# Patient Record
Sex: MALE | Race: ASIAN | NOT HISPANIC OR LATINO
[De-identification: names, ages, dates, MRNs, and addresses within clinical notes are randomized per-mention and may not be internally consistent; named-entity substitution may affect disease eponyms.]

---

## 2021-03-18 PROBLEM — Z00.00 ENCOUNTER FOR PREVENTIVE HEALTH EXAMINATION: Status: ACTIVE | Noted: 2021-03-18

## 2021-03-21 ENCOUNTER — APPOINTMENT (OUTPATIENT)
Dept: DISASTER EMERGENCY | Facility: CLINIC | Age: 71
End: 2021-03-21

## 2021-03-22 LAB — SARS-COV-2 N GENE NPH QL NAA+PROBE: NOT DETECTED

## 2021-03-24 ENCOUNTER — INPATIENT (INPATIENT)
Facility: HOSPITAL | Age: 71
LOS: 1 days | Discharge: HOME | DRG: 247 | End: 2021-03-26
Attending: INTERNAL MEDICINE | Admitting: INTERNAL MEDICINE
Payer: MEDICARE

## 2021-03-24 ENCOUNTER — TRANSCRIPTION ENCOUNTER (OUTPATIENT)
Age: 71
End: 2021-03-24

## 2021-03-24 VITALS — WEIGHT: 175.05 LBS

## 2021-03-24 DIAGNOSIS — Z98.890 OTHER SPECIFIED POSTPROCEDURAL STATES: Chronic | ICD-10-CM

## 2021-03-24 DIAGNOSIS — R93.1 ABNORMAL FINDINGS ON DIAGNOSTIC IMAGING OF HEART AND CORONARY CIRCULATION: ICD-10-CM

## 2021-03-24 LAB
ANION GAP SERPL CALC-SCNC: 15 MMOL/L — SIGNIFICANT CHANGE UP (ref 5–17)
BUN SERPL-MCNC: 14 MG/DL — SIGNIFICANT CHANGE UP (ref 7–23)
CALCIUM SERPL-MCNC: 9.9 MG/DL — SIGNIFICANT CHANGE UP (ref 8.4–10.5)
CHLORIDE SERPL-SCNC: 98 MMOL/L — SIGNIFICANT CHANGE UP (ref 96–108)
CO2 SERPL-SCNC: 26 MMOL/L — SIGNIFICANT CHANGE UP (ref 22–31)
CREAT SERPL-MCNC: 0.69 MG/DL — SIGNIFICANT CHANGE UP (ref 0.5–1.3)
GLUCOSE BLDC GLUCOMTR-MCNC: 120 MG/DL — HIGH (ref 70–99)
GLUCOSE BLDC GLUCOMTR-MCNC: 160 MG/DL — HIGH (ref 70–99)
GLUCOSE BLDC GLUCOMTR-MCNC: 198 MG/DL — HIGH (ref 70–99)
GLUCOSE BLDC GLUCOMTR-MCNC: 93 MG/DL — SIGNIFICANT CHANGE UP (ref 70–99)
GLUCOSE SERPL-MCNC: 165 MG/DL — HIGH (ref 70–99)
HCT VFR BLD CALC: 47 % — SIGNIFICANT CHANGE UP (ref 39–50)
HGB BLD-MCNC: 14.6 G/DL — SIGNIFICANT CHANGE UP (ref 13–17)
MCHC RBC-ENTMCNC: 26.6 PG — LOW (ref 27–34)
MCHC RBC-ENTMCNC: 31.1 GM/DL — LOW (ref 32–36)
MCV RBC AUTO: 85.8 FL — SIGNIFICANT CHANGE UP (ref 80–100)
NRBC # BLD: 0 /100 WBCS — SIGNIFICANT CHANGE UP (ref 0–0)
PLATELET # BLD AUTO: 214 K/UL — SIGNIFICANT CHANGE UP (ref 150–400)
POTASSIUM SERPL-MCNC: 4.1 MMOL/L — SIGNIFICANT CHANGE UP (ref 3.5–5.3)
POTASSIUM SERPL-SCNC: 4.1 MMOL/L — SIGNIFICANT CHANGE UP (ref 3.5–5.3)
RBC # BLD: 5.48 M/UL — SIGNIFICANT CHANGE UP (ref 4.2–5.8)
RBC # FLD: 13.5 % — SIGNIFICANT CHANGE UP (ref 10.3–14.5)
SODIUM SERPL-SCNC: 139 MMOL/L — SIGNIFICANT CHANGE UP (ref 135–145)
WBC # BLD: 6.8 K/UL — SIGNIFICANT CHANGE UP (ref 3.8–10.5)
WBC # FLD AUTO: 6.8 K/UL — SIGNIFICANT CHANGE UP (ref 3.8–10.5)

## 2021-03-24 PROCEDURE — 93010 ELECTROCARDIOGRAM REPORT: CPT

## 2021-03-24 RX ORDER — GLUCAGON INJECTION, SOLUTION 0.5 MG/.1ML
1 INJECTION, SOLUTION SUBCUTANEOUS ONCE
Refills: 0 | Status: DISCONTINUED | OUTPATIENT
Start: 2021-03-24 | End: 2021-03-26

## 2021-03-24 RX ORDER — SODIUM CHLORIDE 9 MG/ML
1000 INJECTION, SOLUTION INTRAVENOUS
Refills: 0 | Status: DISCONTINUED | OUTPATIENT
Start: 2021-03-24 | End: 2021-03-26

## 2021-03-24 RX ORDER — INSULIN LISPRO 100/ML
VIAL (ML) SUBCUTANEOUS
Refills: 0 | Status: DISCONTINUED | OUTPATIENT
Start: 2021-03-24 | End: 2021-03-26

## 2021-03-24 RX ORDER — ASPIRIN/CALCIUM CARB/MAGNESIUM 324 MG
81 TABLET ORAL DAILY
Refills: 0 | Status: DISCONTINUED | OUTPATIENT
Start: 2021-03-24 | End: 2021-03-26

## 2021-03-24 RX ORDER — DEXTROSE 50 % IN WATER 50 %
12.5 SYRINGE (ML) INTRAVENOUS ONCE
Refills: 0 | Status: DISCONTINUED | OUTPATIENT
Start: 2021-03-24 | End: 2021-03-26

## 2021-03-24 RX ORDER — DEXTROSE 50 % IN WATER 50 %
25 SYRINGE (ML) INTRAVENOUS ONCE
Refills: 0 | Status: DISCONTINUED | OUTPATIENT
Start: 2021-03-24 | End: 2021-03-26

## 2021-03-24 RX ORDER — INSULIN LISPRO 100/ML
VIAL (ML) SUBCUTANEOUS AT BEDTIME
Refills: 0 | Status: DISCONTINUED | OUTPATIENT
Start: 2021-03-24 | End: 2021-03-26

## 2021-03-24 RX ORDER — ATORVASTATIN CALCIUM 80 MG/1
40 TABLET, FILM COATED ORAL AT BEDTIME
Refills: 0 | Status: DISCONTINUED | OUTPATIENT
Start: 2021-03-24 | End: 2021-03-26

## 2021-03-24 RX ORDER — DEXTROSE 50 % IN WATER 50 %
15 SYRINGE (ML) INTRAVENOUS ONCE
Refills: 0 | Status: DISCONTINUED | OUTPATIENT
Start: 2021-03-24 | End: 2021-03-26

## 2021-03-24 RX ORDER — METOPROLOL TARTRATE 50 MG
12.5 TABLET ORAL EVERY 12 HOURS
Refills: 0 | Status: DISCONTINUED | OUTPATIENT
Start: 2021-03-24 | End: 2021-03-26

## 2021-03-24 RX ORDER — TAMSULOSIN HYDROCHLORIDE 0.4 MG/1
0.4 CAPSULE ORAL AT BEDTIME
Refills: 0 | Status: DISCONTINUED | OUTPATIENT
Start: 2021-03-24 | End: 2021-03-26

## 2021-03-24 RX ORDER — CLOPIDOGREL BISULFATE 75 MG/1
75 TABLET, FILM COATED ORAL DAILY
Refills: 0 | Status: DISCONTINUED | OUTPATIENT
Start: 2021-03-24 | End: 2021-03-26

## 2021-03-24 RX ADMIN — ATORVASTATIN CALCIUM 40 MILLIGRAM(S): 80 TABLET, FILM COATED ORAL at 21:35

## 2021-03-24 RX ADMIN — Medication 12.5 MILLIGRAM(S): at 18:03

## 2021-03-24 NOTE — H&P CARDIOLOGY - HISTORY OF PRESENT ILLNESS
69 y/o M with PMhx of Carotid artery disease, HLD and Type 2 diabetes who presented to his cardiologist Dr. Shaik Madera with c/o chest pain, SOB, and dizziness.  He had a stress test performed on 3/14 which revealed moderate inferior defect with wall motion abnormality and EF of 64%.  Based on these findings he is referred for [facility] today.  He is asymptomatic at present, denies CP/SOB, dizziness/lightheadedness. 71 y/o M with PMhx of Carotid artery disease, HLD and Type 2 diabetes who presented to his cardiologist Dr. Shaik Madera with c/o exertional dyspnea over the past year.  He had a stress test performed on 3/14 which revealed moderate inferior defect with wall motion abnormality and EF of 64%.  Based on these findings he is referred for [facility] today.  He is asymptomatic at present, denies CP/SOB, dizziness/lightheadedness.

## 2021-03-24 NOTE — PHARMACOTHERAPY INTERVENTION NOTE - COMMENTS
Home Medications:  Flomax 0.4 mg oral capsule: 1 cap(s) orally once a day (24 Mar 2021 15:10)  glipiZIDE 10 mg oral tablet: 1 tab(s) orally 2 times a day (24 Mar 2021 15:10)  Janumet 50 mg-1000 mg oral tablet: 1 tab(s) orally 2 times a day (24 Mar 2021 15:10)  Lipitor 10 mg oral tablet: 1 tab(s) orally once a day (24 Mar 2021 15:10)    INPATIENT MEDICATIONS  (STANDING):  aspirin enteric coated 81 milliGRAM(s) Oral daily  atorvastatin 40 milliGRAM(s) Oral at bedtime  clopidogrel Tablet 75 milliGRAM(s) Oral daily  tamsulosin 0.4 milliGRAM(s) Oral at bedtime    Reviewed home medications with patient and updated patient on medication changes:  - Atorvastatin 40mg PO daily (patient endorsed never starting the atorvastatin 10mg)  - Oral antihyperglycemics held during procedure (recommend ISS during inpatient)  - Tamusulosin resumed (patient last dose on 3/24 AM)    Counseled patient on newly initiated medication doses, indications, and possible side effects:  - Clopidogrel 75mg PO daily  - Aspirin 81mg PO daily  - Atorvastatin 40mg PO daily    Devin ZieglerD  PGY2 Pharmacotherapy Resident   [id number]

## 2021-03-24 NOTE — ASU PATIENT PROFILE, ADULT - PMH
BPH (benign prostatic hyperplasia)    Carotid artery disease    Diabetes mellitus type 2 in nonobese    HLD (hyperlipidemia)

## 2021-03-25 DIAGNOSIS — E78.5 HYPERLIPIDEMIA, UNSPECIFIED: ICD-10-CM

## 2021-03-25 DIAGNOSIS — I10 ESSENTIAL (PRIMARY) HYPERTENSION: ICD-10-CM

## 2021-03-25 DIAGNOSIS — I25.118 ATHEROSCLEROTIC HEART DISEASE OF NATIVE CORONARY ARTERY WITH OTHER FORMS OF ANGINA PECTORIS: ICD-10-CM

## 2021-03-25 LAB
A1C WITH ESTIMATED AVERAGE GLUCOSE RESULT: 7.2 % — HIGH (ref 4–5.6)
COVID-19 SPIKE DOMAIN AB INTERP: NEGATIVE — SIGNIFICANT CHANGE UP
COVID-19 SPIKE DOMAIN ANTIBODY RESULT: 0.4 U/ML — SIGNIFICANT CHANGE UP
ESTIMATED AVERAGE GLUCOSE: 160 MG/DL — HIGH (ref 68–114)
GLUCOSE BLDC GLUCOMTR-MCNC: 124 MG/DL — HIGH (ref 70–99)
GLUCOSE BLDC GLUCOMTR-MCNC: 132 MG/DL — HIGH (ref 70–99)
GLUCOSE BLDC GLUCOMTR-MCNC: 97 MG/DL — SIGNIFICANT CHANGE UP (ref 70–99)
SARS-COV-2 IGG+IGM SERPL QL IA: 0.4 U/ML — SIGNIFICANT CHANGE UP
SARS-COV-2 IGG+IGM SERPL QL IA: NEGATIVE — SIGNIFICANT CHANGE UP

## 2021-03-25 PROCEDURE — 93010 ELECTROCARDIOGRAM REPORT: CPT

## 2021-03-25 PROCEDURE — 92920 PRQ TRLUML C ANGIOP 1ART&/BR: CPT | Mod: RC,52

## 2021-03-25 PROCEDURE — 99152 MOD SED SAME PHYS/QHP 5/>YRS: CPT

## 2021-03-25 PROCEDURE — 99232 SBSQ HOSP IP/OBS MODERATE 35: CPT

## 2021-03-25 RX ORDER — ASPIRIN/CALCIUM CARB/MAGNESIUM 324 MG
1 TABLET ORAL
Qty: 0 | Refills: 0 | DISCHARGE
Start: 2021-03-25

## 2021-03-25 RX ADMIN — ATORVASTATIN CALCIUM 40 MILLIGRAM(S): 80 TABLET, FILM COATED ORAL at 21:16

## 2021-03-25 RX ADMIN — Medication 12.5 MILLIGRAM(S): at 18:29

## 2021-03-25 RX ADMIN — TAMSULOSIN HYDROCHLORIDE 0.4 MILLIGRAM(S): 0.4 CAPSULE ORAL at 21:16

## 2021-03-25 RX ADMIN — Medication 12.5 MILLIGRAM(S): at 05:39

## 2021-03-25 RX ADMIN — Medication 81 MILLIGRAM(S): at 05:39

## 2021-03-25 RX ADMIN — CLOPIDOGREL BISULFATE 75 MILLIGRAM(S): 75 TABLET, FILM COATED ORAL at 05:39

## 2021-03-25 NOTE — DISCHARGE NOTE PROVIDER - HOSPITAL COURSE
HPI:  69 y/o M with PMhx of Carotid artery disease, HLD and Type 2 diabetes who presented to his cardiologist Dr. Shaik Madera with c/o exertional dyspnea over the past year.  He had a stress test performed on 3/14 which revealed moderate inferior defect with wall motion abnormality and EF of 64%.  Based on these findings he is referred for [facility] today.  He is asymptomatic at present, denies CP/SOB, dizziness/lightheadedness. (24 Mar 2021 12:18).    3/24 cardiac cath with shockwaves and one stent to the prox circ. Right radial site without swelling, bleeding.   HPI:  69 y/o M with PMhx of Carotid artery disease, HLD and Type 2 diabetes who presented to his cardiologist Dr. Shaik Madera with c/o exertional dyspnea over the past year.  He had a stress test performed on 3/14 which revealed moderate inferior defect with wall motion abnormality and EF of 64%.  Based on these findings he is referred for [facility] today.  He is asymptomatic at present, denies CP/SOB, dizziness/lightheadedness. (24 Mar 2021 12:18).    3/24 cardiac cath with shockwaves and one stent to the prox circ. Right radial site without swelling, bleeding.    3/25 RCA failed      3/26 stable for discharge to home

## 2021-03-25 NOTE — DISCHARGE NOTE PROVIDER - CARE PROVIDER_API CALL
Shaik JOSUE Madera)  Cardiology; Nuclear Medicine  [address] [place] [address]  Phone: [phone]  Fax: [phone]  Follow Up Time: 2 weeks

## 2021-03-25 NOTE — DISCHARGE NOTE PROVIDER - NSDCCPCAREPLAN_GEN_ALL_CORE_FT
PRINCIPAL DISCHARGE DIAGNOSIS  Diagnosis: CAD (coronary artery disease), native coronary artery  Assessment and Plan of Treatment: No heavy lifting or pushing/pulling with procedure arm for 2 weeks. No driving for 2 days. You may shower 24 hours following the procedure but avoid baths/swimming for 1 week. Check your wrist site for bleeding and/or swelling daily following procedure and call your doctor immediately if it occurs or if you experience increased pain at the site. Follow up with your cardiologist in 1-2 weeks. You may call [facility] if you have any questions/concerns regarding your procedure [phone].      SECONDARY DISCHARGE DIAGNOSES  Diagnosis: HTN (hypertension)  Assessment and Plan of Treatment: Continue with your blood pressure medications; eat a heart healthy diet with low salt diet; exercise regularly (consult with your physician or cardiologist first); maintain a heart healthy weight; if you smoke - quit (A resource to help you stop smoking is the [facility] [employer] [facility] – phone number [phone].); include healthy ways to manage stress. Continue to follow with your primary care physician or cardiologist.    Diagnosis: HLD (hyperlipidemia)  Assessment and Plan of Treatment: Continue with your cholesterol medications. Eat a heart healthy diet that is low in saturated fats and salt, and includes whole grains, fruits, vegetables and lean protein; exercise regularly (consult with your physician or cardiologist first); maintain a heart healthy weight; if you smoke - quit (A resource to help you stop smoking is the [facility] – phone number [phone].). Continue to follow with your primary physician or cardiologist.    Diagnosis: DM type 2, goal HbA1c < 7%  Assessment and Plan of Treatment: Your Hemoglobin A1c level is   Continue to follow with your primary care MD or your endocrinologist.  Follow a heart healthy diabetic diet. If you check your fingerstick glucose at home, call your MD if it is greater than 250mg/dL on 2 occasions or less than 100mg/dL on 2 occasions. Know signs of low blood sugar, such as: dizziness, shakiness, sweating, confusion, hunger, nervousness-drink 4 ounces apple juice if occurs and call your doctor. Know early signs of high blood sugar, such as: frequent urination, increased thirst, blurry vision, fatigue, headache - call your doctor if this occurs. Follow with other practitioners to care for your diabetes, such as ophthalmologist and podiatrist.

## 2021-03-25 NOTE — DISCHARGE NOTE PROVIDER - NSDCCPTREATMENT_GEN_ALL_CORE_FT
PRINCIPAL PROCEDURE  Procedure: Placement of coronary artery stent  Findings and Treatment: stent to the prox circ and

## 2021-03-25 NOTE — PROGRESS NOTE ADULT - ASSESSMENT
69 y/o male with above pmhx, s/p shockwave and one stent to the prox circ via right radial artery access.

## 2021-03-25 NOTE — DISCHARGE NOTE PROVIDER - NSDCMRMEDTOKEN_GEN_ALL_CORE_FT
aspirin 81 mg oral delayed release tablet: 1 tab(s) orally once a day  Flomax 0.4 mg oral capsule: 1 cap(s) orally once a day  glipiZIDE 10 mg oral tablet: 1 tab(s) orally 2 times a day  Janumet 50 mg-1000 mg oral tablet: 1 tab(s) orally 2 times a day  Lipitor 10 mg oral tablet: 1 tab(s) orally once a day   aspirin 81 mg oral delayed release tablet: 1 tab(s) orally once a day  clopidogrel 75 mg oral tablet: 1 tab(s) orally once a day  Flomax 0.4 mg oral capsule: 1 cap(s) orally once a day  glipiZIDE 10 mg oral tablet: 1 tab(s) orally 2 times a day  Janumet 50 mg-1000 mg oral tablet: 1 tab(s) orally 2 times a day (resume on 3/27)  Lipitor 10 mg oral tablet: 1 tab(s) orally once a day  metoprolol succinate 25 mg oral tablet, extended release: 0.5 tab(s) orally 2 times a day

## 2021-03-26 ENCOUNTER — TRANSCRIPTION ENCOUNTER (OUTPATIENT)
Age: 71
End: 2021-03-26

## 2021-03-26 VITALS
HEART RATE: 72 BPM | SYSTOLIC BLOOD PRESSURE: 97 MMHG | OXYGEN SATURATION: 97 % | RESPIRATION RATE: 17 BRPM | DIASTOLIC BLOOD PRESSURE: 52 MMHG | TEMPERATURE: 98 F

## 2021-03-26 LAB — GLUCOSE BLDC GLUCOMTR-MCNC: 118 MG/DL — HIGH (ref 70–99)

## 2021-03-26 PROCEDURE — C1887: CPT

## 2021-03-26 PROCEDURE — 82962 GLUCOSE BLOOD TEST: CPT

## 2021-03-26 PROCEDURE — 99153 MOD SED SAME PHYS/QHP EA: CPT

## 2021-03-26 PROCEDURE — 85027 COMPLETE CBC AUTOMATED: CPT

## 2021-03-26 PROCEDURE — 83036 HEMOGLOBIN GLYCOSYLATED A1C: CPT

## 2021-03-26 PROCEDURE — C1769: CPT

## 2021-03-26 PROCEDURE — 93005 ELECTROCARDIOGRAM TRACING: CPT

## 2021-03-26 PROCEDURE — 92920 PRQ TRLUML C ANGIOP 1ART&/BR: CPT | Mod: RC

## 2021-03-26 PROCEDURE — 86769 SARS-COV-2 COVID-19 ANTIBODY: CPT

## 2021-03-26 PROCEDURE — 99152 MOD SED SAME PHYS/QHP 5/>YRS: CPT

## 2021-03-26 PROCEDURE — 80048 BASIC METABOLIC PNL TOTAL CA: CPT

## 2021-03-26 RX ORDER — METOPROLOL TARTRATE 50 MG
0.5 TABLET ORAL
Qty: 30 | Refills: 0
Start: 2021-03-26 | End: 2021-04-24

## 2021-03-26 RX ORDER — SITAGLIPTIN AND METFORMIN HYDROCHLORIDE 500; 50 MG/1; MG/1
1 TABLET, FILM COATED ORAL
Qty: 0 | Refills: 0 | DISCHARGE

## 2021-03-26 RX ORDER — CLOPIDOGREL BISULFATE 75 MG/1
1 TABLET, FILM COATED ORAL
Qty: 30 | Refills: 0
Start: 2021-03-26 | End: 2021-04-24

## 2021-03-26 RX ADMIN — Medication 12.5 MILLIGRAM(S): at 06:06

## 2021-03-26 RX ADMIN — Medication 81 MILLIGRAM(S): at 06:05

## 2021-03-26 RX ADMIN — CLOPIDOGREL BISULFATE 75 MILLIGRAM(S): 75 TABLET, FILM COATED ORAL at 06:06

## 2021-03-26 NOTE — PROGRESS NOTE ADULT - SUBJECTIVE AND OBJECTIVE BOX
Patient resting comfortably overnight, no distress noted      Allergies    No Known Allergies    Intolerances        Medications:  aspirin enteric coated 81 milliGRAM(s) Oral daily  atorvastatin 40 milliGRAM(s) Oral at bedtime  clopidogrel Tablet 75 milliGRAM(s) Oral daily  dextrose 40% Gel 15 Gram(s) Oral once  dextrose 5%. 1000 milliLiter(s) IV Continuous <Continuous>  dextrose 5%. 1000 milliLiter(s) IV Continuous <Continuous>  dextrose 50% Injectable 25 Gram(s) IV Push once  dextrose 50% Injectable 12.5 Gram(s) IV Push once  dextrose 50% Injectable 25 Gram(s) IV Push once  glucagon  Injectable 1 milliGRAM(s) IntraMuscular once  insulin lispro (ADMELOG) corrective regimen sliding scale   SubCutaneous three times a day before meals  insulin lispro (ADMELOG) corrective regimen sliding scale   SubCutaneous at bedtime  metoprolol succinate ER 12.5 milliGRAM(s) Oral every 12 hours  tamsulosin 0.4 milliGRAM(s) Oral at bedtime      Vitals:  T(C): 36.8 (03-25-21 @ 10:40), Max: 36.8 (03-25-21 @ 05:52)  HR: 75 (03-25-21 @ 18:20) (61 - 77)  BP: 114/57 (03-25-21 @ 18:20) (93/56 - 131/74)  BP(mean): --  RR: 17 (03-25-21 @ 18:20) (15 - 18)  SpO2: 98% (03-25-21 @ 18:20) (97% - 99%)  Wt(kg): --  Daily     Daily   I&O's Summary    25 Mar 2021 07:01  -  26 Mar 2021 05:05  --------------------------------------------------------  IN: 240 mL / OUT: 0 mL / NET: 240 mL      Physical Exam:  Appearance: Normal  Eyes: PERRL, EOMI  HENT: Normal oral muscosa, NC/AT  Cardiovascular: S1S2, RRR, No M/R/G, no JVD, No Lower extremity edema  Procedural Access Site: Right radial no hematoma, Non-tender to palpation, 2+ pulse  Respiratory: Clear to auscultation bilaterally  Gastrointestinal: Soft, Non tender, Normal Bowel Sounds  Musculoskeletal: No clubbing, No joint deformity   Neurologic: Non-focal  Lymphatic: No lymphadenopathy  Psychiatry: AAOx3, Mood & affect appropriate  Skin: No rashes, No ecchymoses, No cyanosis      03-24                        14.6   6.80  )-----------( 214      ( 24 Mar 2021 12:39 )             47.0       139  |  98  |  14  ----------------------------<  165<H>  4.1   |  26  |  0.69    Ca    9.9      24 Mar 2021 12:39      ECG: 3/25/21 10:42:04  SR rate 64    Cath: 3/25/21  INDICATIONS: Unstable angina - CCS4.  HISTORY: The patient has a history of coronary artery disease. The patient  has hypertension and medication-treated dyslipidemia.  PROCEDURE:  --  Sonosite - Interventional.  --  Intervention on proximal RCA: .  TECHNIQUE: The risks and alternatives of the procedures and conscious  sedation were explained to the patient and informed consent was obtained.  Cardiac catheterization performed urgently. Coronary intervention  performed electively.  Local anesthetic given. Right femoral artery access. RADIATION EXPOSURE:  20.9 min. An unsuccessful attempt at was performed on the 100 % lesion in  the proximal RCA. Following intervention there was a 100 % residual  stenosis. According to the ACC/AHA classification system, this lesion was  a type C lesion. There was ALINE 0 flow before the procedure and ALINE 0  flow after the procedure. Vessel setup was performed. A 6FR JR4 LAUNCHER  guiding catheter was used to intubate the vessel. A MIRACLEBRO 6 WIRE wire  was used. Sonosite - Interventional.  CONTRAST GIVEN: Omnipaque 57 ml.  MEDICATIONS GIVEN: Midazolam, 1 mg, IV. Fentanyl, 25 mcg, IV. Heparin, 8000  units, IV. Normal Saline .9%, 250, IV, other.  CORONARY VESSELS:  RCA:   --  Proximal RCA: There was a 100 % stenosis.  COMPLICATIONS: There were no complications.  DIAGNOSTIC RECOMMENDATIONS: this is a .  ASA and Plavix for 1 year.  INTERVENTIONAL RECOMMENDATIONS: this is a .  ASA and Plavix for 1 year.    3/24/21  INDICATIONS: Stable angina - CCS2. Abnormal stress test.  HISTORY: The patient has a history of coronary artery disease. The patient  has hypertension and medication-treated dyslipidemia.  PROCEDURE:  --  Left heart catheterization.  --  Left coronary angiography.  --  Right coronary angiography.  --  Rotational Atherectomy.  --  Intervention on proximal circumflex: drug-eluting stent.  TECHNIQUE: The risks and alternatives of the procedures and conscious  sedation were explained to the patient and informed consent was obtained.  Cardiac catheterization performed electively.  Local anesthetic given. Right radial artery access. Left heart  catheterization. Left coronary artery angiography. The vessel was injected  utilizing a catheter. Right coronary artery angiography. The vessel was  injected utilizing a catheter. RADIATION EXPOSURE: 14.23 min. A successful  drug-eluting stent was performed on the 80 % lesion in the proximal  circumflex. Following intervention there was a 1 % residual stenosis.  According to the ACC/AHA classification system, this lesion was a type C  lesion. There was ALINE 3 flow before the procedure and ALINE 3 flow after  the procedure. Vessel setup was performed. A GAIL [id number] wire was used  to cross the lesion. Vessel setup was performed. A 6FR JL3.5 LAUNCHER  guiding catheter was used to intubate the vessel. Vessel setup was  performed. A 6FR EBU 3.5 LAUNCHER guiding catheter was used to intubate  the vessel. Balloon angioplasty was performed, using a 2.0 X 12 EUPHORA  balloon, with 1 inflations. Rotational atherectomy was performed, with a 0  passes. A 2.50 X 15 BRIEN drug-eluting stent was placed across the lesion  and deployed at a maximum inflation pressure of 16 yobany. Balloon  angioplasty was performed, with 2 inflations and a maximum inflation  pressure of 10 yobany. Rotational Atherectomy.  CONTRAST GIVEN: Omnipaque 58 ml. Omnipaque 62 ml.  MEDICATIONS GIVEN: Midazolam, 1 mg, IV. Fentanyl, 25 mcg, IV. Verapamil  (Isoptin, Calan, Covera), 2.5 mg, IA. Heparin, 3000 units, IA. Heparin,  5000 units, IV. Aspirin, 325 mg, PO. Clopidogrel (Plavix), 600 mg, PO.  CORONARY VESSELS: The coronary circulation is right dominant.  LM:   --  LM: Angiography showed minor luminal irregularities with no flow  limiting lesions.  LAD:   --  Proximal LAD: There was a diffuse 40 % stenosis.  CX:   --  Proximal circumflex: There was a 80 % stenosis.  RCA:   --  Proximal RCA: There was a 100 % stenosis.  COMPLICATIONS: There were no complications.  DIAGNOSTIC RECOMMENDATIONS: ASA and Plavix for 6 months  Return for PCI of RCA in am Shockwave done on proximal LCX  INTERVENTIONAL RECOMMENDATIONS: ASA and Plavix for 6 months  Return for PCI of RCA in am Shockwave done on proximal LCX    Interpretation of Telemetry: SR 60s-70s      71 y/o M with PMhx of Carotid artery disease, HLD and Type 2 diabetes who presented to his cardiologist Dr. Shaik Madera with c/o exertional dyspnea over the past year.  He had a stress test performed on 3/14 which revealed moderate inferior defect with wall motion abnormality and EF of 64%.  Based on these findings he is referred for [facility] today.  He is asymptomatic at present, denies CP/SOB, dizziness/lightheadedness. 3/24 s/p LHC shockwave & LUKE x1 to pCirc via RRA. 3/25 s/p LHC failed  via RRA      #   CAD       3/24 s/p LHC shockwave & LUKE x1 to pCirc via RRA       3/25 s/p failed  via RRA       no hematoma, positive pulses       continue aspirin and plavix       continue atorvastatin       continue DASH diet    #   HLD       continue atorvastatin    #   T2DM       continue SSI while inpatient       FS TID/HS       continue consistent carb diet       will resume oral hypoglycemics upon discharge    #   Dispo       anticipate discharge if site and condition are stable       follow up appt with Dr. Bustos in two weeks      Drew Bettencourt [facility]  Ext [phone]    
HPI:  69 y/o M with PMhx of Carotid artery disease, HLD and Type 2 diabetes who presented to his cardiologist Dr. Shaik Madera with c/o exertional dyspnea over the past year.  He had a stress test performed on 3/14 which revealed moderate inferior defect with wall motion abnormality and EF of 64%.  Based on these findings he is referred for [facility] today.  He is asymptomatic at present, denies CP/SOB, dizziness/lightheadedness. (24 Mar 2021 12:18).    Allergies    No Known Allergies    Intolerances        Medications:  aspirin enteric coated 81 milliGRAM(s) Oral daily  atorvastatin 40 milliGRAM(s) Oral at bedtime  clopidogrel Tablet 75 milliGRAM(s) Oral daily  dextrose 40% Gel 15 Gram(s) Oral once  dextrose 5%. 1000 milliLiter(s) IV Continuous <Continuous>  dextrose 5%. 1000 milliLiter(s) IV Continuous <Continuous>  dextrose 50% Injectable 25 Gram(s) IV Push once  dextrose 50% Injectable 12.5 Gram(s) IV Push once  dextrose 50% Injectable 25 Gram(s) IV Push once  glucagon  Injectable 1 milliGRAM(s) IntraMuscular once  insulin lispro (ADMELOG) corrective regimen sliding scale   SubCutaneous three times a day before meals  insulin lispro (ADMELOG) corrective regimen sliding scale   SubCutaneous at bedtime  metoprolol succinate ER 12.5 milliGRAM(s) Oral every 12 hours  tamsulosin 0.4 milliGRAM(s) Oral at bedtime      Vitals:  T(C): 36.8 (21 @ 12:30), Max: 36.8 (21 @ 12:30)  HR: 73 (21 @ 21:40) (70 - 80)  BP: 118/60 (21 @ 21:40) (104/50 - 139/67)  BP(mean): --  RR: 16 (21 @ 21:40) (16 - 18)  SpO2: 98% (21 @ 21:40) (97% - 100%)  Wt(kg): --  Daily Height in cm: 182.88 (24 Mar 2021 12:30)    Daily Weight in k.4 (24 Mar 2021 12:18)  I&O's Summary        Physical Exam:  Appearance: Normal  Eyes: PERRL, EOMI  HENT: Normal oral muscosa, NC/AT  Cardiovascular: S1S2, RRR, No M/R/G, no JVD, No Lower extremity edema  Procedural Access Site: No hematoma, Non-tender to palpation, 2+ pulse, No bruit, No Ecchymosis  Respiratory: Clear to auscultation bilaterally  Gastrointestinal: Soft, Non tender, Normal Bowel Sounds  Musculoskeletal: No clubbing, No joint deformity   Neurologic: Non-focal  Lymphatic: No lymphadenopathy  Psychiatry: AAOx3, Mood & affect appropriate  Skin: No rashes, No ecchymoses, No cyanosis        139  |  98  |  14  ----------------------------<  165<H>  4.1   |  26  |  0.69    Ca    9.9      24 Mar 2021 12:39              Lipid panel   Hgb A1c                         14.6   6.80  )-----------( 214      ( 24 Mar 2021 12:39 )             47.0

## 2021-03-26 NOTE — DISCHARGE NOTE NURSING/CASE MANAGEMENT/SOCIAL WORK - PATIENT PORTAL LINK FT
You can access the FollowMyHealth Patient Portal offered by [facility] by registering at the following website: [email or web address]. By joining [facility]’s FollowMyHealth portal, you will also be able to view your health information using other applications (apps) compatible with our system.

## 2021-04-09 ENCOUNTER — APPOINTMENT (OUTPATIENT)
Dept: CV DIAGNOSITCS | Facility: HOSPITAL | Age: 71
End: 2021-04-09

## 2022-07-11 PROBLEM — E78.5 HYPERLIPIDEMIA, UNSPECIFIED: Chronic | Status: ACTIVE | Noted: 2021-03-24

## 2022-07-11 PROBLEM — I77.9 DISORDER OF ARTERIES AND ARTERIOLES, UNSPECIFIED: Chronic | Status: ACTIVE | Noted: 2021-03-24

## 2022-07-11 PROBLEM — N40.0 BENIGN PROSTATIC HYPERPLASIA WITHOUT LOWER URINARY TRACT SYMPTOMS: Chronic | Status: ACTIVE | Noted: 2021-03-24

## 2022-07-11 PROBLEM — E11.9 TYPE 2 DIABETES MELLITUS WITHOUT COMPLICATIONS: Chronic | Status: ACTIVE | Noted: 2021-03-24

## 2022-07-21 ENCOUNTER — APPOINTMENT (OUTPATIENT)
Dept: NEUROLOGY | Facility: CLINIC | Age: 72
End: 2022-07-21

## 2022-07-21 VITALS
BODY MASS INDEX: 22.35 KG/M2 | DIASTOLIC BLOOD PRESSURE: 60 MMHG | SYSTOLIC BLOOD PRESSURE: 108 MMHG | HEIGHT: 72 IN | WEIGHT: 165 LBS

## 2022-07-21 DIAGNOSIS — Z87.438 PERSONAL HISTORY OF OTHER DISEASES OF MALE GENITAL ORGANS: ICD-10-CM

## 2022-07-21 DIAGNOSIS — Z87.891 PERSONAL HISTORY OF NICOTINE DEPENDENCE: ICD-10-CM

## 2022-07-21 PROCEDURE — 99204 OFFICE O/P NEW MOD 45 MIN: CPT

## 2022-07-21 NOTE — DATA REVIEWED
[de-identified] : MRA of the brain was performed on 6/9/2022 at [facility].\par The study was normal. [de-identified] : Skull x-ray was performed on 6/9/2022 at [facility].\par The study demonstrated posterior fossa craniotomy with metallic mesh at the site of previous surgery.

## 2022-07-21 NOTE — ASSESSMENT
[FreeTextEntry1] : This is a 72-year-old man with symptoms of difficulty with his feet.\par He has neuropathic findings on examination.\par Likely this is a result of diabetic peripheral neuropathy.\par I will obtain EMG and nerve conduction studies to assess things further.\par \par I will see him back after the testing.\par

## 2022-07-21 NOTE — CONSULT LETTER
[Dear  ___] : Dear  [unfilled], [Courtesy Letter:] : I had the pleasure of seeing your patient, [unfilled], in my office today. [Please see my note below.] : Please see my note below. [Consult Closing:] : Thank you very much for allowing me to participate in the care of this patient.  If you have any questions, please do not hesitate to contact me. [Sincerely,] : Sincerely, [FreeTextEntry3] : Sami Mcmillan MD.

## 2022-07-21 NOTE — PHYSICAL EXAM
[General Appearance - Alert] : alert [General Appearance - In No Acute Distress] : in no acute distress [Oriented To Time, Place, And Person] : oriented to person, place, and time [Affect] : the affect was normal [Memory Recent] : recent memory was not impaired [Memory Remote] : remote memory was not impaired [Cranial Nerves Optic (II)] : visual acuity intact bilaterally,  visual fields full to confrontation, pupils equal round and reactive to light [Cranial Nerves Oculomotor (III)] : extraocular motion intact [Cranial Nerves Trigeminal (V)] : facial sensation intact symmetrically [Cranial Nerves Facial (VII)] : face symmetrical [Cranial Nerves Glossopharyngeal (IX)] : tongue and palate midline [Cranial Nerves Accessory (XI - Cranial And Spinal)] : head turning and shoulder shrug symmetric [Cranial Nerves Hypoglossal (XII)] : there was no tongue deviation with protrusion [Motor Tone] : muscle tone was normal in all four extremities [Motor Strength] : muscle strength was normal in all four extremities [Sensation Tactile Decrease] : light touch was intact [Sensation Pain / Temperature Decrease] : pain and temperature was intact [Abnormal Walk] : normal gait [Optic Disc Abnormality] : the optic disc were normal in size and color [1+] : Patella left 1+ [0] : Ankle jerk left 0 [Dysarthria] : no dysarthria [Aphasia] : no dysphasia/aphasia [Romberg's Sign] : Romberg's sign was negtive [Coordination - Dysmetria Impaired Finger-to-Nose Bilateral] : not present [Plantar Reflex Right Only] : normal on the right [Plantar Reflex Left Only] : normal on the left

## 2022-07-21 NOTE — HISTORY OF PRESENT ILLNESS
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

## 2022-08-08 ENCOUNTER — OFFICE (OUTPATIENT)
Dept: URBAN - METROPOLITAN AREA CLINIC 109 | Facility: CLINIC | Age: 72
Setting detail: OPHTHALMOLOGY
End: 2022-08-08
Payer: COMMERCIAL

## 2022-08-08 DIAGNOSIS — H40.1131: ICD-10-CM

## 2022-08-08 DIAGNOSIS — H25.13: ICD-10-CM

## 2022-08-08 PROCEDURE — 92002 INTRM OPH EXAM NEW PATIENT: CPT | Performed by: OPHTHALMOLOGY

## 2022-08-08 PROCEDURE — 92083 EXTENDED VISUAL FIELD XM: CPT | Performed by: OPHTHALMOLOGY

## 2022-08-08 PROCEDURE — 92133 CPTRZD OPH DX IMG PST SGM ON: CPT | Performed by: OPHTHALMOLOGY

## 2022-08-08 PROCEDURE — 76514 ECHO EXAM OF EYE THICKNESS: CPT | Performed by: OPHTHALMOLOGY

## 2022-08-08 ASSESSMENT — REFRACTION_CURRENTRX
OD_OVR_VA: 20/
OS_ADD: +3.00
OS_CYLINDER: -0.50
OS_SPHERE: +2.50
OD_SPHERE: +1.00
OS_AXIS: 86
OD_AXIS: 90
OD_ADD: +3.00
OS_OVR_VA: 20/
OD_CYLINDER: -1.00

## 2022-08-08 ASSESSMENT — PACHYMETRY
OS_CT_CORRECTION: 0
OS_CT_UM: 548
OD_CT_CORRECTION: 3
OD_CT_UM: 502

## 2022-08-08 ASSESSMENT — CONFRONTATIONAL VISUAL FIELD TEST (CVF)
OS_FINDINGS: FULL
OD_FINDINGS: FULL

## 2022-08-08 ASSESSMENT — TONOMETRY: OD_IOP_MMHG: 21

## 2022-08-08 ASSESSMENT — REFRACTION_AUTOREFRACTION
OS_SPHERE: +2.75
OS_AXIS: 100
OD_SPHERE: UNABLE
OS_CYLINDER: -1.00

## 2022-08-08 ASSESSMENT — SPHEQUIV_DERIVED: OS_SPHEQUIV: 2.25

## 2022-08-08 ASSESSMENT — VISUAL ACUITY
OD_BCVA: 20/25-2
OS_BCVA: 20/70-2

## 2022-08-12 ENCOUNTER — OFFICE (OUTPATIENT)
Dept: URBAN - METROPOLITAN AREA CLINIC 109 | Facility: CLINIC | Age: 72
Setting detail: OPHTHALMOLOGY
End: 2022-08-12
Payer: COMMERCIAL

## 2022-08-12 DIAGNOSIS — H25.13: ICD-10-CM

## 2022-08-12 DIAGNOSIS — E11.3293: ICD-10-CM

## 2022-08-12 PROCEDURE — 92201 OPSCPY EXTND RTA DRAW UNI/BI: CPT | Performed by: OPHTHALMOLOGY

## 2022-08-12 PROCEDURE — 92014 COMPRE OPH EXAM EST PT 1/>: CPT | Performed by: OPHTHALMOLOGY

## 2022-08-12 ASSESSMENT — REFRACTION_CURRENTRX
OS_AXIS: 86
OS_CYLINDER: -0.50
OD_ADD: +3.00
OS_SPHERE: +2.50
OD_OVR_VA: 20/
OD_CYLINDER: -1.00
OS_OVR_VA: 20/
OS_ADD: +3.00
OD_SPHERE: +1.00
OD_AXIS: 90

## 2022-08-12 ASSESSMENT — REFRACTION_AUTOREFRACTION
OS_AXIS: 85
OS_CYLINDER: -1.25
OS_SPHERE: +3.00
OD_SPHERE: UNABLE

## 2022-08-12 ASSESSMENT — KERATOMETRY
OS_K1POWER_DIOPTERS: 41.87
OD_K1POWER_DIOPTERS: 42.75
OD_AXISANGLE_DEGREES: 180
OS_K2POWER_DIOPTERS: 43.25
OD_K2POWER_DIOPTERS: 42.87
OS_AXISANGLE_DEGREES: 171

## 2022-08-12 ASSESSMENT — VISUAL ACUITY
OD_BCVA: 20/25-1
OS_BCVA: 20/50-2

## 2022-08-12 ASSESSMENT — CONFRONTATIONAL VISUAL FIELD TEST (CVF)
OS_FINDINGS: FULL
OD_FINDINGS: FULL

## 2022-08-12 ASSESSMENT — AXIALLENGTH_DERIVED: OS_AL: 23.0258

## 2022-08-12 ASSESSMENT — SPHEQUIV_DERIVED: OS_SPHEQUIV: 2.375

## 2022-09-20 ENCOUNTER — APPOINTMENT (OUTPATIENT)
Dept: NEUROLOGY | Facility: CLINIC | Age: 72
End: 2022-09-20

## 2022-09-28 ENCOUNTER — APPOINTMENT (OUTPATIENT)
Dept: NEUROLOGY | Facility: CLINIC | Age: 72
End: 2022-09-28

## 2023-06-20 ENCOUNTER — RX ONLY (RX ONLY)
Age: 73
End: 2023-06-20

## 2023-06-20 ENCOUNTER — OFFICE (OUTPATIENT)
Dept: URBAN - METROPOLITAN AREA CLINIC 6 | Facility: CLINIC | Age: 73
Setting detail: OPHTHALMOLOGY
End: 2023-06-20
Payer: MEDICARE

## 2023-06-20 DIAGNOSIS — H01.001: ICD-10-CM

## 2023-06-20 DIAGNOSIS — Z96.1: ICD-10-CM

## 2023-06-20 DIAGNOSIS — H52.4: ICD-10-CM

## 2023-06-20 DIAGNOSIS — E11.9: ICD-10-CM

## 2023-06-20 DIAGNOSIS — H44.19: ICD-10-CM

## 2023-06-20 DIAGNOSIS — H01.002: ICD-10-CM

## 2023-06-20 DIAGNOSIS — H01.005: ICD-10-CM

## 2023-06-20 DIAGNOSIS — H01.004: ICD-10-CM

## 2023-06-20 PROCEDURE — 76512 OPH US DX B-SCAN: CPT | Performed by: OPHTHALMOLOGY

## 2023-06-20 PROCEDURE — 92015 DETERMINE REFRACTIVE STATE: CPT | Performed by: OPHTHALMOLOGY

## 2023-06-20 PROCEDURE — 92014 COMPRE OPH EXAM EST PT 1/>: CPT | Performed by: OPHTHALMOLOGY

## 2023-06-20 ASSESSMENT — PACHYMETRY
OD_CT_CORRECTION: 3
OD_CT_UM: 502

## 2023-06-20 ASSESSMENT — REFRACTION_CURRENTRX
OD_SPHERE: PLANO
OS_OVR_VA: 20/
OD_OVR_VA: 20/
OS_ADD: +2.75
OS_CYLINDER: SPHERE
OD_ADD: +2.75
OD_CYLINDER: -2.00
OD_AXIS: 081
OS_SPHERE: PLANO

## 2023-06-20 ASSESSMENT — REFRACTION_MANIFEST
OS_ADD: +2.50
OD_ADD: +2.50
OD_AXIS: 090
OD_CYLINDER: -1.50
OS_SPHERE: BALANCE
OD_SPHERE: PLANO
OD_VA1: 20/20

## 2023-06-20 ASSESSMENT — REFRACTION_AUTOREFRACTION
OD_SPHERE: PLANO
OS_SPHERE: UNABLE
OD_AXIS: 092
OD_CYLINDER: -1.50

## 2023-06-20 ASSESSMENT — KERATOMETRY
OS_K2POWER_DIOPTERS: 48.75
OD_K1POWER_DIOPTERS: 41.75
OD_AXISANGLE_DEGREES: 007
OS_AXISANGLE_DEGREES: 080
OS_K1POWER_DIOPTERS: 39.00
OD_K2POWER_DIOPTERS: 43.00

## 2023-06-20 ASSESSMENT — CONFRONTATIONAL VISUAL FIELD TEST (CVF)
OS_FINDINGS: FULL
OD_FINDINGS: FULL

## 2023-06-20 ASSESSMENT — LID EXAM ASSESSMENTS
OD_BLEPHARITIS: RLL RUL
OS_BLEPHARITIS: LLL LUL

## 2023-06-20 ASSESSMENT — VISUAL ACUITY
OD_BCVA: 20/NLP
OS_BCVA: 20/20

## 2023-06-20 ASSESSMENT — TONOMETRY: OD_IOP_MMHG: 12

## 2023-07-07 ENCOUNTER — OFFICE (OUTPATIENT)
Dept: URBAN - METROPOLITAN AREA CLINIC 104 | Facility: CLINIC | Age: 73
Setting detail: OPHTHALMOLOGY
End: 2023-07-07
Payer: MEDICARE

## 2023-07-07 DIAGNOSIS — H18.20: ICD-10-CM

## 2023-07-07 DIAGNOSIS — H21.02: ICD-10-CM

## 2023-07-07 PROBLEM — H01.002 BLEPHARITIS; RIGHT UPPER LID, RIGHT LOWER LID, LEFT UPPER LID, LEFT LOWER LID: Status: ACTIVE | Noted: 2023-06-20

## 2023-07-07 PROBLEM — H01.001 BLEPHARITIS; RIGHT UPPER LID, RIGHT LOWER LID, LEFT UPPER LID, LEFT LOWER LID: Status: ACTIVE | Noted: 2023-06-20

## 2023-07-07 PROBLEM — H16.223 DRY EYE SYNDROME K SICCA; BOTH EYES: Status: ACTIVE | Noted: 2023-06-20

## 2023-07-07 PROBLEM — H01.004 BLEPHARITIS; RIGHT UPPER LID, RIGHT LOWER LID, LEFT UPPER LID, LEFT LOWER LID: Status: ACTIVE | Noted: 2023-06-20

## 2023-07-07 PROBLEM — H44.19 ENDOPHTHALMITIS, OTHER ; LEFT EYE: Status: ACTIVE | Noted: 2023-06-20

## 2023-07-07 PROBLEM — Z96.1 PSEUDOPHAKIA ; RIGHT EYE: Status: ACTIVE | Noted: 2023-06-20

## 2023-07-07 PROBLEM — H01.005 BLEPHARITIS; RIGHT UPPER LID, RIGHT LOWER LID, LEFT UPPER LID, LEFT LOWER LID: Status: ACTIVE | Noted: 2023-06-20

## 2023-07-07 PROCEDURE — 99213 OFFICE O/P EST LOW 20 MIN: CPT | Performed by: OPTOMETRIST

## 2023-07-07 ASSESSMENT — CORNEAL EDEMA CLINICAL DESCRIPTION: OS_CORNEALEDEMA: 1+ 2+

## 2023-07-07 ASSESSMENT — LID EXAM ASSESSMENTS
OS_BLEPHARITIS: LLL LUL
OD_BLEPHARITIS: RLL RUL

## 2023-07-07 ASSESSMENT — PACHYMETRY
OD_CT_CORRECTION: 3
OD_CT_UM: 502

## 2023-07-07 ASSESSMENT — VISUAL ACUITY
OS_BCVA: 20/20
OD_BCVA: 20/NLP

## 2023-07-07 ASSESSMENT — REFRACTION_CURRENTRX
OS_OVR_VA: 20/
OD_OVR_VA: 20/

## 2023-07-07 ASSESSMENT — CONFRONTATIONAL VISUAL FIELD TEST (CVF)
OS_FINDINGS: FULL
OD_FINDINGS: FULL

## 2023-07-16 ENCOUNTER — RX ONLY (RX ONLY)
Age: 73
End: 2023-07-16

## 2023-07-16 ENCOUNTER — OFFICE (OUTPATIENT)
Dept: URBAN - METROPOLITAN AREA CLINIC 6 | Facility: CLINIC | Age: 73
Setting detail: OPHTHALMOLOGY
End: 2023-07-16
Payer: MEDICARE

## 2023-07-16 DIAGNOSIS — H44.19: ICD-10-CM

## 2023-07-16 DIAGNOSIS — H21.02: ICD-10-CM

## 2023-07-16 DIAGNOSIS — H16.223: ICD-10-CM

## 2023-07-16 DIAGNOSIS — H01.005: ICD-10-CM

## 2023-07-16 DIAGNOSIS — H18.20: ICD-10-CM

## 2023-07-16 DIAGNOSIS — Z96.1: ICD-10-CM

## 2023-07-16 DIAGNOSIS — H01.001: ICD-10-CM

## 2023-07-16 DIAGNOSIS — H01.002: ICD-10-CM

## 2023-07-16 DIAGNOSIS — H01.004: ICD-10-CM

## 2023-07-16 PROCEDURE — 99213 OFFICE O/P EST LOW 20 MIN: CPT | Performed by: OPHTHALMOLOGY

## 2023-07-16 ASSESSMENT — CONFRONTATIONAL VISUAL FIELD TEST (CVF)
OD_FINDINGS: FULL
OS_FINDINGS: FULL

## 2023-07-16 ASSESSMENT — PACHYMETRY
OD_CT_CORRECTION: 3
OD_CT_UM: 502

## 2023-07-16 ASSESSMENT — LID EXAM ASSESSMENTS
OD_BLEPHARITIS: RLL RUL
OS_BLEPHARITIS: LLL LUL

## 2023-07-16 ASSESSMENT — TONOMETRY: OD_IOP_MMHG: 15

## 2023-07-18 ASSESSMENT — VISUAL ACUITY
OS_BCVA: 20/20
OD_BCVA: 20/NLP

## 2023-09-08 ENCOUNTER — OFFICE (OUTPATIENT)
Dept: URBAN - METROPOLITAN AREA CLINIC 6 | Facility: CLINIC | Age: 73
Setting detail: OPHTHALMOLOGY
End: 2023-09-08
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
## 2023-09-08 PROCEDURE — NO SHOW FE NO SHOW FEE: Performed by: OPHTHALMOLOGY

## 2023-10-02 ENCOUNTER — RESULT REVIEW (OUTPATIENT)
Age: 73
End: 2023-10-02

## 2023-10-02 ENCOUNTER — APPOINTMENT (OUTPATIENT)
Age: 73
End: 2023-10-02
Payer: MEDICARE

## 2023-10-02 PROCEDURE — 42100 BIOPSY ROOF OF MOUTH: CPT

## 2023-10-09 ENCOUNTER — APPOINTMENT (OUTPATIENT)
Age: 73
End: 2023-10-09
Payer: MEDICARE

## 2023-10-09 PROCEDURE — 99024 POSTOP FOLLOW-UP VISIT: CPT

## 2023-10-10 PROBLEM — Z00.00 ENCOUNTER FOR PREVENTIVE HEALTH EXAMINATION: Noted: 2023-10-10

## 2023-10-12 ENCOUNTER — APPOINTMENT (OUTPATIENT)
Dept: NUCLEAR MEDICINE | Facility: CLINIC | Age: 73
End: 2023-10-12
Payer: MEDICARE

## 2023-10-12 ENCOUNTER — RESULT REVIEW (OUTPATIENT)
Age: 73
End: 2023-10-12

## 2023-10-12 ENCOUNTER — OUTPATIENT (OUTPATIENT)
Dept: OUTPATIENT SERVICES | Facility: HOSPITAL | Age: 73
LOS: 1 days | End: 2023-10-12

## 2023-10-12 DIAGNOSIS — C41.0 MALIGNANT NEOPLASM OF BONES OF SKULL AND FACE: ICD-10-CM

## 2023-10-12 PROCEDURE — 78815 PET IMAGE W/CT SKULL-THIGH: CPT | Mod: 26,PI

## 2023-10-17 ENCOUNTER — APPOINTMENT (OUTPATIENT)
Age: 73
End: 2023-10-17
Payer: MEDICARE

## 2023-10-17 PROCEDURE — 99213 OFFICE O/P EST LOW 20 MIN: CPT

## 2023-10-25 ENCOUNTER — APPOINTMENT (OUTPATIENT)
Age: 73
End: 2023-10-25
Payer: MEDICARE

## 2023-10-25 PROCEDURE — 99213 OFFICE O/P EST LOW 20 MIN: CPT

## 2023-10-26 ENCOUNTER — NON-APPOINTMENT (OUTPATIENT)
Age: 73
End: 2023-10-26

## 2023-10-31 ENCOUNTER — APPOINTMENT (OUTPATIENT)
Dept: PLASTIC SURGERY | Facility: CLINIC | Age: 73
End: 2023-10-31
Payer: MEDICARE

## 2023-10-31 VITALS
HEART RATE: 74 BPM | WEIGHT: 155 LBS | OXYGEN SATURATION: 98 % | HEIGHT: 72 IN | SYSTOLIC BLOOD PRESSURE: 111 MMHG | DIASTOLIC BLOOD PRESSURE: 64 MMHG | TEMPERATURE: 98 F | BODY MASS INDEX: 20.99 KG/M2

## 2023-10-31 DIAGNOSIS — Z86.011 PERSONAL HISTORY OF BENIGN NEOPLASM OF THE BRAIN: ICD-10-CM

## 2023-10-31 PROCEDURE — 99204 OFFICE O/P NEW MOD 45 MIN: CPT

## 2023-10-31 RX ORDER — GLIPIZIDE 10 MG/1
10 TABLET ORAL
Qty: 180 | Refills: 0 | Status: DISCONTINUED | COMMUNITY
Start: 2022-06-03 | End: 2023-10-31

## 2023-11-02 ENCOUNTER — OUTPATIENT (OUTPATIENT)
Dept: OUTPATIENT SERVICES | Facility: HOSPITAL | Age: 73
LOS: 1 days | End: 2023-11-02
Payer: MEDICARE

## 2023-11-02 ENCOUNTER — APPOINTMENT (OUTPATIENT)
Dept: CT IMAGING | Facility: CLINIC | Age: 73
End: 2023-11-02
Payer: MEDICARE

## 2023-11-02 DIAGNOSIS — C03.0 MALIGNANT NEOPLASM OF UPPER GUM: ICD-10-CM

## 2023-11-02 DIAGNOSIS — Z98.890 OTHER SPECIFIED POSTPROCEDURAL STATES: Chronic | ICD-10-CM

## 2023-11-02 PROCEDURE — 70496 CT ANGIOGRAPHY HEAD: CPT

## 2023-11-02 PROCEDURE — 70496 CT ANGIOGRAPHY HEAD: CPT | Mod: 26

## 2023-11-02 PROCEDURE — 70498 CT ANGIOGRAPHY NECK: CPT | Mod: 26

## 2023-11-02 PROCEDURE — 70498 CT ANGIOGRAPHY NECK: CPT

## 2023-11-03 ENCOUNTER — OUTPATIENT (OUTPATIENT)
Dept: OUTPATIENT SERVICES | Facility: HOSPITAL | Age: 73
LOS: 1 days | End: 2023-11-03
Payer: MEDICARE

## 2023-11-03 VITALS
SYSTOLIC BLOOD PRESSURE: 101 MMHG | RESPIRATION RATE: 16 BRPM | HEART RATE: 66 BPM | OXYGEN SATURATION: 100 % | HEIGHT: 72 IN | TEMPERATURE: 98 F | WEIGHT: 149.91 LBS | DIASTOLIC BLOOD PRESSURE: 56 MMHG

## 2023-11-03 DIAGNOSIS — I25.10 ATHEROSCLEROTIC HEART DISEASE OF NATIVE CORONARY ARTERY WITHOUT ANGINA PECTORIS: ICD-10-CM

## 2023-11-03 DIAGNOSIS — Z98.890 OTHER SPECIFIED POSTPROCEDURAL STATES: Chronic | ICD-10-CM

## 2023-11-03 DIAGNOSIS — I10 ESSENTIAL (PRIMARY) HYPERTENSION: ICD-10-CM

## 2023-11-03 DIAGNOSIS — Z98.61 CORONARY ANGIOPLASTY STATUS: Chronic | ICD-10-CM

## 2023-11-03 DIAGNOSIS — E11.9 TYPE 2 DIABETES MELLITUS WITHOUT COMPLICATIONS: ICD-10-CM

## 2023-11-03 DIAGNOSIS — C41.0 MALIGNANT NEOPLASM OF BONES OF SKULL AND FACE: ICD-10-CM

## 2023-11-03 DIAGNOSIS — Z98.49 CATARACT EXTRACTION STATUS, UNSPECIFIED EYE: Chronic | ICD-10-CM

## 2023-11-03 DIAGNOSIS — R29.6 REPEATED FALLS: ICD-10-CM

## 2023-11-03 LAB
A1C WITH ESTIMATED AVERAGE GLUCOSE RESULT: 7 % — HIGH (ref 4–5.6)
A1C WITH ESTIMATED AVERAGE GLUCOSE RESULT: 7 % — HIGH (ref 4–5.6)
ALBUMIN SERPL ELPH-MCNC: 4.6 G/DL — SIGNIFICANT CHANGE UP (ref 3.3–5)
ALBUMIN SERPL ELPH-MCNC: 4.6 G/DL — SIGNIFICANT CHANGE UP (ref 3.3–5)
ALP SERPL-CCNC: 78 U/L — SIGNIFICANT CHANGE UP (ref 40–120)
ALP SERPL-CCNC: 78 U/L — SIGNIFICANT CHANGE UP (ref 40–120)
ALT FLD-CCNC: 22 U/L — SIGNIFICANT CHANGE UP (ref 4–41)
ALT FLD-CCNC: 22 U/L — SIGNIFICANT CHANGE UP (ref 4–41)
ANION GAP SERPL CALC-SCNC: 13 MMOL/L — SIGNIFICANT CHANGE UP (ref 7–14)
ANION GAP SERPL CALC-SCNC: 13 MMOL/L — SIGNIFICANT CHANGE UP (ref 7–14)
AST SERPL-CCNC: 27 U/L — SIGNIFICANT CHANGE UP (ref 4–40)
AST SERPL-CCNC: 27 U/L — SIGNIFICANT CHANGE UP (ref 4–40)
BILIRUB SERPL-MCNC: 0.4 MG/DL — SIGNIFICANT CHANGE UP (ref 0.2–1.2)
BILIRUB SERPL-MCNC: 0.4 MG/DL — SIGNIFICANT CHANGE UP (ref 0.2–1.2)
BLD GP AB SCN SERPL QL: NEGATIVE — SIGNIFICANT CHANGE UP
BLD GP AB SCN SERPL QL: NEGATIVE — SIGNIFICANT CHANGE UP
BUN SERPL-MCNC: 11 MG/DL — SIGNIFICANT CHANGE UP (ref 7–23)
BUN SERPL-MCNC: 11 MG/DL — SIGNIFICANT CHANGE UP (ref 7–23)
CALCIUM SERPL-MCNC: 10 MG/DL — SIGNIFICANT CHANGE UP (ref 8.4–10.5)
CALCIUM SERPL-MCNC: 10 MG/DL — SIGNIFICANT CHANGE UP (ref 8.4–10.5)
CHLORIDE SERPL-SCNC: 100 MMOL/L — SIGNIFICANT CHANGE UP (ref 98–107)
CHLORIDE SERPL-SCNC: 100 MMOL/L — SIGNIFICANT CHANGE UP (ref 98–107)
CO2 SERPL-SCNC: 26 MMOL/L — SIGNIFICANT CHANGE UP (ref 22–31)
CO2 SERPL-SCNC: 26 MMOL/L — SIGNIFICANT CHANGE UP (ref 22–31)
CREAT SERPL-MCNC: 0.76 MG/DL — SIGNIFICANT CHANGE UP (ref 0.5–1.3)
CREAT SERPL-MCNC: 0.76 MG/DL — SIGNIFICANT CHANGE UP (ref 0.5–1.3)
EGFR: 96 ML/MIN/1.73M2 — SIGNIFICANT CHANGE UP
EGFR: 96 ML/MIN/1.73M2 — SIGNIFICANT CHANGE UP
ESTIMATED AVERAGE GLUCOSE: 154 — SIGNIFICANT CHANGE UP
ESTIMATED AVERAGE GLUCOSE: 154 — SIGNIFICANT CHANGE UP
GLUCOSE SERPL-MCNC: 147 MG/DL — HIGH (ref 70–99)
GLUCOSE SERPL-MCNC: 147 MG/DL — HIGH (ref 70–99)
HCT VFR BLD CALC: 41.7 % — SIGNIFICANT CHANGE UP (ref 39–50)
HCT VFR BLD CALC: 41.7 % — SIGNIFICANT CHANGE UP (ref 39–50)
HGB BLD-MCNC: 12.6 G/DL — LOW (ref 13–17)
HGB BLD-MCNC: 12.6 G/DL — LOW (ref 13–17)
MCHC RBC-ENTMCNC: 26 PG — LOW (ref 27–34)
MCHC RBC-ENTMCNC: 26 PG — LOW (ref 27–34)
MCHC RBC-ENTMCNC: 30.2 GM/DL — LOW (ref 32–36)
MCHC RBC-ENTMCNC: 30.2 GM/DL — LOW (ref 32–36)
MCV RBC AUTO: 86.2 FL — SIGNIFICANT CHANGE UP (ref 80–100)
MCV RBC AUTO: 86.2 FL — SIGNIFICANT CHANGE UP (ref 80–100)
NRBC # BLD: 0 /100 WBCS — SIGNIFICANT CHANGE UP (ref 0–0)
NRBC # BLD: 0 /100 WBCS — SIGNIFICANT CHANGE UP (ref 0–0)
NRBC # FLD: 0 K/UL — SIGNIFICANT CHANGE UP (ref 0–0)
NRBC # FLD: 0 K/UL — SIGNIFICANT CHANGE UP (ref 0–0)
PLATELET # BLD AUTO: 121 K/UL — LOW (ref 150–400)
PLATELET # BLD AUTO: 121 K/UL — LOW (ref 150–400)
POTASSIUM SERPL-MCNC: 3.7 MMOL/L — SIGNIFICANT CHANGE UP (ref 3.5–5.3)
POTASSIUM SERPL-MCNC: 3.7 MMOL/L — SIGNIFICANT CHANGE UP (ref 3.5–5.3)
POTASSIUM SERPL-SCNC: 3.7 MMOL/L — SIGNIFICANT CHANGE UP (ref 3.5–5.3)
POTASSIUM SERPL-SCNC: 3.7 MMOL/L — SIGNIFICANT CHANGE UP (ref 3.5–5.3)
PROT SERPL-MCNC: 7.6 G/DL — SIGNIFICANT CHANGE UP (ref 6–8.3)
PROT SERPL-MCNC: 7.6 G/DL — SIGNIFICANT CHANGE UP (ref 6–8.3)
RBC # BLD: 4.84 M/UL — SIGNIFICANT CHANGE UP (ref 4.2–5.8)
RBC # BLD: 4.84 M/UL — SIGNIFICANT CHANGE UP (ref 4.2–5.8)
RBC # FLD: 16 % — HIGH (ref 10.3–14.5)
RBC # FLD: 16 % — HIGH (ref 10.3–14.5)
RH IG SCN BLD-IMP: NEGATIVE — SIGNIFICANT CHANGE UP
RH IG SCN BLD-IMP: NEGATIVE — SIGNIFICANT CHANGE UP
SODIUM SERPL-SCNC: 139 MMOL/L — SIGNIFICANT CHANGE UP (ref 135–145)
SODIUM SERPL-SCNC: 139 MMOL/L — SIGNIFICANT CHANGE UP (ref 135–145)
WBC # BLD: 6.87 K/UL — SIGNIFICANT CHANGE UP (ref 3.8–10.5)
WBC # BLD: 6.87 K/UL — SIGNIFICANT CHANGE UP (ref 3.8–10.5)
WBC # FLD AUTO: 6.87 K/UL — SIGNIFICANT CHANGE UP (ref 3.8–10.5)
WBC # FLD AUTO: 6.87 K/UL — SIGNIFICANT CHANGE UP (ref 3.8–10.5)

## 2023-11-03 PROCEDURE — 93010 ELECTROCARDIOGRAM REPORT: CPT

## 2023-11-03 RX ORDER — ATORVASTATIN CALCIUM 80 MG/1
1 TABLET, FILM COATED ORAL
Qty: 0 | Refills: 0 | DISCHARGE

## 2023-11-03 RX ORDER — SODIUM CHLORIDE 9 MG/ML
1000 INJECTION, SOLUTION INTRAVENOUS
Refills: 0 | Status: DISCONTINUED | OUTPATIENT
Start: 2023-11-10 | End: 2023-11-10

## 2023-11-03 RX ORDER — SODIUM CHLORIDE 9 MG/ML
3 INJECTION INTRAMUSCULAR; INTRAVENOUS; SUBCUTANEOUS EVERY 8 HOURS
Refills: 0 | Status: DISCONTINUED | OUTPATIENT
Start: 2023-11-10 | End: 2023-11-10

## 2023-11-03 NOTE — H&P PST ADULT - NSANTHBMIRD_ENT_A_CORE
Show Aperture Variable?: Yes Consent: The patient's consent was obtained including but not limited to risks of crusting, scabbing, blistering, scarring, darker or lighter pigmentary change, recurrence, incomplete removal and infection. Detail Level: Detailed Render Post-Care Instructions In Note?: no Number Of Freeze-Thaw Cycles: 1 freeze-thaw cycle Post-Care Instructions: I reviewed with the patient in detail post-care instructions. Patient is to wear sunprotection, and avoid picking at any of the treated lesions. Pt may apply Vaseline to crusted or scabbing areas if desired. Duration Of Freeze Thaw-Cycle (Seconds): 3 No

## 2023-11-03 NOTE — H&P PST ADULT - NSICDXPASTMEDICALHX_GEN_ALL_CORE_FT
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

## 2023-11-03 NOTE — H&P PST ADULT - MUSCULOSKELETAL
negative ROM intact/strength 5/5 bilateral upper extremities/strength 5/5 bilateral lower extremities/abnormal gait

## 2023-11-03 NOTE — H&P PST ADULT - CARDIOVASCULAR COMMENTS
Hx CAD- s/p 2 stents in RCA 2021- patient reports he never took plavix and only took aspirin for a few days Shaik Grey was informed and stated he will addend cardiac clearance to reflect this information- patient to start taking aspirin 81 mg orally once a day Hx CAD- s/p 2 stents in RCA 2021- patient reports he never took plavix and only took aspirin for a few days Shaik Grey was informed and stated he will provide cardiac clearance - patient to start taking aspirin 81 mg orally once a day

## 2023-11-03 NOTE — H&P PST ADULT - ENMT COMMENTS
thick tongue scar- "tongue nerve removed" s/p acoustic neuroma excision developed facial nerve disorder, right facial paralysis, right hearing loss, and decreased taste sensation s/p acoustic neuroma excision in 1987 patient reports damage to facial nerve and nerve innervating tongue, presents with right facial paralysis, right hearing loss, and decreased taste sensation

## 2023-11-03 NOTE — H&P PST ADULT - MOUTH
thick tongue scar- "tongue nerve removed"/dry/swollen gums right thick tongue scar- "tongue nerve removed"/dry/swollen gums

## 2023-11-03 NOTE — H&P PST ADULT - HISTORY OF PRESENT ILLNESS
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 scheduled for maxillectomy, total neck dissection, modified radical impression custom preparation surgical obturator prosthesis, advance flap and Dr Lang to perform radial forearm free flap , spilt thickness skin graft, vestibuloplasty anterior, complex repair of neck, VAC placement, Patient reports he had cataract surgery in [place] one year ago, developed left eye infection and was started on many medications, developed left eye blindness and mouth sore, pain and some bleeding, had multiple teeth pulled without improvement of symptoms, referred for biopsy revealing SCC  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

## 2023-11-03 NOTE — H&P PST ADULT - ANTERIOR CERVICAL R
enlarged lymph node, solid, non tender, normal temperature right lateral enlarged solid nodule, non tender, normal temperature

## 2023-11-03 NOTE — H&P PST ADULT - PROBLEM SELECTOR PLAN 3
Medical evaluation requested by PST for further evaluation of painful urination, neck mass, frequent falls

## 2023-11-03 NOTE — H&P PST ADULT - GENERAL GENITOURINARY SYMPTOMS
reports some burning sensation when urinating for a few weeks has not seen medical doctor- will see medical doctor for medical clearance/dysuria reports some burning sensation when urinating for a few weeks has not seen medical doctor- will see medical doctor for medical clearance 11/4/23/dysuria

## 2023-11-03 NOTE — H&P PST ADULT - PROBLEM SELECTOR PLAN 1
Patient scheduled for surgery on: 11/10/23  Provided with verbal and written presurgical instructions  Verbalized understanding  with teach back on the following: Famotidine for GI prophylaxis     Lab specimen drawn at PST today:     Medical  Cardiac   Pulmonology  Dental** evaluation requested by PST for further evaluation of   ***Comparison EKG, Echo and Stress test requested Patient scheduled for surgery on: 11/10/23  Provided with verbal and written presurgical instructions  Verbalized understanding  with teach back on the following: Famotidine for GI prophylaxis     Lab specimen drawn at PST today: cbc, cmp, hga1c, type

## 2023-11-03 NOTE — H&P PST ADULT - FUNCTIONAL STATUS
walks stairs, bike one hour daily; Denies chest pain, shortness of breath, palpitations, weakness/4-10 METS

## 2023-11-03 NOTE — H&P PST ADULT - ENT GEN HX ROS MEA POS PC
hearing difficulty/vertigo/sinus symptoms/nasal discharge/abnormal taste sensation/gum bleeding/dry mouth

## 2023-11-03 NOTE — H&P PST ADULT - NSICDXPASTSURGICALHX_GEN_ALL_CORE_FT
PAST SURGICAL HISTORY:  S/P cataract surgery     S/P coronary angioplasty     S/P excision of acoustic neuroma

## 2023-11-03 NOTE — H&P PST ADULT - NEUROLOGICAL COMMENTS
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

## 2023-11-03 NOTE — H&P PST ADULT - PROBLEM SELECTOR PLAN 2
Cardiac   evaluation requested by PST for further evaluation of CAD not on antiplatelet therapy (states took aspirin for a few days after stent placement) Cardiologist called and informed   Echo and Stress test requested    Patient instructed to start low dose aspirin today Cardiac evaluation requested by PST for further evaluation of CAD not on antiplatelet therapy  Cardiologist called and informed  Cath report, Echo and Stress test requested    Patient instructed to start low dose aspirin today

## 2023-11-03 NOTE — H&P PST ADULT - NSANTHOSAYNRD_GEN_A_CORE
No. BHARAT screening performed.  STOP BANG Legend: 0-2 = LOW Risk; 3-4 = INTERMEDIATE Risk; 5-8 = HIGH Risk

## 2023-11-03 NOTE — H&P PST ADULT - OPHTHALMOLOGIC COMMENTS
left eye blindness after cataract surgery infection Left eye blindness after cataract surgery infection

## 2023-11-03 NOTE — H&P PST ADULT - NSICDXFAMILYHX_GEN_ALL_CORE_FT
FAMILY HISTORY:  Mother  Still living? No  Family history of diabetes mellitus (DM), Age at diagnosis: Age Unknown    Sibling  Still living? Yes, Estimated age: Age Unknown  Family history of CABG, Age at diagnosis: 61-70  Family history of diabetes mellitus (DM), Age at diagnosis: Age Unknown  Family history of diabetes mellitus (DM), Age at diagnosis: Age Unknown

## 2023-11-09 ENCOUNTER — TRANSCRIPTION ENCOUNTER (OUTPATIENT)
Age: 73
End: 2023-11-09

## 2023-11-09 NOTE — ASU PATIENT PROFILE, ADULT - FALL HARM RISK - HARM RISK INTERVENTIONS

## 2023-11-09 NOTE — ASU PATIENT PROFILE, ADULT - REASON FOR ADMISSION, PROFILE
maxillectomy, neck dissection, radial forearm free flap, split thickness skin graft, anterior vestibuloplasty, vac placement

## 2023-11-09 NOTE — ASU PATIENT PROFILE, ADULT - MENTAL HEALTH CONDITIONS/SYMPTOMS, PROFILE
Patient is a 52-year-old male with hypertension, diabetes, heart disease with prior cardiac stent in 2017. Comes to the emergency department tonight complaining of chest pain. He states the last several days he has had some palpitations and fluttering in the chest and feels like his heart pauses. Around 5 PM today states he had the acute onset of a sharp left-sided chest pain. No shortness of breath or diaphoresis. The pain lasted seconds. The history is provided by the patient. Chest Pain    This is a new problem. The current episode started 3 to 5 hours ago. The problem has been resolved. The pain is associated with normal activity. The pain is present in the left side. The pain is moderate. The quality of the pain is described as sharp. The pain does not radiate. Associated symptoms include palpitations. Pertinent negatives include no abdominal pain, no back pain, no cough, no diaphoresis, no fever, no nausea, no shortness of breath and no weakness. He has tried aspirin for the symptoms. Risk factors include diabetes mellitus, hypertension, male gender and cardiac disease. His past medical history is significant for DM and HTN. Procedural history includes cardiac catheterization and cardiac stents.        Past Medical History:   Diagnosis Date    Arthritis     Benign essential hypertension     CAD (coronary artery disease)     MI in 12/07, 6/2017    Chronic pain     Dilated cardiomyopathy (HCC)     Gastrointestinal hemorrhage with hematemesis 6/20/2017    GERD (gastroesophageal reflux disease)     Heart failure (HCC)     Hyperlipidemia LDL goal <70     NIDDM (non-insulin dependent diabetes mellitus) 8/3/2009    Psychiatric disorder     depression and anxiety    Renal insufficiency        Past Surgical History:   Procedure Laterality Date    HX CORONARY STENT PLACEMENT  06/2017    RCA    HX ORTHOPAEDIC      right femur ORIF surg    HX WISDOM TEETH EXTRACTION      NV LEFT HEART CATH,PERCUTANEOUS  06/19/2017    STEMI & PCI         Family History:   Problem Relation Age of Onset    Heart Disease Father     Hypertension Father     Diabetes Mother     Hypertension Mother     Stroke Mother        Social History     Socioeconomic History    Marital status: SINGLE     Spouse name: Not on file    Number of children: Not on file    Years of education: Not on file    Highest education level: Not on file   Occupational History    Occupation: INDOM     Employer: RETIRED     Comment: Disability   Social Needs    Financial resource strain: Not on file    Food insecurity     Worry: Not on file     Inability: Not on file   [employer] needs     Medical: Not on file     Non-medical: Not on file   Tobacco Use    Smoking status: Former Smoker     Packs/day: 0.25     Years: 29.00     Pack years: 7.25     Types: Cigarettes     Last attempt to quit: 6/19/2017     Years since quitting: 3.3    Smokeless tobacco: Never Used   Substance and Sexual Activity    Alcohol use: No    Drug use: No     Comment: occasional; quit cocainein 2007 - used for 10 years    Sexual activity: Not on file   Lifestyle    Physical activity     Days per week: Not on file     Minutes per session: Not on file    Stress: Not on file   Relationships    Social connections     Talks on phone: Not on file     Gets together: Not on file     Attends Muslim service: Not on file     Active member of club or organization: Not on file     Attends meetings of clubs or organizations: Not on file     Relationship status: Not on file    Intimate partner violence     Fear of current or ex partner: Not on file     Emotionally abused: Not on file     Physically abused: Not on file     Forced sexual activity: Not on file   Other Topics Concern    Not on file   Social History Narrative    Not on file         ALLERGIES: Lisinopril; Ragweed; and Reopro [abciximab]    Review of Systems   Constitutional: Negative for chills, diaphoresis, fatigue and fever. HENT: Negative for congestion, rhinorrhea and sore throat. Eyes: Negative for pain, discharge and visual disturbance. Respiratory: Negative for cough and shortness of breath. Cardiovascular: Positive for chest pain and palpitations. Gastrointestinal: Negative for abdominal pain, diarrhea and nausea. Endocrine: Negative for polydipsia and polyuria. Genitourinary: Negative for dysuria, frequency and urgency. Musculoskeletal: Negative for back pain and neck pain. Skin: Negative for rash. Neurological: Negative for seizures, syncope and weakness. Hematological: Negative. Vitals:    10/31/20 1825   BP: (!) 150/93   Pulse: 79   Resp: 16   Temp: 98.6 °F (37 °C)   SpO2: 94%   Weight: 106.6 kg (235 lb)   Height: 6' 3\" (1.905 m)            Physical Exam  Vitals signs and nursing note reviewed. Constitutional:       Appearance: Normal appearance. He is well-developed. HENT:      Head: Normocephalic and atraumatic. Nose: Nose normal.   Eyes:      Extraocular Movements: Extraocular movements intact. Conjunctiva/sclera: Conjunctivae normal.      Pupils: Pupils are equal, round, and reactive to light. Neck:      Musculoskeletal: Normal range of motion and neck supple. Cardiovascular:      Rate and Rhythm: Normal rate and regular rhythm. Heart sounds: Normal heart sounds. Pulmonary:      Effort: Pulmonary effort is normal.      Breath sounds: Normal breath sounds. Abdominal:      Palpations: Abdomen is soft. Tenderness: There is no abdominal tenderness. There is no guarding or rebound. Musculoskeletal: Normal range of motion. General: No tenderness. Right lower leg: No edema. Left lower leg: No edema. Lymphadenopathy:      Cervical: No cervical adenopathy. Skin:     General: Skin is warm and dry. Findings: No rash. Neurological:      General: No focal deficit present.       Mental Status: He is alert and oriented to person, place, and time. GCS: GCS eye subscore is 4. GCS verbal subscore is 5. GCS motor subscore is 6. Cranial Nerves: No cranial nerve deficit. Sensory: No sensory deficit. Motor: Motor function is intact. MDM  Number of Diagnoses or Management Options  Diagnosis management comments: I wore appropriate PPE throughout this patient's ED visit. Mart Rasmussen MD, 7:44 PM    EKG reviewed by me shows normal sinus rhythm rate of 83 with PVC. Unchanged morphology compared to previous. No acute ST segment elevation. 7:54 PM  Chemistries show mild renal insufficiency but creatinine is at his baseline  Initial troponin elevated 162    Patient is no longer having any pain and his EKG is unchanged. I discussed the case with Wilma Hoffman who is on-call for cardiology. He would like us to check a repeat 2-hour value given the patient's known disease. 9:28 PM  Repeat troponin went down to 143. He has had no further chest pain. Again nonischemic EKG. Pain was very atypical tonight. He is safe for discharge to home. He will follow-up with his cardiologist in the office. Voice dictation software was used during the making of this note. This software is not perfect and grammatical and other typographical errors may be present. This note has been proofread, but may still contain errors.   Mart Rasmussen MD; 10/31/2020 @9:29 PM   ===================================================================         Amount and/or Complexity of Data Reviewed  Clinical lab tests: ordered and reviewed  Tests in the radiology section of CPT®: ordered and reviewed  Tests in the medicine section of CPT®: ordered and reviewed  Review and summarize past medical records: yes  Discuss the patient with other providers: yes  Independent visualization of images, tracings, or specimens: yes    Risk of Complications, Morbidity, and/or Mortality  Presenting problems: moderate  Diagnostic procedures: moderate  Management options: low    Patient Progress  Patient progress: stable         Procedures anxiety disorder/depression
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
## 2023-11-10 ENCOUNTER — APPOINTMENT (OUTPATIENT)
Age: 73
End: 2023-11-10
Payer: MEDICARE

## 2023-11-10 ENCOUNTER — APPOINTMENT (OUTPATIENT)
Dept: PLASTIC SURGERY | Facility: HOSPITAL | Age: 73
End: 2023-11-10
Payer: MEDICARE

## 2023-11-10 ENCOUNTER — INPATIENT (INPATIENT)
Facility: HOSPITAL | Age: 73
LOS: 10 days | Discharge: HOME HEALTH | End: 2023-11-21
Attending: ORAL & MAXILLOFACIAL SURGERY | Admitting: SURGERY
Payer: MEDICARE

## 2023-11-10 ENCOUNTER — RESULT REVIEW (OUTPATIENT)
Age: 73
End: 2023-11-10

## 2023-11-10 VITALS
SYSTOLIC BLOOD PRESSURE: 114 MMHG | HEIGHT: 72 IN | WEIGHT: 149.91 LBS | DIASTOLIC BLOOD PRESSURE: 94 MMHG | RESPIRATION RATE: 18 BRPM | OXYGEN SATURATION: 99 % | TEMPERATURE: 98 F | HEART RATE: 72 BPM

## 2023-11-10 DIAGNOSIS — C41.0 MALIGNANT NEOPLASM OF BONES OF SKULL AND FACE: ICD-10-CM

## 2023-11-10 DIAGNOSIS — Z98.49 CATARACT EXTRACTION STATUS, UNSPECIFIED EYE: Chronic | ICD-10-CM

## 2023-11-10 DIAGNOSIS — Z98.890 OTHER SPECIFIED POSTPROCEDURAL STATES: Chronic | ICD-10-CM

## 2023-11-10 DIAGNOSIS — Z98.61 CORONARY ANGIOPLASTY STATUS: Chronic | ICD-10-CM

## 2023-11-10 PROBLEM — R26.81 UNSTEADINESS ON FEET: Chronic | Status: ACTIVE | Noted: 2023-11-03

## 2023-11-10 PROBLEM — D33.3 BENIGN NEOPLASM OF CRANIAL NERVES: Chronic | Status: ACTIVE | Noted: 2023-11-03

## 2023-11-10 PROBLEM — H54.40 BLINDNESS, ONE EYE, UNSPECIFIED EYE: Chronic | Status: ACTIVE | Noted: 2023-11-03

## 2023-11-10 PROBLEM — I25.10 ATHEROSCLEROTIC HEART DISEASE OF NATIVE CORONARY ARTERY WITHOUT ANGINA PECTORIS: Chronic | Status: ACTIVE | Noted: 2023-11-03

## 2023-11-10 LAB
A1C WITH ESTIMATED AVERAGE GLUCOSE RESULT: 7.4 % — HIGH (ref 4–5.6)
A1C WITH ESTIMATED AVERAGE GLUCOSE RESULT: 7.4 % — HIGH (ref 4–5.6)
ANION GAP SERPL CALC-SCNC: 10 MMOL/L — SIGNIFICANT CHANGE UP (ref 7–14)
APTT BLD: 31.1 SEC — SIGNIFICANT CHANGE UP (ref 24.5–35.6)
APTT BLD: 31.1 SEC — SIGNIFICANT CHANGE UP (ref 24.5–35.6)
BLOOD GAS ARTERIAL - LYTES,HGB,ICA,LACT RESULT: SIGNIFICANT CHANGE UP
BLOOD GAS ARTERIAL - LYTES,HGB,ICA,LACT RESULT: SIGNIFICANT CHANGE UP
BLOOD GAS ARTERIAL COMPREHENSIVE RESULT: SIGNIFICANT CHANGE UP
BLOOD GAS ARTERIAL COMPREHENSIVE RESULT: SIGNIFICANT CHANGE UP
BUN SERPL-MCNC: 12 MG/DL — SIGNIFICANT CHANGE UP (ref 7–23)
CA-I BLD-SCNC: 1.06 MMOL/L — LOW (ref 1.15–1.29)
CA-I BLD-SCNC: 1.06 MMOL/L — LOW (ref 1.15–1.29)
CALCIUM SERPL-MCNC: 8.2 MG/DL — LOW (ref 8.4–10.5)
CALCIUM SERPL-MCNC: 8.2 MG/DL — LOW (ref 8.4–10.5)
CALCIUM SERPL-MCNC: 8.3 MG/DL — LOW (ref 8.4–10.5)
CALCIUM SERPL-MCNC: 8.3 MG/DL — LOW (ref 8.4–10.5)
CHLORIDE SERPL-SCNC: 103 MMOL/L — SIGNIFICANT CHANGE UP (ref 98–107)
CO2 SERPL-SCNC: 25 MMOL/L — SIGNIFICANT CHANGE UP (ref 22–31)
CREAT SERPL-MCNC: 0.55 MG/DL — SIGNIFICANT CHANGE UP (ref 0.5–1.3)
EGFR: 105 ML/MIN/1.73M2 — SIGNIFICANT CHANGE UP
ESTIMATED AVERAGE GLUCOSE: 166 — SIGNIFICANT CHANGE UP
ESTIMATED AVERAGE GLUCOSE: 166 — SIGNIFICANT CHANGE UP
GAS PNL BLDA: SIGNIFICANT CHANGE UP
GAS PNL BLDA: SIGNIFICANT CHANGE UP
GLUCOSE BLDC GLUCOMTR-MCNC: 144 MG/DL — HIGH (ref 70–99)
GLUCOSE BLDC GLUCOMTR-MCNC: 144 MG/DL — HIGH (ref 70–99)
GLUCOSE BLDC GLUCOMTR-MCNC: 184 MG/DL — HIGH (ref 70–99)
GLUCOSE BLDC GLUCOMTR-MCNC: 184 MG/DL — HIGH (ref 70–99)
GLUCOSE SERPL-MCNC: 183 MG/DL — HIGH (ref 70–99)
HCT VFR BLD CALC: 29.3 % — LOW (ref 39–50)
HCT VFR BLD CALC: 29.3 % — LOW (ref 39–50)
HGB BLD-MCNC: 9.2 G/DL — LOW (ref 13–17)
HGB BLD-MCNC: 9.2 G/DL — LOW (ref 13–17)
INR BLD: 1.09 RATIO — SIGNIFICANT CHANGE UP (ref 0.85–1.18)
INR BLD: 1.09 RATIO — SIGNIFICANT CHANGE UP (ref 0.85–1.18)
MAGNESIUM SERPL-MCNC: 1.4 MG/DL — LOW (ref 1.6–2.6)
MAGNESIUM SERPL-MCNC: 1.4 MG/DL — LOW (ref 1.6–2.6)
MCHC RBC-ENTMCNC: 26.5 PG — LOW (ref 27–34)
MCHC RBC-ENTMCNC: 26.5 PG — LOW (ref 27–34)
MCHC RBC-ENTMCNC: 31.4 GM/DL — LOW (ref 32–36)
MCHC RBC-ENTMCNC: 31.4 GM/DL — LOW (ref 32–36)
MCV RBC AUTO: 84.4 FL — SIGNIFICANT CHANGE UP (ref 80–100)
MCV RBC AUTO: 84.4 FL — SIGNIFICANT CHANGE UP (ref 80–100)
NRBC # BLD: 0 /100 WBCS — SIGNIFICANT CHANGE UP (ref 0–0)
NRBC # BLD: 0 /100 WBCS — SIGNIFICANT CHANGE UP (ref 0–0)
NRBC # FLD: 0 K/UL — SIGNIFICANT CHANGE UP (ref 0–0)
NRBC # FLD: 0 K/UL — SIGNIFICANT CHANGE UP (ref 0–0)
PHOSPHATE SERPL-MCNC: 3.5 MG/DL — SIGNIFICANT CHANGE UP (ref 2.5–4.5)
PHOSPHATE SERPL-MCNC: 3.5 MG/DL — SIGNIFICANT CHANGE UP (ref 2.5–4.5)
PLATELET # BLD AUTO: 133 K/UL — LOW (ref 150–400)
PLATELET # BLD AUTO: 133 K/UL — LOW (ref 150–400)
POTASSIUM SERPL-MCNC: 3.8 MMOL/L — SIGNIFICANT CHANGE UP (ref 3.5–5.3)
POTASSIUM SERPL-SCNC: 3.8 MMOL/L — SIGNIFICANT CHANGE UP (ref 3.5–5.3)
PROTHROM AB SERPL-ACNC: 12.2 SEC — SIGNIFICANT CHANGE UP (ref 9.5–13)
PROTHROM AB SERPL-ACNC: 12.2 SEC — SIGNIFICANT CHANGE UP (ref 9.5–13)
RBC # BLD: 3.47 M/UL — LOW (ref 4.2–5.8)
RBC # BLD: 3.47 M/UL — LOW (ref 4.2–5.8)
RBC # FLD: 15.4 % — HIGH (ref 10.3–14.5)
RBC # FLD: 15.4 % — HIGH (ref 10.3–14.5)
SODIUM SERPL-SCNC: 138 MMOL/L — SIGNIFICANT CHANGE UP (ref 135–145)
TSH SERPL-MCNC: 3.84 UIU/ML — SIGNIFICANT CHANGE UP (ref 0.27–4.2)
TSH SERPL-MCNC: 3.84 UIU/ML — SIGNIFICANT CHANGE UP (ref 0.27–4.2)
WBC # BLD: 11.88 K/UL — HIGH (ref 3.8–10.5)
WBC # BLD: 11.88 K/UL — HIGH (ref 3.8–10.5)
WBC # FLD AUTO: 11.88 K/UL — HIGH (ref 3.8–10.5)
WBC # FLD AUTO: 11.88 K/UL — HIGH (ref 3.8–10.5)

## 2023-11-10 PROCEDURE — D7140: CPT

## 2023-11-10 PROCEDURE — 15100 SPLT AGRFT T/A/L 1ST 100SQCM: CPT

## 2023-11-10 PROCEDURE — 71045 X-RAY EXAM CHEST 1 VIEW: CPT | Mod: 26

## 2023-11-10 PROCEDURE — 88332 PATH CONSLTJ SURG EA ADD BLK: CPT | Mod: 26

## 2023-11-10 PROCEDURE — 88309 TISSUE EXAM BY PATHOLOGIST: CPT | Mod: 26

## 2023-11-10 PROCEDURE — 88360 TUMOR IMMUNOHISTOCHEM/MANUAL: CPT | Mod: 26

## 2023-11-10 PROCEDURE — ZZZZZ: CPT

## 2023-11-10 PROCEDURE — 21070 REMOVE CORONOID PROCESS: CPT

## 2023-11-10 PROCEDURE — 14302 TIS TRNFR ADDL 30 SQ CM: CPT

## 2023-11-10 PROCEDURE — 15757 FREE SKIN FLAP MICROVASC: CPT

## 2023-11-10 PROCEDURE — 99291 CRITICAL CARE FIRST HOUR: CPT | Mod: 25

## 2023-11-10 PROCEDURE — 88307 TISSUE EXAM BY PATHOLOGIST: CPT | Mod: 26

## 2023-11-10 PROCEDURE — 14301 TIS TRNFR ANY 30.1-60 SQ CM: CPT

## 2023-11-10 PROCEDURE — 99292 CRITICAL CARE ADDL 30 MIN: CPT | Mod: 25

## 2023-11-10 PROCEDURE — 88305 TISSUE EXAM BY PATHOLOGIST: CPT | Mod: 26

## 2023-11-10 PROCEDURE — 15002 WOUND PREP TRK/ARM/LEG: CPT

## 2023-11-10 PROCEDURE — 15860 IV NJX TST VASC FLO FLAP/GRF: CPT

## 2023-11-10 PROCEDURE — 40840 RECONSTRUCTION OF MOUTH: CPT

## 2023-11-10 PROCEDURE — 15004 WOUND PREP F/N/HF/G: CPT

## 2023-11-10 PROCEDURE — 88331 PATH CONSLTJ SURG 1 BLK 1SPC: CPT | Mod: 26

## 2023-11-10 PROCEDURE — 88311 DECALCIFY TISSUE: CPT | Mod: 26

## 2023-11-10 PROCEDURE — 31225 REMOVAL OF UPPER JAW: CPT

## 2023-11-10 PROCEDURE — 38724 REMOVAL OF LYMPH NODES NECK: CPT

## 2023-11-10 PROCEDURE — 97606 NEG PRS WND THER DME>50 SQCM: CPT | Mod: 59

## 2023-11-10 DEVICE — LIGATING CLIPS WECK HORIZON SMALL-WIDE (RED) 24: Type: IMPLANTABLE DEVICE | Site: LEFT | Status: FUNCTIONAL

## 2023-11-10 DEVICE — CLIP APPLIER COVIDIEN SURGICLIP III 9" SM: Type: IMPLANTABLE DEVICE | Site: LEFT | Status: FUNCTIONAL

## 2023-11-10 DEVICE — SURGICEL 2 X 14": Type: IMPLANTABLE DEVICE | Site: LEFT | Status: FUNCTIONAL

## 2023-11-10 DEVICE — DOPPLER PROBE DISPOSABLE: Type: IMPLANTABLE DEVICE | Site: LEFT | Status: FUNCTIONAL

## 2023-11-10 DEVICE — LIGATING CLIPS WECK HORIZON MEDIUM (BLUE) 24: Type: IMPLANTABLE DEVICE | Site: LEFT | Status: FUNCTIONAL

## 2023-11-10 DEVICE — COUPLER VESSEL ANASTOMOTIC 2.5MM: Type: IMPLANTABLE DEVICE | Site: LEFT | Status: FUNCTIONAL

## 2023-11-10 DEVICE — CLIP APPLIER COVIDIEN SURGICLIP 11.5" MEDIUM: Type: IMPLANTABLE DEVICE | Site: LEFT | Status: FUNCTIONAL

## 2023-11-10 DEVICE — SURGIFOAM PAD 8CM X 12.5CM X 2MM (100C): Type: IMPLANTABLE DEVICE | Site: LEFT | Status: FUNCTIONAL

## 2023-11-10 RX ORDER — PROPOFOL 10 MG/ML
50 INJECTION, EMULSION INTRAVENOUS
Qty: 1000 | Refills: 0 | Status: DISCONTINUED | OUTPATIENT
Start: 2023-11-10 | End: 2023-11-11

## 2023-11-10 RX ORDER — AMPICILLIN SODIUM AND SULBACTAM SODIUM 250; 125 MG/ML; MG/ML
3 INJECTION, POWDER, FOR SUSPENSION INTRAMUSCULAR; INTRAVENOUS EVERY 6 HOURS
Refills: 0 | Status: DISCONTINUED | OUTPATIENT
Start: 2023-11-10 | End: 2023-11-10

## 2023-11-10 RX ORDER — ONDANSETRON 8 MG/1
4 TABLET, FILM COATED ORAL EVERY 6 HOURS
Refills: 0 | Status: DISCONTINUED | OUTPATIENT
Start: 2023-11-10 | End: 2023-11-10

## 2023-11-10 RX ORDER — ONDANSETRON 8 MG/1
4 TABLET, FILM COATED ORAL EVERY 8 HOURS
Refills: 0 | Status: DISCONTINUED | OUTPATIENT
Start: 2023-11-10 | End: 2023-11-11

## 2023-11-10 RX ORDER — SODIUM CHLORIDE 9 MG/ML
1000 INJECTION, SOLUTION INTRAVENOUS
Refills: 0 | Status: DISCONTINUED | OUTPATIENT
Start: 2023-11-10 | End: 2023-11-11

## 2023-11-10 RX ORDER — SENNA PLUS 8.6 MG/1
2 TABLET ORAL AT BEDTIME
Refills: 0 | Status: DISCONTINUED | OUTPATIENT
Start: 2023-11-10 | End: 2023-11-16

## 2023-11-10 RX ORDER — POLYETHYLENE GLYCOL 3350 17 G/17G
17 POWDER, FOR SOLUTION ORAL DAILY
Refills: 0 | Status: DISCONTINUED | OUTPATIENT
Start: 2023-11-10 | End: 2023-11-21

## 2023-11-10 RX ORDER — AMPICILLIN SODIUM AND SULBACTAM SODIUM 250; 125 MG/ML; MG/ML
INJECTION, POWDER, FOR SUSPENSION INTRAMUSCULAR; INTRAVENOUS
Refills: 0 | Status: DISCONTINUED | OUTPATIENT
Start: 2023-11-10 | End: 2023-11-13

## 2023-11-10 RX ORDER — PHENYLEPHRINE HYDROCHLORIDE 10 MG/ML
0.2 INJECTION INTRAVENOUS
Qty: 160 | Refills: 0 | Status: DISCONTINUED | OUTPATIENT
Start: 2023-11-10 | End: 2023-11-11

## 2023-11-10 RX ORDER — SENNA PLUS 8.6 MG/1
1 TABLET ORAL DAILY
Refills: 0 | Status: DISCONTINUED | OUTPATIENT
Start: 2023-11-10 | End: 2023-11-10

## 2023-11-10 RX ORDER — AMPICILLIN SODIUM AND SULBACTAM SODIUM 250; 125 MG/ML; MG/ML
3 INJECTION, POWDER, FOR SUSPENSION INTRAMUSCULAR; INTRAVENOUS ONCE
Refills: 0 | Status: COMPLETED | OUTPATIENT
Start: 2023-11-10 | End: 2023-11-10

## 2023-11-10 RX ORDER — AMPICILLIN SODIUM AND SULBACTAM SODIUM 250; 125 MG/ML; MG/ML
3 INJECTION, POWDER, FOR SUSPENSION INTRAMUSCULAR; INTRAVENOUS EVERY 6 HOURS
Refills: 0 | Status: DISCONTINUED | OUTPATIENT
Start: 2023-11-11 | End: 2023-11-13

## 2023-11-10 RX ORDER — FENTANYL CITRATE 50 UG/ML
0.5 INJECTION INTRAVENOUS
Qty: 2500 | Refills: 0 | Status: DISCONTINUED | OUTPATIENT
Start: 2023-11-10 | End: 2023-11-11

## 2023-11-10 RX ORDER — HEPARIN SODIUM 5000 [USP'U]/ML
5000 INJECTION INTRAVENOUS; SUBCUTANEOUS EVERY 8 HOURS
Refills: 0 | Status: DISCONTINUED | OUTPATIENT
Start: 2023-11-10 | End: 2023-11-16

## 2023-11-10 RX ORDER — DEXMEDETOMIDINE HYDROCHLORIDE IN 0.9% SODIUM CHLORIDE 4 UG/ML
0.5 INJECTION INTRAVENOUS
Qty: 400 | Refills: 0 | Status: DISCONTINUED | OUTPATIENT
Start: 2023-11-10 | End: 2023-11-11

## 2023-11-10 RX ORDER — CHLORHEXIDINE GLUCONATE 213 G/1000ML
15 SOLUTION TOPICAL
Refills: 0 | Status: DISCONTINUED | OUTPATIENT
Start: 2023-11-10 | End: 2023-11-21

## 2023-11-10 RX ORDER — CHLORHEXIDINE GLUCONATE 213 G/1000ML
1 SOLUTION TOPICAL DAILY
Refills: 0 | Status: DISCONTINUED | OUTPATIENT
Start: 2023-11-10 | End: 2023-11-21

## 2023-11-10 RX ORDER — POLYETHYLENE GLYCOL 3350 17 G/17G
17 POWDER, FOR SOLUTION ORAL DAILY
Refills: 0 | Status: DISCONTINUED | OUTPATIENT
Start: 2023-11-10 | End: 2023-11-10

## 2023-11-10 RX ADMIN — HEPARIN SODIUM 5000 UNIT(S): 5000 INJECTION INTRAVENOUS; SUBCUTANEOUS at 22:00

## 2023-11-10 RX ADMIN — AMPICILLIN SODIUM AND SULBACTAM SODIUM 200 GRAM(S): 250; 125 INJECTION, POWDER, FOR SUSPENSION INTRAMUSCULAR; INTRAVENOUS at 20:59

## 2023-11-10 RX ADMIN — FENTANYL CITRATE 3.4 MICROGRAM(S)/KG/HR: 50 INJECTION INTRAVENOUS at 21:23

## 2023-11-10 RX ADMIN — DEXMEDETOMIDINE HYDROCHLORIDE IN 0.9% SODIUM CHLORIDE 8.5 MICROGRAM(S)/KG/HR: 4 INJECTION INTRAVENOUS at 21:23

## 2023-11-10 RX ADMIN — PROPOFOL 20.4 MICROGRAM(S)/KG/MIN: 10 INJECTION, EMULSION INTRAVENOUS at 21:23

## 2023-11-10 RX ADMIN — PHENYLEPHRINE HYDROCHLORIDE 2.55 MICROGRAM(S)/KG/MIN: 10 INJECTION INTRAVENOUS at 21:29

## 2023-11-10 NOTE — BRIEF OPERATIVE NOTE - NSICDXBRIEFPOSTOP_GEN_ALL_CORE_FT
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

## 2023-11-10 NOTE — PATIENT PROFILE ADULT - CAREGIVER PHONE NUMBER
COPD EDUCATION by COPD CLINICAL EDUCATOR  11/30/2018 at 6:31 AM by Felicia Arriaga     Patient reviewed by COPD education team. Patient does not qualify for COPD program.   [phone]

## 2023-11-10 NOTE — BRIEF OPERATIVE NOTE - COMMENTS
Nasoendotracheal tube sutured to L. naris, KO feeding tube sutured to R. naris, CXR to verify tube position, plan for likely extubation on POD1, ERAS
see omfs op note for additional details

## 2023-11-10 NOTE — CONSULT NOTE ADULT - SUBJECTIVE AND OBJECTIVE BOX
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 Pt now s/p R maxillectomy and R neck dissection, L RFFF recon, L STSG. 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      Acoustic neuroma      Facial nerve disorder      Blindness of left eye      Unsteady gait      CAD (coronary artery disease)      Hearing loss, right      H/O neuropathy      S/P excision of acoustic neuroma      S/P cataract surgery      S/P coronary angioplasty        Home Meds:   Allergies: No Known Allergies    Soc:   Advanced Directives: Presumed Full Code       CURRENT MEDICATIONS:   --------------------------------------------------------------------------------------  Neurologic Medications    Respiratory Medications    Cardiovascular Medications    Gastrointestinal Medications  lactated ringers. 1000 milliLiter(s) IV Continuous <Continuous>  sodium chloride 0.9% lock flush 3 milliLiter(s) IV Push every 8 hours    Genitourinary Medications    Hematologic/Oncologic Medications    Antimicrobial/Immunologic Medications    Endocrine/Metabolic Medications    Topical/Other Medications    --------------------------------------------------------------------------------------    VITAL SIGNS, INS/OUTS (last 24 hours):  --------------------------------------------------------------------------------------    --------------------------------------------------------------------------------------    EXAM:  General/Neuro  Exam: Normal, NAD, alert, oriented x 3, no focal deficits. PERRLA      HEENT: flap with good color/cap refill, sutures c/d/i, RAE with sang?serous output, triphasic doppler+    Respiratory  Exam: Lungs clear to auscultation, Normal expansion/effort.   [] Tracheostomy   [] Intubated  Mechanical Ventilation:     Cardiovascular  Exam: S1, S2.  Regular rate and rhythm.    Cardiac Rhythm: Normal Sinus Rhythm      GI  Exam: Abdomen soft, Non-tender, Non-distended.    Current Diet:  NPO      Tubes/Lines/Drains   [x] Peripheral IV  [] Central Venous Line     	[] R	[] L	[] IJ	[] Fem	[] SC        Type:	    Date Placed:   [x] Arterial Line		[] R	[] L	[] Fem	[] Rad	[] Ax	Date Placed:   [] PICC:         	[] Midline		[] Mediport           [x] Urinary Catheter		Date Placed:     Extremities  Exam: Extremities warm, pink, well-perfused.  L RFFF donor site with vac to suction. LLE SG donor site appropriately oozing, stable      Derm:  Exam: Good skin turgor, no skin breakdown.      :   Exam: Manuel catheter in place.     LABS  --------------------------------------------------------------------------------------    LABS:                --------------------------------------------------------------------------------------

## 2023-11-10 NOTE — CONSULT NOTE ADULT - ATTENDING COMMENTS
Patient s/p head and neck resection , left nasal tracheally intubated, and plastics reconstruction requiring sicu q1 hr flap checks  N prop and fent, rass of -5  resp on mechanical ventilation, nasal tracheally intubated, discuss with ent regarding extubation plan, extubation bedside vs in or  cv hemodynamically stable, q1 hr flap checks  gi npo, ivf, gi ppx  gu/renal monitor uop  heme vte ppx  id unasyn  endo no changes    The patient is a critical care patient with life threatening hemodynamic and metabolic instability in SICU.  I have personally interviewed when possible and examined the patient, reviewed data and laboratory tests/x-rays and all pertinent electronic images.  I was physically present for the key portions of the evaluation and management (E/M) service provided.   The SICU team has a constant risk benefit analyzes discussion with the primary team, all consultants, House Staff and PA's on all decisions.  These diagnoses are unrelated to the surgical procedure noted above.  I meet with family if needed to get further history, discuss the case and make care decisions for this patient who might not be able to participate.  Time involved in performance of separately billable procedures was not counted toward my critical care time. There is no overlap.  I spent 55-75 minutes ( 0800Hrs-0915Hrs in AM/ 1600Hrs-1715Hrs in PM, or other time indicated) of critical care time for the diagnoses, assessment, plan and interventions.  This time excludes time spent on separate procedures and teaching.

## 2023-11-10 NOTE — BRIEF OPERATIVE NOTE - OPERATION/FINDINGS
RfFF to maxillary defect from left arm, left leg STSG
Resection margins around tumor using maxillary vestibular incision, midline palatal incision, crestal incision over anterior maxillary ridge and incision posterior to maxillary tuberosity, removed R. maxillary central incisor, R. hemimaxillectomy performed using reciprocating saw, nasal floor left intact, R. PSA and R. DPA clipped for hemostasis, buccal fat pad removed, lateral mucosal margins sent for frozens and returned negative, performed supraomohyoid neck dissection levels I-III, dissected lateral to mandible, subperiosteal tunnel made through paraphyarngeal space for flap placement, coronoid tendon attachments dissected and osteotomized coronoid process, microvascular anastomosis using radial forearm free flap, cook doppler and drain placed prior to primary neck closure w/ vicryl deep dermal and nylon skin sutures.
73M with SCC R maxillary alveolar ridge s/p R hemimaxillectomy, R SND, L RFFF, L ALT STSG.

## 2023-11-10 NOTE — CONSULT NOTE ADULT - ASSESSMENT
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 Pt now s/p R maxillectomy and R neck dissection, L RFFF recon, L STSG. SICU consulted for q1 flap checks    PLAN:   Neurologic:   - AxOx3  - sedated: wean prop  - R eye blindness, L ear deafness  - PCA    Respiratory:   - nasal intubation- sutured in place    Cardiovascular:   - BP systolic < 140    Gastrointestinal/Nutrition:   - NGT- sutured in place  - NPO  - zofran  - senna/miralax    Renal/Genitourinary:   - IVF    Hematologic:   - dvt ppx    Infectious Disease:   - Xosygbd14 hr    Lines/Tubes:  - a line  - PIV  - NGT  - vac    Endocrine:   - monitor BG      Disposition: SICU    --------------------------------------------------------------------------------------    Critical Care Diagnoses:

## 2023-11-10 NOTE — PATIENT PROFILE ADULT - VISION (WITH CORRECTIVE LENSES IF THE PATIENT USUALLY WEARS THEM):
pt is blind in left eye/Normal vision: sees adequately in most situations; can see medication labels, newsprint

## 2023-11-10 NOTE — PATIENT PROFILE ADULT - FALL HARM RISK - HARM RISK INTERVENTIONS
Assistance with ambulation/Assistance OOB with selected safe patient handling equipment/Communicate Risk of Fall with Harm to all staff/Discuss with provider need for PT consult/Monitor gait and stability/Reinforce activity limits and safety measures with patient and family/Sit up slowly, dangle for a short time, stand at bedside before walking/Tailored Fall Risk Interventions/Use of alarms - bed, chair and/or voice tab/Visual Cue: Yellow wristband and red socks/Bed in lowest position, wheels locked, appropriate side rails in place/Call bell, personal items and telephone in reach/Instruct patient to call for assistance before getting out of bed or chair/Non-slip footwear when patient is out of bed/[place] to call system/Physically safe environment - no spills, clutter or unnecessary equipment/Purposeful Proactive Rounding/Room/bathroom lighting operational, light cord in reach

## 2023-11-10 NOTE — BRIEF OPERATIVE NOTE - NSICDXBRIEFPROCEDURE_GEN_ALL_CORE_FT
PROCEDURES:  Creation, free flap, fasciocutaneous, radial forearm 10-Nov-2023 18:39:04  Imer Pearl  Maxillectomy with neck dissection 10-Nov-2023 20:01:34  Isaak Bhatt  
PROCEDURES:  Maxillectomy with neck dissection 10-Nov-2023 20:01:34  Isaak Bhatt  
PROCEDURES:  Creation, free flap, fasciocutaneous, radial forearm 10-Nov-2023 18:39:04  Imer Pearl

## 2023-11-10 NOTE — BRIEF OPERATIVE NOTE - NSICDXBRIEFPREOP_GEN_ALL_CORE_FT
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

## 2023-11-11 LAB
ANION GAP SERPL CALC-SCNC: 12 MMOL/L — SIGNIFICANT CHANGE UP (ref 7–14)
ANION GAP SERPL CALC-SCNC: 12 MMOL/L — SIGNIFICANT CHANGE UP (ref 7–14)
BLOOD GAS ARTERIAL COMPREHENSIVE RESULT: SIGNIFICANT CHANGE UP
BLOOD GAS ARTERIAL COMPREHENSIVE RESULT: SIGNIFICANT CHANGE UP
BUN SERPL-MCNC: 11 MG/DL — SIGNIFICANT CHANGE UP (ref 7–23)
BUN SERPL-MCNC: 11 MG/DL — SIGNIFICANT CHANGE UP (ref 7–23)
CALCIUM SERPL-MCNC: 8.4 MG/DL — SIGNIFICANT CHANGE UP (ref 8.4–10.5)
CALCIUM SERPL-MCNC: 8.4 MG/DL — SIGNIFICANT CHANGE UP (ref 8.4–10.5)
CHLORIDE SERPL-SCNC: 101 MMOL/L — SIGNIFICANT CHANGE UP (ref 98–107)
CHLORIDE SERPL-SCNC: 101 MMOL/L — SIGNIFICANT CHANGE UP (ref 98–107)
CO2 SERPL-SCNC: 24 MMOL/L — SIGNIFICANT CHANGE UP (ref 22–31)
CO2 SERPL-SCNC: 24 MMOL/L — SIGNIFICANT CHANGE UP (ref 22–31)
CREAT SERPL-MCNC: 0.52 MG/DL — SIGNIFICANT CHANGE UP (ref 0.5–1.3)
CREAT SERPL-MCNC: 0.52 MG/DL — SIGNIFICANT CHANGE UP (ref 0.5–1.3)
EGFR: 107 ML/MIN/1.73M2 — SIGNIFICANT CHANGE UP
EGFR: 107 ML/MIN/1.73M2 — SIGNIFICANT CHANGE UP
GLUCOSE BLDC GLUCOMTR-MCNC: 108 MG/DL — HIGH (ref 70–99)
GLUCOSE BLDC GLUCOMTR-MCNC: 108 MG/DL — HIGH (ref 70–99)
GLUCOSE BLDC GLUCOMTR-MCNC: 140 MG/DL — HIGH (ref 70–99)
GLUCOSE BLDC GLUCOMTR-MCNC: 140 MG/DL — HIGH (ref 70–99)
GLUCOSE BLDC GLUCOMTR-MCNC: 169 MG/DL — HIGH (ref 70–99)
GLUCOSE BLDC GLUCOMTR-MCNC: 169 MG/DL — HIGH (ref 70–99)
GLUCOSE BLDC GLUCOMTR-MCNC: 239 MG/DL — HIGH (ref 70–99)
GLUCOSE BLDC GLUCOMTR-MCNC: 239 MG/DL — HIGH (ref 70–99)
GLUCOSE SERPL-MCNC: 188 MG/DL — HIGH (ref 70–99)
GLUCOSE SERPL-MCNC: 188 MG/DL — HIGH (ref 70–99)
HCT VFR BLD CALC: 28.8 % — LOW (ref 39–50)
HCT VFR BLD CALC: 28.8 % — LOW (ref 39–50)
HGB BLD-MCNC: 8.9 G/DL — LOW (ref 13–17)
HGB BLD-MCNC: 8.9 G/DL — LOW (ref 13–17)
MAGNESIUM SERPL-MCNC: 1.4 MG/DL — LOW (ref 1.6–2.6)
MAGNESIUM SERPL-MCNC: 1.4 MG/DL — LOW (ref 1.6–2.6)
MCHC RBC-ENTMCNC: 26.2 PG — LOW (ref 27–34)
MCHC RBC-ENTMCNC: 26.2 PG — LOW (ref 27–34)
MCHC RBC-ENTMCNC: 30.9 GM/DL — LOW (ref 32–36)
MCHC RBC-ENTMCNC: 30.9 GM/DL — LOW (ref 32–36)
MCV RBC AUTO: 84.7 FL — SIGNIFICANT CHANGE UP (ref 80–100)
MCV RBC AUTO: 84.7 FL — SIGNIFICANT CHANGE UP (ref 80–100)
NRBC # BLD: 0 /100 WBCS — SIGNIFICANT CHANGE UP (ref 0–0)
NRBC # BLD: 0 /100 WBCS — SIGNIFICANT CHANGE UP (ref 0–0)
NRBC # FLD: 0 K/UL — SIGNIFICANT CHANGE UP (ref 0–0)
NRBC # FLD: 0 K/UL — SIGNIFICANT CHANGE UP (ref 0–0)
PHOSPHATE SERPL-MCNC: 3.1 MG/DL — SIGNIFICANT CHANGE UP (ref 2.5–4.5)
PHOSPHATE SERPL-MCNC: 3.1 MG/DL — SIGNIFICANT CHANGE UP (ref 2.5–4.5)
PLATELET # BLD AUTO: 124 K/UL — LOW (ref 150–400)
PLATELET # BLD AUTO: 124 K/UL — LOW (ref 150–400)
POTASSIUM SERPL-MCNC: 3.8 MMOL/L — SIGNIFICANT CHANGE UP (ref 3.5–5.3)
POTASSIUM SERPL-MCNC: 3.8 MMOL/L — SIGNIFICANT CHANGE UP (ref 3.5–5.3)
POTASSIUM SERPL-SCNC: 3.8 MMOL/L — SIGNIFICANT CHANGE UP (ref 3.5–5.3)
POTASSIUM SERPL-SCNC: 3.8 MMOL/L — SIGNIFICANT CHANGE UP (ref 3.5–5.3)
RBC # BLD: 3.4 M/UL — LOW (ref 4.2–5.8)
RBC # BLD: 3.4 M/UL — LOW (ref 4.2–5.8)
RBC # FLD: 15.4 % — HIGH (ref 10.3–14.5)
RBC # FLD: 15.4 % — HIGH (ref 10.3–14.5)
SODIUM SERPL-SCNC: 137 MMOL/L — SIGNIFICANT CHANGE UP (ref 135–145)
SODIUM SERPL-SCNC: 137 MMOL/L — SIGNIFICANT CHANGE UP (ref 135–145)
WBC # BLD: 9.71 K/UL — SIGNIFICANT CHANGE UP (ref 3.8–10.5)
WBC # BLD: 9.71 K/UL — SIGNIFICANT CHANGE UP (ref 3.8–10.5)
WBC # FLD AUTO: 9.71 K/UL — SIGNIFICANT CHANGE UP (ref 3.8–10.5)
WBC # FLD AUTO: 9.71 K/UL — SIGNIFICANT CHANGE UP (ref 3.8–10.5)

## 2023-11-11 PROCEDURE — 99291 CRITICAL CARE FIRST HOUR: CPT | Mod: 24

## 2023-11-11 RX ORDER — GLUCAGON INJECTION, SOLUTION 0.5 MG/.1ML
1 INJECTION, SOLUTION SUBCUTANEOUS ONCE
Refills: 0 | Status: DISCONTINUED | OUTPATIENT
Start: 2023-11-11 | End: 2023-11-12

## 2023-11-11 RX ORDER — DEXTROSE 50 % IN WATER 50 %
12.5 SYRINGE (ML) INTRAVENOUS ONCE
Refills: 0 | Status: DISCONTINUED | OUTPATIENT
Start: 2023-11-11 | End: 2023-11-21

## 2023-11-11 RX ORDER — SODIUM CHLORIDE 9 MG/ML
1000 INJECTION, SOLUTION INTRAVENOUS
Refills: 0 | Status: DISCONTINUED | OUTPATIENT
Start: 2023-11-11 | End: 2023-11-12

## 2023-11-11 RX ORDER — DEXTROSE 50 % IN WATER 50 %
25 SYRINGE (ML) INTRAVENOUS ONCE
Refills: 0 | Status: DISCONTINUED | OUTPATIENT
Start: 2023-11-11 | End: 2023-11-21

## 2023-11-11 RX ORDER — OXYCODONE HYDROCHLORIDE 5 MG/1
5 TABLET ORAL EVERY 4 HOURS
Refills: 0 | Status: DISCONTINUED | OUTPATIENT
Start: 2023-11-11 | End: 2023-11-16

## 2023-11-11 RX ORDER — INSULIN LISPRO 100/ML
VIAL (ML) SUBCUTANEOUS EVERY 6 HOURS
Refills: 0 | Status: DISCONTINUED | OUTPATIENT
Start: 2023-11-11 | End: 2023-11-14

## 2023-11-11 RX ORDER — ACETAMINOPHEN 500 MG
975 TABLET ORAL EVERY 6 HOURS
Refills: 0 | Status: DISCONTINUED | OUTPATIENT
Start: 2023-11-11 | End: 2023-11-21

## 2023-11-11 RX ORDER — OXYCODONE HYDROCHLORIDE 5 MG/1
10 TABLET ORAL EVERY 4 HOURS
Refills: 0 | Status: DISCONTINUED | OUTPATIENT
Start: 2023-11-11 | End: 2023-11-13

## 2023-11-11 RX ORDER — DEXTROSE 50 % IN WATER 50 %
15 SYRINGE (ML) INTRAVENOUS ONCE
Refills: 0 | Status: DISCONTINUED | OUTPATIENT
Start: 2023-11-11 | End: 2023-11-12

## 2023-11-11 RX ADMIN — AMPICILLIN SODIUM AND SULBACTAM SODIUM 200 GRAM(S): 250; 125 INJECTION, POWDER, FOR SUSPENSION INTRAMUSCULAR; INTRAVENOUS at 09:22

## 2023-11-11 RX ADMIN — AMPICILLIN SODIUM AND SULBACTAM SODIUM 200 GRAM(S): 250; 125 INJECTION, POWDER, FOR SUSPENSION INTRAMUSCULAR; INTRAVENOUS at 15:37

## 2023-11-11 RX ADMIN — CHLORHEXIDINE GLUCONATE 1 APPLICATION(S): 213 SOLUTION TOPICAL at 11:57

## 2023-11-11 RX ADMIN — AMPICILLIN SODIUM AND SULBACTAM SODIUM 200 GRAM(S): 250; 125 INJECTION, POWDER, FOR SUSPENSION INTRAMUSCULAR; INTRAVENOUS at 22:16

## 2023-11-11 RX ADMIN — FENTANYL CITRATE 3.4 MICROGRAM(S)/KG/HR: 50 INJECTION INTRAVENOUS at 07:37

## 2023-11-11 RX ADMIN — PHENYLEPHRINE HYDROCHLORIDE 2.55 MICROGRAM(S)/KG/MIN: 10 INJECTION INTRAVENOUS at 07:37

## 2023-11-11 RX ADMIN — PROPOFOL 20.4 MICROGRAM(S)/KG/MIN: 10 INJECTION, EMULSION INTRAVENOUS at 07:37

## 2023-11-11 RX ADMIN — Medication 2: at 17:35

## 2023-11-11 RX ADMIN — DEXMEDETOMIDINE HYDROCHLORIDE IN 0.9% SODIUM CHLORIDE 8.5 MICROGRAM(S)/KG/HR: 4 INJECTION INTRAVENOUS at 07:38

## 2023-11-11 RX ADMIN — ONDANSETRON 4 MILLIGRAM(S): 8 TABLET, FILM COATED ORAL at 05:35

## 2023-11-11 RX ADMIN — AMPICILLIN SODIUM AND SULBACTAM SODIUM 200 GRAM(S): 250; 125 INJECTION, POWDER, FOR SUSPENSION INTRAMUSCULAR; INTRAVENOUS at 03:27

## 2023-11-11 RX ADMIN — CHLORHEXIDINE GLUCONATE 15 MILLILITER(S): 213 SOLUTION TOPICAL at 22:17

## 2023-11-11 RX ADMIN — FENTANYL CITRATE 0.5 MICROGRAM(S)/KG/HR: 50 INJECTION INTRAVENOUS at 08:11

## 2023-11-11 RX ADMIN — HEPARIN SODIUM 5000 UNIT(S): 5000 INJECTION INTRAVENOUS; SUBCUTANEOUS at 13:15

## 2023-11-11 RX ADMIN — SODIUM CHLORIDE 75 MILLILITER(S): 9 INJECTION, SOLUTION INTRAVENOUS at 07:37

## 2023-11-11 RX ADMIN — CHLORHEXIDINE GLUCONATE 15 MILLILITER(S): 213 SOLUTION TOPICAL at 13:15

## 2023-11-11 RX ADMIN — SENNA PLUS 2 TABLET(S): 8.6 TABLET ORAL at 22:17

## 2023-11-11 RX ADMIN — Medication 4: at 01:13

## 2023-11-11 RX ADMIN — HEPARIN SODIUM 5000 UNIT(S): 5000 INJECTION INTRAVENOUS; SUBCUTANEOUS at 05:34

## 2023-11-11 RX ADMIN — HEPARIN SODIUM 5000 UNIT(S): 5000 INJECTION INTRAVENOUS; SUBCUTANEOUS at 22:17

## 2023-11-11 NOTE — PROGRESS NOTE ADULT - ATTENDING COMMENTS
I have examined this patient, reviewed pertinent labs and imaging on SICU rounds.    75   minutes in total were spent in providing direct critical care for the diagnoses, assessment and plan outlined below.  This patient suffers from a critical illness that acutely impairs one or more vital organ systems such that there is a high probability of life threatening or imminent deterioration of the patient's condition.  These diagnoses are unrelated to the surgical procedure.    Additionally, time spent in teaching or the performance of separately billable procedures was not counted toward my critical care time.  There is no overlap.  Time included review of vitals, labs, imaging, discussion with consultants (OMFS).    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  He underwent maxillectomy, total neck dissection, modified radical impression custom preparation surgical obturator prosthesis, advance flap and radial forearm free flap , spilt thickness skin graft, vestibuloplasty anterior, complex repair of neck, VAC placement.  SICU consulted for q1 flap checks and vent management.    crit care issues:  critical airway  vent management    ICU Vital Signs Last 24 Hrs  T(C): 36.4 (11 Nov 2023 08:00), Max: 37.2 (10 Nov 2023 18:45)  T(F): 97.6 (11 Nov 2023 08:00), Max: 99 (10 Nov 2023 18:45)  HR: 73 (11 Nov 2023 12:08) (59 - 93)  BP: 116/60 (10 Nov 2023 18:35) (116/60 - 116/60)  BP(mean): 78 (10 Nov 2023 18:35) (78 - 78)  ABP: 130/59 (11 Nov 2023 11:00) (90/44 - 156/63)  ABP(mean): 84 (11 Nov 2023 11:00) (58 - 103)  RR: 12 (11 Nov 2023 11:00) (12 - 14)  SpO2: 100% (11 Nov 2023 12:08) (100% - 100%)    O2 Parameters below as of 11 Nov 2023 10:00  Patient On (Oxygen Delivery Method): ventilator, AC    O2 Concentration (%): 40    sedated  intubated  some plethora face, as expected  minimal trach secretions    ABG - ( 11 Nov 2023 00:50 )  pH, Arterial: 7.45  pH, Blood: x     /  pCO2: 37    /  pO2: 173   / HCO3: 26    / Base Excess: 1.7   /  SaO2: 97.2                            8.9    9.71  )-----------( 124      ( 11 Nov 2023 00:50 )             28.8   11-11    137  |  101  |  11  ----------------------------<  188<H>  3.8   |  24  |  0.52    Ca    8.4      11 Nov 2023 00:50  Phos  3.1     11-11  Mg     1.40     11-11    MEDICATIONS  (STANDING):  ampicillin/sulbactam  IVPB      ampicillin/sulbactam  IVPB 3 Gram(s) IV Intermittent every 6 hours  chlorhexidine 0.12% Liquid 15 milliLiter(s) Swish and Spit <User Schedule>  chlorhexidine 2% Cloths 1 Application(s) Topical daily  dexMEDEtomidine Infusion 0.5 MICROgram(s)/kG/Hr (8.5 mL/Hr) IV Continuous <Continuous>  dextrose 5%. 1000 milliLiter(s) (50 mL/Hr) IV Continuous <Continuous>  dextrose 5%. 1000 milliLiter(s) (100 mL/Hr) IV Continuous <Continuous>  dextrose 50% Injectable 25 Gram(s) IV Push once  dextrose 50% Injectable 25 Gram(s) IV Push once  dextrose 50% Injectable 12.5 Gram(s) IV Push once  fentaNYL   Infusion 0.5 MICROgram(s)/kG/Hr (3.4 mL/Hr) IV Continuous <Continuous>  glucagon  Injectable 1 milliGRAM(s) IntraMuscular once  heparin   Injectable 5000 Unit(s) SubCutaneous every 8 hours  insulin lispro (ADMELOG) corrective regimen sliding scale   SubCutaneous every 6 hours  ondansetron Injectable 4 milliGRAM(s) IV Push every 8 hours  phenylephrine    Infusion 0.2 MICROgram(s)/kG/Min (2.55 mL/Hr) IV Continuous <Continuous>  polyethylene glycol 3350 17 Gram(s) Oral daily  propofol Infusion 50 MICROgram(s)/kG/Min (20.4 mL/Hr) IV Continuous <Continuous>  senna 2 Tablet(s) Oral at bedtime

## 2023-11-11 NOTE — PROGRESS NOTE ADULT - ASSESSMENT
s/p maxillectomy with radial fore arm reconstruction 11/10/2023:    Plan:  - continue ERAS protocol,  - wean off pressors  - extubation based off of OMFS  - continue q2 flap checks at 24 hours post op  - VAC to arm, remove on POD7  - leg thigh dressing down POD7    Juan Palm PGY3  Plastic Surgery  [id number]# [facility] pager  [phone] [facility] pager  Available on Teams

## 2023-11-11 NOTE — PROGRESS NOTE ADULT - ASSESSMENT
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 Admitted to SICU for q1h flap checks, +2 cook doppler signal, KO tube via R naris, yang, A-line. WBC 9.71, H/H 8.9/28.8.     Plan:  - ERAS Day 1 Protocol  - Cuff leak test, if pass, extubation in SICU w/ OMFS at bedside  - Consult nutrition for TF regimen  - Confirm TF placement in CXR, once correct TF placement is verified, start TF   - C/W IV Unasyn  - Multimodal pain control  - After extubation, remove yang, TOV,   - AOOBTC  - Follow RAE and VAC output  - Q1h flap checks    Juanita Sultana  Oral and Maxillofacial Surgery  #[id number]

## 2023-11-11 NOTE — PROGRESS NOTE ADULT - SUBJECTIVE AND OBJECTIVE BOX
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 Admitted to SICU for q1h flap checks, +2 cook doppler signal, KO tube via R naris, yang, A-line.    Interval events:  CXR taken at 9pm overnight, re-positioned, re-taken CXR w/ image at 11pm. NAEON.    ICU Vital Signs Last 24 Hrs  T(C): 36.4 (11 Nov 2023 08:00), Max: 37.2 (10 Nov 2023 18:45)  T(F): 97.6 (11 Nov 2023 08:00), Max: 99 (10 Nov 2023 18:45)  HR: 69 (11 Nov 2023 09:00) (59 - 93)  BP: 116/60 (10 Nov 2023 18:35) (116/60 - 116/60)  BP(mean): 78 (10 Nov 2023 18:35) (78 - 78)  ABP: 94/54 (11 Nov 2023 09:00) (90/44 - 156/63)  ABP(mean): 69 (11 Nov 2023 09:00) (58 - 103)  RR: 12 (11 Nov 2023 09:00) (12 - 14)  SpO2: 100% (11 Nov 2023 09:00) (100% - 100%)    O2 Parameters below as of 11 Nov 2023 08:00  Patient On (Oxygen Delivery Method): ventilator, AC  O2 Flow (L/min): 40    I&O's Detail    10 Nov 2023 07:01  -  11 Nov 2023 07:00  --------------------------------------------------------  IN:    Dexmedetomidine: 34 mL    FentaNYL: 91.8 mL    IV PiggyBack: 100 mL    Lactated Ringers: 900 mL    Phenylephrine: 42.3 mL    Propofol: 244.8 mL  Total IN: 1412.9 mL    OUT:    Bulb (mL): 20 mL    Bulb (mL): 60 mL    Indwelling Catheter - Urethral (mL): 1700 mL    VAC (Vacuum Assisted Closure) System (mL): 25 mL  Total OUT: 1805 mL    Total NET: -392.1 mL      11 Nov 2023 07:01  -  11 Nov 2023 10:31  --------------------------------------------------------  IN:    FentaNYL: 17 mL    Lactated Ringers: 150 mL    Phenylephrine: 14 mL    Propofol: 40.8 mL  Total IN: 221.8 mL    OUT:    Bulb (mL): 20 mL    Bulb (mL): 5 mL    Indwelling Catheter - Urethral (mL): 275 mL  Total OUT: 300 mL    Total NET: -78.2 mL    MEDICATIONS  (STANDING):  ampicillin/sulbactam  IVPB      ampicillin/sulbactam  IVPB 3 Gram(s) IV Intermittent every 6 hours  chlorhexidine 0.12% Liquid 15 milliLiter(s) Swish and Spit <User Schedule>  chlorhexidine 2% Cloths 1 Application(s) Topical daily  dexMEDEtomidine Infusion 0.5 MICROgram(s)/kG/Hr (8.5 mL/Hr) IV Continuous <Continuous>  dextrose 5%. 1000 milliLiter(s) (50 mL/Hr) IV Continuous <Continuous>  dextrose 5%. 1000 milliLiter(s) (100 mL/Hr) IV Continuous <Continuous>  dextrose 50% Injectable 25 Gram(s) IV Push once  dextrose 50% Injectable 25 Gram(s) IV Push once  dextrose 50% Injectable 12.5 Gram(s) IV Push once  fentaNYL   Infusion 0.5 MICROgram(s)/kG/Hr (3.4 mL/Hr) IV Continuous <Continuous>  glucagon  Injectable 1 milliGRAM(s) IntraMuscular once  heparin   Injectable 5000 Unit(s) SubCutaneous every 8 hours  insulin lispro (ADMELOG) corrective regimen sliding scale   SubCutaneous every 6 hours  ondansetron Injectable 4 milliGRAM(s) IV Push every 8 hours  phenylephrine    Infusion 0.2 MICROgram(s)/kG/Min (2.55 mL/Hr) IV Continuous <Continuous>  polyethylene glycol 3350 17 Gram(s) Oral daily  propofol Infusion 50 MICROgram(s)/kG/Min (20.4 mL/Hr) IV Continuous <Continuous>  senna 2 Tablet(s) Oral at bedtime    MEDICATIONS  (PRN):  dextrose Oral Gel 15 Gram(s) Oral once PRN Blood Glucose LESS THAN 70 milliGRAM(s)/deciliter    Physical Exam:  Gen: intubated, sedated  ABIDA: NC/AT, Cook doppler on R side, strong, R neck RAE drain w/ sso 30cc, R anterior neck staple line c/d/i,  N: KO feeding tube in place, sutured  Maxillofacial: intraoral flap pink, good cap refill, suture site c/i/hemostatic  : yang, clear yellow urine  Ext: L forearm wrapped w/ vac to suction, LLE STSG donor site hemostatic, intact,

## 2023-11-11 NOTE — PROGRESS NOTE ADULT - SUBJECTIVE AND OBJECTIVE BOX
SICU Daily Progress Note  =====================================================  Interval/Overnight Events:     ***    Allergies: No Known Allergies      MEDICATIONS:   --------------------------------------------------------------------------------------  Neurologic Medications  dexMEDEtomidine Infusion 0.5 MICROgram(s)/kG/Hr IV Continuous <Continuous>  fentaNYL   Infusion 0.5 MICROgram(s)/kG/Hr IV Continuous <Continuous>  ondansetron Injectable 4 milliGRAM(s) IV Push every 8 hours  propofol Infusion 50 MICROgram(s)/kG/Min IV Continuous <Continuous>    Respiratory Medications    Cardiovascular Medications  phenylephrine    Infusion 0.2 MICROgram(s)/kG/Min IV Continuous <Continuous>    Gastrointestinal Medications  dextrose 5%. 1000 milliLiter(s) IV Continuous <Continuous>  dextrose 5%. 1000 milliLiter(s) IV Continuous <Continuous>  lactated ringers. 1000 milliLiter(s) IV Continuous <Continuous>  polyethylene glycol 3350 17 Gram(s) Oral daily  senna 2 Tablet(s) Oral at bedtime    Genitourinary Medications    Hematologic/Oncologic Medications  heparin   Injectable 5000 Unit(s) SubCutaneous every 8 hours    Antimicrobial/Immunologic Medications  ampicillin/sulbactam  IVPB      ampicillin/sulbactam  IVPB 3 Gram(s) IV Intermittent every 6 hours    Endocrine/Metabolic Medications  dextrose 50% Injectable 25 Gram(s) IV Push once  dextrose 50% Injectable 25 Gram(s) IV Push once  dextrose 50% Injectable 12.5 Gram(s) IV Push once  dextrose Oral Gel 15 Gram(s) Oral once PRN Blood Glucose LESS THAN 70 milliGRAM(s)/deciliter  glucagon  Injectable 1 milliGRAM(s) IntraMuscular once  insulin lispro (ADMELOG) corrective regimen sliding scale   SubCutaneous every 6 hours    Topical/Other Medications  chlorhexidine 0.12% Liquid 15 milliLiter(s) Swish and Spit <User Schedule>  chlorhexidine 2% Cloths 1 Application(s) Topical daily    --------------------------------------------------------------------------------------    VITAL SIGNS, INS/OUTS (last 24 hours):  --------------------------------------------------------------------------------------  T(C): 36.1 (11-11-23 @ 00:00), Max: 37.2 (11-10-23 @ 18:45)  HR: 81 (11-11-23 @ 02:00) (72 - 93)  BP: 116/60 (11-10-23 @ 18:35) (114/94 - 116/60)  BP(mean): 78 (11-10-23 @ 18:35) (78 - 78)  ABP: 111/50 (11-11-23 @ 02:00) (107/47 - 156/63)  ABP(mean): 69 (11-11-23 @ 02:00) (64 - 93)  RR: 12 (11-11-23 @ 02:00) (12 - 18)  SpO2: 100% (11-11-23 @ 02:00) (99% - 100%)  Wt(kg): --  CVP(mm Hg): --  CI: --  CAPILLARY BLOOD GLUCOSE      POCT Blood Glucose.: 239 mg/dL (11 Nov 2023 01:04)  POCT Blood Glucose.: 184 mg/dL (10 Nov 2023 19:27)  POCT Blood Glucose.: 144 mg/dL (10 Nov 2023 06:58)   N/A      11-10 @ 07:01  -  11-11 @ 02:59  --------------------------------------------------------  IN:    Dexmedetomidine: 42.5 mL    IV PiggyBack: 100 mL    Lactated Ringers: 525 mL    Propofol: 122.4 mL  Total IN: 789.9 mL    OUT:    Bulb (mL): 0 mL    Bulb (mL): 0 mL    Indwelling Catheter - Urethral (mL): 1100 mL    VAC (Vacuum Assisted Closure) System (mL): 0 mL  Total OUT: 1100 mL    Total NET: -310.1 mL        --------------------------------------------------------------------------------------    EXAM  NEUROLOGY  Exam: Normal, NAD, alert, oriented x3, no focal deficits.    HEENT  Exam: nasotracheal intubation sutured in place, KO feeding tube in place and sutured, dried blood  at nares, R anterior neck staple line C/D/I, flap with [employer] doppler in place audible signal present, good color + cap refill, RAE w/ SS drain    RESPIRATORY  Exam: Lungs clear to auscultation, Normal expansion/effort.   Mechanical Ventilation: Mode: AC/ CMV (Assist Control/ Continuous Mandatory Ventilation), RR (machine): 12, TV (machine): 500, FiO2: 40, PEEP: 5, MAP: 8, PIP: 23    CARDIOVASCULAR  Exam: S1, S2.  Regular rate and rhythm.     GI/NUTRITION  Exam: Abdomen soft, Non-tender, Non-distended.     VASCULAR  Exam: Extremities warm, pink, well-perfused. L forearm wrapped with vac to suction, LLE STSG donor site, oozing      MUSCULOSKELETAL  Exam: All extremities moving spontaneously without limitations.    SKIN  Exam: Good skin turgor, no skin breakdown.       Tubes/Lines/Drains   [x] Peripheral IV  [] Central Venous Line     	[] R	[] L	[] IJ	[] Fem	[] SC	  [] Arterial Line		[] R	[] L	[] Fem	[] Rad	[] Ax	  [] PICC		[] Midline		[] Mediport  [] Urinary Catheter		  [x] Necessity of urinary, arterial, and venous catheters discussed    LABS  --------------------------------------------------------------------------------------  LABS:                        8.9    9.71  )-----------( 124      ( 11 Nov 2023 00:50 )             28.8     11-10    138  |  103  |  12  ----------------------------<  183<H>  3.8   |  25  |  0.55    Ca    8.2<L>      10 Nov 2023 19:00  Phos  3.5     11-10  Mg     1.40     11-10      Lactate:    PT/INR - ( 10 Nov 2023 19:00 )   PT: 12.2 sec;   INR: 1.09 ratio         PTT - ( 10 Nov 2023 19:00 )  PTT:31.1 sec  ABG - ( 11 Nov 2023 00:50 )  pH, Arterial: 7.45  pH, Blood: x     /  pCO2: 37    /  pO2: 173   / HCO3: 26    / Base Excess: 1.7   /  SaO2: 97.2                    Urinalysis Basic - ( 10 Nov 2023 19:00 )    Color: x / Appearance: x / SG: x / pH: x  Gluc: 183 mg/dL / Ketone: x  / Bili: x / Urobili: x   Blood: x / Protein: x / Nitrite: x   Leuk Esterase: x / RBC: x / WBC x   Sq Epi: x / Non Sq Epi: x / Bacteria: x        IMAGING:    --------------------------------------------------------------------------------------    IMAGING STUDIES:

## 2023-11-11 NOTE — PROGRESS NOTE ADULT - CRITICAL CARE ATTENDING COMMENT
Neurologic:   - wean propofol if cuff leak and per OMFS availability for extubation trial    Respiratory:   - nasal intubation- sutured in place  - Per OMFS, plan to extubate POD1, at bedside, discussed with OMFS that primary team would need to be bedside for extubation in SICU for airway salvage if unable to be reintubated. OMFS will f/u in AM    Cardiovascular: hypotension likely 2/2 mild hypovolemia and sedative effect  - BP systolic < 140  - requiring anna gtt for hypotension    Gastrointestinal/Nutrition:   - NGT- sutured in place, initial placement in R lung, replaced overnight with OMFS  - NPO  - zofran  - senna/miralax    Renal/Genitourinary:   - IVF  - replete lytes prn    Hematologic:   - dvt ppx    Infectious Disease:   - [id number] hr    Lines/Tubes:  - a line  - PIV  - NGT  - vac    Endocrine:   - monitor BG      Disposition: SICU

## 2023-11-11 NOTE — PROGRESS NOTE ADULT - ASSESSMENT
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 Pt now s/p R maxillectomy and R neck dissection, L RFFF recon, L STSG. SICU consulted for q1 flap checks     OM  PLAN:   Neurologic:   - AxOx3  - sedated: wean prop  - R eye blindness, L ear deafness  - PCA    Respiratory:   - nasal intubation- sutured in place  - Per OMFS, plan to extubate POD1, at bedside, discussed with OMFS that primary team would need to be bedside for extubation in SICU for airway salvage if unable to be reintubated. OMFS will f/u in AM    Cardiovascular:   - BP systolic < 140  - requiring anna gtt for hypotension    Gastrointestinal/Nutrition:   - NGT- sutured in place, initial placement in R lung, replaced overnight with OMFS  - NPO  - zofran  - senna/miralax    Renal/Genitourinary:   - IVF  - replete lytes prn    Hematologic:   - dvt ppx    Infectious Disease:   - [id number] hr    Lines/Tubes:  - a line  - PIV  - NGT  - vac    Endocrine:   - monitor BG      Disposition: SICU    --------------------------------------------------------------------------------------    Critical Care Diagnoses:

## 2023-11-11 NOTE — PROGRESS NOTE ADULT - SUBJECTIVE AND OBJECTIVE BOX
Plastic Surgery    SUBJECTIVE: Pt seen and examined on rounds with team. NAEON.      VITALS  T(C): 36.4 (11-11-23 @ 08:00), Max: 37.2 (11-10-23 @ 18:45)  HR: 59 (11-11-23 @ 08:00) (59 - 93)  BP: 116/60 (11-10-23 @ 18:35) (116/60 - 116/60)  RR: 12 (11-11-23 @ 08:00) (12 - 14)  SpO2: 100% (11-11-23 @ 08:00) (100% - 100%)  CAPILLARY BLOOD GLUCOSE      POCT Blood Glucose.: 140 mg/dL (11 Nov 2023 05:17)  POCT Blood Glucose.: 239 mg/dL (11 Nov 2023 01:04)  POCT Blood Glucose.: 184 mg/dL (10 Nov 2023 19:27)      Is/Os    11-10 @ 07:01  -  11-11 @ 07:00  --------------------------------------------------------  IN:    Dexmedetomidine: 34 mL    FentaNYL: 91.8 mL    IV PiggyBack: 100 mL    Lactated Ringers: 900 mL    Phenylephrine: 42.3 mL    Propofol: 244.8 mL  Total IN: 1412.9 mL    OUT:    Bulb (mL): 20 mL    Bulb (mL): 60 mL    Indwelling Catheter - Urethral (mL): 1700 mL    VAC (Vacuum Assisted Closure) System (mL): 25 mL  Total OUT: 1805 mL    Total NET: -392.1 mL      11-11 @ 07:01  -  11-11 @ 08:57  --------------------------------------------------------  IN:    FentaNYL: 10.2 mL    Lactated Ringers: 75 mL    Phenylephrine: 6.3 mL    Propofol: 20.4 mL  Total IN: 111.9 mL    OUT:    Bulb (mL): 20 mL    Bulb (mL): 5 mL    Indwelling Catheter - Urethral (mL): 150 mL  Total OUT: 175 mL    Total NET: -63.1 mL          PHYSICAL EXAM:   General: NAD, Lying in bed comfortably  Neuro: sedated  Pulm: Nasal intubation  HEENT: Maxillary flap, good color, soft, good cook signal, drains s/s,   L arm: VAC holding suction  L thigh: donor site appropriate.       LABS  CBC (11-11 @ 00:50)                              8.9<L>                         9.71    )----------------(  124<L>     --    % Neutrophils, --    % Lymphocytes, ANC: --                                  28.8<L>  CBC (11-10 @ 19:00)                              9.2<L>                         11.88<H>  )----------------(  133<L>     --    % Neutrophils, --    % Lymphocytes, ANC: --                                  29.3<L>    BMP (11-11 @ 00:50)             137     |  101     |  11    		Ca++ --      Ca 8.4                ---------------------------------( 188<H>		Mg 1.40<L>             3.8     |  24      |  0.52  			Ph 3.1     BMP (11-10 @ 19:00)             138     |  103     |  12    		Ca++ 1.06<L>  Ca 8.2<L>             ---------------------------------( 183<H>		Mg 1.40<L>             3.8     |  25      |  0.55  			Ph 3.5         Coags (11-10 @ 19:00)  aPTT 31.1 / INR 1.09 / PT 12.2      ABG (11-11 @ 00:50)     7.45 / 37 / 173<H> / 26 / 1.7 / 97.2%     Lactate:    ABG (11-10 @ 19:00)     7.40 / 41 / 174<H> / 25 / 0.5 / 96.3%     Lactate:

## 2023-11-12 LAB
ANION GAP SERPL CALC-SCNC: 10 MMOL/L — SIGNIFICANT CHANGE UP (ref 7–14)
ANION GAP SERPL CALC-SCNC: 10 MMOL/L — SIGNIFICANT CHANGE UP (ref 7–14)
BUN SERPL-MCNC: 12 MG/DL — SIGNIFICANT CHANGE UP (ref 7–23)
BUN SERPL-MCNC: 12 MG/DL — SIGNIFICANT CHANGE UP (ref 7–23)
CALCIUM SERPL-MCNC: 8.5 MG/DL — SIGNIFICANT CHANGE UP (ref 8.4–10.5)
CALCIUM SERPL-MCNC: 8.5 MG/DL — SIGNIFICANT CHANGE UP (ref 8.4–10.5)
CHLORIDE SERPL-SCNC: 100 MMOL/L — SIGNIFICANT CHANGE UP (ref 98–107)
CHLORIDE SERPL-SCNC: 100 MMOL/L — SIGNIFICANT CHANGE UP (ref 98–107)
CO2 SERPL-SCNC: 26 MMOL/L — SIGNIFICANT CHANGE UP (ref 22–31)
CO2 SERPL-SCNC: 26 MMOL/L — SIGNIFICANT CHANGE UP (ref 22–31)
CREAT SERPL-MCNC: 0.57 MG/DL — SIGNIFICANT CHANGE UP (ref 0.5–1.3)
CREAT SERPL-MCNC: 0.57 MG/DL — SIGNIFICANT CHANGE UP (ref 0.5–1.3)
EGFR: 104 ML/MIN/1.73M2 — SIGNIFICANT CHANGE UP
EGFR: 104 ML/MIN/1.73M2 — SIGNIFICANT CHANGE UP
GLUCOSE BLDC GLUCOMTR-MCNC: 124 MG/DL — HIGH (ref 70–99)
GLUCOSE BLDC GLUCOMTR-MCNC: 124 MG/DL — HIGH (ref 70–99)
GLUCOSE BLDC GLUCOMTR-MCNC: 151 MG/DL — HIGH (ref 70–99)
GLUCOSE BLDC GLUCOMTR-MCNC: 151 MG/DL — HIGH (ref 70–99)
GLUCOSE BLDC GLUCOMTR-MCNC: 156 MG/DL — HIGH (ref 70–99)
GLUCOSE BLDC GLUCOMTR-MCNC: 156 MG/DL — HIGH (ref 70–99)
GLUCOSE BLDC GLUCOMTR-MCNC: 174 MG/DL — HIGH (ref 70–99)
GLUCOSE BLDC GLUCOMTR-MCNC: 174 MG/DL — HIGH (ref 70–99)
GLUCOSE BLDC GLUCOMTR-MCNC: 181 MG/DL — HIGH (ref 70–99)
GLUCOSE BLDC GLUCOMTR-MCNC: 181 MG/DL — HIGH (ref 70–99)
GLUCOSE SERPL-MCNC: 170 MG/DL — HIGH (ref 70–99)
GLUCOSE SERPL-MCNC: 170 MG/DL — HIGH (ref 70–99)
HCT VFR BLD CALC: 24.1 % — LOW (ref 39–50)
HCT VFR BLD CALC: 24.1 % — LOW (ref 39–50)
HGB BLD-MCNC: 7.6 G/DL — LOW (ref 13–17)
HGB BLD-MCNC: 7.6 G/DL — LOW (ref 13–17)
MAGNESIUM SERPL-MCNC: 1.6 MG/DL — SIGNIFICANT CHANGE UP (ref 1.6–2.6)
MAGNESIUM SERPL-MCNC: 1.6 MG/DL — SIGNIFICANT CHANGE UP (ref 1.6–2.6)
MCHC RBC-ENTMCNC: 26 PG — LOW (ref 27–34)
MCHC RBC-ENTMCNC: 26 PG — LOW (ref 27–34)
MCHC RBC-ENTMCNC: 31.5 GM/DL — LOW (ref 32–36)
MCHC RBC-ENTMCNC: 31.5 GM/DL — LOW (ref 32–36)
MCV RBC AUTO: 82.5 FL — SIGNIFICANT CHANGE UP (ref 80–100)
MCV RBC AUTO: 82.5 FL — SIGNIFICANT CHANGE UP (ref 80–100)
NRBC # BLD: 0 /100 WBCS — SIGNIFICANT CHANGE UP (ref 0–0)
NRBC # BLD: 0 /100 WBCS — SIGNIFICANT CHANGE UP (ref 0–0)
NRBC # FLD: 0 K/UL — SIGNIFICANT CHANGE UP (ref 0–0)
NRBC # FLD: 0 K/UL — SIGNIFICANT CHANGE UP (ref 0–0)
PHOSPHATE SERPL-MCNC: 2.4 MG/DL — LOW (ref 2.5–4.5)
PHOSPHATE SERPL-MCNC: 2.4 MG/DL — LOW (ref 2.5–4.5)
PLATELET # BLD AUTO: 122 K/UL — LOW (ref 150–400)
PLATELET # BLD AUTO: 122 K/UL — LOW (ref 150–400)
POTASSIUM SERPL-MCNC: 3.3 MMOL/L — LOW (ref 3.5–5.3)
POTASSIUM SERPL-MCNC: 3.3 MMOL/L — LOW (ref 3.5–5.3)
POTASSIUM SERPL-SCNC: 3.3 MMOL/L — LOW (ref 3.5–5.3)
POTASSIUM SERPL-SCNC: 3.3 MMOL/L — LOW (ref 3.5–5.3)
RBC # BLD: 2.92 M/UL — LOW (ref 4.2–5.8)
RBC # BLD: 2.92 M/UL — LOW (ref 4.2–5.8)
RBC # FLD: 15.3 % — HIGH (ref 10.3–14.5)
RBC # FLD: 15.3 % — HIGH (ref 10.3–14.5)
SODIUM SERPL-SCNC: 136 MMOL/L — SIGNIFICANT CHANGE UP (ref 135–145)
SODIUM SERPL-SCNC: 136 MMOL/L — SIGNIFICANT CHANGE UP (ref 135–145)
WBC # BLD: 9.67 K/UL — SIGNIFICANT CHANGE UP (ref 3.8–10.5)
WBC # BLD: 9.67 K/UL — SIGNIFICANT CHANGE UP (ref 3.8–10.5)
WBC # FLD AUTO: 9.67 K/UL — SIGNIFICANT CHANGE UP (ref 3.8–10.5)
WBC # FLD AUTO: 9.67 K/UL — SIGNIFICANT CHANGE UP (ref 3.8–10.5)

## 2023-11-12 PROCEDURE — 99232 SBSQ HOSP IP/OBS MODERATE 35: CPT | Mod: 24,GC

## 2023-11-12 PROCEDURE — 71045 X-RAY EXAM CHEST 1 VIEW: CPT | Mod: 26

## 2023-11-12 RX ORDER — SODIUM CHLORIDE 9 MG/ML
500 INJECTION, SOLUTION INTRAVENOUS ONCE
Refills: 0 | Status: COMPLETED | OUTPATIENT
Start: 2023-11-12 | End: 2023-11-12

## 2023-11-12 RX ORDER — FINASTERIDE 5 MG/1
5 TABLET, FILM COATED ORAL ONCE
Refills: 0 | Status: COMPLETED | OUTPATIENT
Start: 2023-11-12 | End: 2023-11-12

## 2023-11-12 RX ORDER — POTASSIUM CHLORIDE 20 MEQ
40 PACKET (EA) ORAL ONCE
Refills: 0 | Status: DISCONTINUED | OUTPATIENT
Start: 2023-11-12 | End: 2023-11-12

## 2023-11-12 RX ORDER — POTASSIUM PHOSPHATE, MONOBASIC POTASSIUM PHOSPHATE, DIBASIC 236; 224 MG/ML; MG/ML
30 INJECTION, SOLUTION INTRAVENOUS ONCE
Refills: 0 | Status: COMPLETED | OUTPATIENT
Start: 2023-11-12 | End: 2023-11-12

## 2023-11-12 RX ORDER — MAGNESIUM SULFATE 500 MG/ML
1 VIAL (ML) INJECTION ONCE
Refills: 0 | Status: COMPLETED | OUTPATIENT
Start: 2023-11-12 | End: 2023-11-12

## 2023-11-12 RX ORDER — POTASSIUM CHLORIDE 20 MEQ
40 PACKET (EA) ORAL ONCE
Refills: 0 | Status: COMPLETED | OUTPATIENT
Start: 2023-11-12 | End: 2023-11-12

## 2023-11-12 RX ORDER — ACETAMINOPHEN 500 MG
1000 TABLET ORAL ONCE
Refills: 0 | Status: COMPLETED | OUTPATIENT
Start: 2023-11-12 | End: 2023-11-12

## 2023-11-12 RX ORDER — POTASSIUM CHLORIDE 20 MEQ
10 PACKET (EA) ORAL
Refills: 0 | Status: DISCONTINUED | OUTPATIENT
Start: 2023-11-12 | End: 2023-11-12

## 2023-11-12 RX ADMIN — Medication 1000 MILLIGRAM(S): at 09:48

## 2023-11-12 RX ADMIN — POTASSIUM PHOSPHATE, MONOBASIC POTASSIUM PHOSPHATE, DIBASIC 83.33 MILLIMOLE(S): 236; 224 INJECTION, SOLUTION INTRAVENOUS at 06:03

## 2023-11-12 RX ADMIN — Medication 2: at 00:10

## 2023-11-12 RX ADMIN — Medication 100 GRAM(S): at 01:48

## 2023-11-12 RX ADMIN — CHLORHEXIDINE GLUCONATE 1 APPLICATION(S): 213 SOLUTION TOPICAL at 12:56

## 2023-11-12 RX ADMIN — CHLORHEXIDINE GLUCONATE 15 MILLILITER(S): 213 SOLUTION TOPICAL at 14:57

## 2023-11-12 RX ADMIN — HEPARIN SODIUM 5000 UNIT(S): 5000 INJECTION INTRAVENOUS; SUBCUTANEOUS at 14:57

## 2023-11-12 RX ADMIN — Medication 975 MILLIGRAM(S): at 23:54

## 2023-11-12 RX ADMIN — Medication 40 MILLIEQUIVALENT(S): at 04:12

## 2023-11-12 RX ADMIN — HEPARIN SODIUM 5000 UNIT(S): 5000 INJECTION INTRAVENOUS; SUBCUTANEOUS at 23:54

## 2023-11-12 RX ADMIN — Medication 2: at 12:55

## 2023-11-12 RX ADMIN — AMPICILLIN SODIUM AND SULBACTAM SODIUM 200 GRAM(S): 250; 125 INJECTION, POWDER, FOR SUSPENSION INTRAMUSCULAR; INTRAVENOUS at 23:56

## 2023-11-12 RX ADMIN — Medication 975 MILLIGRAM(S): at 00:10

## 2023-11-12 RX ADMIN — AMPICILLIN SODIUM AND SULBACTAM SODIUM 200 GRAM(S): 250; 125 INJECTION, POWDER, FOR SUSPENSION INTRAMUSCULAR; INTRAVENOUS at 15:27

## 2023-11-12 RX ADMIN — SODIUM CHLORIDE 500 MILLILITER(S): 9 INJECTION, SOLUTION INTRAVENOUS at 03:37

## 2023-11-12 RX ADMIN — Medication 2: at 23:59

## 2023-11-12 RX ADMIN — OXYCODONE HYDROCHLORIDE 5 MILLIGRAM(S): 5 TABLET ORAL at 21:12

## 2023-11-12 RX ADMIN — CHLORHEXIDINE GLUCONATE 15 MILLILITER(S): 213 SOLUTION TOPICAL at 05:56

## 2023-11-12 RX ADMIN — AMPICILLIN SODIUM AND SULBACTAM SODIUM 200 GRAM(S): 250; 125 INJECTION, POWDER, FOR SUSPENSION INTRAMUSCULAR; INTRAVENOUS at 04:47

## 2023-11-12 RX ADMIN — AMPICILLIN SODIUM AND SULBACTAM SODIUM 200 GRAM(S): 250; 125 INJECTION, POWDER, FOR SUSPENSION INTRAMUSCULAR; INTRAVENOUS at 09:43

## 2023-11-12 RX ADMIN — Medication 400 MILLIGRAM(S): at 08:37

## 2023-11-12 RX ADMIN — Medication 975 MILLIGRAM(S): at 17:11

## 2023-11-12 RX ADMIN — HEPARIN SODIUM 5000 UNIT(S): 5000 INJECTION INTRAVENOUS; SUBCUTANEOUS at 05:55

## 2023-11-12 RX ADMIN — Medication 975 MILLIGRAM(S): at 18:00

## 2023-11-12 RX ADMIN — FINASTERIDE 5 MILLIGRAM(S): 5 TABLET, FILM COATED ORAL at 17:11

## 2023-11-12 RX ADMIN — Medication 975 MILLIGRAM(S): at 01:00

## 2023-11-12 RX ADMIN — Medication 2: at 05:54

## 2023-11-12 RX ADMIN — OXYCODONE HYDROCHLORIDE 5 MILLIGRAM(S): 5 TABLET ORAL at 20:42

## 2023-11-12 RX ADMIN — SENNA PLUS 2 TABLET(S): 8.6 TABLET ORAL at 23:54

## 2023-11-12 NOTE — PROGRESS NOTE ADULT - ATTENDING COMMENTS
tolerated extubation  flap viable  acute blood loss anemia with hgb 7.6 but without hemodynamic consequence  -monitor for trend, transfuse if hgb<7

## 2023-11-12 NOTE — PROGRESS NOTE ADULT - SUBJECTIVE AND OBJECTIVE BOX
SICU Daily Progress Note  =====================================================  Interval events:  - Restarted TF  - D/c LR, D/c fent  - Extubated at bedside with OMFS and anesthesia  - KO tube      ALLERGIES:  No Known Allergies      --------------------------------------------------------------------------------------    MEDICATIONS:    Neurologic Medications  acetaminophen   Oral Liquid .. 975 milliGRAM(s) Oral every 6 hours  oxyCODONE    Solution 5 milliGRAM(s) Oral every 4 hours PRN Moderate Pain (4 - 6)  oxyCODONE    Solution 10 milliGRAM(s) Oral every 4 hours PRN Severe Pain (7 - 10)    Respiratory Medications    Cardiovascular Medications    Gastrointestinal Medications  dextrose 5%. 1000 milliLiter(s) IV Continuous <Continuous>  dextrose 5%. 1000 milliLiter(s) IV Continuous <Continuous>  polyethylene glycol 3350 17 Gram(s) Oral daily  senna 2 Tablet(s) Oral at bedtime    Genitourinary Medications    Hematologic/Oncologic Medications  heparin   Injectable 5000 Unit(s) SubCutaneous every 8 hours    Antimicrobial/Immunologic Medications  ampicillin/sulbactam  IVPB 3 Gram(s) IV Intermittent every 6 hours  ampicillin/sulbactam  IVPB        Endocrine/Metabolic Medications  dextrose 50% Injectable 25 Gram(s) IV Push once  dextrose 50% Injectable 25 Gram(s) IV Push once  dextrose 50% Injectable 12.5 Gram(s) IV Push once  dextrose Oral Gel 15 Gram(s) Oral once PRN Blood Glucose LESS THAN 70 milliGRAM(s)/deciliter  glucagon  Injectable 1 milliGRAM(s) IntraMuscular once  insulin lispro (ADMELOG) corrective regimen sliding scale   SubCutaneous every 6 hours    Topical/Other Medications  chlorhexidine 0.12% Liquid 15 milliLiter(s) Swish and Spit <User Schedule>  chlorhexidine 2% Cloths 1 Application(s) Topical daily    --------------------------------------------------------------------------------------    VITAL SIGNS:  ICU Vital Signs Last 24 Hrs  T(C): 36.7 (11 Nov 2023 20:00), Max: 36.7 (11 Nov 2023 04:00)  T(F): 98.1 (11 Nov 2023 20:00), Max: 98.1 (11 Nov 2023 20:00)  HR: 88 (11 Nov 2023 22:00) (59 - 92)  BP: --  BP(mean): --  ABP: 130/50 (11 Nov 2023 22:00) (90/44 - 147/73)  ABP(mean): 75 (11 Nov 2023 22:00) (58 - 103)  RR: 21 (11 Nov 2023 22:00) (10 - 21)  SpO2: 100% (11 Nov 2023 22:00) (99% - 100%)    O2 Parameters below as of 11 Nov 2023 20:00  Patient On (Oxygen Delivery Method): face tent  O2 Flow (L/min): 8  O2 Concentration (%): 28  --------------------------------------------------------------------------------------    INS AND OUTS:  I&O's Detail    10 Nov 2023 07:01  -  11 Nov 2023 07:00  --------------------------------------------------------  IN:    Dexmedetomidine: 34 mL    FentaNYL: 91.8 mL    IV PiggyBack: 100 mL    Lactated Ringers: 900 mL    Phenylephrine: 42.3 mL    Propofol: 244.8 mL  Total IN: 1412.9 mL    OUT:    Bulb (mL): 20 mL    Bulb (mL): 60 mL    Indwelling Catheter - Urethral (mL): 1700 mL    VAC (Vacuum Assisted Closure) System (mL): 25 mL  Total OUT: 1805 mL    Total NET: -392.1 mL      11 Nov 2023 07:01  -  12 Nov 2023 00:33  --------------------------------------------------------  IN:    Dexmedetomidine: 10.2 mL    FentaNYL: 17 mL    Glucerna 1.5: 80 mL    IV PiggyBack: 200 mL    Lactated Ringers: 150 mL    Phenylephrine: 36.8 mL    Propofol: 82.8 mL  Total IN: 576.8 mL    OUT:    Bulb (mL): 8 mL    Bulb (mL): 90 mL    Indwelling Catheter - Urethral (mL): 1200 mL  Total OUT: 1298 mL    Total NET: -721.2 mL    --------------------------------------------------------------------------------------      EXAM  NEUROLOGY  Exam: Normal, NAD, alert, oriented x3, no focal deficits.    HEENT  Exam: KO feeding tube in place and sutured, dried blood  at nares, R anterior neck staple line C/D/I, flap with cook doppler in place audible signal present, good color + cap refill, RAE w/ SS drain    RESPIRATORY  Exam: Lungs clear to auscultation, Normal expansion/effort. Face tent    CARDIOVASCULAR  Exam: S1, S2.  Regular rate and rhythm.     GI/NUTRITION  Exam: Abdomen soft, Non-tender, Non-distended.     VASCULAR  Exam: Extremities warm, pink, well-perfused. L forearm wrapped with vac to suction, LLE STSG donor site, oozing      MUSCULOSKELETAL  Exam: All extremities moving spontaneously without limitations.    SKIN  Exam: Good skin turgor, no skin breakdown.     METABOLIC / FLUIDS / ELECTROLYTES  dextrose 5%. 1000 milliLiter(s) IV Continuous <Continuous>  dextrose 5%. 1000 milliLiter(s) IV Continuous <Continuous>      HEMATOLOGIC  [x] VTE Prophylaxis: heparin   Injectable 5000 Unit(s) SubCutaneous every 8 hours    Transfusions:	[] PRBC	[] Platelets		[] FFP	[] Cryoprecipitate    INFECTIOUS DISEASE  Antimicrobials/Immunologic Medications:  ampicillin/sulbactam  IVPB 3 Gram(s) IV Intermittent every 6 hours  ampicillin/sulbactam  IVPB        Day #      of     ***    TUBES / LINES / DRAINS  ***  [x] Peripheral IV  [] Central Venous Line     	[] R	[] L	[] IJ	[] Fem	[] SC	Date Placed:   [] Arterial Line		[] R	[] L	[] Fem	[] Rad	[] Ax	Date Placed:   [] PICC		[] Midline		[] Mediport  [] Urinary Catheter		Date Placed:   [x] Necessity of urinary, arterial, and venous catheters discussed    --------------------------------------------------------------------------------------    LABS                          8.9    9.71  )-----------( 124      ( 11 Nov 2023 00:50 )             28.8   11-11    137  |  101  |  11  ----------------------------<  188<H>  3.8   |  24  |  0.52    Ca    8.4      11 Nov 2023 00:50  Phos  3.1     11-11  Mg     1.40     11-11    --------------------------------------------------------------------------------------    OTHER LABORATORY:     IMAGING STUDIES:   CXR:

## 2023-11-12 NOTE — PROGRESS NOTE ADULT - ASSESSMENT
s/p maxillectomy with radial fore arm reconstruction 11/10/2023:    Plan:  - continue ERAS protocol,  - Extubated, doing well  - q4 flap checks with [employer] doppler  - VAC to arm, remove on POD7  - leg thigh dressing down POD7    Plastic Surgery  [id number]# [facility] pager  [phone] [facility] pager  Available on Teams

## 2023-11-12 NOTE — PROGRESS NOTE ADULT - SUBJECTIVE AND OBJECTIVE BOX
Plastic Surgery Progress Note (pg [facility]: [id number], [facility]: [phone])    SUBJECTIVE  The patient was seen and examined. No acute events overnight. Pain controlled, afebrile w/ stable vitals. Extubated.    OBJECTIVE  ___________________________________________________  VITAL SIGNS / I&O's   Vital Signs Last 24 Hrs  T(C): 36.9 (12 Nov 2023 04:00), Max: 36.9 (12 Nov 2023 04:00)  T(F): 98.5 (12 Nov 2023 04:00), Max: 98.5 (12 Nov 2023 04:00)  HR: 83 (12 Nov 2023 08:00) (71 - 100)  BP: --  BP(mean): --  RR: 16 (12 Nov 2023 08:00) (10 - 29)  SpO2: 100% (12 Nov 2023 08:00) (99% - 100%)    Parameters below as of 12 Nov 2023 08:00  Patient On (Oxygen Delivery Method): face tent  O2 Flow (L/min): 6  O2 Concentration (%): 28  Mode: CPAP with PS  FiO2: 30  PEEP: 5  PS: 8  MAP: 8      11 Nov 2023 07:01  -  12 Nov 2023 07:00  --------------------------------------------------------  IN:    Dexmedetomidine: 10.2 mL    FentaNYL: 17 mL    Glucerna 1.5: 320 mL    IV PiggyBack: 800 mL    Lactated Ringers: 150 mL    Phenylephrine: 36.8 mL    Propofol: 82.8 mL  Total IN: 1416.8 mL    OUT:    Bulb (mL): 13 mL    Bulb (mL): 130 mL    Indwelling Catheter - Urethral (mL): 2020 mL  Total OUT: 2163 mL    Total NET: -746.2 mL      12 Nov 2023 07:01  -  12 Nov 2023 09:06  --------------------------------------------------------  IN:    Glucerna 1.5: 40 mL  Total IN: 40 mL    OUT:    Indwelling Catheter - Urethral (mL): 150 mL  Total OUT: 150 mL    Total NET: -110 mL        ___________________________________________________  PHYSICAL EXAM    PHYSICAL EXAM:   General: NAD, Lying in bed comfortably  Neuro: Somnolent but off sedation  Pulm: Extubated w/ face tent mask  HEENT: Maxillary flap, good color, soft, good cook signal, drains s/s,   L arm: VAC holding suction  L thigh: donor site appropriate.     ___________________________________________________  LABS                        7.6    9.67  )-----------( 122      ( 12 Nov 2023 00:44 )             24.1     12 Nov 2023 00:44    136    |  100    |  12     ----------------------------<  170    3.3     |  26     |  0.57     Ca    8.5        12 Nov 2023 00:44  Phos  2.4       12 Nov 2023 00:44  Mg     1.60      12 Nov 2023 00:44      PT/INR - ( 10 Nov 2023 19:00 )   PT: 12.2 sec;   INR: 1.09 ratio         PTT - ( 10 Nov 2023 19:00 )  PTT:31.1 sec  CAPILLARY BLOOD GLUCOSE      POCT Blood Glucose.: 181 mg/dL (12 Nov 2023 05:42)  POCT Blood Glucose.: 151 mg/dL (12 Nov 2023 00:08)  POCT Blood Glucose.: 169 mg/dL (11 Nov 2023 17:15)  POCT Blood Glucose.: 108 mg/dL (11 Nov 2023 11:29)        Urinalysis Basic - ( 12 Nov 2023 00:44 )    Color: x / Appearance: x / SG: x / pH: x  Gluc: 170 mg/dL / Ketone: x  / Bili: x / Urobili: x   Blood: x / Protein: x / Nitrite: x   Leuk Esterase: x / RBC: x / WBC x   Sq Epi: x / Non Sq Epi: x / Bacteria: x      ___________________________________________________  MICRO  Recent Cultures:    ___________________________________________________  MEDICATIONS  (STANDING):  acetaminophen   Oral Liquid .. 975 milliGRAM(s) Oral every 6 hours  ampicillin/sulbactam  IVPB 3 Gram(s) IV Intermittent every 6 hours  ampicillin/sulbactam  IVPB      chlorhexidine 0.12% Liquid 15 milliLiter(s) Swish and Spit <User Schedule>  chlorhexidine 2% Cloths 1 Application(s) Topical daily  dextrose 50% Injectable 25 Gram(s) IV Push once  dextrose 50% Injectable 25 Gram(s) IV Push once  dextrose 50% Injectable 12.5 Gram(s) IV Push once  finasteride 5 milliGRAM(s) Oral once  heparin   Injectable 5000 Unit(s) SubCutaneous every 8 hours  insulin lispro (ADMELOG) corrective regimen sliding scale   SubCutaneous every 6 hours  polyethylene glycol 3350 17 Gram(s) Oral daily  senna 2 Tablet(s) Oral at bedtime    MEDICATIONS  (PRN):  oxyCODONE    Solution 5 milliGRAM(s) Oral every 4 hours PRN Moderate Pain (4 - 6)  oxyCODONE    Solution 10 milliGRAM(s) Oral every 4 hours PRN Severe Pain (7 - 10)

## 2023-11-12 NOTE — PROGRESS NOTE ADULT - ASSESSMENT
Assessment	  73M w/ hx of SCC R maxillary alveolar ridge POD1 s/p R hemimaxillectomy, R SOHND, L RFFF, L ALT STSG 10/10/23. Admitted to SICU for q1h flap checks, +2 cook doppler signal, KO tube via R naris, yang, A-line. Pt is progressing well.     Plan:  - ERAS Day 2 Protocol  - Consult nutrition for TF regimen  - PT Consult   - C/W IV Unasyn  - Multimodal pain control  - OOBTC  - Trend RAE/Vac outputs      Oral and Maxillofacial Surgery  #[id number]

## 2023-11-12 NOTE — PROGRESS NOTE ADULT - SUBJECTIVE AND OBJECTIVE BOX
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 Admitted to SICU for q1h flap checks, +2 cook doppler signal, KO tube via R naris, yang, A-line.    Interval events: Extubated, d/c'd propofol, precedex, fentanyl. Started TFs.     Vital Signs Last 24 Hrs  T(C): 36.9 (12 Nov 2023 04:00), Max: 36.9 (12 Nov 2023 04:00)  T(F): 98.5 (12 Nov 2023 04:00), Max: 98.5 (12 Nov 2023 04:00)  HR: 83 (12 Nov 2023 08:00) (69 - 100)  BP: --  BP(mean): --  RR: 16 (12 Nov 2023 08:00) (10 - 29)  SpO2: 100% (12 Nov 2023 08:00) (99% - 100%)    Parameters below as of 12 Nov 2023 08:00  Patient On (Oxygen Delivery Method): face tent  O2 Flow (L/min): 6  O2 Concentration (%): 28    I&O's Detail    11 Nov 2023 07:01  -  12 Nov 2023 07:00  --------------------------------------------------------  IN:    Dexmedetomidine: 10.2 mL    FentaNYL: 17 mL    Glucerna 1.5: 320 mL    IV PiggyBack: 800 mL    Lactated Ringers: 150 mL    Phenylephrine: 36.8 mL    Propofol: 82.8 mL  Total IN: 1416.8 mL    OUT:    Bulb (mL): 13 mL    Bulb (mL): 130 mL    Indwelling Catheter - Urethral (mL): 2020 mL  Total OUT: 2163 mL    Total NET: -746.2 mL      12 Nov 2023 07:01  -  12 Nov 2023 08:58  --------------------------------------------------------  IN:    Glucerna 1.5: 40 mL  Total IN: 40 mL    OUT:    Indwelling Catheter - Urethral (mL): 150 mL  Total OUT: 150 mL    Total NET: -110 mL    MEDICATIONS  (STANDING):  acetaminophen   Oral Liquid .. 975 milliGRAM(s) Oral every 6 hours  ampicillin/sulbactam  IVPB 3 Gram(s) IV Intermittent every 6 hours  ampicillin/sulbactam  IVPB      chlorhexidine 0.12% Liquid 15 milliLiter(s) Swish and Spit <User Schedule>  chlorhexidine 2% Cloths 1 Application(s) Topical daily  dextrose 50% Injectable 25 Gram(s) IV Push once  dextrose 50% Injectable 25 Gram(s) IV Push once  dextrose 50% Injectable 12.5 Gram(s) IV Push once  finasteride 5 milliGRAM(s) Oral once  heparin   Injectable 5000 Unit(s) SubCutaneous every 8 hours  insulin lispro (ADMELOG) corrective regimen sliding scale   SubCutaneous every 6 hours  polyethylene glycol 3350 17 Gram(s) Oral daily  senna 2 Tablet(s) Oral at bedtime    MEDICATIONS  (PRN):  oxyCODONE    Solution 5 milliGRAM(s) Oral every 4 hours PRN Moderate Pain (4 - 6)  oxyCODONE    Solution 10 milliGRAM(s) Oral every 4 hours PRN Severe Pain (7 - 10)      Physical Exam:  Gen: AAOx3, pt not in acute distress  ABIDA: NC/AT, Cook doppler on R side, strong, R neck RAE drain, R anterior neck staple line c/d/i,  N: KO feeding tube in place, sutured  Maxillofacial: intraoral flap pink, good cap refill, suture site c/i/hemostatic  : yang, clear yellow urine  Ext: L forearm wrapped w/ vac to suction, LLE STSG donor site hemostatic, intact,

## 2023-11-12 NOTE — PROGRESS NOTE ADULT - ASSESSMENT
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 Pt now s/p R maxillectomy and R neck dissection, L RFFF recon, L STSG. SICU consulted for q1 flap checks    PLAN:   Neurologic:   - R eye blindness, L ear deafness    Respiratory:   - Extubated 11/11  - O2 > 92%    Cardiovascular:   - BP systolic < 140  - Wean Nathen as tolerated    Gastrointestinal/Nutrition:   - TF: Glucerna 1.5  - NGT- sutured in place, initial placement in R lung, replaced overnight with OMFS  - NPO  - zofran  - senna/miralax    Renal/Genitourinary:   - IVF  - replete lytes prn    Hematologic:   - dvt ppx: heparin    Infectious Disease:   - [id number] hr    Endocrine:   - monitor BG  - ISS    Lines/Tubes:  - a line  - PIV  - NGT  - vac      Disposition: SICU

## 2023-11-13 LAB
ANION GAP SERPL CALC-SCNC: 4 MMOL/L — LOW (ref 7–14)
ANION GAP SERPL CALC-SCNC: 4 MMOL/L — LOW (ref 7–14)
BUN SERPL-MCNC: 11 MG/DL — SIGNIFICANT CHANGE UP (ref 7–23)
BUN SERPL-MCNC: 11 MG/DL — SIGNIFICANT CHANGE UP (ref 7–23)
CALCIUM SERPL-MCNC: 8.6 MG/DL — SIGNIFICANT CHANGE UP (ref 8.4–10.5)
CALCIUM SERPL-MCNC: 8.6 MG/DL — SIGNIFICANT CHANGE UP (ref 8.4–10.5)
CHLORIDE SERPL-SCNC: 103 MMOL/L — SIGNIFICANT CHANGE UP (ref 98–107)
CHLORIDE SERPL-SCNC: 103 MMOL/L — SIGNIFICANT CHANGE UP (ref 98–107)
CO2 SERPL-SCNC: 30 MMOL/L — SIGNIFICANT CHANGE UP (ref 22–31)
CO2 SERPL-SCNC: 30 MMOL/L — SIGNIFICANT CHANGE UP (ref 22–31)
CREAT SERPL-MCNC: 0.53 MG/DL — SIGNIFICANT CHANGE UP (ref 0.5–1.3)
CREAT SERPL-MCNC: 0.53 MG/DL — SIGNIFICANT CHANGE UP (ref 0.5–1.3)
EGFR: 106 ML/MIN/1.73M2 — SIGNIFICANT CHANGE UP
EGFR: 106 ML/MIN/1.73M2 — SIGNIFICANT CHANGE UP
GLUCOSE BLDC GLUCOMTR-MCNC: 158 MG/DL — HIGH (ref 70–99)
GLUCOSE BLDC GLUCOMTR-MCNC: 158 MG/DL — HIGH (ref 70–99)
GLUCOSE BLDC GLUCOMTR-MCNC: 175 MG/DL — HIGH (ref 70–99)
GLUCOSE BLDC GLUCOMTR-MCNC: 175 MG/DL — HIGH (ref 70–99)
GLUCOSE BLDC GLUCOMTR-MCNC: 197 MG/DL — HIGH (ref 70–99)
GLUCOSE BLDC GLUCOMTR-MCNC: 197 MG/DL — HIGH (ref 70–99)
GLUCOSE BLDC GLUCOMTR-MCNC: 202 MG/DL — HIGH (ref 70–99)
GLUCOSE BLDC GLUCOMTR-MCNC: 202 MG/DL — HIGH (ref 70–99)
GLUCOSE SERPL-MCNC: 209 MG/DL — HIGH (ref 70–99)
GLUCOSE SERPL-MCNC: 209 MG/DL — HIGH (ref 70–99)
HCT VFR BLD CALC: 22.9 % — LOW (ref 39–50)
HCT VFR BLD CALC: 22.9 % — LOW (ref 39–50)
HGB BLD-MCNC: 7.2 G/DL — LOW (ref 13–17)
HGB BLD-MCNC: 7.2 G/DL — LOW (ref 13–17)
MAGNESIUM SERPL-MCNC: 1.8 MG/DL — SIGNIFICANT CHANGE UP (ref 1.6–2.6)
MAGNESIUM SERPL-MCNC: 1.8 MG/DL — SIGNIFICANT CHANGE UP (ref 1.6–2.6)
MCHC RBC-ENTMCNC: 26.8 PG — LOW (ref 27–34)
MCHC RBC-ENTMCNC: 26.8 PG — LOW (ref 27–34)
MCHC RBC-ENTMCNC: 31.4 GM/DL — LOW (ref 32–36)
MCHC RBC-ENTMCNC: 31.4 GM/DL — LOW (ref 32–36)
MCV RBC AUTO: 85.1 FL — SIGNIFICANT CHANGE UP (ref 80–100)
MCV RBC AUTO: 85.1 FL — SIGNIFICANT CHANGE UP (ref 80–100)
NRBC # BLD: 0 /100 WBCS — SIGNIFICANT CHANGE UP (ref 0–0)
NRBC # BLD: 0 /100 WBCS — SIGNIFICANT CHANGE UP (ref 0–0)
NRBC # FLD: 0 K/UL — SIGNIFICANT CHANGE UP (ref 0–0)
NRBC # FLD: 0 K/UL — SIGNIFICANT CHANGE UP (ref 0–0)
PHOSPHATE SERPL-MCNC: 1.8 MG/DL — LOW (ref 2.5–4.5)
PHOSPHATE SERPL-MCNC: 1.8 MG/DL — LOW (ref 2.5–4.5)
PLATELET # BLD AUTO: 137 K/UL — LOW (ref 150–400)
PLATELET # BLD AUTO: 137 K/UL — LOW (ref 150–400)
POTASSIUM SERPL-MCNC: 3.2 MMOL/L — LOW (ref 3.5–5.3)
POTASSIUM SERPL-MCNC: 3.2 MMOL/L — LOW (ref 3.5–5.3)
POTASSIUM SERPL-SCNC: 3.2 MMOL/L — LOW (ref 3.5–5.3)
POTASSIUM SERPL-SCNC: 3.2 MMOL/L — LOW (ref 3.5–5.3)
RBC # BLD: 2.69 M/UL — LOW (ref 4.2–5.8)
RBC # BLD: 2.69 M/UL — LOW (ref 4.2–5.8)
RBC # FLD: 15.9 % — HIGH (ref 10.3–14.5)
RBC # FLD: 15.9 % — HIGH (ref 10.3–14.5)
SODIUM SERPL-SCNC: 137 MMOL/L — SIGNIFICANT CHANGE UP (ref 135–145)
SODIUM SERPL-SCNC: 137 MMOL/L — SIGNIFICANT CHANGE UP (ref 135–145)
WBC # BLD: 7.95 K/UL — SIGNIFICANT CHANGE UP (ref 3.8–10.5)
WBC # BLD: 7.95 K/UL — SIGNIFICANT CHANGE UP (ref 3.8–10.5)
WBC # FLD AUTO: 7.95 K/UL — SIGNIFICANT CHANGE UP (ref 3.8–10.5)
WBC # FLD AUTO: 7.95 K/UL — SIGNIFICANT CHANGE UP (ref 3.8–10.5)

## 2023-11-13 PROCEDURE — 99233 SBSQ HOSP IP/OBS HIGH 50: CPT

## 2023-11-13 RX ORDER — TAMSULOSIN HYDROCHLORIDE 0.4 MG/1
0.4 CAPSULE ORAL AT BEDTIME
Refills: 0 | Status: COMPLETED | OUTPATIENT
Start: 2023-11-13 | End: 2023-11-15

## 2023-11-13 RX ORDER — POTASSIUM PHOSPHATE, MONOBASIC POTASSIUM PHOSPHATE, DIBASIC 236; 224 MG/ML; MG/ML
30 INJECTION, SOLUTION INTRAVENOUS ONCE
Refills: 0 | Status: COMPLETED | OUTPATIENT
Start: 2023-11-13 | End: 2023-11-13

## 2023-11-13 RX ORDER — POTASSIUM CHLORIDE 20 MEQ
40 PACKET (EA) ORAL ONCE
Refills: 0 | Status: COMPLETED | OUTPATIENT
Start: 2023-11-13 | End: 2023-11-13

## 2023-11-13 RX ORDER — MAGNESIUM SULFATE 500 MG/ML
1 VIAL (ML) INJECTION ONCE
Refills: 0 | Status: COMPLETED | OUTPATIENT
Start: 2023-11-13 | End: 2023-11-13

## 2023-11-13 RX ADMIN — AMPICILLIN SODIUM AND SULBACTAM SODIUM 200 GRAM(S): 250; 125 INJECTION, POWDER, FOR SUSPENSION INTRAMUSCULAR; INTRAVENOUS at 11:00

## 2023-11-13 RX ADMIN — AMPICILLIN SODIUM AND SULBACTAM SODIUM 200 GRAM(S): 250; 125 INJECTION, POWDER, FOR SUSPENSION INTRAMUSCULAR; INTRAVENOUS at 15:56

## 2023-11-13 RX ADMIN — Medication 2: at 23:28

## 2023-11-13 RX ADMIN — TAMSULOSIN HYDROCHLORIDE 0.4 MILLIGRAM(S): 0.4 CAPSULE ORAL at 23:27

## 2023-11-13 RX ADMIN — Medication 4: at 11:34

## 2023-11-13 RX ADMIN — Medication 2: at 17:52

## 2023-11-13 RX ADMIN — CHLORHEXIDINE GLUCONATE 15 MILLILITER(S): 213 SOLUTION TOPICAL at 15:36

## 2023-11-13 RX ADMIN — Medication 975 MILLIGRAM(S): at 11:00

## 2023-11-13 RX ADMIN — OXYCODONE HYDROCHLORIDE 10 MILLIGRAM(S): 5 TABLET ORAL at 00:00

## 2023-11-13 RX ADMIN — HEPARIN SODIUM 5000 UNIT(S): 5000 INJECTION INTRAVENOUS; SUBCUTANEOUS at 05:13

## 2023-11-13 RX ADMIN — Medication 40 MILLIEQUIVALENT(S): at 05:13

## 2023-11-13 RX ADMIN — Medication 975 MILLIGRAM(S): at 23:27

## 2023-11-13 RX ADMIN — HEPARIN SODIUM 5000 UNIT(S): 5000 INJECTION INTRAVENOUS; SUBCUTANEOUS at 23:27

## 2023-11-13 RX ADMIN — OXYCODONE HYDROCHLORIDE 10 MILLIGRAM(S): 5 TABLET ORAL at 13:51

## 2023-11-13 RX ADMIN — OXYCODONE HYDROCHLORIDE 10 MILLIGRAM(S): 5 TABLET ORAL at 20:23

## 2023-11-13 RX ADMIN — Medication 975 MILLIGRAM(S): at 17:53

## 2023-11-13 RX ADMIN — Medication 975 MILLIGRAM(S): at 00:17

## 2023-11-13 RX ADMIN — Medication 975 MILLIGRAM(S): at 11:30

## 2023-11-13 RX ADMIN — CHLORHEXIDINE GLUCONATE 1 APPLICATION(S): 213 SOLUTION TOPICAL at 11:01

## 2023-11-13 RX ADMIN — Medication 2: at 05:18

## 2023-11-13 RX ADMIN — OXYCODONE HYDROCHLORIDE 10 MILLIGRAM(S): 5 TABLET ORAL at 13:21

## 2023-11-13 RX ADMIN — Medication 100 GRAM(S): at 04:22

## 2023-11-13 RX ADMIN — POTASSIUM PHOSPHATE, MONOBASIC POTASSIUM PHOSPHATE, DIBASIC 83.33 MILLIMOLE(S): 236; 224 INJECTION, SOLUTION INTRAVENOUS at 06:31

## 2023-11-13 RX ADMIN — CHLORHEXIDINE GLUCONATE 15 MILLILITER(S): 213 SOLUTION TOPICAL at 23:27

## 2023-11-13 RX ADMIN — CHLORHEXIDINE GLUCONATE 15 MILLILITER(S): 213 SOLUTION TOPICAL at 00:17

## 2023-11-13 RX ADMIN — Medication 975 MILLIGRAM(S): at 05:12

## 2023-11-13 RX ADMIN — CHLORHEXIDINE GLUCONATE 15 MILLILITER(S): 213 SOLUTION TOPICAL at 05:13

## 2023-11-13 RX ADMIN — HEPARIN SODIUM 5000 UNIT(S): 5000 INJECTION INTRAVENOUS; SUBCUTANEOUS at 15:36

## 2023-11-13 RX ADMIN — AMPICILLIN SODIUM AND SULBACTAM SODIUM 200 GRAM(S): 250; 125 INJECTION, POWDER, FOR SUSPENSION INTRAMUSCULAR; INTRAVENOUS at 04:21

## 2023-11-13 NOTE — DIETITIAN INITIAL EVALUATION ADULT - PERTINENT LABORATORY DATA
11-13    137  |  103  |  11  ----------------------------<  209<H>  3.2<L>   |  30  |  0.53    Ca    8.6      13 Nov 2023 01:02  Phos  1.8     11-13  Mg     1.80     11-13    POCT Blood Glucose.: 202 mg/dL (11-13-23 @ 11:28)  A1C with Estimated Average Glucose Result: 7.4 % (11-10-23 @ 19:00)  A1C with Estimated Average Glucose Result: 7.0 % (11-03-23 @ 13:05)

## 2023-11-13 NOTE — PROGRESS NOTE ADULT - ASSESSMENT
s/p maxillectomy with radial fore arm reconstruction 11/10/2023:    Plan:  - continue ERAS protocol,  - Extubated, doing well  - q4 flap checks with [employer] doppler  - VAC to arm, remove on POD7  - leg thigh dressing down POD7    Plastic Surgery  [id number]# [facility] pager  [phone] [facility] pager  Available on Teams

## 2023-11-13 NOTE — PROGRESS NOTE ADULT - ATTENDING COMMENTS
I agree with the detailed interval history, physical, and plan, which I have reviewed and edited where appropriate'; also agree with notes/assessment with my team on service.  I have personally examined the patient.  I was physically present for the key portions of the evaluation and management (E/M) service provided.  I reviewed all the pertinent data.  The patient is a critical care patient with life threatening hemodynamic and metabolic instability in SICU.  The SICU team has a constant risk benefit analyzes discussion and coordinating care with the primary team and all consultants.   The patient is in SICU with the chief complaint and diagnosis mentioned in the note.   The plan will be specified in the note.  73 year old male sp maxillectomy, total neck dissection, modified radical impression custom preparation surgical obturator prosthesis, advance flap and radial forearm free flap in SICU.  EXAM  NEUROLOGY  Exam: no focal deficits.  HEENT  Exam: cook doppler  RESPIRATORY  Exam: Lungs clear   CARDIOVASCULAR  Exam: Regular rate   GI/NUTRITION  Exam: Abdomen soft  PLAN:   Neurologic:   - R eye blindness, L ear deafness  Respiratory:   - O2 > 92%  Cardiovascular:   - MAP>65  Gastrointestinal/Nutrition:   - TF: Glucerna 1.5  - zofran  - senna/miralax  Renal/Genitourinary:   - IVF  Hematologic:   -heparin  Infectious Disease:   - Unasyn  Endocrine:   - monitor BGlucose    Disposition: SICU

## 2023-11-13 NOTE — PHYSICAL THERAPY INITIAL EVALUATION ADULT - DIAGNOSIS, PT EVAL
Pt s/p Maxillectomy with neck dissection on 11/10/2023; pt presents with decreased strength and decreased balance.

## 2023-11-13 NOTE — PROGRESS NOTE ADULT - ASSESSMENT
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 Pt now s/p R maxillectomy and R neck dissection, L RFFF recon, L STSG. SICU consulted for q1 flap checks    PLAN:   Neurologic:   - R eye blindness, L ear deafness    Respiratory:   - Extubated 11/11  - O2 > 92%    Cardiovascular:   - BP systolic < 140  - Wean Nathen as tolerated    Gastrointestinal/Nutrition:   - TF: Glucerna 1.5  - NGT- sutured in place, initial placement in R lung, replaced overnight with OMFS  - NPO  - zofran  - senna/miralax    Renal/Genitourinary:   - IVF  - replete lytes prn    Hematologic:   - dvt ppx: heparin    Infectious Disease:   - [id number] hr    Endocrine:   - monitor BG  - ISS    Lines/Tubes:  - a line  - PIV  - NGT  - vac      Disposition: SICU

## 2023-11-13 NOTE — PHYSICAL THERAPY INITIAL EVALUATION ADULT - PATIENT PROFILE REVIEW, REHAB EVAL
No Formal Activity Order in the Computer; Spoke with RN Sirena Clark prior to PT evaluation--> Pt OK for PT consult/OOB activity; vitals taken; /60mmHg, heart rate 78bpm, SpO2 99%./yes

## 2023-11-13 NOTE — PHYSICAL THERAPY INITIAL EVALUATION ADULT - ACTIVE RANGE OF MOTION EXAMINATION, REHAB EVAL
bilateral shoulders ~80 degrees, right elbow/hand/wrist WFL, Left elbow ~ degrees, left wrist WFL, left hand decreased DIP flexion/bilateral  lower extremity Active ROM was WFL (within functional limits)

## 2023-11-13 NOTE — PROGRESS NOTE ADULT - SUBJECTIVE AND OBJECTIVE BOX
Plastic Surgery Progress Note (pg [facility]: [id number], [facility]: [phone])    SUBJECTIVE  The patient was seen and examined. No acute events overnight. Pain controlled, afebrile w/ stable vitals. Doing well on room air    OBJECTIVE  ___________________________________________________  VITAL SIGNS / I&O's   Vital Signs Last 24 Hrs  T(C): 37 (13 Nov 2023 08:00), Max: 37.1 (13 Nov 2023 00:00)  T(F): 98.6 (13 Nov 2023 08:00), Max: 98.8 (13 Nov 2023 00:00)  HR: 71 (13 Nov 2023 08:00) (71 - 92)  BP: 123/49 (13 Nov 2023 08:00) (117/49 - 132/59)  BP(mean): 71 (13 Nov 2023 08:00) (67 - 83)  RR: 17 (13 Nov 2023 08:00) (11 - 22)  SpO2: 99% (13 Nov 2023 08:00) (96% - 100%)    Parameters below as of 13 Nov 2023 08:00  Patient On (Oxygen Delivery Method): room air          12 Nov 2023 07:01  -  13 Nov 2023 07:00  --------------------------------------------------------  IN:    Glucerna 1.5: 715 mL    IV PiggyBack: 200 mL  Total IN: 915 mL    OUT:    Bulb (mL): 30 mL    Bulb (mL): 9 mL    Indwelling Catheter - Urethral (mL): 1975 mL    VAC (Vacuum Assisted Closure) System (mL): 0 mL  Total OUT: 2014 mL    Total NET: -1099 mL        ___________________________________________________  PHYSICAL EXAM    General: NAD, Lying in bed comfortably  Neuro: awake and alert  Pulm: No increased work of breathing on room air  HEENT: Maxillary flap, good color, soft, good cook signal, drain s/s,   L arm: VAC holding suction, drain SS  L thigh: donor site appropriate.     ___________________________________________________  LABS                        7.2    7.95  )-----------( 137      ( 13 Nov 2023 01:02 )             22.9     13 Nov 2023 01:02    137    |  103    |  11     ----------------------------<  209    3.2     |  30     |  0.53     Ca    8.6        13 Nov 2023 01:02  Phos  1.8       13 Nov 2023 01:02  Mg     1.80      13 Nov 2023 01:02        CAPILLARY BLOOD GLUCOSE      POCT Blood Glucose.: 197 mg/dL (13 Nov 2023 05:16)  POCT Blood Glucose.: 174 mg/dL (12 Nov 2023 23:53)  POCT Blood Glucose.: 124 mg/dL (12 Nov 2023 17:22)  POCT Blood Glucose.: 156 mg/dL (12 Nov 2023 12:45)        Urinalysis Basic - ( 13 Nov 2023 01:02 )    Color: x / Appearance: x / SG: x / pH: x  Gluc: 209 mg/dL / Ketone: x  / Bili: x / Urobili: x   Blood: x / Protein: x / Nitrite: x   Leuk Esterase: x / RBC: x / WBC x   Sq Epi: x / Non Sq Epi: x / Bacteria: x      ___________________________________________________  MICRO  Recent Cultures:    ___________________________________________________  MEDICATIONS  (STANDING):  acetaminophen   Oral Liquid .. 975 milliGRAM(s) Oral every 6 hours  ampicillin/sulbactam  IVPB 3 Gram(s) IV Intermittent every 6 hours  ampicillin/sulbactam  IVPB      chlorhexidine 0.12% Liquid 15 milliLiter(s) Swish and Spit <User Schedule>  chlorhexidine 2% Cloths 1 Application(s) Topical daily  dextrose 50% Injectable 25 Gram(s) IV Push once  dextrose 50% Injectable 25 Gram(s) IV Push once  dextrose 50% Injectable 12.5 Gram(s) IV Push once  heparin   Injectable 5000 Unit(s) SubCutaneous every 8 hours  insulin lispro (ADMELOG) corrective regimen sliding scale   SubCutaneous every 6 hours  polyethylene glycol 3350 17 Gram(s) Oral daily  senna 2 Tablet(s) Oral at bedtime    MEDICATIONS  (PRN):  oxyCODONE    Solution 5 milliGRAM(s) Oral every 4 hours PRN Moderate Pain (4 - 6)  oxyCODONE    Solution 10 milliGRAM(s) Oral every 4 hours PRN Severe Pain (7 - 10)

## 2023-11-13 NOTE — PHYSICAL THERAPY INITIAL EVALUATION ADULT - PERTINENT HX OF CURRENT PROBLEM, REHAB EVAL
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 Pt now s/p Right maxillectomy and Right neck dissection, Left RFFF recon, Left STSG. SICU consulted for q1 flap checks

## 2023-11-13 NOTE — DIETITIAN INITIAL EVALUATION ADULT - [id number]_GEN_A_CORE
11-12-23 @ 07:01  -  11-13-23 @ 07:00  --------------------------------------------------------  OUT:  Total OUT: 0 mL    Total NET: 715 mL      11-13-23 @ 07:01  -  11-13-23 @ 13:01  --------------------------------------------------------  OUT:  Total OUT: 0 mL    Total NET: 90 mL

## 2023-11-13 NOTE — PHYSICAL THERAPY INITIAL EVALUATION ADULT - ADDITIONAL COMMENTS
Information regarding pt's prior level of function was obtained from pt's wife @ bedside. Pt lives in a private house with his wife (she works 2 jobs so most of the time pt is alone) with 3 steps to enter; and a flight of stairs to negotiate to basement where his bedroom is; (+)1 handrail. Prior to hospital admission, pt was completely independent and used no assistive device with ambulation, however pt would hold onto furniture/jay. Pt has had several recent falls. Pt's wife is unsure when his last fall is as there are times when she returns from work where she'll find bruises or cuts on him where she believes that he did fall and denies it.

## 2023-11-13 NOTE — PHYSICAL THERAPY INITIAL EVALUATION ADULT - GENERAL OBSERVATIONS, REHAB EVAL
Pt encountered seated in chair, no distress, AxOx4, with +IV, +RAE drains, +doppler, +tele, +pulse oximeter, +yang, +wound vac, left forearm wrapped in ace bandage dry/intact, and wife @ bedside. Pt agreeable to participate in PT despite pain level.

## 2023-11-13 NOTE — PHYSICAL THERAPY INITIAL EVALUATION ADULT - MANUAL MUSCLE TESTING RESULTS, REHAB EVAL
surgical precautions; Bilateral shoulder 3-/5, right elbow/hand/wrist 3/5, Left elbow 3-/5, left wrist 3/5, left  hand 3-/5; Bilateral lower extremities 3/5/grossly assessed due to

## 2023-11-13 NOTE — PROGRESS NOTE ADULT - SUBJECTIVE AND OBJECTIVE BOX
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 Admitted to SICU for q1h flap checks, +2 cook doppler signal, KO tube via R naris, yang, A-line.    Interval events: Due to NGT crack, replaced NGT and confirmed with CXR, TF started uneventfully    Vital Signs Last 24 Hrs  T(C): 36.9 (13 Nov 2023 04:00), Max: 37.1 (13 Nov 2023 00:00)  T(F): 98.4 (13 Nov 2023 04:00), Max: 98.8 (13 Nov 2023 00:00)  HR: 74 (13 Nov 2023 04:00) (73 - 92)  BP: 132/59 (13 Nov 2023 04:00) (117/49 - 132/59)  BP(mean): 80 (13 Nov 2023 04:00) (67 - 83)  RR: 11 (13 Nov 2023 04:00) (11 - 22)  SpO2: 99% (13 Nov 2023 04:00) (96% - 100%)    Parameters below as of 13 Nov 2023 04:00  Patient On (Oxygen Delivery Method): room air    I&O's Detail    11 Nov 2023 07:01  -  12 Nov 2023 07:00  --------------------------------------------------------  IN:    Dexmedetomidine: 10.2 mL    FentaNYL: 17 mL    Glucerna 1.5: 320 mL    IV PiggyBack: 800 mL    Lactated Ringers: 150 mL    Phenylephrine: 36.8 mL    Propofol: 82.8 mL  Total IN: 1416.8 mL    OUT:    Bulb (mL): 13 mL    Bulb (mL): 130 mL    Indwelling Catheter - Urethral (mL): 2020 mL  Total OUT: 2163 mL    Total NET: -746.2 mL    12 Nov 2023 07:01  -  13 Nov 2023 06:56  --------------------------------------------------------  IN:    Glucerna 1.5: 715 mL    IV PiggyBack: 200 mL  Total IN: 915 mL    OUT:    Bulb (mL): 30 mL    Bulb (mL): 9 mL    Indwelling Catheter - Urethral (mL): 1975 mL    VAC (Vacuum Assisted Closure) System (mL): 0 mL  Total OUT: 2014 mL    Total NET: -1099 mL    MEDICATIONS  (STANDING):  acetaminophen   Oral Liquid .. 975 milliGRAM(s) Oral every 6 hours  ampicillin/sulbactam  IVPB 3 Gram(s) IV Intermittent every 6 hours  ampicillin/sulbactam  IVPB      chlorhexidine 0.12% Liquid 15 milliLiter(s) Swish and Spit <User Schedule>  chlorhexidine 2% Cloths 1 Application(s) Topical daily  dextrose 50% Injectable 25 Gram(s) IV Push once  dextrose 50% Injectable 25 Gram(s) IV Push once  dextrose 50% Injectable 12.5 Gram(s) IV Push once  heparin   Injectable 5000 Unit(s) SubCutaneous every 8 hours  insulin lispro (ADMELOG) corrective regimen sliding scale   SubCutaneous every 6 hours  polyethylene glycol 3350 17 Gram(s) Oral daily  senna 2 Tablet(s) Oral at bedtime    MEDICATIONS  (PRN):  oxyCODONE    Solution 5 milliGRAM(s) Oral every 4 hours PRN Moderate Pain (4 - 6)  oxyCODONE    Solution 10 milliGRAM(s) Oral every 4 hours PRN Severe Pain (7 - 10)    Labs:                        7.2    7.95  )-----------( 137      ( 13 Nov 2023 01:02 )             22.9     11-13    137  |  103  |  11  ----------------------------<  209<H>  3.2<L>   |  30  |  0.53    Ca    8.6      13 Nov 2023 01:02  Phos  1.8     11-13  Mg     1.80     11-13    Physical Exam:  Gen: AAOx3, pt not in acute distress  ABIDA: NC/AT, Cook doppler on R side, strong, R neck RAE drain, R anterior neck staple line c/d/i,  N: KO feeding tube in place, sutured  Maxillofacial: intraoral flap pink, good cap refill, suture site c/i/hemostatic  : yang, clear yellow urine  Ext: L forearm wrapped w/ vac to suction, LLE STSG donor site hemostatic, intact,

## 2023-11-13 NOTE — PROGRESS NOTE ADULT - ASSESSMENT
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 Admitted to SICU for q1h flap checks, +2 cook doppler signal, KO tube via R naris, yang, A-line. Pt is progressing well.     Plan:  - ERAS Day 3 Protocol  - Consult nutrition for TF regimen  - PT Consult   - Multimodal pain control  - OOBTC  - Trend RAE/Vac outputs    Tyler Phelps  Oral and Maxillofacial Surgery  [facility] Pager #[phone]  Available on Teams

## 2023-11-13 NOTE — DIETITIAN INITIAL EVALUATION ADULT - NS FNS DIET ORDER
Diet, NPO with Tube Feed:   Tube Feeding Modality: Nasogastric  Glucerna 1.5 Nando (GLUCERNA1.5RTH)  Total Volume for 24 Hours (mL): 1080  Continuous  Starting Tube Feed Rate {mL per Hour}: 20  Increase Tube Feed Rate by (mL): 10  Until Goal Tube Feed Rate (mL per Hour): 45  Tube Feed Duration (in Hours): 24  Tube Feed Start Time: 21:00 (11-11-23 @ 20:10)

## 2023-11-13 NOTE — DIETITIAN INITIAL EVALUATION ADULT - PERTINENT MEDS FT
MEDICATIONS  (STANDING):  acetaminophen   Oral Liquid .. 975 milliGRAM(s) Oral every 6 hours  ampicillin/sulbactam  IVPB 3 Gram(s) IV Intermittent every 6 hours  ampicillin/sulbactam  IVPB      chlorhexidine 0.12% Liquid 15 milliLiter(s) Swish and Spit <User Schedule>  chlorhexidine 2% Cloths 1 Application(s) Topical daily  dextrose 50% Injectable 25 Gram(s) IV Push once  dextrose 50% Injectable 25 Gram(s) IV Push once  dextrose 50% Injectable 12.5 Gram(s) IV Push once  heparin   Injectable 5000 Unit(s) SubCutaneous every 8 hours  insulin lispro (ADMELOG) corrective regimen sliding scale   SubCutaneous every 6 hours  polyethylene glycol 3350 17 Gram(s) Oral daily  senna 2 Tablet(s) Oral at bedtime    MEDICATIONS  (PRN):  oxyCODONE    Solution 5 milliGRAM(s) Oral every 4 hours PRN Moderate Pain (4 - 6)  oxyCODONE    Solution 10 milliGRAM(s) Oral every 4 hours PRN Severe Pain (7 - 10)

## 2023-11-13 NOTE — DIETITIAN INITIAL EVALUATION ADULT - WEIGHT FOR BMI (KG)
EGD scheduled on 8/3/20 with Dr Kay at [facility]  Sharyn Marcuses gave patient verbal instructions/paper work given  Patient aware to complete covid test on 7/24/20 
67.9

## 2023-11-13 NOTE — PROGRESS NOTE ADULT - SUBJECTIVE AND OBJECTIVE BOX
SICU Daily Progress Note  =====================================================  Interval/Overnight Events:    - q2 -> q4 flap checks    Allergies: No Known Allergies      MEDICATIONS:   --------------------------------------------------------------------------------------  Neurologic Medications  acetaminophen   Oral Liquid .. 975 milliGRAM(s) Oral every 6 hours  oxyCODONE    Solution 5 milliGRAM(s) Oral every 4 hours PRN Moderate Pain (4 - 6)  oxyCODONE    Solution 10 milliGRAM(s) Oral every 4 hours PRN Severe Pain (7 - 10)    Respiratory Medications    Cardiovascular Medications    Gastrointestinal Medications  polyethylene glycol 3350 17 Gram(s) Oral daily  senna 2 Tablet(s) Oral at bedtime    Genitourinary Medications    Hematologic/Oncologic Medications  heparin   Injectable 5000 Unit(s) SubCutaneous every 8 hours    Antimicrobial/Immunologic Medications  ampicillin/sulbactam  IVPB 3 Gram(s) IV Intermittent every 6 hours  ampicillin/sulbactam  IVPB        Endocrine/Metabolic Medications  dextrose 50% Injectable 25 Gram(s) IV Push once  dextrose 50% Injectable 25 Gram(s) IV Push once  dextrose 50% Injectable 12.5 Gram(s) IV Push once  insulin lispro (ADMELOG) corrective regimen sliding scale   SubCutaneous every 6 hours    Topical/Other Medications  chlorhexidine 0.12% Liquid 15 milliLiter(s) Swish and Spit <User Schedule>  chlorhexidine 2% Cloths 1 Application(s) Topical daily    --------------------------------------------------------------------------------------    VITAL SIGNS, INS/OUTS (last 24 hours):  --------------------------------------------------------------------------------------  Vital Signs Last 24 Hrs  T(C): 36.8 (12 Nov 2023 20:00), Max: 36.9 (12 Nov 2023 04:00)  T(F): 98.3 (12 Nov 2023 20:00), Max: 98.5 (12 Nov 2023 04:00)  HR: 92 (12 Nov 2023 20:00) (73 - 100)  BP: 123/56 (12 Nov 2023 20:00) (117/49 - 129/55)  BP(mean): 75 (12 Nov 2023 20:00) (67 - 75)  RR: 22 (12 Nov 2023 20:00) (14 - 29)  SpO2: 98% (12 Nov 2023 20:00) (96% - 100%)    Parameters below as of 12 Nov 2023 20:00  Patient On (Oxygen Delivery Method): room air      --------------------------------------------------------------------------------------      EXAM  NEUROLOGY  Exam: Normal, NAD, alert, oriented x3, no focal deficits.    HEENT  Exam: KO feeding tube in place and sutured, dried blood  at nares, R anterior neck staple line C/D/I, flap with [address] doppler in place audible signal present, good color + cap refill, RAE w/ SS drain    RESPIRATORY  Exam: Lungs clear to auscultation, Normal expansion/effort. Face tent    CARDIOVASCULAR  Exam: S1, S2.  Regular rate and rhythm.     GI/NUTRITION  Exam: Abdomen soft, Non-tender, Non-distended.     VASCULAR  Exam: Extremities warm, pink, well-perfused. L forearm wrapped with vac to suction, LLE STSG donor site, oozing      MUSCULOSKELETAL  Exam: All extremities moving spontaneously without limitations.    SKIN  Exam: Good skin turgor, no skin breakdown.     METABOLIC / FLUIDS / ELECTROLYTES  dextrose 5%. 1000 milliLiter(s) IV Continuous <Continuous>  dextrose 5%. 1000 milliLiter(s) IV Continuous <Continuous>      HEMATOLOGIC  [x] VTE Prophylaxis: heparin   Injectable 5000 Unit(s) SubCutaneous every 8 hours    Transfusions:	[] PRBC	[] Platelets		[] FFP	[] Cryoprecipitate    INFECTIOUS DISEASE  Antimicrobials/Immunologic Medications:  ampicillin/sulbactam  IVPB 3 Gram(s) IV Intermittent every 6 hours  ampicillin/sulbactam  IVPB        Day #      of     ***    TUBES / LINES / DRAINS  ***  [x] Peripheral IV  [] Central Venous Line     	[] R	[] L	[] IJ	[] Fem	[] SC	Date Placed:   [] Arterial Line		[] R	[] L	[] Fem	[] Rad	[] Ax	Date Placed:   [] PICC		[] Midline		[] Mediport  [] Urinary Catheter		Date Placed:   [x] Necessity of urinary, arterial, and venous catheters discussed      LABS  --------------------------------------------------------------------------------------    LABS:                        7.6    9.67  )-----------( 122      ( 12 Nov 2023 00:44 )             24.1     11-12    136  |  100  |  12  ----------------------------<  170<H>  3.3<L>   |  26  |  0.57    Ca    8.5      12 Nov 2023 00:44  Phos  2.4     11-12  Mg     1.60     11-12            --------------------------------------------------------------------------------------    OTHER LABORATORY:     IMAGING STUDIES:   CXR:     ASSESSMENT:  73y Male    ((insert from previous))***    PLAN:   ***  Neurologic:   Respiratory:   Cardiovascular:   Gastrointestinal/Nutrition:   Renal/Genitourinary:   Hematologic:   Infectious Disease:   Tubes/Lines/Drains:   Endocrine:   Disposition:     --------------------------------------------------------------------------------------    Critical Care Diagnoses:

## 2023-11-14 LAB
ANION GAP SERPL CALC-SCNC: 10 MMOL/L — SIGNIFICANT CHANGE UP (ref 7–14)
ANION GAP SERPL CALC-SCNC: 10 MMOL/L — SIGNIFICANT CHANGE UP (ref 7–14)
BUN SERPL-MCNC: 13 MG/DL — SIGNIFICANT CHANGE UP (ref 7–23)
BUN SERPL-MCNC: 13 MG/DL — SIGNIFICANT CHANGE UP (ref 7–23)
CALCIUM SERPL-MCNC: 9.2 MG/DL — SIGNIFICANT CHANGE UP (ref 8.4–10.5)
CALCIUM SERPL-MCNC: 9.2 MG/DL — SIGNIFICANT CHANGE UP (ref 8.4–10.5)
CHLORIDE SERPL-SCNC: 103 MMOL/L — SIGNIFICANT CHANGE UP (ref 98–107)
CHLORIDE SERPL-SCNC: 103 MMOL/L — SIGNIFICANT CHANGE UP (ref 98–107)
CO2 SERPL-SCNC: 28 MMOL/L — SIGNIFICANT CHANGE UP (ref 22–31)
CO2 SERPL-SCNC: 28 MMOL/L — SIGNIFICANT CHANGE UP (ref 22–31)
CREAT SERPL-MCNC: 0.49 MG/DL — LOW (ref 0.5–1.3)
CREAT SERPL-MCNC: 0.49 MG/DL — LOW (ref 0.5–1.3)
EGFR: 109 ML/MIN/1.73M2 — SIGNIFICANT CHANGE UP
EGFR: 109 ML/MIN/1.73M2 — SIGNIFICANT CHANGE UP
GLUCOSE BLDC GLUCOMTR-MCNC: 174 MG/DL — HIGH (ref 70–99)
GLUCOSE BLDC GLUCOMTR-MCNC: 174 MG/DL — HIGH (ref 70–99)
GLUCOSE BLDC GLUCOMTR-MCNC: 200 MG/DL — HIGH (ref 70–99)
GLUCOSE BLDC GLUCOMTR-MCNC: 200 MG/DL — HIGH (ref 70–99)
GLUCOSE BLDC GLUCOMTR-MCNC: 216 MG/DL — HIGH (ref 70–99)
GLUCOSE BLDC GLUCOMTR-MCNC: 216 MG/DL — HIGH (ref 70–99)
GLUCOSE BLDC GLUCOMTR-MCNC: 223 MG/DL — HIGH (ref 70–99)
GLUCOSE BLDC GLUCOMTR-MCNC: 223 MG/DL — HIGH (ref 70–99)
GLUCOSE SERPL-MCNC: 186 MG/DL — HIGH (ref 70–99)
GLUCOSE SERPL-MCNC: 186 MG/DL — HIGH (ref 70–99)
HCT VFR BLD CALC: 25.7 % — LOW (ref 39–50)
HCT VFR BLD CALC: 25.7 % — LOW (ref 39–50)
HGB BLD-MCNC: 7.9 G/DL — LOW (ref 13–17)
HGB BLD-MCNC: 7.9 G/DL — LOW (ref 13–17)
MAGNESIUM SERPL-MCNC: 1.9 MG/DL — SIGNIFICANT CHANGE UP (ref 1.6–2.6)
MAGNESIUM SERPL-MCNC: 1.9 MG/DL — SIGNIFICANT CHANGE UP (ref 1.6–2.6)
MCHC RBC-ENTMCNC: 26.3 PG — LOW (ref 27–34)
MCHC RBC-ENTMCNC: 26.3 PG — LOW (ref 27–34)
MCHC RBC-ENTMCNC: 30.7 GM/DL — LOW (ref 32–36)
MCHC RBC-ENTMCNC: 30.7 GM/DL — LOW (ref 32–36)
MCV RBC AUTO: 85.7 FL — SIGNIFICANT CHANGE UP (ref 80–100)
MCV RBC AUTO: 85.7 FL — SIGNIFICANT CHANGE UP (ref 80–100)
NRBC # BLD: 0 /100 WBCS — SIGNIFICANT CHANGE UP (ref 0–0)
NRBC # BLD: 0 /100 WBCS — SIGNIFICANT CHANGE UP (ref 0–0)
NRBC # FLD: 0 K/UL — SIGNIFICANT CHANGE UP (ref 0–0)
NRBC # FLD: 0 K/UL — SIGNIFICANT CHANGE UP (ref 0–0)
PHOSPHATE SERPL-MCNC: 2.5 MG/DL — SIGNIFICANT CHANGE UP (ref 2.5–4.5)
PHOSPHATE SERPL-MCNC: 2.5 MG/DL — SIGNIFICANT CHANGE UP (ref 2.5–4.5)
PLATELET # BLD AUTO: 212 K/UL — SIGNIFICANT CHANGE UP (ref 150–400)
PLATELET # BLD AUTO: 212 K/UL — SIGNIFICANT CHANGE UP (ref 150–400)
POTASSIUM SERPL-MCNC: 3.1 MMOL/L — LOW (ref 3.5–5.3)
POTASSIUM SERPL-MCNC: 3.1 MMOL/L — LOW (ref 3.5–5.3)
POTASSIUM SERPL-SCNC: 3.1 MMOL/L — LOW (ref 3.5–5.3)
POTASSIUM SERPL-SCNC: 3.1 MMOL/L — LOW (ref 3.5–5.3)
RBC # BLD: 3 M/UL — LOW (ref 4.2–5.8)
RBC # BLD: 3 M/UL — LOW (ref 4.2–5.8)
RBC # FLD: 15.6 % — HIGH (ref 10.3–14.5)
RBC # FLD: 15.6 % — HIGH (ref 10.3–14.5)
SODIUM SERPL-SCNC: 141 MMOL/L — SIGNIFICANT CHANGE UP (ref 135–145)
SODIUM SERPL-SCNC: 141 MMOL/L — SIGNIFICANT CHANGE UP (ref 135–145)
WBC # BLD: 7.24 K/UL — SIGNIFICANT CHANGE UP (ref 3.8–10.5)
WBC # BLD: 7.24 K/UL — SIGNIFICANT CHANGE UP (ref 3.8–10.5)
WBC # FLD AUTO: 7.24 K/UL — SIGNIFICANT CHANGE UP (ref 3.8–10.5)
WBC # FLD AUTO: 7.24 K/UL — SIGNIFICANT CHANGE UP (ref 3.8–10.5)

## 2023-11-14 PROCEDURE — 71045 X-RAY EXAM CHEST 1 VIEW: CPT | Mod: 26

## 2023-11-14 RX ORDER — OXYMETAZOLINE HYDROCHLORIDE 0.5 MG/ML
2 SPRAY NASAL
Qty: 1 | Refills: 0
Start: 2023-11-14 | End: 2023-11-16

## 2023-11-14 RX ORDER — ACETAMINOPHEN 500 MG
20 TABLET ORAL
Qty: 800 | Refills: 0
Start: 2023-11-14 | End: 2023-11-23

## 2023-11-14 RX ORDER — DEXTROSE 50 % IN WATER 50 %
12.5 SYRINGE (ML) INTRAVENOUS ONCE
Refills: 0 | Status: DISCONTINUED | OUTPATIENT
Start: 2023-11-14 | End: 2023-11-21

## 2023-11-14 RX ORDER — SODIUM CHLORIDE 0.65 %
2 AEROSOL, SPRAY (ML) NASAL
Qty: 1 | Refills: 0
Start: 2023-11-14 | End: 2023-11-20

## 2023-11-14 RX ORDER — SODIUM CHLORIDE 9 MG/ML
1000 INJECTION, SOLUTION INTRAVENOUS
Refills: 0 | Status: DISCONTINUED | OUTPATIENT
Start: 2023-11-14 | End: 2023-11-21

## 2023-11-14 RX ORDER — DEXTROSE 50 % IN WATER 50 %
25 SYRINGE (ML) INTRAVENOUS ONCE
Refills: 0 | Status: DISCONTINUED | OUTPATIENT
Start: 2023-11-14 | End: 2023-11-21

## 2023-11-14 RX ORDER — IBUPROFEN 200 MG
30 TABLET ORAL
Qty: 1200 | Refills: 0
Start: 2023-11-14 | End: 2023-11-23

## 2023-11-14 RX ORDER — FLUTICASONE PROPIONATE 50 MCG
1 SPRAY, SUSPENSION NASAL
Qty: 1 | Refills: 0
Start: 2023-11-14 | End: 2023-11-20

## 2023-11-14 RX ORDER — POTASSIUM CHLORIDE 20 MEQ
10 PACKET (EA) ORAL
Refills: 0 | Status: COMPLETED | OUTPATIENT
Start: 2023-11-14 | End: 2023-11-14

## 2023-11-14 RX ORDER — OXYCODONE HYDROCHLORIDE 5 MG/1
5 TABLET ORAL
Qty: 100 | Refills: 0
Start: 2023-11-14 | End: 2023-11-18

## 2023-11-14 RX ORDER — CHLORHEXIDINE GLUCONATE 213 G/1000ML
15 SOLUTION TOPICAL
Qty: 1 | Refills: 0
Start: 2023-11-14 | End: 2023-11-27

## 2023-11-14 RX ORDER — INSULIN LISPRO 100/ML
VIAL (ML) SUBCUTANEOUS AT BEDTIME
Refills: 0 | Status: DISCONTINUED | OUTPATIENT
Start: 2023-11-14 | End: 2023-11-14

## 2023-11-14 RX ORDER — DEXTROSE 50 % IN WATER 50 %
15 SYRINGE (ML) INTRAVENOUS ONCE
Refills: 0 | Status: DISCONTINUED | OUTPATIENT
Start: 2023-11-14 | End: 2023-11-21

## 2023-11-14 RX ORDER — INSULIN LISPRO 100/ML
VIAL (ML) SUBCUTANEOUS EVERY 6 HOURS
Refills: 0 | Status: DISCONTINUED | OUTPATIENT
Start: 2023-11-14 | End: 2023-11-15

## 2023-11-14 RX ORDER — GLUCAGON INJECTION, SOLUTION 0.5 MG/.1ML
1 INJECTION, SOLUTION SUBCUTANEOUS ONCE
Refills: 0 | Status: DISCONTINUED | OUTPATIENT
Start: 2023-11-14 | End: 2023-11-21

## 2023-11-14 RX ORDER — AMOXICILLIN 250 MG/5ML
10 SUSPENSION, RECONSTITUTED, ORAL (ML) ORAL
Qty: 2 | Refills: 0
Start: 2023-11-14 | End: 2023-11-20

## 2023-11-14 RX ADMIN — SENNA PLUS 2 TABLET(S): 8.6 TABLET ORAL at 21:24

## 2023-11-14 RX ADMIN — Medication 975 MILLIGRAM(S): at 12:08

## 2023-11-14 RX ADMIN — CHLORHEXIDINE GLUCONATE 15 MILLILITER(S): 213 SOLUTION TOPICAL at 07:26

## 2023-11-14 RX ADMIN — CHLORHEXIDINE GLUCONATE 1 APPLICATION(S): 213 SOLUTION TOPICAL at 11:58

## 2023-11-14 RX ADMIN — Medication 975 MILLIGRAM(S): at 18:13

## 2023-11-14 RX ADMIN — CHLORHEXIDINE GLUCONATE 15 MILLILITER(S): 213 SOLUTION TOPICAL at 16:50

## 2023-11-14 RX ADMIN — Medication 4: at 12:22

## 2023-11-14 RX ADMIN — HEPARIN SODIUM 5000 UNIT(S): 5000 INJECTION INTRAVENOUS; SUBCUTANEOUS at 21:23

## 2023-11-14 RX ADMIN — HEPARIN SODIUM 5000 UNIT(S): 5000 INJECTION INTRAVENOUS; SUBCUTANEOUS at 14:02

## 2023-11-14 RX ADMIN — Medication 100 MILLIEQUIVALENT(S): at 10:55

## 2023-11-14 RX ADMIN — TAMSULOSIN HYDROCHLORIDE 0.4 MILLIGRAM(S): 0.4 CAPSULE ORAL at 21:23

## 2023-11-14 RX ADMIN — Medication 975 MILLIGRAM(S): at 17:43

## 2023-11-14 RX ADMIN — Medication 975 MILLIGRAM(S): at 11:38

## 2023-11-14 RX ADMIN — Medication 975 MILLIGRAM(S): at 07:25

## 2023-11-14 RX ADMIN — HEPARIN SODIUM 5000 UNIT(S): 5000 INJECTION INTRAVENOUS; SUBCUTANEOUS at 07:26

## 2023-11-14 RX ADMIN — Medication 975 MILLIGRAM(S): at 00:15

## 2023-11-14 RX ADMIN — Medication 2: at 07:36

## 2023-11-14 RX ADMIN — Medication 100 MILLIEQUIVALENT(S): at 11:37

## 2023-11-14 RX ADMIN — Medication 100 MILLIEQUIVALENT(S): at 09:28

## 2023-11-14 RX ADMIN — Medication 975 MILLIGRAM(S): at 23:14

## 2023-11-14 RX ADMIN — CHLORHEXIDINE GLUCONATE 15 MILLILITER(S): 213 SOLUTION TOPICAL at 21:23

## 2023-11-14 NOTE — PROGRESS NOTE ADULT - ASSESSMENT
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 +2 cook doppler signal, KO tube via R naris, yang, A-line. Pt is progressing well.     Plan:  - ERAS Day 4 Protocol  - Consult S/S, SW, and   - Multimodal pain control  - OOBTC  - Trend RAE/Vac outputs    Tyler Phelps  Oral and Maxillofacial Surgery  [facility] Pager #[phone]  Available on Teams

## 2023-11-14 NOTE — PROGRESS NOTE ADULT - SUBJECTIVE AND OBJECTIVE BOX
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 Admitted to SICU for q1h flap checks, +2 cook doppler signal, KO tube via R naris, yang, A-line.    Interval events:   -Pt was uneventfully downgraded from SICU to 8S  -Pt (+) TOV 550mL  -Pt successfully sipped clears    Vitals:     Vital Signs Last 24 Hrs  T(C): 36.4 (14 Nov 2023 05:30), Max: 37 (13 Nov 2023 08:00)  T(F): 97.5 (14 Nov 2023 05:30), Max: 98.6 (13 Nov 2023 08:00)  HR: 75 (14 Nov 2023 05:30) (64 - 75)  BP: 149/78 (14 Nov 2023 05:30) (117/53 - 149/78)  BP(mean): 94 (14 Nov 2023 04:00) (71 - 94)  RR: 16 (14 Nov 2023 05:30) (13 - 18)  SpO2: 94% (14 Nov 2023 05:30) (94% - 100%)    Parameters below as of 14 Nov 2023 05:30  Patient On (Oxygen Delivery Method): room air        I&O's Detail    13 Nov 2023 07:01  -  14 Nov 2023 07:00  --------------------------------------------------------  IN:    Glucerna 1.5: 1130 mL    IV PiggyBack: 100 mL  Total IN: 1230 mL    OUT:    Bulb (mL): 30 mL    Bulb (mL): 15 mL    Indwelling Catheter - Urethral (mL): 1340 mL    VAC (Vacuum Assisted Closure) System (mL): 0 mL    Voided (mL): 550 mL  Total OUT: 1935 mL    Total NET: -705 mL          Medications:    MEDICATIONS  (STANDING):  acetaminophen   Oral Liquid .. 975 milliGRAM(s) Oral every 6 hours  chlorhexidine 0.12% Liquid 15 milliLiter(s) Swish and Spit <User Schedule>  chlorhexidine 2% Cloths 1 Application(s) Topical daily  dextrose 50% Injectable 25 Gram(s) IV Push once  dextrose 50% Injectable 25 Gram(s) IV Push once  dextrose 50% Injectable 12.5 Gram(s) IV Push once  heparin   Injectable 5000 Unit(s) SubCutaneous every 8 hours  insulin lispro (ADMELOG) corrective regimen sliding scale   SubCutaneous every 6 hours  polyethylene glycol 3350 17 Gram(s) Oral daily  senna 2 Tablet(s) Oral at bedtime  tamsulosin 0.4 milliGRAM(s) Oral at bedtime    MEDICATIONS  (PRN):  oxyCODONE    Solution 5 milliGRAM(s) Oral every 4 hours PRN Moderate Pain (4 - 6)  oxyCODONE    Solution 10 milliGRAM(s) Oral every 4 hours PRN Severe Pain (7 - 10)    Labs:                        7.9    7.24  )-----------( 212      ( 14 Nov 2023 07:17 )             25.7     11-13    137  |  103  |  11  ----------------------------<  209<H>  3.2<L>   |  30  |  0.53    Ca    8.6      13 Nov 2023 01:02  Phos  1.8     11-13  Mg     1.80     11-13    Physical Exam:  Gen: AAOx3, pt not in acute distress  ABIDA: NC/AT, Cook doppler on R side, strong, R neck RAE drain, R anterior neck staple line c/d/i,  N: KO feeding tube in place, sutured  Maxillofacial: intraoral flap pink, good cap refill, suture site c/i/hemostatic  : yang, clear yellow urine  Ext: L forearm wrapped w/ vac to suction, LLE STSG donor site hemostatic, intact,

## 2023-11-14 NOTE — PROGRESS NOTE ADULT - SUBJECTIVE AND OBJECTIVE BOX
Plastic Surgery Progress Note (pg [facility]: [id number], NS: [phone])    SUBJECTIVE  The patient was seen and examined. Transferred to floor overnight. Pain controlled, afebrile w/ stable vitals.     OBJECTIVE  ___________________________________________________  VITAL SIGNS / I&O's   Vital Signs Last 24 Hrs  T(C): 36.4 (14 Nov 2023 05:30), Max: 37 (13 Nov 2023 08:00)  T(F): 97.5 (14 Nov 2023 05:30), Max: 98.6 (13 Nov 2023 08:00)  HR: 75 (14 Nov 2023 05:30) (64 - 75)  BP: 149/78 (14 Nov 2023 05:30) (117/53 - 149/78)  BP(mean): 94 (14 Nov 2023 04:00) (71 - 94)  RR: 16 (14 Nov 2023 05:30) (13 - 18)  SpO2: 94% (14 Nov 2023 05:30) (94% - 100%)    Parameters below as of 14 Nov 2023 05:30  Patient On (Oxygen Delivery Method): room air          13 Nov 2023 07:01  -  14 Nov 2023 07:00  --------------------------------------------------------  IN:    Glucerna 1.5: 1130 mL    IV PiggyBack: 100 mL  Total IN: 1230 mL    OUT:    Bulb (mL): 30 mL    Bulb (mL): 15 mL    Indwelling Catheter - Urethral (mL): 1340 mL    VAC (Vacuum Assisted Closure) System (mL): 0 mL    Voided (mL): 550 mL  Total OUT: 1935 mL    Total NET: -705 mL        ___________________________________________________  PHYSICAL EXAM    General: NAD, Lying in bed comfortably  Neuro: awake and alert  Pulm: No increased work of breathing on room air  HEENT: Maxillary flap, good color, slightly indurated, good cook signal, drain s/s  Neck: incision intact w/ nylon sutures  L arm: VAC holding suction, drain SS  L thigh: donor site appropriate.     ___________________________________________________  LABS                        7.2    7.95  )-----------( 137      ( 13 Nov 2023 01:02 )             22.9     13 Nov 2023 01:02    137    |  103    |  11     ----------------------------<  209    3.2     |  30     |  0.53     Ca    8.6        13 Nov 2023 01:02  Phos  1.8       13 Nov 2023 01:02  Mg     1.80      13 Nov 2023 01:02        CAPILLARY BLOOD GLUCOSE      POCT Blood Glucose.: 175 mg/dL (13 Nov 2023 23:23)  POCT Blood Glucose.: 158 mg/dL (13 Nov 2023 17:12)  POCT Blood Glucose.: 202 mg/dL (13 Nov 2023 11:28)        Urinalysis Basic - ( 13 Nov 2023 01:02 )    Color: x / Appearance: x / SG: x / pH: x  Gluc: 209 mg/dL / Ketone: x  / Bili: x / Urobili: x   Blood: x / Protein: x / Nitrite: x   Leuk Esterase: x / RBC: x / WBC x   Sq Epi: x / Non Sq Epi: x / Bacteria: x      ___________________________________________________  MICRO  Recent Cultures:    ___________________________________________________  MEDICATIONS  (STANDING):  acetaminophen   Oral Liquid .. 975 milliGRAM(s) Oral every 6 hours  chlorhexidine 0.12% Liquid 15 milliLiter(s) Swish and Spit <User Schedule>  chlorhexidine 2% Cloths 1 Application(s) Topical daily  dextrose 50% Injectable 25 Gram(s) IV Push once  dextrose 50% Injectable 25 Gram(s) IV Push once  dextrose 50% Injectable 12.5 Gram(s) IV Push once  heparin   Injectable 5000 Unit(s) SubCutaneous every 8 hours  insulin lispro (ADMELOG) corrective regimen sliding scale   SubCutaneous every 6 hours  polyethylene glycol 3350 17 Gram(s) Oral daily  senna 2 Tablet(s) Oral at bedtime  tamsulosin 0.4 milliGRAM(s) Oral at bedtime    MEDICATIONS  (PRN):  oxyCODONE    Solution 5 milliGRAM(s) Oral every 4 hours PRN Moderate Pain (4 - 6)  oxyCODONE    Solution 10 milliGRAM(s) Oral every 4 hours PRN Severe Pain (7 - 10)

## 2023-11-14 NOTE — PROGRESS NOTE ADULT - ASSESSMENT
s/p maxillectomy with radial fore arm reconstruction 11/10/2023:    Plan:  - continue ERAS protocol,  - q4 flap checks with Cook doppler, remove POD7  - VAC to arm, remove on POD7  - leg thigh dressing down POD7  - Remove nylons in neck POD10    Plastic Surgery  [id number]# [facility] pager  [phone] [facility] pager  Available on Teams

## 2023-11-15 LAB
ANION GAP SERPL CALC-SCNC: 9 MMOL/L — SIGNIFICANT CHANGE UP (ref 7–14)
ANION GAP SERPL CALC-SCNC: 9 MMOL/L — SIGNIFICANT CHANGE UP (ref 7–14)
BUN SERPL-MCNC: 16 MG/DL — SIGNIFICANT CHANGE UP (ref 7–23)
BUN SERPL-MCNC: 16 MG/DL — SIGNIFICANT CHANGE UP (ref 7–23)
CALCIUM SERPL-MCNC: 9.1 MG/DL — SIGNIFICANT CHANGE UP (ref 8.4–10.5)
CALCIUM SERPL-MCNC: 9.1 MG/DL — SIGNIFICANT CHANGE UP (ref 8.4–10.5)
CHLORIDE SERPL-SCNC: 102 MMOL/L — SIGNIFICANT CHANGE UP (ref 98–107)
CHLORIDE SERPL-SCNC: 102 MMOL/L — SIGNIFICANT CHANGE UP (ref 98–107)
CO2 SERPL-SCNC: 26 MMOL/L — SIGNIFICANT CHANGE UP (ref 22–31)
CO2 SERPL-SCNC: 26 MMOL/L — SIGNIFICANT CHANGE UP (ref 22–31)
CREAT SERPL-MCNC: 0.45 MG/DL — LOW (ref 0.5–1.3)
CREAT SERPL-MCNC: 0.45 MG/DL — LOW (ref 0.5–1.3)
EGFR: 112 ML/MIN/1.73M2 — SIGNIFICANT CHANGE UP
EGFR: 112 ML/MIN/1.73M2 — SIGNIFICANT CHANGE UP
GLUCOSE BLDC GLUCOMTR-MCNC: 216 MG/DL — HIGH (ref 70–99)
GLUCOSE BLDC GLUCOMTR-MCNC: 216 MG/DL — HIGH (ref 70–99)
GLUCOSE BLDC GLUCOMTR-MCNC: 222 MG/DL — HIGH (ref 70–99)
GLUCOSE BLDC GLUCOMTR-MCNC: 222 MG/DL — HIGH (ref 70–99)
GLUCOSE BLDC GLUCOMTR-MCNC: 242 MG/DL — HIGH (ref 70–99)
GLUCOSE BLDC GLUCOMTR-MCNC: 242 MG/DL — HIGH (ref 70–99)
GLUCOSE BLDC GLUCOMTR-MCNC: 256 MG/DL — HIGH (ref 70–99)
GLUCOSE BLDC GLUCOMTR-MCNC: 256 MG/DL — HIGH (ref 70–99)
GLUCOSE BLDC GLUCOMTR-MCNC: 295 MG/DL — HIGH (ref 70–99)
GLUCOSE BLDC GLUCOMTR-MCNC: 295 MG/DL — HIGH (ref 70–99)
GLUCOSE BLDC GLUCOMTR-MCNC: 306 MG/DL — HIGH (ref 70–99)
GLUCOSE BLDC GLUCOMTR-MCNC: 306 MG/DL — HIGH (ref 70–99)
GLUCOSE SERPL-MCNC: 281 MG/DL — HIGH (ref 70–99)
GLUCOSE SERPL-MCNC: 281 MG/DL — HIGH (ref 70–99)
HCT VFR BLD CALC: 23.7 % — LOW (ref 39–50)
HCT VFR BLD CALC: 23.7 % — LOW (ref 39–50)
HEMOLYSIS INDEX: 3 — SIGNIFICANT CHANGE UP
HEMOLYSIS INDEX: 3 — SIGNIFICANT CHANGE UP
HGB BLD-MCNC: 7.4 G/DL — LOW (ref 13–17)
HGB BLD-MCNC: 7.4 G/DL — LOW (ref 13–17)
MAGNESIUM SERPL-MCNC: 1.9 MG/DL — SIGNIFICANT CHANGE UP (ref 1.6–2.6)
MAGNESIUM SERPL-MCNC: 1.9 MG/DL — SIGNIFICANT CHANGE UP (ref 1.6–2.6)
MCHC RBC-ENTMCNC: 26.4 PG — LOW (ref 27–34)
MCHC RBC-ENTMCNC: 26.4 PG — LOW (ref 27–34)
MCHC RBC-ENTMCNC: 31.2 GM/DL — LOW (ref 32–36)
MCHC RBC-ENTMCNC: 31.2 GM/DL — LOW (ref 32–36)
MCV RBC AUTO: 84.6 FL — SIGNIFICANT CHANGE UP (ref 80–100)
MCV RBC AUTO: 84.6 FL — SIGNIFICANT CHANGE UP (ref 80–100)
NRBC # BLD: 0 /100 WBCS — SIGNIFICANT CHANGE UP (ref 0–0)
NRBC # BLD: 0 /100 WBCS — SIGNIFICANT CHANGE UP (ref 0–0)
NRBC # FLD: 0 K/UL — SIGNIFICANT CHANGE UP (ref 0–0)
NRBC # FLD: 0 K/UL — SIGNIFICANT CHANGE UP (ref 0–0)
PHOSPHATE SERPL-MCNC: 2.5 MG/DL — SIGNIFICANT CHANGE UP (ref 2.5–4.5)
PHOSPHATE SERPL-MCNC: 2.5 MG/DL — SIGNIFICANT CHANGE UP (ref 2.5–4.5)
PLATELET # BLD AUTO: 218 K/UL — SIGNIFICANT CHANGE UP (ref 150–400)
PLATELET # BLD AUTO: 218 K/UL — SIGNIFICANT CHANGE UP (ref 150–400)
POTASSIUM SERPL-MCNC: 3.2 MMOL/L — LOW (ref 3.5–5.3)
POTASSIUM SERPL-MCNC: 3.2 MMOL/L — LOW (ref 3.5–5.3)
POTASSIUM SERPL-MCNC: 3.5 MMOL/L — SIGNIFICANT CHANGE UP (ref 3.5–5.3)
POTASSIUM SERPL-MCNC: 3.5 MMOL/L — SIGNIFICANT CHANGE UP (ref 3.5–5.3)
POTASSIUM SERPL-SCNC: 3.2 MMOL/L — LOW (ref 3.5–5.3)
POTASSIUM SERPL-SCNC: 3.2 MMOL/L — LOW (ref 3.5–5.3)
POTASSIUM SERPL-SCNC: 3.5 MMOL/L — SIGNIFICANT CHANGE UP (ref 3.5–5.3)
POTASSIUM SERPL-SCNC: 3.5 MMOL/L — SIGNIFICANT CHANGE UP (ref 3.5–5.3)
RBC # BLD: 2.8 M/UL — LOW (ref 4.2–5.8)
RBC # BLD: 2.8 M/UL — LOW (ref 4.2–5.8)
RBC # FLD: 15 % — HIGH (ref 10.3–14.5)
RBC # FLD: 15 % — HIGH (ref 10.3–14.5)
SODIUM SERPL-SCNC: 137 MMOL/L — SIGNIFICANT CHANGE UP (ref 135–145)
SODIUM SERPL-SCNC: 137 MMOL/L — SIGNIFICANT CHANGE UP (ref 135–145)
WBC # BLD: 5.44 K/UL — SIGNIFICANT CHANGE UP (ref 3.8–10.5)
WBC # BLD: 5.44 K/UL — SIGNIFICANT CHANGE UP (ref 3.8–10.5)
WBC # FLD AUTO: 5.44 K/UL — SIGNIFICANT CHANGE UP (ref 3.8–10.5)
WBC # FLD AUTO: 5.44 K/UL — SIGNIFICANT CHANGE UP (ref 3.8–10.5)

## 2023-11-15 RX ORDER — SODIUM CHLORIDE 9 MG/ML
1000 INJECTION, SOLUTION INTRAVENOUS
Refills: 0 | Status: DISCONTINUED | OUTPATIENT
Start: 2023-11-15 | End: 2023-11-16

## 2023-11-15 RX ORDER — POTASSIUM CHLORIDE 20 MEQ
10 PACKET (EA) ORAL
Refills: 0 | Status: COMPLETED | OUTPATIENT
Start: 2023-11-15 | End: 2023-11-15

## 2023-11-15 RX ORDER — POTASSIUM CHLORIDE 20 MEQ
10 PACKET (EA) ORAL
Refills: 0 | Status: DISCONTINUED | OUTPATIENT
Start: 2023-11-15 | End: 2023-11-15

## 2023-11-15 RX ORDER — INSULIN GLARGINE 100 [IU]/ML
14 INJECTION, SOLUTION SUBCUTANEOUS AT BEDTIME
Refills: 0 | Status: DISCONTINUED | OUTPATIENT
Start: 2023-11-15 | End: 2023-11-16

## 2023-11-15 RX ORDER — INSULIN LISPRO 100/ML
VIAL (ML) SUBCUTANEOUS
Refills: 0 | Status: DISCONTINUED | OUTPATIENT
Start: 2023-11-15 | End: 2023-11-16

## 2023-11-15 RX ORDER — LANOLIN ALCOHOL/MO/W.PET/CERES
3 CREAM (GRAM) TOPICAL AT BEDTIME
Refills: 0 | Status: DISCONTINUED | OUTPATIENT
Start: 2023-11-15 | End: 2023-11-16

## 2023-11-15 RX ORDER — DEXTROSE 50 % IN WATER 50 %
25 SYRINGE (ML) INTRAVENOUS ONCE
Refills: 0 | Status: DISCONTINUED | OUTPATIENT
Start: 2023-11-15 | End: 2023-11-16

## 2023-11-15 RX ADMIN — Medication 100 MILLIEQUIVALENT(S): at 11:23

## 2023-11-15 RX ADMIN — HEPARIN SODIUM 5000 UNIT(S): 5000 INJECTION INTRAVENOUS; SUBCUTANEOUS at 13:17

## 2023-11-15 RX ADMIN — Medication 2: at 06:35

## 2023-11-15 RX ADMIN — TAMSULOSIN HYDROCHLORIDE 0.4 MILLIGRAM(S): 0.4 CAPSULE ORAL at 21:50

## 2023-11-15 RX ADMIN — POLYETHYLENE GLYCOL 3350 17 GRAM(S): 17 POWDER, FOR SOLUTION ORAL at 12:23

## 2023-11-15 RX ADMIN — HEPARIN SODIUM 5000 UNIT(S): 5000 INJECTION INTRAVENOUS; SUBCUTANEOUS at 05:51

## 2023-11-15 RX ADMIN — Medication 100 MILLIEQUIVALENT(S): at 21:50

## 2023-11-15 RX ADMIN — Medication 2: at 18:02

## 2023-11-15 RX ADMIN — INSULIN GLARGINE 14 UNIT(S): 100 INJECTION, SOLUTION SUBCUTANEOUS at 22:26

## 2023-11-15 RX ADMIN — Medication 100 MILLIEQUIVALENT(S): at 13:15

## 2023-11-15 RX ADMIN — Medication 100 MILLIEQUIVALENT(S): at 22:55

## 2023-11-15 RX ADMIN — Medication 975 MILLIGRAM(S): at 12:24

## 2023-11-15 RX ADMIN — CHLORHEXIDINE GLUCONATE 15 MILLILITER(S): 213 SOLUTION TOPICAL at 13:16

## 2023-11-15 RX ADMIN — CHLORHEXIDINE GLUCONATE 15 MILLILITER(S): 213 SOLUTION TOPICAL at 05:52

## 2023-11-15 RX ADMIN — Medication 975 MILLIGRAM(S): at 12:54

## 2023-11-15 RX ADMIN — Medication 975 MILLIGRAM(S): at 23:51

## 2023-11-15 RX ADMIN — Medication 975 MILLIGRAM(S): at 18:03

## 2023-11-15 RX ADMIN — CHLORHEXIDINE GLUCONATE 1 APPLICATION(S): 213 SOLUTION TOPICAL at 12:23

## 2023-11-15 RX ADMIN — Medication 975 MILLIGRAM(S): at 05:52

## 2023-11-15 RX ADMIN — Medication 100 MILLIEQUIVALENT(S): at 19:37

## 2023-11-15 RX ADMIN — HEPARIN SODIUM 5000 UNIT(S): 5000 INJECTION INTRAVENOUS; SUBCUTANEOUS at 21:49

## 2023-11-15 RX ADMIN — CHLORHEXIDINE GLUCONATE 15 MILLILITER(S): 213 SOLUTION TOPICAL at 22:25

## 2023-11-15 RX ADMIN — Medication 100 MILLIEQUIVALENT(S): at 09:34

## 2023-11-15 RX ADMIN — Medication 3: at 13:16

## 2023-11-15 RX ADMIN — Medication 975 MILLIGRAM(S): at 18:33

## 2023-11-15 RX ADMIN — Medication 975 MILLIGRAM(S): at 23:21

## 2023-11-15 NOTE — PROGRESS NOTE ADULT - SUBJECTIVE AND OBJECTIVE BOX
PRATIMA VAZQUEZ  [id number]    Subjective:    Patient seen and examined, doing well. No complaints.      (13 Nov 2023 13:01)       Objective:  T(C): 36.5 (11-15-23 @ 02:00), Max: 36.7 (11-14-23 @ 22:00)  HR: 68 (11-15-23 @ 02:00) (67 - 72)  BP: 126/63 (11-15-23 @ 02:00) (117/64 - 139/72)  RR: 16 (11-15-23 @ 02:00) (16 - 18)  SpO2: 99% (11-15-23 @ 02:00) (98% - 100%)  Wt(kg): --   11-14    141  |  103  |  13  ----------------------------<  186<H>  3.1<L>   |  28  |  0.49<L>    Ca    9.2      14 Nov 2023 07:17  Phos  2.5     11-14  Mg     1.90     11-14                          7.9    7.24  )-----------( 212      ( 14 Nov 2023 07:17 )             25.7       11-14 @ 07:01  -  11-15 @ 07:00  --------------------------------------------------------  IN: 660 mL / OUT: 1126 mL / NET: -466 mL      PHYSICAL EXAM:    General: NAD, Lying in bed comfortably  Neuro: awake and alert  Pulm: No increased work of breathing on room air  HEENT: Maxillary flap, good color, good cook signal, drain s/s  Neck: incision intact w/ nylon sutures  L arm: VAC holding suction, drain SS  L thigh: donor site appropriate.               MEDICATIONS  (STANDING):  acetaminophen   Oral Liquid .. 975 milliGRAM(s) Oral every 6 hours  chlorhexidine 0.12% Liquid 15 milliLiter(s) Swish and Spit <User Schedule>  chlorhexidine 2% Cloths 1 Application(s) Topical daily  dextrose 5%. 1000 milliLiter(s) (100 mL/Hr) IV Continuous <Continuous>  dextrose 5%. 1000 milliLiter(s) (50 mL/Hr) IV Continuous <Continuous>  dextrose 5%. 1000 milliLiter(s) (50 mL/Hr) IV Continuous <Continuous>  dextrose 50% Injectable 12.5 Gram(s) IV Push once  dextrose 50% Injectable 25 Gram(s) IV Push once  dextrose 50% Injectable 25 Gram(s) IV Push once  dextrose 50% Injectable 25 Gram(s) IV Push once  dextrose 50% Injectable 25 Gram(s) IV Push once  dextrose 50% Injectable 25 Gram(s) IV Push once  dextrose 50% Injectable 12.5 Gram(s) IV Push once  glucagon  Injectable 1 milliGRAM(s) IntraMuscular once  heparin   Injectable 5000 Unit(s) SubCutaneous every 8 hours  insulin glargine Injectable (LANTUS) 14 Unit(s) SubCutaneous at bedtime  insulin lispro (ADMELOG) corrective regimen sliding scale   SubCutaneous three times a day before meals  polyethylene glycol 3350 17 Gram(s) Oral daily  senna 2 Tablet(s) Oral at bedtime  tamsulosin 0.4 milliGRAM(s) Oral at bedtime    MEDICATIONS  (PRN):  dextrose Oral Gel 15 Gram(s) Oral once PRN Blood Glucose LESS THAN 70 milliGRAM(s)/deciliter  oxyCODONE    Solution 5 milliGRAM(s) Oral every 4 hours PRN Moderate Pain (4 - 6)  oxyCODONE    Solution 10 milliGRAM(s) Oral every 4 hours PRN Severe Pain (7 - 10)

## 2023-11-15 NOTE — PROGRESS NOTE ADULT - ASSESSMENT
s/p maxillectomy with radial fore arm reconstruction 11/10/2023:    Plan:  - continue ERAS protocol,  - q4 flap checks with Cook doppler, remove POD7  - VAC to arm, remove on POD7  - leg thigh dressing down POD7  - Remove nylons in neck POD10    Plastic Surgery  [id number]# [facility] pager  [phone] [facility] pager  Available on Teams

## 2023-11-15 NOTE — SWALLOW BEDSIDE ASSESSMENT ADULT - COMMENTS
OMFS 11/15 "73M w/ hx of SCC R maxillary alveolar ridge POD5 s/p R hemimaxillectomy, R SOHND, L RFFF, L ALT STSG 10/10/23. Admitted to SICU for q1h flap checks, +2 cook doppler signal, KO tube via R naris, yang, A-line. Pt was extubated on 11/11/23, tolerating well on room air. A- line and yang removed prior to transferred the floor, d/c Unasyn on 11/13/23. Passed TOV and voiding well on 11/14/23. PT dispo plan for subacute rehab 2-3x/week, SW dispo plan for skilled nursing facility short-term. Pt is progressing appropriately w/o acute events."    Patient seen at bedside this AM for an initial assessment of the swallow function. Patient with NG tube in place upon arrival. Patient is able to verbalize his wants/needs and follows directives. Patient states his mouth is dry because he has not had anything to eat or drink. Patient denies odynophagia.

## 2023-11-15 NOTE — PROGRESS NOTE ADULT - SUBJECTIVE AND OBJECTIVE BOX
73M w/ hx of SCC R maxillary alveolar ridge POD5 s/p R hemimaxillectomy, R SOHND, L RFFF, L ALT STSG 10/10/23. Admitted to SICU for q1h flap checks, +2 cook doppler signal, KO tube via R naris, yang, A-line. Pt was extubated on 11/11/23, tolerating well on room air. A- line and yang removed prior to transferred the floor, d/c Unasyn on 11/13/23. Passed TOV and voiding well on 11/14/23.     Interval events:   -Pt successfully sipped clears  -Insulin changed to low dose sliding scale + bedtime Lantus 14 units this AM due to high POCT glucose range to 300  -1x KCl- repletion, AM labs pending    ICU Vital Signs Last 24 Hrs  T(C): 36.3 (15 Nov 2023 05:51), Max: 36.7 (14 Nov 2023 22:00)  T(F): 97.4 (15 Nov 2023 05:51), Max: 98 (14 Nov 2023 22:00)  HR: 76 (15 Nov 2023 05:51) (67 - 76)  BP: 151/59 (15 Nov 2023 05:51) (117/64 - 151/59)  BP(mean): --  ABP: --  ABP(mean): --  RR: 17 (15 Nov 2023 05:51) (16 - 18)  SpO2: 100% (15 Nov 2023 05:51) (98% - 100%)    O2 Parameters below as of 15 Nov 2023 05:51  Patient On (Oxygen Delivery Method): room air    I&O's Detail    14 Nov 2023 07:01  -  15 Nov 2023 07:00  --------------------------------------------------------  IN:    Glucerna 1.5: 880 mL  Total IN: 880 mL    OUT:    Bulb (mL): 9.5 mL    Bulb (mL): 24 mL    Oral Fluid: 0 mL    VAC (Vacuum Assisted Closure) System (mL): 50 mL    Voided (mL): 1275 mL  Total OUT: 1358.5 mL    Total NET: -478.5 mL    MEDICATIONS  (STANDING):  acetaminophen   Oral Liquid .. 975 milliGRAM(s) Oral every 6 hours  chlorhexidine 0.12% Liquid 15 milliLiter(s) Swish and Spit <User Schedule>  chlorhexidine 2% Cloths 1 Application(s) Topical daily  dextrose 5%. 1000 milliLiter(s) (50 mL/Hr) IV Continuous <Continuous>  dextrose 5%. 1000 milliLiter(s) (50 mL/Hr) IV Continuous <Continuous>  dextrose 5%. 1000 milliLiter(s) (100 mL/Hr) IV Continuous <Continuous>  dextrose 50% Injectable 25 Gram(s) IV Push once  dextrose 50% Injectable 25 Gram(s) IV Push once  dextrose 50% Injectable 25 Gram(s) IV Push once  dextrose 50% Injectable 25 Gram(s) IV Push once  dextrose 50% Injectable 25 Gram(s) IV Push once  dextrose 50% Injectable 12.5 Gram(s) IV Push once  dextrose 50% Injectable 12.5 Gram(s) IV Push once  glucagon  Injectable 1 milliGRAM(s) IntraMuscular once  heparin   Injectable 5000 Unit(s) SubCutaneous every 8 hours  insulin glargine Injectable (LANTUS) 14 Unit(s) SubCutaneous at bedtime  insulin lispro (ADMELOG) corrective regimen sliding scale   SubCutaneous three times a day before meals  polyethylene glycol 3350 17 Gram(s) Oral daily  senna 2 Tablet(s) Oral at bedtime  tamsulosin 0.4 milliGRAM(s) Oral at bedtime    MEDICATIONS  (PRN):  dextrose Oral Gel 15 Gram(s) Oral once PRN Blood Glucose LESS THAN 70 milliGRAM(s)/deciliter  oxyCODONE    Solution 5 milliGRAM(s) Oral every 4 hours PRN Moderate Pain (4 - 6)  oxyCODONE    Solution 10 milliGRAM(s) Oral every 4 hours PRN Severe Pain (7 - 10)    Physical Exam:  Gen: AAOx3, pt not in acute distress  ABIDA: NC/AT, Cook doppler on R side, strong, R neck RAE drain w/ 19cc ss,, R anterior neck staple line c/d/i,  N: KO feeding tube in place, sutured,  Maxillofacial: intraoral flap pink, good cap refill, suture site c/i/hemostatic  : voiding freely  Ext: L forearm RAE w/ sso 7cc, L forearm wrapped w/ vac to suction w/ sso 50cc, LLE STSG donor site hemostatic, intact,

## 2023-11-15 NOTE — SWALLOW BEDSIDE ASSESSMENT ADULT - ADDITIONAL RECOMMENDATIONS
1) Continue with short-term non-oral means via NG tube at MD's discretion. 2) This service to follow up as schedule permits for diet tolerance. 3) Reconsult this service if change in medical status and/or tolerance to recommended diet

## 2023-11-15 NOTE — SWALLOW BEDSIDE ASSESSMENT ADULT - SWALLOW EVAL: DIAGNOSIS
Received clearance for puree and thin liquids from OMFS. 1. Mild oral dysphagia for puree and thin liquids marked by reduced labial seal closure around teaspoon/cup resulting in mild anterior spillage from right side of mouth (thin liquids>puree), adequate collection/manipulation and transfer. Adequate oral clearance post primary swallow. 2. Functional pharyngeal phase for puree and thin liquids marked by a present pharyngeal swallow trigger with hyolaryngeal elevation noted upon digital palpation without evidence of impaired airway protection. Of Note: Patient exhibited coughing x2 on large cup sips of thin liquids that did not reduplicate when patient is cued to take small single sips.

## 2023-11-15 NOTE — PROGRESS NOTE ADULT - ASSESSMENT
73M w/ hx of SCC R maxillary alveolar ridge POD5 s/p R hemimaxillectomy, R SOHND, L RFFF, L ALT STSG 10/10/23. Admitted to SICU for q1h flap checks, +2 cook doppler signal, KO tube via R naris, yang, A-line. Pt was extubated on 11/11/23, tolerating well on room air. A- line and yang removed prior to transferred the floor, d/c Unasyn on 11/13/23. Passed TOV and voiding well on 11/14/23. PT dispo plan for subacute rehab 2-3x/week, SW dispo plan for skilled nursing facility short-term. Pt is progressing appropriately w/o acute events.    Plan:  - ERAS Day 5 Protocol  - Pending S/S eval  - Once s/s eval completed, potentially advance to FLD   - Multimodal pain control  - OOBTC  - Trend RAE/Vac outputs    Juanita Sultana  Oral and Maxillofacial Surgery  [facility] Pager #[phone]  Available on Teams

## 2023-11-16 LAB
ANION GAP SERPL CALC-SCNC: 10 MMOL/L — SIGNIFICANT CHANGE UP (ref 7–14)
ANION GAP SERPL CALC-SCNC: 10 MMOL/L — SIGNIFICANT CHANGE UP (ref 7–14)
BUN SERPL-MCNC: 11 MG/DL — SIGNIFICANT CHANGE UP (ref 7–23)
BUN SERPL-MCNC: 11 MG/DL — SIGNIFICANT CHANGE UP (ref 7–23)
CALCIUM SERPL-MCNC: 8.8 MG/DL — SIGNIFICANT CHANGE UP (ref 8.4–10.5)
CALCIUM SERPL-MCNC: 8.8 MG/DL — SIGNIFICANT CHANGE UP (ref 8.4–10.5)
CHLORIDE SERPL-SCNC: 102 MMOL/L — SIGNIFICANT CHANGE UP (ref 98–107)
CHLORIDE SERPL-SCNC: 102 MMOL/L — SIGNIFICANT CHANGE UP (ref 98–107)
CO2 SERPL-SCNC: 27 MMOL/L — SIGNIFICANT CHANGE UP (ref 22–31)
CO2 SERPL-SCNC: 27 MMOL/L — SIGNIFICANT CHANGE UP (ref 22–31)
CREAT SERPL-MCNC: 0.46 MG/DL — LOW (ref 0.5–1.3)
CREAT SERPL-MCNC: 0.46 MG/DL — LOW (ref 0.5–1.3)
EGFR: 111 ML/MIN/1.73M2 — SIGNIFICANT CHANGE UP
EGFR: 111 ML/MIN/1.73M2 — SIGNIFICANT CHANGE UP
GLUCOSE BLDC GLUCOMTR-MCNC: 131 MG/DL — HIGH (ref 70–99)
GLUCOSE BLDC GLUCOMTR-MCNC: 131 MG/DL — HIGH (ref 70–99)
GLUCOSE BLDC GLUCOMTR-MCNC: 152 MG/DL — HIGH (ref 70–99)
GLUCOSE BLDC GLUCOMTR-MCNC: 152 MG/DL — HIGH (ref 70–99)
GLUCOSE BLDC GLUCOMTR-MCNC: 208 MG/DL — HIGH (ref 70–99)
GLUCOSE BLDC GLUCOMTR-MCNC: 208 MG/DL — HIGH (ref 70–99)
GLUCOSE BLDC GLUCOMTR-MCNC: 228 MG/DL — HIGH (ref 70–99)
GLUCOSE BLDC GLUCOMTR-MCNC: 228 MG/DL — HIGH (ref 70–99)
GLUCOSE BLDC GLUCOMTR-MCNC: 253 MG/DL — HIGH (ref 70–99)
GLUCOSE BLDC GLUCOMTR-MCNC: 253 MG/DL — HIGH (ref 70–99)
GLUCOSE SERPL-MCNC: 189 MG/DL — HIGH (ref 70–99)
GLUCOSE SERPL-MCNC: 189 MG/DL — HIGH (ref 70–99)
HCT VFR BLD CALC: 23.5 % — LOW (ref 39–50)
HCT VFR BLD CALC: 23.5 % — LOW (ref 39–50)
HGB BLD-MCNC: 7.4 G/DL — LOW (ref 13–17)
HGB BLD-MCNC: 7.4 G/DL — LOW (ref 13–17)
MAGNESIUM SERPL-MCNC: 2 MG/DL — SIGNIFICANT CHANGE UP (ref 1.6–2.6)
MAGNESIUM SERPL-MCNC: 2 MG/DL — SIGNIFICANT CHANGE UP (ref 1.6–2.6)
MCHC RBC-ENTMCNC: 26 PG — LOW (ref 27–34)
MCHC RBC-ENTMCNC: 26 PG — LOW (ref 27–34)
MCHC RBC-ENTMCNC: 31.5 GM/DL — LOW (ref 32–36)
MCHC RBC-ENTMCNC: 31.5 GM/DL — LOW (ref 32–36)
MCV RBC AUTO: 82.5 FL — SIGNIFICANT CHANGE UP (ref 80–100)
MCV RBC AUTO: 82.5 FL — SIGNIFICANT CHANGE UP (ref 80–100)
NRBC # BLD: 0 /100 WBCS — SIGNIFICANT CHANGE UP (ref 0–0)
NRBC # BLD: 0 /100 WBCS — SIGNIFICANT CHANGE UP (ref 0–0)
NRBC # FLD: 0.02 K/UL — HIGH (ref 0–0)
NRBC # FLD: 0.02 K/UL — HIGH (ref 0–0)
PHOSPHATE SERPL-MCNC: 2.7 MG/DL — SIGNIFICANT CHANGE UP (ref 2.5–4.5)
PHOSPHATE SERPL-MCNC: 2.7 MG/DL — SIGNIFICANT CHANGE UP (ref 2.5–4.5)
PLATELET # BLD AUTO: 255 K/UL — SIGNIFICANT CHANGE UP (ref 150–400)
PLATELET # BLD AUTO: 255 K/UL — SIGNIFICANT CHANGE UP (ref 150–400)
POTASSIUM SERPL-MCNC: 3.7 MMOL/L — SIGNIFICANT CHANGE UP (ref 3.5–5.3)
POTASSIUM SERPL-MCNC: 3.7 MMOL/L — SIGNIFICANT CHANGE UP (ref 3.5–5.3)
POTASSIUM SERPL-SCNC: 3.7 MMOL/L — SIGNIFICANT CHANGE UP (ref 3.5–5.3)
POTASSIUM SERPL-SCNC: 3.7 MMOL/L — SIGNIFICANT CHANGE UP (ref 3.5–5.3)
RBC # BLD: 2.85 M/UL — LOW (ref 4.2–5.8)
RBC # BLD: 2.85 M/UL — LOW (ref 4.2–5.8)
RBC # FLD: 14.7 % — HIGH (ref 10.3–14.5)
RBC # FLD: 14.7 % — HIGH (ref 10.3–14.5)
SODIUM SERPL-SCNC: 139 MMOL/L — SIGNIFICANT CHANGE UP (ref 135–145)
SODIUM SERPL-SCNC: 139 MMOL/L — SIGNIFICANT CHANGE UP (ref 135–145)
WBC # BLD: 6.53 K/UL — SIGNIFICANT CHANGE UP (ref 3.8–10.5)
WBC # BLD: 6.53 K/UL — SIGNIFICANT CHANGE UP (ref 3.8–10.5)
WBC # FLD AUTO: 6.53 K/UL — SIGNIFICANT CHANGE UP (ref 3.8–10.5)
WBC # FLD AUTO: 6.53 K/UL — SIGNIFICANT CHANGE UP (ref 3.8–10.5)

## 2023-11-16 RX ORDER — INSULIN GLARGINE 100 [IU]/ML
20 INJECTION, SOLUTION SUBCUTANEOUS AT BEDTIME
Refills: 0 | Status: DISCONTINUED | OUTPATIENT
Start: 2023-11-16 | End: 2023-11-17

## 2023-11-16 RX ORDER — INSULIN LISPRO 100/ML
VIAL (ML) SUBCUTANEOUS
Refills: 0 | Status: DISCONTINUED | OUTPATIENT
Start: 2023-11-16 | End: 2023-11-17

## 2023-11-16 RX ORDER — LANOLIN ALCOHOL/MO/W.PET/CERES
3 CREAM (GRAM) TOPICAL ONCE
Refills: 0 | Status: COMPLETED | OUTPATIENT
Start: 2023-11-16 | End: 2023-11-16

## 2023-11-16 RX ORDER — OXYCODONE HYDROCHLORIDE 5 MG/1
5 TABLET ORAL EVERY 4 HOURS
Refills: 0 | Status: DISCONTINUED | OUTPATIENT
Start: 2023-11-16 | End: 2023-11-21

## 2023-11-16 RX ORDER — TAMSULOSIN HYDROCHLORIDE 0.4 MG/1
0.4 CAPSULE ORAL AT BEDTIME
Refills: 0 | Status: COMPLETED | OUTPATIENT
Start: 2023-11-16 | End: 2023-11-18

## 2023-11-16 RX ORDER — SODIUM CHLORIDE 0.65 %
1 AEROSOL, SPRAY (ML) NASAL
Refills: 0 | Status: DISCONTINUED | OUTPATIENT
Start: 2023-11-16 | End: 2023-11-21

## 2023-11-16 RX ORDER — ENOXAPARIN SODIUM 100 MG/ML
40 INJECTION SUBCUTANEOUS EVERY 24 HOURS
Refills: 0 | Status: DISCONTINUED | OUTPATIENT
Start: 2023-11-17 | End: 2023-11-21

## 2023-11-16 RX ORDER — SENNA PLUS 8.6 MG/1
2 TABLET ORAL AT BEDTIME
Refills: 0 | Status: DISCONTINUED | OUTPATIENT
Start: 2023-11-16 | End: 2023-11-21

## 2023-11-16 RX ADMIN — Medication 975 MILLIGRAM(S): at 05:39

## 2023-11-16 RX ADMIN — Medication 975 MILLIGRAM(S): at 17:42

## 2023-11-16 RX ADMIN — CHLORHEXIDINE GLUCONATE 1 APPLICATION(S): 213 SOLUTION TOPICAL at 12:02

## 2023-11-16 RX ADMIN — Medication 1 SPRAY(S): at 17:43

## 2023-11-16 RX ADMIN — HEPARIN SODIUM 5000 UNIT(S): 5000 INJECTION INTRAVENOUS; SUBCUTANEOUS at 05:39

## 2023-11-16 RX ADMIN — CHLORHEXIDINE GLUCONATE 15 MILLILITER(S): 213 SOLUTION TOPICAL at 05:39

## 2023-11-16 RX ADMIN — INSULIN GLARGINE 20 UNIT(S): 100 INJECTION, SOLUTION SUBCUTANEOUS at 23:12

## 2023-11-16 RX ADMIN — TAMSULOSIN HYDROCHLORIDE 0.4 MILLIGRAM(S): 0.4 CAPSULE ORAL at 22:53

## 2023-11-16 RX ADMIN — Medication 2: at 12:25

## 2023-11-16 RX ADMIN — Medication 3: at 17:43

## 2023-11-16 RX ADMIN — Medication 975 MILLIGRAM(S): at 12:35

## 2023-11-16 RX ADMIN — OXYCODONE HYDROCHLORIDE 5 MILLIGRAM(S): 5 TABLET ORAL at 10:45

## 2023-11-16 RX ADMIN — Medication 975 MILLIGRAM(S): at 23:17

## 2023-11-16 RX ADMIN — Medication 2: at 08:45

## 2023-11-16 RX ADMIN — CHLORHEXIDINE GLUCONATE 15 MILLILITER(S): 213 SOLUTION TOPICAL at 22:52

## 2023-11-16 RX ADMIN — Medication 975 MILLIGRAM(S): at 12:02

## 2023-11-16 RX ADMIN — OXYCODONE HYDROCHLORIDE 5 MILLIGRAM(S): 5 TABLET ORAL at 10:14

## 2023-11-16 RX ADMIN — Medication 3 MILLIGRAM(S): at 00:29

## 2023-11-16 RX ADMIN — Medication 1 SPRAY(S): at 23:18

## 2023-11-16 RX ADMIN — Medication 975 MILLIGRAM(S): at 06:09

## 2023-11-16 RX ADMIN — Medication 975 MILLIGRAM(S): at 18:15

## 2023-11-16 NOTE — PROGRESS NOTE ADULT - SUBJECTIVE AND OBJECTIVE BOX
ORAL & MAXILLOFACIAL SURGERY PROGRESS NOTE:    73M w/hx of SCC R maxillary alveolar ridge s/p R hemimaxillectomy, R SOHND, L RFFF, L ALT STSG (10/10/23). Pain well-controlled on current regimen. Lost KO tube overnight, decision to not replaced at this time 2/2 adequate PO intake. Ambulating independently.    Vital Signs Last 24 Hrs  T(C): 36.9 (16 Nov 2023 06:00), Max: 36.9 (16 Nov 2023 06:00)  T(F): 98.5 (16 Nov 2023 06:00), Max: 98.5 (16 Nov 2023 06:00)  HR: 72 (16 Nov 2023 06:00) (66 - 77)  BP: 116/75 (16 Nov 2023 06:00) (116/75 - 136/77)  BP(mean): --  RR: 17 (16 Nov 2023 06:00) (16 - 18)  SpO2: 98% (16 Nov 2023 06:00) (97% - 100%)  Parameters below as of 16 Nov 2023 06:00  Patient On (Oxygen Delivery Method): room air    I&O's Summary  15 Nov 2023 07:01  -  16 Nov 2023 07:00  --------------------------------------------------------  IN: 650 mL / OUT: 36.5 mL / NET: 613.5 mL    Physical Exam:  Gen: AAOx3, NAD  ABIDA: NC/AT, Cook doppler on R side, strong, R neck RAE drain w/22.5cc +SS, R anterior neck staple line c/d/i.  N: KO tube lost, nares hemostatic.  Maxillofacial: intraoral flap pink, good cap refill, suture site c/i/hemostatic, no evidence of dehiscence or infection.  Ext: L forearm RAE 14cc SS+, L forearm wrapped w/ vac to suction, LLE STSG donor site hemostatic, dressing intact.    LABS:                          7.4    6.53  )-----------( 255      ( 16 Nov 2023 06:05 )             23.5     11-15    x   |  x   |  x   ----------------------------<  x   3.5   |  x   |  x     Ca    9.1      15 Nov 2023 06:16  Phos  2.5     11-15  Mg     1.90     11-15    CAPILLARY BLOOD GLUCOSE  POCT Blood Glucose.: 216 mg/dL (15 Nov 2023 22:13)  POCT Blood Glucose.: 222 mg/dL (15 Nov 2023 17:54)  POCT Blood Glucose.: 242 mg/dL (15 Nov 2023 17:11)  POCT Blood Glucose.: 256 mg/dL (15 Nov 2023 12:44)

## 2023-11-16 NOTE — PROGRESS NOTE ADULT - ASSESSMENT
73M w/ hx of SCC R maxillary alveolar ridge s/p R hemimaxillectomy, R SOHND, L RFFF, L ALT STSG (10/10/23). Admitted to SICU for hemodynamic monitoring, since transferred to floor uneventfully. Now tolerating FLD s/p S&S evaluation. PT dispo plan for [facility]. Progressing as per ERAS.    Plan:  - d/c RAE neck today  - Strict I&Os  - Multimodal pain control  - Monitor FS, adjust insulin for goal -150mg/dl  - L forearm and thigh management as per PRS, neck nylons to be removed 11/20  - Diet: FLD  - Activity: OOBTC, PT  - DVT ppx: Lovenox 40mg QD  - Dispo: TYLER Medina DDS, MD  Resident, Oral & Maxillofacial Surgery  [facility] Pager: [phone]  [facility] Pager: [phone]  [facility] Pager: [phone]  Available on [employer] TEAMS

## 2023-11-16 NOTE — PROGRESS NOTE ADULT - SUBJECTIVE AND OBJECTIVE BOX
Plastic Surgery Progress Note (pg [facility]: [id number], [facility]: [phone])    SUBJECTIVE  The patient was seen and examined. No acute events overnight. Pain controlled, afebrile w/ stable vitals.     OBJECTIVE  ___________________________________________________  VITAL SIGNS / I&O's   Vital Signs Last 24 Hrs  T(C): 36.4 (16 Nov 2023 02:26), Max: 36.6 (15 Nov 2023 13:54)  T(F): 97.6 (16 Nov 2023 02:26), Max: 97.9 (15 Nov 2023 13:54)  HR: 72 (16 Nov 2023 02:26) (66 - 77)  BP: 118/64 (16 Nov 2023 02:26) (118/64 - 136/77)  BP(mean): --  RR: 16 (16 Nov 2023 02:26) (16 - 18)  SpO2: 97% (16 Nov 2023 02:26) (97% - 100%)    Parameters below as of 16 Nov 2023 02:26  Patient On (Oxygen Delivery Method): room air          15 Nov 2023 07:01  -  16 Nov 2023 07:00  --------------------------------------------------------  IN:    IV PiggyBack: 500 mL    Oral Fluid: 100 mL  Total IN: 600 mL    OUT:    Bulb (mL): 20 mL    Bulb (mL): 13 mL    VAC (Vacuum Assisted Closure) System (mL): 0 mL  Total OUT: 33 mL    Total NET: 567 mL        ___________________________________________________  PHYSICAL EXAM    General: NAD, Lying in bed comfortably  Neuro: awake and alert  Pulm: No increased work of breathing on room air  HEENT: Maxillary flap, good color, good cook signal, drain s/s  Neck: incision intact w/ nylon sutures  L arm: VAC holding suction, drain SS  L thigh: donor site appropriate.     ___________________________________________________  LABS                        7.4    5.44  )-----------( 218      ( 15 Nov 2023 06:16 )             23.7     15 Nov 2023 17:55    x      |  x      |  x      ----------------------------<  x      3.5     |  x      |  x        Ca    9.1        15 Nov 2023 06:16  Phos  2.5       15 Nov 2023 06:16  Mg     1.90      15 Nov 2023 06:16        CAPILLARY BLOOD GLUCOSE      POCT Blood Glucose.: 216 mg/dL (15 Nov 2023 22:13)  POCT Blood Glucose.: 222 mg/dL (15 Nov 2023 17:54)  POCT Blood Glucose.: 242 mg/dL (15 Nov 2023 17:11)  POCT Blood Glucose.: 256 mg/dL (15 Nov 2023 12:44)        Urinalysis Basic - ( 15 Nov 2023 06:16 )    Color: x / Appearance: x / SG: x / pH: x  Gluc: 281 mg/dL / Ketone: x  / Bili: x / Urobili: x   Blood: x / Protein: x / Nitrite: x   Leuk Esterase: x / RBC: x / WBC x   Sq Epi: x / Non Sq Epi: x / Bacteria: x      ___________________________________________________  MICRO  Recent Cultures:    ___________________________________________________  MEDICATIONS  (STANDING):  acetaminophen   Oral Liquid .. 975 milliGRAM(s) Oral every 6 hours  chlorhexidine 0.12% Liquid 15 milliLiter(s) Swish and Spit <User Schedule>  chlorhexidine 2% Cloths 1 Application(s) Topical daily  dextrose 5%. 1000 milliLiter(s) (50 mL/Hr) IV Continuous <Continuous>  dextrose 5%. 1000 milliLiter(s) (100 mL/Hr) IV Continuous <Continuous>  dextrose 5%. 1000 milliLiter(s) (50 mL/Hr) IV Continuous <Continuous>  dextrose 50% Injectable 25 Gram(s) IV Push once  dextrose 50% Injectable 25 Gram(s) IV Push once  dextrose 50% Injectable 25 Gram(s) IV Push once  dextrose 50% Injectable 12.5 Gram(s) IV Push once  dextrose 50% Injectable 25 Gram(s) IV Push once  dextrose 50% Injectable 12.5 Gram(s) IV Push once  dextrose 50% Injectable 25 Gram(s) IV Push once  glucagon  Injectable 1 milliGRAM(s) IntraMuscular once  heparin   Injectable 5000 Unit(s) SubCutaneous every 8 hours  insulin glargine Injectable (LANTUS) 14 Unit(s) SubCutaneous at bedtime  insulin lispro (ADMELOG) corrective regimen sliding scale   SubCutaneous three times a day before meals  polyethylene glycol 3350 17 Gram(s) Oral daily  senna 2 Tablet(s) Oral at bedtime    MEDICATIONS  (PRN):  dextrose Oral Gel 15 Gram(s) Oral once PRN Blood Glucose LESS THAN 70 milliGRAM(s)/deciliter  oxyCODONE    Solution 5 milliGRAM(s) Oral every 4 hours PRN Moderate Pain (4 - 6)  oxyCODONE    Solution 10 milliGRAM(s) Oral every 4 hours PRN Severe Pain (7 - 10)

## 2023-11-17 LAB
A1C WITH ESTIMATED AVERAGE GLUCOSE RESULT: 6.5 % — HIGH (ref 4–5.6)
A1C WITH ESTIMATED AVERAGE GLUCOSE RESULT: 6.5 % — HIGH (ref 4–5.6)
ANION GAP SERPL CALC-SCNC: 12 MMOL/L — SIGNIFICANT CHANGE UP (ref 7–14)
ANION GAP SERPL CALC-SCNC: 12 MMOL/L — SIGNIFICANT CHANGE UP (ref 7–14)
BUN SERPL-MCNC: 12 MG/DL — SIGNIFICANT CHANGE UP (ref 7–23)
BUN SERPL-MCNC: 12 MG/DL — SIGNIFICANT CHANGE UP (ref 7–23)
CALCIUM SERPL-MCNC: 9.2 MG/DL — SIGNIFICANT CHANGE UP (ref 8.4–10.5)
CALCIUM SERPL-MCNC: 9.2 MG/DL — SIGNIFICANT CHANGE UP (ref 8.4–10.5)
CHLORIDE SERPL-SCNC: 101 MMOL/L — SIGNIFICANT CHANGE UP (ref 98–107)
CHLORIDE SERPL-SCNC: 101 MMOL/L — SIGNIFICANT CHANGE UP (ref 98–107)
CO2 SERPL-SCNC: 27 MMOL/L — SIGNIFICANT CHANGE UP (ref 22–31)
CO2 SERPL-SCNC: 27 MMOL/L — SIGNIFICANT CHANGE UP (ref 22–31)
CREAT SERPL-MCNC: 0.52 MG/DL — SIGNIFICANT CHANGE UP (ref 0.5–1.3)
CREAT SERPL-MCNC: 0.52 MG/DL — SIGNIFICANT CHANGE UP (ref 0.5–1.3)
EGFR: 107 ML/MIN/1.73M2 — SIGNIFICANT CHANGE UP
EGFR: 107 ML/MIN/1.73M2 — SIGNIFICANT CHANGE UP
ESTIMATED AVERAGE GLUCOSE: 140 — SIGNIFICANT CHANGE UP
ESTIMATED AVERAGE GLUCOSE: 140 — SIGNIFICANT CHANGE UP
GLUCOSE BLDC GLUCOMTR-MCNC: 160 MG/DL — HIGH (ref 70–99)
GLUCOSE BLDC GLUCOMTR-MCNC: 160 MG/DL — HIGH (ref 70–99)
GLUCOSE BLDC GLUCOMTR-MCNC: 161 MG/DL — HIGH (ref 70–99)
GLUCOSE BLDC GLUCOMTR-MCNC: 161 MG/DL — HIGH (ref 70–99)
GLUCOSE BLDC GLUCOMTR-MCNC: 188 MG/DL — HIGH (ref 70–99)
GLUCOSE BLDC GLUCOMTR-MCNC: 188 MG/DL — HIGH (ref 70–99)
GLUCOSE BLDC GLUCOMTR-MCNC: 193 MG/DL — HIGH (ref 70–99)
GLUCOSE BLDC GLUCOMTR-MCNC: 193 MG/DL — HIGH (ref 70–99)
GLUCOSE BLDC GLUCOMTR-MCNC: 202 MG/DL — HIGH (ref 70–99)
GLUCOSE BLDC GLUCOMTR-MCNC: 202 MG/DL — HIGH (ref 70–99)
GLUCOSE SERPL-MCNC: 194 MG/DL — HIGH (ref 70–99)
GLUCOSE SERPL-MCNC: 194 MG/DL — HIGH (ref 70–99)
HCT VFR BLD CALC: 26.6 % — LOW (ref 39–50)
HCT VFR BLD CALC: 26.6 % — LOW (ref 39–50)
HEMOLYSIS INDEX: 1 — SIGNIFICANT CHANGE UP
HEMOLYSIS INDEX: 1 — SIGNIFICANT CHANGE UP
HGB BLD-MCNC: 8.3 G/DL — LOW (ref 13–17)
HGB BLD-MCNC: 8.3 G/DL — LOW (ref 13–17)
MAGNESIUM SERPL-MCNC: 2.1 MG/DL — SIGNIFICANT CHANGE UP (ref 1.6–2.6)
MAGNESIUM SERPL-MCNC: 2.1 MG/DL — SIGNIFICANT CHANGE UP (ref 1.6–2.6)
MCHC RBC-ENTMCNC: 26.3 PG — LOW (ref 27–34)
MCHC RBC-ENTMCNC: 26.3 PG — LOW (ref 27–34)
MCHC RBC-ENTMCNC: 31.2 GM/DL — LOW (ref 32–36)
MCHC RBC-ENTMCNC: 31.2 GM/DL — LOW (ref 32–36)
MCV RBC AUTO: 84.4 FL — SIGNIFICANT CHANGE UP (ref 80–100)
MCV RBC AUTO: 84.4 FL — SIGNIFICANT CHANGE UP (ref 80–100)
NRBC # BLD: 0 /100 WBCS — SIGNIFICANT CHANGE UP (ref 0–0)
NRBC # BLD: 0 /100 WBCS — SIGNIFICANT CHANGE UP (ref 0–0)
NRBC # FLD: 0 K/UL — SIGNIFICANT CHANGE UP (ref 0–0)
NRBC # FLD: 0 K/UL — SIGNIFICANT CHANGE UP (ref 0–0)
PHOSPHATE SERPL-MCNC: 2.8 MG/DL — SIGNIFICANT CHANGE UP (ref 2.5–4.5)
PHOSPHATE SERPL-MCNC: 2.8 MG/DL — SIGNIFICANT CHANGE UP (ref 2.5–4.5)
PLATELET # BLD AUTO: 308 K/UL — SIGNIFICANT CHANGE UP (ref 150–400)
PLATELET # BLD AUTO: 308 K/UL — SIGNIFICANT CHANGE UP (ref 150–400)
POTASSIUM SERPL-MCNC: 3.3 MMOL/L — LOW (ref 3.5–5.3)
POTASSIUM SERPL-MCNC: 3.3 MMOL/L — LOW (ref 3.5–5.3)
POTASSIUM SERPL-MCNC: 4 MMOL/L — SIGNIFICANT CHANGE UP (ref 3.5–5.3)
POTASSIUM SERPL-MCNC: 4 MMOL/L — SIGNIFICANT CHANGE UP (ref 3.5–5.3)
POTASSIUM SERPL-SCNC: 3.3 MMOL/L — LOW (ref 3.5–5.3)
POTASSIUM SERPL-SCNC: 3.3 MMOL/L — LOW (ref 3.5–5.3)
POTASSIUM SERPL-SCNC: 4 MMOL/L — SIGNIFICANT CHANGE UP (ref 3.5–5.3)
POTASSIUM SERPL-SCNC: 4 MMOL/L — SIGNIFICANT CHANGE UP (ref 3.5–5.3)
RBC # BLD: 3.15 M/UL — LOW (ref 4.2–5.8)
RBC # BLD: 3.15 M/UL — LOW (ref 4.2–5.8)
RBC # FLD: 15 % — HIGH (ref 10.3–14.5)
RBC # FLD: 15 % — HIGH (ref 10.3–14.5)
SODIUM SERPL-SCNC: 140 MMOL/L — SIGNIFICANT CHANGE UP (ref 135–145)
SODIUM SERPL-SCNC: 140 MMOL/L — SIGNIFICANT CHANGE UP (ref 135–145)
WBC # BLD: 7.63 K/UL — SIGNIFICANT CHANGE UP (ref 3.8–10.5)
WBC # BLD: 7.63 K/UL — SIGNIFICANT CHANGE UP (ref 3.8–10.5)
WBC # FLD AUTO: 7.63 K/UL — SIGNIFICANT CHANGE UP (ref 3.8–10.5)
WBC # FLD AUTO: 7.63 K/UL — SIGNIFICANT CHANGE UP (ref 3.8–10.5)

## 2023-11-17 RX ORDER — PETROLATUM,WHITE
1 JELLY (GRAM) TOPICAL DAILY
Refills: 0 | Status: DISCONTINUED | OUTPATIENT
Start: 2023-11-18 | End: 2023-11-18

## 2023-11-17 RX ORDER — GLUCAGON INJECTION, SOLUTION 0.5 MG/.1ML
1 INJECTION, SOLUTION SUBCUTANEOUS ONCE
Refills: 0 | Status: DISCONTINUED | OUTPATIENT
Start: 2023-11-17 | End: 2023-11-21

## 2023-11-17 RX ORDER — LANOLIN ALCOHOL/MO/W.PET/CERES
6 CREAM (GRAM) TOPICAL AT BEDTIME
Refills: 0 | Status: DISCONTINUED | OUTPATIENT
Start: 2023-11-17 | End: 2023-11-21

## 2023-11-17 RX ORDER — INSULIN LISPRO 100/ML
2 VIAL (ML) SUBCUTANEOUS
Refills: 0 | Status: DISCONTINUED | OUTPATIENT
Start: 2023-11-17 | End: 2023-11-18

## 2023-11-17 RX ORDER — POTASSIUM CHLORIDE 20 MEQ
20 PACKET (EA) ORAL ONCE
Refills: 0 | Status: COMPLETED | OUTPATIENT
Start: 2023-11-17 | End: 2023-11-17

## 2023-11-17 RX ORDER — INSULIN LISPRO 100/ML
VIAL (ML) SUBCUTANEOUS
Refills: 0 | Status: DISCONTINUED | OUTPATIENT
Start: 2023-11-17 | End: 2023-11-18

## 2023-11-17 RX ORDER — SODIUM CHLORIDE 9 MG/ML
500 INJECTION, SOLUTION INTRAVENOUS ONCE
Refills: 0 | Status: COMPLETED | OUTPATIENT
Start: 2023-11-17 | End: 2023-11-17

## 2023-11-17 RX ORDER — INSULIN GLARGINE 100 [IU]/ML
24 INJECTION, SOLUTION SUBCUTANEOUS AT BEDTIME
Refills: 0 | Status: DISCONTINUED | OUTPATIENT
Start: 2023-11-17 | End: 2023-11-18

## 2023-11-17 RX ORDER — SODIUM CHLORIDE 9 MG/ML
1000 INJECTION, SOLUTION INTRAVENOUS
Refills: 0 | Status: DISCONTINUED | OUTPATIENT
Start: 2023-11-17 | End: 2023-11-21

## 2023-11-17 RX ORDER — LANOLIN ALCOHOL/MO/W.PET/CERES
5 CREAM (GRAM) TOPICAL AT BEDTIME
Refills: 0 | Status: DISCONTINUED | OUTPATIENT
Start: 2023-11-17 | End: 2023-11-17

## 2023-11-17 RX ADMIN — CHLORHEXIDINE GLUCONATE 15 MILLILITER(S): 213 SOLUTION TOPICAL at 22:43

## 2023-11-17 RX ADMIN — CHLORHEXIDINE GLUCONATE 1 APPLICATION(S): 213 SOLUTION TOPICAL at 11:18

## 2023-11-17 RX ADMIN — Medication 2 UNIT(S): at 18:35

## 2023-11-17 RX ADMIN — Medication 2: at 12:24

## 2023-11-17 RX ADMIN — ENOXAPARIN SODIUM 40 MILLIGRAM(S): 100 INJECTION SUBCUTANEOUS at 05:40

## 2023-11-17 RX ADMIN — Medication 975 MILLIGRAM(S): at 18:38

## 2023-11-17 RX ADMIN — SODIUM CHLORIDE 500 MILLILITER(S): 9 INJECTION, SOLUTION INTRAVENOUS at 11:11

## 2023-11-17 RX ADMIN — Medication 6 MILLIGRAM(S): at 22:44

## 2023-11-17 RX ADMIN — TAMSULOSIN HYDROCHLORIDE 0.4 MILLIGRAM(S): 0.4 CAPSULE ORAL at 22:44

## 2023-11-17 RX ADMIN — Medication 50 MILLIEQUIVALENT(S): at 11:11

## 2023-11-17 RX ADMIN — Medication 1 SPRAY(S): at 23:00

## 2023-11-17 RX ADMIN — Medication 1 SPRAY(S): at 18:40

## 2023-11-17 RX ADMIN — Medication 1: at 08:41

## 2023-11-17 RX ADMIN — Medication 1: at 18:35

## 2023-11-17 RX ADMIN — Medication 975 MILLIGRAM(S): at 05:41

## 2023-11-17 RX ADMIN — CHLORHEXIDINE GLUCONATE 15 MILLILITER(S): 213 SOLUTION TOPICAL at 05:40

## 2023-11-17 RX ADMIN — CHLORHEXIDINE GLUCONATE 15 MILLILITER(S): 213 SOLUTION TOPICAL at 13:08

## 2023-11-17 RX ADMIN — Medication 975 MILLIGRAM(S): at 06:11

## 2023-11-17 RX ADMIN — Medication 1 SPRAY(S): at 05:41

## 2023-11-17 RX ADMIN — INSULIN GLARGINE 24 UNIT(S): 100 INJECTION, SOLUTION SUBCUTANEOUS at 22:41

## 2023-11-17 RX ADMIN — Medication 1 SPRAY(S): at 11:14

## 2023-11-17 NOTE — PROVIDER CONTACT NOTE (FALL NOTIFICATION) - SITUATION
Pt found on floor in bathroom states he slips getting up from toilet. He states he did not hit his head or hit / injure any other part of the body.Able to safely get pt back to bed.

## 2023-11-17 NOTE — PROGRESS NOTE ADULT - ASSESSMENT
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 Pt was extubated on 11/11/23, tolerating well on room air. A- line and yang removed prior to transferred the floor, d/c Unasyn on 11/13/23. Passed TOV and voiding well on 11/14/23. PT dispo plan for subacute rehab 2-3x/week, SW dispo plan for skilled nursing facility short-term. Pt is progressing appropriately w/o acute events.    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 Pt was extubated on 11/11/23, tolerating well on room air. A- line and yang removed prior to transferred the floor, d/c Unasyn on 11/13/23. Passed TOV and voiding well on 11/14/23. PT dispo plan for subacute rehab 2-3x/week, SW dispo plan for skilled nursing facility short-term. Pt is progressing appropriately w/o acute events.    Plan:  - Remove nylons in neck POD1-  - Multimodal pain control  - Plan to remove R Neck RAE drain today  - Strict I&Os  - Monitor FS, adjust insulin for goal -150mg/dl  - Diet: FLD  - Activity: OOBTC, PT  - DVT ppx: Lovenox 40mg QD  - Dispo: [facility], during [facility] treatment pt will not require Radiation Therapy/Chemo    Tyler Phelps  Oral and Maxillofacial Surgery  [facility] Pager #[phone]  Available on Teams

## 2023-11-17 NOTE — PROGRESS NOTE ADULT - SUBJECTIVE AND OBJECTIVE BOX
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     Interval Events: NAD, pt had unwitnessed fall in bathroom this AM, denies head strike. Afebrile, AVSS. Vac and LA RAE drain removed this AM eventfully and cook doppler cut by PRS. Pt progressing well.     Vitals:     Vital Signs Last 24 Hrs  T(C): 36.7 (17 Nov 2023 02:00), Max: 36.8 (16 Nov 2023 14:00)  T(F): 98.1 (17 Nov 2023 02:00), Max: 98.2 (16 Nov 2023 14:00)  HR: 78 (17 Nov 2023 02:00) (70 - 81)  BP: 106/65 (17 Nov 2023 02:00) (106/65 - 120/72)  BP(mean): --  RR: 18 (17 Nov 2023 02:00) (17 - 18)  SpO2: 98% (17 Nov 2023 02:00) (98% - 100%)    Parameters below as of 17 Nov 2023 02:00  Patient On (Oxygen Delivery Method): room air        I&O's Detail    16 Nov 2023 07:01  -  17 Nov 2023 07:00  --------------------------------------------------------  IN:    Oral Fluid: 920 mL  Total IN: 920 mL    OUT:    Bulb (mL): 19.5 mL    Bulb (mL): 6 mL    VAC (Vacuum Assisted Closure) System (mL): 1 mL    Voided (mL): 0 mL  Total OUT: 26.5 mL    Total NET: 893.5 mL          Medications:    MEDICATIONS  (STANDING):  acetaminophen   Oral Liquid .. 975 milliGRAM(s) Oral every 6 hours  chlorhexidine 0.12% Liquid 15 milliLiter(s) Swish and Spit <User Schedule>  chlorhexidine 2% Cloths 1 Application(s) Topical daily  dextrose 5%. 1000 milliLiter(s) (50 mL/Hr) IV Continuous <Continuous>  dextrose 5%. 1000 milliLiter(s) (100 mL/Hr) IV Continuous <Continuous>  dextrose 50% Injectable 12.5 Gram(s) IV Push once  dextrose 50% Injectable 25 Gram(s) IV Push once  dextrose 50% Injectable 25 Gram(s) IV Push once  dextrose 50% Injectable 12.5 Gram(s) IV Push once  dextrose 50% Injectable 25 Gram(s) IV Push once  enoxaparin Injectable 40 milliGRAM(s) SubCutaneous every 24 hours  glucagon  Injectable 1 milliGRAM(s) IntraMuscular once  insulin glargine Injectable (LANTUS) 20 Unit(s) SubCutaneous at bedtime  insulin lispro (ADMELOG) corrective regimen sliding scale   SubCutaneous three times a day before meals  polyethylene glycol 3350 17 Gram(s) Oral daily  senna 2 Tablet(s) Oral at bedtime  sodium chloride 0.65% Nasal 1 Spray(s) Both Nostrils four times a day  tamsulosin 0.4 milliGRAM(s) Oral at bedtime    MEDICATIONS  (PRN):  dextrose Oral Gel 15 Gram(s) Oral once PRN Blood Glucose LESS THAN 70 milliGRAM(s)/deciliter  oxyCODONE    Solution 10 milliGRAM(s) Oral every 4 hours PRN Severe Pain (7 - 10)  oxyCODONE    Solution 5 milliGRAM(s) Oral every 4 hours PRN Moderate Pain (4 - 6)      Labs:                          8.3    7.63  )-----------( 308      ( 17 Nov 2023 06:20 )             26.6       11-17    140  |  101  |  12  ----------------------------<  194<H>  3.3<L>   |  27  |  0.52    Ca    9.2      17 Nov 2023 06:20  Phos  2.8     11-17  Mg     2.10     11-17    Physical Exam:  Gen: AAOx3, pt not in acute distress  ABIDA: NC/AT, R neck RAE drain, R anterior neck staple line c/d/i,  Maxillofacial: intraoral flap pink, good cap refill, suture site c/i/hemostatic  : voiding freely  Ext: L forearm wrapped w/ ACE Bandage,

## 2023-11-17 NOTE — PROGRESS NOTE ADULT - SUBJECTIVE AND OBJECTIVE BOX
Plastic Surgery Progress Note (pg [facility]: [id number], [facility]: [phone])    SUBJECTIVE  The patient was seen and examined. No acute events overnight. Pain controlled, afebrile w/ stable vitals.     OBJECTIVE  ___________________________________________________  VITAL SIGNS / I&O's   Vital Signs Last 24 Hrs  T(C): 36.7 (17 Nov 2023 02:00), Max: 36.8 (16 Nov 2023 14:00)  T(F): 98.1 (17 Nov 2023 02:00), Max: 98.2 (16 Nov 2023 14:00)  HR: 78 (17 Nov 2023 02:00) (70 - 81)  BP: 106/65 (17 Nov 2023 02:00) (106/65 - 120/72)  BP(mean): --  RR: 18 (17 Nov 2023 02:00) (17 - 18)  SpO2: 98% (17 Nov 2023 02:00) (98% - 100%)    Parameters below as of 17 Nov 2023 02:00  Patient On (Oxygen Delivery Method): room air          16 Nov 2023 07:01  -  17 Nov 2023 07:00  --------------------------------------------------------  IN:    Oral Fluid: 920 mL  Total IN: 920 mL    OUT:    Bulb (mL): 19.5 mL    Bulb (mL): 6 mL    VAC (Vacuum Assisted Closure) System (mL): 1 mL    Voided (mL): 0 mL  Total OUT: 26.5 mL    Total NET: 893.5 mL        ___________________________________________________  PHYSICAL EXAM    General: NAD, Lying in bed comfortably  Neuro: awake and alert  Pulm: No increased work of breathing on room air  HEENT: Maxillary flap, good color, drain s/s; interval removal of cook doppler  Neck: incision intact w/ nylon sutures  L arm: Skin graft w/ good take. Xeroform/telfa/ace dressing applied  L thigh: donor site appropriate; open to air    ___________________________________________________  LABS                        8.3    7.63  )-----------( 308      ( 17 Nov 2023 06:20 )             26.6     17 Nov 2023 06:20    140    |  101    |  12     ----------------------------<  194    3.3     |  27     |  0.52     Ca    9.2        17 Nov 2023 06:20  Phos  2.8       17 Nov 2023 06:20  Mg     2.10      17 Nov 2023 06:20        CAPILLARY BLOOD GLUCOSE      POCT Blood Glucose.: 160 mg/dL (17 Nov 2023 06:00)  POCT Blood Glucose.: 131 mg/dL (16 Nov 2023 23:10)  POCT Blood Glucose.: 152 mg/dL (16 Nov 2023 21:47)  POCT Blood Glucose.: 253 mg/dL (16 Nov 2023 17:28)  POCT Blood Glucose.: 228 mg/dL (16 Nov 2023 12:12)  POCT Blood Glucose.: 208 mg/dL (16 Nov 2023 08:38)        Urinalysis Basic - ( 17 Nov 2023 06:20 )    Color: x / Appearance: x / SG: x / pH: x  Gluc: 194 mg/dL / Ketone: x  / Bili: x / Urobili: x   Blood: x / Protein: x / Nitrite: x   Leuk Esterase: x / RBC: x / WBC x   Sq Epi: x / Non Sq Epi: x / Bacteria: x      ___________________________________________________  MICRO  Recent Cultures:    ___________________________________________________  MEDICATIONS  (STANDING):  acetaminophen   Oral Liquid .. 975 milliGRAM(s) Oral every 6 hours  chlorhexidine 0.12% Liquid 15 milliLiter(s) Swish and Spit <User Schedule>  chlorhexidine 2% Cloths 1 Application(s) Topical daily  dextrose 5%. 1000 milliLiter(s) (50 mL/Hr) IV Continuous <Continuous>  dextrose 5%. 1000 milliLiter(s) (100 mL/Hr) IV Continuous <Continuous>  dextrose 50% Injectable 12.5 Gram(s) IV Push once  dextrose 50% Injectable 25 Gram(s) IV Push once  dextrose 50% Injectable 25 Gram(s) IV Push once  dextrose 50% Injectable 12.5 Gram(s) IV Push once  dextrose 50% Injectable 25 Gram(s) IV Push once  enoxaparin Injectable 40 milliGRAM(s) SubCutaneous every 24 hours  glucagon  Injectable 1 milliGRAM(s) IntraMuscular once  insulin glargine Injectable (LANTUS) 20 Unit(s) SubCutaneous at bedtime  insulin lispro (ADMELOG) corrective regimen sliding scale   SubCutaneous three times a day before meals  polyethylene glycol 3350 17 Gram(s) Oral daily  senna 2 Tablet(s) Oral at bedtime  sodium chloride 0.65% Nasal 1 Spray(s) Both Nostrils four times a day  tamsulosin 0.4 milliGRAM(s) Oral at bedtime    MEDICATIONS  (PRN):  dextrose Oral Gel 15 Gram(s) Oral once PRN Blood Glucose LESS THAN 70 milliGRAM(s)/deciliter  oxyCODONE    Solution 10 milliGRAM(s) Oral every 4 hours PRN Severe Pain (7 - 10)  oxyCODONE    Solution 5 milliGRAM(s) Oral every 4 hours PRN Moderate Pain (4 - 6)   Plastic Surgery Progress Note (pg [facility]: [id number], [facility]: [phone])    SUBJECTIVE  The patient was seen and examined. Fell while going to bathroom overnight; denies head strike. Pain controlled, afebrile w/ stable vitals.     OBJECTIVE  ___________________________________________________  VITAL SIGNS / I&O's   Vital Signs Last 24 Hrs  T(C): 36.7 (17 Nov 2023 02:00), Max: 36.8 (16 Nov 2023 14:00)  T(F): 98.1 (17 Nov 2023 02:00), Max: 98.2 (16 Nov 2023 14:00)  HR: 78 (17 Nov 2023 02:00) (70 - 81)  BP: 106/65 (17 Nov 2023 02:00) (106/65 - 120/72)  BP(mean): --  RR: 18 (17 Nov 2023 02:00) (17 - 18)  SpO2: 98% (17 Nov 2023 02:00) (98% - 100%)    Parameters below as of 17 Nov 2023 02:00  Patient On (Oxygen Delivery Method): room air          16 Nov 2023 07:01  -  17 Nov 2023 07:00  --------------------------------------------------------  IN:    Oral Fluid: 920 mL  Total IN: 920 mL    OUT:    Bulb (mL): 19.5 mL    Bulb (mL): 6 mL    VAC (Vacuum Assisted Closure) System (mL): 1 mL    Voided (mL): 0 mL  Total OUT: 26.5 mL    Total NET: 893.5 mL        ___________________________________________________  PHYSICAL EXAM    General: NAD, Lying in bed comfortably  Neuro: awake and alert  Pulm: No increased work of breathing on room air  HEENT: Maxillary flap, good color, drain s/s; interval removal of cook doppler  Neck: incision intact w/ nylon sutures  L arm: Skin graft w/ good take. Xeroform/telfa/ace dressing applied  L thigh: donor site appropriate; open to air    ___________________________________________________  LABS                        8.3    7.63  )-----------( 308      ( 17 Nov 2023 06:20 )             26.6     17 Nov 2023 06:20    140    |  101    |  12     ----------------------------<  194    3.3     |  27     |  0.52     Ca    9.2        17 Nov 2023 06:20  Phos  2.8       17 Nov 2023 06:20  Mg     2.10      17 Nov 2023 06:20        CAPILLARY BLOOD GLUCOSE      POCT Blood Glucose.: 160 mg/dL (17 Nov 2023 06:00)  POCT Blood Glucose.: 131 mg/dL (16 Nov 2023 23:10)  POCT Blood Glucose.: 152 mg/dL (16 Nov 2023 21:47)  POCT Blood Glucose.: 253 mg/dL (16 Nov 2023 17:28)  POCT Blood Glucose.: 228 mg/dL (16 Nov 2023 12:12)  POCT Blood Glucose.: 208 mg/dL (16 Nov 2023 08:38)        Urinalysis Basic - ( 17 Nov 2023 06:20 )    Color: x / Appearance: x / SG: x / pH: x  Gluc: 194 mg/dL / Ketone: x  / Bili: x / Urobili: x   Blood: x / Protein: x / Nitrite: x   Leuk Esterase: x / RBC: x / WBC x   Sq Epi: x / Non Sq Epi: x / Bacteria: x      ___________________________________________________  MICRO  Recent Cultures:    ___________________________________________________  MEDICATIONS  (STANDING):  acetaminophen   Oral Liquid .. 975 milliGRAM(s) Oral every 6 hours  chlorhexidine 0.12% Liquid 15 milliLiter(s) Swish and Spit <User Schedule>  chlorhexidine 2% Cloths 1 Application(s) Topical daily  dextrose 5%. 1000 milliLiter(s) (50 mL/Hr) IV Continuous <Continuous>  dextrose 5%. 1000 milliLiter(s) (100 mL/Hr) IV Continuous <Continuous>  dextrose 50% Injectable 12.5 Gram(s) IV Push once  dextrose 50% Injectable 25 Gram(s) IV Push once  dextrose 50% Injectable 25 Gram(s) IV Push once  dextrose 50% Injectable 12.5 Gram(s) IV Push once  dextrose 50% Injectable 25 Gram(s) IV Push once  enoxaparin Injectable 40 milliGRAM(s) SubCutaneous every 24 hours  glucagon  Injectable 1 milliGRAM(s) IntraMuscular once  insulin glargine Injectable (LANTUS) 20 Unit(s) SubCutaneous at bedtime  insulin lispro (ADMELOG) corrective regimen sliding scale   SubCutaneous three times a day before meals  polyethylene glycol 3350 17 Gram(s) Oral daily  senna 2 Tablet(s) Oral at bedtime  sodium chloride 0.65% Nasal 1 Spray(s) Both Nostrils four times a day  tamsulosin 0.4 milliGRAM(s) Oral at bedtime    MEDICATIONS  (PRN):  dextrose Oral Gel 15 Gram(s) Oral once PRN Blood Glucose LESS THAN 70 milliGRAM(s)/deciliter  oxyCODONE    Solution 10 milliGRAM(s) Oral every 4 hours PRN Severe Pain (7 - 10)  oxyCODONE    Solution 5 milliGRAM(s) Oral every 4 hours PRN Moderate Pain (4 - 6)

## 2023-11-17 NOTE — PROGRESS NOTE ADULT - ASSESSMENT
s/p maxillectomy with radial forearm reconstruction 11/10/2023:    Plan:  - continue ERAS protocol,  - Cook doppler taken down 11/17 AM  - Xeroform/telfa/ace wrap dressing changes to L arm q1d  - leg thigh donor site dressing down; aquaphor daily to site starting 11/18  - Remove nylons in neck POD10    Plastic Surgery  [id number]# [facility] pager  [phone] [facility] pager  Available on Teams

## 2023-11-17 NOTE — PROVIDER CONTACT NOTE (FALL NOTIFICATION) - ASSESSMENT
aox4,no signs for injury , bruises and bleeding, he is little weak to walk , vital signs and FS are stable. Pt RAE and incisions are intact.

## 2023-11-18 DIAGNOSIS — E11.9 TYPE 2 DIABETES MELLITUS WITHOUT COMPLICATIONS: ICD-10-CM

## 2023-11-18 DIAGNOSIS — D62 ACUTE POSTHEMORRHAGIC ANEMIA: ICD-10-CM

## 2023-11-18 DIAGNOSIS — C41.0 MALIGNANT NEOPLASM OF BONES OF SKULL AND FACE: ICD-10-CM

## 2023-11-18 DIAGNOSIS — I77.9 DISORDER OF ARTERIES AND ARTERIOLES, UNSPECIFIED: ICD-10-CM

## 2023-11-18 LAB
ANION GAP SERPL CALC-SCNC: 11 MMOL/L — SIGNIFICANT CHANGE UP (ref 7–14)
ANION GAP SERPL CALC-SCNC: 11 MMOL/L — SIGNIFICANT CHANGE UP (ref 7–14)
BUN SERPL-MCNC: 13 MG/DL — SIGNIFICANT CHANGE UP (ref 7–23)
BUN SERPL-MCNC: 13 MG/DL — SIGNIFICANT CHANGE UP (ref 7–23)
CALCIUM SERPL-MCNC: 9 MG/DL — SIGNIFICANT CHANGE UP (ref 8.4–10.5)
CALCIUM SERPL-MCNC: 9 MG/DL — SIGNIFICANT CHANGE UP (ref 8.4–10.5)
CHLORIDE SERPL-SCNC: 99 MMOL/L — SIGNIFICANT CHANGE UP (ref 98–107)
CHLORIDE SERPL-SCNC: 99 MMOL/L — SIGNIFICANT CHANGE UP (ref 98–107)
CO2 SERPL-SCNC: 26 MMOL/L — SIGNIFICANT CHANGE UP (ref 22–31)
CO2 SERPL-SCNC: 26 MMOL/L — SIGNIFICANT CHANGE UP (ref 22–31)
CREAT SERPL-MCNC: 0.5 MG/DL — SIGNIFICANT CHANGE UP (ref 0.5–1.3)
CREAT SERPL-MCNC: 0.5 MG/DL — SIGNIFICANT CHANGE UP (ref 0.5–1.3)
EGFR: 108 ML/MIN/1.73M2 — SIGNIFICANT CHANGE UP
EGFR: 108 ML/MIN/1.73M2 — SIGNIFICANT CHANGE UP
GLUCOSE BLDC GLUCOMTR-MCNC: 143 MG/DL — HIGH (ref 70–99)
GLUCOSE BLDC GLUCOMTR-MCNC: 143 MG/DL — HIGH (ref 70–99)
GLUCOSE BLDC GLUCOMTR-MCNC: 159 MG/DL — HIGH (ref 70–99)
GLUCOSE BLDC GLUCOMTR-MCNC: 159 MG/DL — HIGH (ref 70–99)
GLUCOSE BLDC GLUCOMTR-MCNC: 193 MG/DL — HIGH (ref 70–99)
GLUCOSE BLDC GLUCOMTR-MCNC: 193 MG/DL — HIGH (ref 70–99)
GLUCOSE BLDC GLUCOMTR-MCNC: 223 MG/DL — HIGH (ref 70–99)
GLUCOSE BLDC GLUCOMTR-MCNC: 223 MG/DL — HIGH (ref 70–99)
GLUCOSE SERPL-MCNC: 225 MG/DL — HIGH (ref 70–99)
GLUCOSE SERPL-MCNC: 225 MG/DL — HIGH (ref 70–99)
HCT VFR BLD CALC: 24.9 % — LOW (ref 39–50)
HCT VFR BLD CALC: 24.9 % — LOW (ref 39–50)
HGB BLD-MCNC: 7.5 G/DL — LOW (ref 13–17)
HGB BLD-MCNC: 7.5 G/DL — LOW (ref 13–17)
MAGNESIUM SERPL-MCNC: 2.1 MG/DL — SIGNIFICANT CHANGE UP (ref 1.6–2.6)
MAGNESIUM SERPL-MCNC: 2.1 MG/DL — SIGNIFICANT CHANGE UP (ref 1.6–2.6)
MCHC RBC-ENTMCNC: 26.3 PG — LOW (ref 27–34)
MCHC RBC-ENTMCNC: 26.3 PG — LOW (ref 27–34)
MCHC RBC-ENTMCNC: 30.1 GM/DL — LOW (ref 32–36)
MCHC RBC-ENTMCNC: 30.1 GM/DL — LOW (ref 32–36)
MCV RBC AUTO: 87.4 FL — SIGNIFICANT CHANGE UP (ref 80–100)
MCV RBC AUTO: 87.4 FL — SIGNIFICANT CHANGE UP (ref 80–100)
NRBC # BLD: 0 /100 WBCS — SIGNIFICANT CHANGE UP (ref 0–0)
NRBC # BLD: 0 /100 WBCS — SIGNIFICANT CHANGE UP (ref 0–0)
NRBC # FLD: 0 K/UL — SIGNIFICANT CHANGE UP (ref 0–0)
NRBC # FLD: 0 K/UL — SIGNIFICANT CHANGE UP (ref 0–0)
PHOSPHATE SERPL-MCNC: 3.2 MG/DL — SIGNIFICANT CHANGE UP (ref 2.5–4.5)
PHOSPHATE SERPL-MCNC: 3.2 MG/DL — SIGNIFICANT CHANGE UP (ref 2.5–4.5)
PLATELET # BLD AUTO: 330 K/UL — SIGNIFICANT CHANGE UP (ref 150–400)
PLATELET # BLD AUTO: 330 K/UL — SIGNIFICANT CHANGE UP (ref 150–400)
POTASSIUM SERPL-MCNC: 3.6 MMOL/L — SIGNIFICANT CHANGE UP (ref 3.5–5.3)
POTASSIUM SERPL-MCNC: 3.6 MMOL/L — SIGNIFICANT CHANGE UP (ref 3.5–5.3)
POTASSIUM SERPL-SCNC: 3.6 MMOL/L — SIGNIFICANT CHANGE UP (ref 3.5–5.3)
POTASSIUM SERPL-SCNC: 3.6 MMOL/L — SIGNIFICANT CHANGE UP (ref 3.5–5.3)
RBC # BLD: 2.85 M/UL — LOW (ref 4.2–5.8)
RBC # BLD: 2.85 M/UL — LOW (ref 4.2–5.8)
RBC # FLD: 15.3 % — HIGH (ref 10.3–14.5)
RBC # FLD: 15.3 % — HIGH (ref 10.3–14.5)
SODIUM SERPL-SCNC: 136 MMOL/L — SIGNIFICANT CHANGE UP (ref 135–145)
SODIUM SERPL-SCNC: 136 MMOL/L — SIGNIFICANT CHANGE UP (ref 135–145)
WBC # BLD: 6.74 K/UL — SIGNIFICANT CHANGE UP (ref 3.8–10.5)
WBC # BLD: 6.74 K/UL — SIGNIFICANT CHANGE UP (ref 3.8–10.5)
WBC # FLD AUTO: 6.74 K/UL — SIGNIFICANT CHANGE UP (ref 3.8–10.5)
WBC # FLD AUTO: 6.74 K/UL — SIGNIFICANT CHANGE UP (ref 3.8–10.5)

## 2023-11-18 PROCEDURE — 99223 1ST HOSP IP/OBS HIGH 75: CPT | Mod: GC

## 2023-11-18 RX ORDER — DEXTROSE 50 % IN WATER 50 %
15 SYRINGE (ML) INTRAVENOUS ONCE
Refills: 0 | Status: DISCONTINUED | OUTPATIENT
Start: 2023-11-18 | End: 2023-11-21

## 2023-11-18 RX ORDER — SODIUM CHLORIDE 9 MG/ML
1000 INJECTION, SOLUTION INTRAVENOUS
Refills: 0 | Status: DISCONTINUED | OUTPATIENT
Start: 2023-11-18 | End: 2023-11-20

## 2023-11-18 RX ORDER — INSULIN LISPRO 100/ML
3 VIAL (ML) SUBCUTANEOUS
Refills: 0 | Status: DISCONTINUED | OUTPATIENT
Start: 2023-11-18 | End: 2023-11-21

## 2023-11-18 RX ORDER — DEXTROSE 50 % IN WATER 50 %
25 SYRINGE (ML) INTRAVENOUS ONCE
Refills: 0 | Status: DISCONTINUED | OUTPATIENT
Start: 2023-11-18 | End: 2023-11-21

## 2023-11-18 RX ORDER — SODIUM CHLORIDE 9 MG/ML
1000 INJECTION, SOLUTION INTRAVENOUS
Refills: 0 | Status: DISCONTINUED | OUTPATIENT
Start: 2023-11-18 | End: 2023-11-21

## 2023-11-18 RX ORDER — GLUCAGON INJECTION, SOLUTION 0.5 MG/.1ML
1 INJECTION, SOLUTION SUBCUTANEOUS ONCE
Refills: 0 | Status: DISCONTINUED | OUTPATIENT
Start: 2023-11-18 | End: 2023-11-21

## 2023-11-18 RX ORDER — DEXTROSE 50 % IN WATER 50 %
12.5 SYRINGE (ML) INTRAVENOUS ONCE
Refills: 0 | Status: DISCONTINUED | OUTPATIENT
Start: 2023-11-18 | End: 2023-11-21

## 2023-11-18 RX ORDER — INSULIN LISPRO 100/ML
VIAL (ML) SUBCUTANEOUS AT BEDTIME
Refills: 0 | Status: DISCONTINUED | OUTPATIENT
Start: 2023-11-18 | End: 2023-11-21

## 2023-11-18 RX ORDER — INSULIN LISPRO 100/ML
VIAL (ML) SUBCUTANEOUS
Refills: 0 | Status: DISCONTINUED | OUTPATIENT
Start: 2023-11-18 | End: 2023-11-21

## 2023-11-18 RX ORDER — INSULIN GLARGINE 100 [IU]/ML
25 INJECTION, SOLUTION SUBCUTANEOUS AT BEDTIME
Refills: 0 | Status: DISCONTINUED | OUTPATIENT
Start: 2023-11-18 | End: 2023-11-20

## 2023-11-18 RX ORDER — PETROLATUM,WHITE
1 JELLY (GRAM) TOPICAL DAILY
Refills: 0 | Status: DISCONTINUED | OUTPATIENT
Start: 2023-11-18 | End: 2023-11-21

## 2023-11-18 RX ORDER — SODIUM CHLORIDE 9 MG/ML
500 INJECTION, SOLUTION INTRAVENOUS ONCE
Refills: 0 | Status: COMPLETED | OUTPATIENT
Start: 2023-11-18 | End: 2023-11-18

## 2023-11-18 RX ADMIN — Medication 1 SPRAY(S): at 12:27

## 2023-11-18 RX ADMIN — Medication 6 MILLIGRAM(S): at 22:39

## 2023-11-18 RX ADMIN — CHLORHEXIDINE GLUCONATE 1 APPLICATION(S): 213 SOLUTION TOPICAL at 12:26

## 2023-11-18 RX ADMIN — Medication 975 MILLIGRAM(S): at 06:10

## 2023-11-18 RX ADMIN — Medication 975 MILLIGRAM(S): at 12:27

## 2023-11-18 RX ADMIN — SENNA PLUS 2 TABLET(S): 8.6 TABLET ORAL at 22:38

## 2023-11-18 RX ADMIN — Medication 975 MILLIGRAM(S): at 23:39

## 2023-11-18 RX ADMIN — Medication 2 UNIT(S): at 12:33

## 2023-11-18 RX ADMIN — Medication 1 SPRAY(S): at 23:10

## 2023-11-18 RX ADMIN — Medication 2: at 12:32

## 2023-11-18 RX ADMIN — POLYETHYLENE GLYCOL 3350 17 GRAM(S): 17 POWDER, FOR SOLUTION ORAL at 12:27

## 2023-11-18 RX ADMIN — Medication 975 MILLIGRAM(S): at 05:40

## 2023-11-18 RX ADMIN — TAMSULOSIN HYDROCHLORIDE 0.4 MILLIGRAM(S): 0.4 CAPSULE ORAL at 22:38

## 2023-11-18 RX ADMIN — Medication 975 MILLIGRAM(S): at 13:00

## 2023-11-18 RX ADMIN — Medication 1 SPRAY(S): at 05:40

## 2023-11-18 RX ADMIN — Medication 1: at 08:48

## 2023-11-18 RX ADMIN — CHLORHEXIDINE GLUCONATE 15 MILLILITER(S): 213 SOLUTION TOPICAL at 22:37

## 2023-11-18 RX ADMIN — Medication 2 UNIT(S): at 08:49

## 2023-11-18 RX ADMIN — Medication 975 MILLIGRAM(S): at 23:09

## 2023-11-18 RX ADMIN — SODIUM CHLORIDE 500 MILLILITER(S): 9 INJECTION, SOLUTION INTRAVENOUS at 06:12

## 2023-11-18 RX ADMIN — CHLORHEXIDINE GLUCONATE 15 MILLILITER(S): 213 SOLUTION TOPICAL at 14:36

## 2023-11-18 RX ADMIN — SODIUM CHLORIDE 70 MILLILITER(S): 9 INJECTION, SOLUTION INTRAVENOUS at 22:47

## 2023-11-18 RX ADMIN — Medication 3 UNIT(S): at 17:31

## 2023-11-18 RX ADMIN — Medication 1 SPRAY(S): at 17:31

## 2023-11-18 RX ADMIN — CHLORHEXIDINE GLUCONATE 15 MILLILITER(S): 213 SOLUTION TOPICAL at 05:40

## 2023-11-18 RX ADMIN — INSULIN GLARGINE 25 UNIT(S): 100 INJECTION, SOLUTION SUBCUTANEOUS at 22:39

## 2023-11-18 RX ADMIN — Medication 1 APPLICATION(S): at 12:26

## 2023-11-18 NOTE — PROGRESS NOTE ADULT - ASSESSMENT
s/p maxillectomy with radial forearm reconstruction 11/10/2023:    Plan:  - continue ERAS protocol  - Xeroform+aquaphor/telfa/ace wrap dressing changes to L arm q1d  - leg thigh donor site dressing down; aquaphor daily to site starting 11/18  - Remove nylons in neck POD10  - Appreciate rest of care per primary team    Plastic Surgery  [id number]# [facility] pager  [phone] [facility] pager  Available on Teams

## 2023-11-18 NOTE — PROGRESS NOTE ADULT - ASSESSMENT
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 Pt was extubated on 11/11/23, tolerating well on room air. A- line and yang removed prior to transferred the floor, d/c Unasyn on 11/13/23. Passed TOV and voiding well on 11/14/23. PT dispo plan for subacute rehab 2-3x/week, SW dispo plan for skilled nursing facility short-term. Pt is progressing appropriately w/o acute events.    Plan:  - Multimodal pain control  - Strict I&Os  - Monitor FS, adjust insulin for goal -150mg/dl  - FLD  - Activity: OOBTC, PT  - DVT ppx: Lovenox 40mg QD  - Dispo: [facility], during [facility] treatment pt will not require Radiation Therapy/Chemo      Lemuel Song  Oral and Maxillofacial Surgery  [facility] Pager #[phone]  Available on Teams

## 2023-11-18 NOTE — CONSULT NOTE ADULT - SUBJECTIVE AND OBJECTIVE BOX
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 Pt presents to the hospital for scheduled operation with OMFS and plastic surgery. Pt is now s/p maxillectomy and R neck dissection, L RFFF recon, L STSG. Pt briefly in SICU q1 flap checks, but now transitioned to general floors. Medicine consulted for management of DM2.    Spoke with patient's family. DM2 is not a new diagnosis. Pt is on Janumet at home. A1c during admission 6.5% indicating good outpt glycemic control. FS during admissions >180. Pt currently on Lantus 24u and Lispro 2u TID initiated by surgery team. Pt on atorvastatin 20mg at home for HLD. Pt is not on anti-hypertensive at this time. Chart review indicated pt was prescribed midodrine outpt.    Evaluated pt at bedside. Pt reports experiencing lightheadness earlier today, received 500cc bolus. Pt reports feeling better at this time. Denies any complaints.     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      Acoustic neuroma      Facial nerve disorder      Blindness of left eye      Unsteady gait      CAD (coronary artery disease)      Hearing loss, right      H/O neuropathy      S/P excision of acoustic neuroma      S/P cataract surgery      S/P coronary angioplasty        T(C): 36.2 (11-18-23 @ 10:05), Max: 36.6 (11-17-23 @ 14:00)  HR: 78 (11-18-23 @ 10:05) (69 - 81)  BP: 100/52 (11-18-23 @ 10:05) (95/42 - 140/62)  RR: 17 (11-18-23 @ 10:05) (17 - 18)  SpO2: 98% (11-18-23 @ 10:05) (98% - 100%)  Wt(kg): --Vital Signs Last 24 Hrs  T(C): 36.2 (18 Nov 2023 10:05), Max: 36.6 (17 Nov 2023 14:00)  T(F): 97.1 (18 Nov 2023 10:05), Max: 97.9 (17 Nov 2023 14:00)  HR: 78 (18 Nov 2023 10:05) (69 - 81)  BP: 100/52 (18 Nov 2023 10:05) (95/42 - 140/62)  BP(mean): --  RR: 17 (18 Nov 2023 10:05) (17 - 18)  SpO2: 98% (18 Nov 2023 10:05) (98% - 100%)    Parameters below as of 18 Nov 2023 10:05  Patient On (Oxygen Delivery Method): room air        PHYSICAL EXAM:  GENERAL: NAD, well-groomed, well-developed  HEAD:  Atraumatic, Normocephalic  EYES: EOMI, PERRLA, conjunctiva and sclera clear  ABIDA: NC/AT, R anterior neck staple line c/d/i,  Maxillofacial: intraoral flap pink, good cap refill, suture site c/i/hemostatic  : voiding freely  Ext: L forearm wrapped w/ ACE Bandage,  CHEST/LUNG: Clear to percussion bilaterally; No rales, rhonchi, wheezing, or rubs  HEART: Regular rate and rhythm; No murmurs, rubs, or gallops  ABDOMEN: Soft, Nontender, Nondistended; Bowel sounds present  EXTREMITIES:  2+ Peripheral Pulses, No clubbing, cyanosis, or edema  LYMPH: No lymphadenopathy noted  SKIN: No rashes or lesions  NERVOUS SYSTEM:  Alert & Oriented X3, Good concentration; Motor Strength 5/5 B/L upper and lower extremities;    Consultant(s) Notes Reviewed:  [x ] YES  [ ] NO  Care Discussed with Consultants/Other Providers [ x] YES  [ ] NO    LABS:  CBC   11-18-23 @ 07:30  Hematcorit 24.9  Hemoglobin 7.5  Mean Cell Hemoglobin 26.3  Platelet Count-Automated 330  RBC Count 2.85  Red Cell Distrib Width 15.3  Wbc Count 6.74      BMP  11-18-23 @ 07:30  Anion Gap. Serum 11  Blood Urea Nitrogen,Serm 13  Calcium, Total Serum 9.0  Carbon Dioxide, Serum 26  Chloride, Serum 99  Creatinine, Serum 0.50  eGFR in  --  eGFR in Non Afican American --  Gloucose, serum 225  Potassium, Serum 3.6  Sodium, Serum 136              11-17-23 @ 19:15  Anion Gap. Serum --  Blood Urea Nitrogen,Serm --  Calcium, Total Serum --  Carbon Dioxide, Serum --  Chloride, Serum --  Creatinine, Serum --  eGFR in  --  eGFR in Non Afican American --  Gloucose, serum --  Potassium, Serum 4.0  Sodium, Serum --              11-17-23 @ 06:20  Anion Gap. Serum 12  Blood Urea Nitrogen,Serm 12  Calcium, Total Serum 9.2  Carbon Dioxide, Serum 27  Chloride, Serum 101  Creatinine, Serum 0.52  eGFR in  --  eGFR in Non Afican American --  Gloucose, serum 194  Potassium, Serum 3.3  Sodium, Serum 140              11-16-23 @ 06:05  Anion Gap. Serum 10  Blood Urea Nitrogen,Serm 11  Calcium, Total Serum 8.8  Carbon Dioxide, Serum 27  Chloride, Serum 102  Creatinine, Serum 0.46  eGFR in  --  eGFR in Non Afican American --  Gloucose, serum 189  Potassium, Serum 3.7  Sodium, Serum 139              11-15-23 @ 17:55  Anion Gap. Serum --  Blood Urea Nitrogen,Serm --  Calcium, Total Serum --  Carbon Dioxide, Serum --  Chloride, Serum --  Creatinine, Serum --  eGFR in  --  eGFR in Non Afican American --  Gloucose, serum --  Potassium, Serum 3.5  Sodium, Serum --                  CMP  11-18-23 @ 07:30  Irene Aminotransferase(ALT/SGPT)--  Albumin, Serum --  Alkaline Phosphatase, Serum --  Anion Gap, Serum 11  Aspartate Aminotransferase (AST/SGOT)--  Bilirubin Total, Serum --  Blood Urea Nitrogen, Serum 13  Calcium,Total Serum 9.0  Carbon Dioxide, Serum 26  Chloride, Serum 99  Creatinine, Serum 0.50  eGFR if  --  eGFR if Non African American --  Glucose, Serum 225  Potassium, Serum 3.6  Protein Total, Serum --  Sodium, Serum 136                      11-17-23 @ 19:15  Irene Aminotransferase(ALT/SGPT)--  Albumin, Serum --  Alkaline Phosphatase, Serum --  Anion Gap, Serum --  Aspartate Aminotransferase (AST/SGOT)--  Bilirubin Total, Serum --  Blood Urea Nitrogen, Serum --  Calcium,Total Serum --  Carbon Dioxide, Serum --  Chloride, Serum --  Creatinine, Serum --  eGFR if  --  eGFR if Non African American --  Glucose, Serum --  Potassium, Serum 4.0  Protein Total, Serum --  Sodium, Serum --                      11-17-23 @ 06:20  Irene Aminotransferase(ALT/SGPT)--  Albumin, Serum --  Alkaline Phosphatase, Serum --  Anion Gap, Serum 12  Aspartate Aminotransferase (AST/SGOT)--  Bilirubin Total, Serum --  Blood Urea Nitrogen, Serum 12  Calcium,Total Serum 9.2  Carbon Dioxide, Serum 27  Chloride, Serum 101  Creatinine, Serum 0.52  eGFR if  --  eGFR if Non African American --  Glucose, Serum 194  Potassium, Serum 3.3  Protein Total, Serum --  Sodium, Serum 140                      11-16-23 @ 06:05  Irene Aminotransferase(ALT/SGPT)--  Albumin, Serum --  Alkaline Phosphatase, Serum --  Anion Gap, Serum 10  Aspartate Aminotransferase (AST/SGOT)--  Bilirubin Total, Serum --  Blood Urea Nitrogen, Serum 11  Calcium,Total Serum 8.8  Carbon Dioxide, Serum 27  Chloride, Serum 102  Creatinine, Serum 0.46  eGFR if  --  eGFR if Non African American --  Glucose, Serum 189  Potassium, Serum 3.7  Protein Total, Serum --  Sodium, Serum 139                      11-15-23 @ 17:55  Irene Aminotransferase(ALT/SGPT)--  Albumin, Serum --  Alkaline Phosphatase, Serum --  Anion Gap, Serum --  Aspartate Aminotransferase (AST/SGOT)--  Bilirubin Total, Serum --  Blood Urea Nitrogen, Serum --  Calcium,Total Serum --  Carbon Dioxide, Serum --  Chloride, Serum --  Creatinine, Serum --  eGFR if  --  eGFR if Non African American --  Glucose, Serum --  Potassium, Serum 3.5  Protein Total, Serum --  Sodium, Serum --                          PT/INR      Amylase/Lipase              Imaging Personally Reviewed:  [ x] YES  [ ] NO Patient is a 73y old  Male who presents with a chief complaint of 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 Pt presents to the hospital for scheduled operation with OMFS and plastic surgery. Pt is now s/p maxillectomy and R neck dissection, L RFFF recon, L STSG. Pt briefly in SICU q1 flap checks, but now transitioned to general floors. Medicine consulted for management of DM2.    Spoke with patient's family. DM2 is not a new diagnosis. Pt is on Janumet and farxiga outpatient. A1c during admission 6.5% indicating good outpt glycemic control. FS during admissions >180. Pt currently on Lantus 24u and Lispro 2u TID initiated by surgery team. Pt on atorvastatin 20mg at home for HLD. Pt is not on anti-hypertensive at this time. Chart review indicated pt was prescribed midodrine outpt.    Evaluated pt at bedside. Pt reports experiencing lightheadedness earlier today, received 500cc bolus. Pt reports feeling better at this time. Denies any complaints. He reports he was seen by his PCP recently and was recommended to start on midodrine qd for hypotension, which he had been taking prior to pre-surgical testing.    Of note, pt has history of CAD. He received stent 1.5 years ago and was prescribed DAPT. Unclear how long he took DAPT for. Stopped on his own without consulting with his cardiologist. Was supposedly restarted on ASA prior to pre-surgical testing but according to wife, he has not been taking ASA either. 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      Acoustic neuroma      Facial nerve disorder      Blindness of left eye      Unsteady gait      CAD (coronary artery disease)      Hearing loss, right      H/O neuropathy      S/P excision of acoustic neuroma      S/P cataract surgery      S/P coronary angioplasty        T(C): 36.2 (11-18-23 @ 10:05), Max: 36.6 (11-17-23 @ 14:00)  HR: 78 (11-18-23 @ 10:05) (69 - 81)  BP: 100/52 (11-18-23 @ 10:05) (95/42 - 140/62)  RR: 17 (11-18-23 @ 10:05) (17 - 18)  SpO2: 98% (11-18-23 @ 10:05) (98% - 100%)  Wt(kg): --Vital Signs Last 24 Hrs  T(C): 36.2 (18 Nov 2023 10:05), Max: 36.6 (17 Nov 2023 14:00)  T(F): 97.1 (18 Nov 2023 10:05), Max: 97.9 (17 Nov 2023 14:00)  HR: 78 (18 Nov 2023 10:05) (69 - 81)  BP: 100/52 (18 Nov 2023 10:05) (95/42 - 140/62)  BP(mean): --  RR: 17 (18 Nov 2023 10:05) (17 - 18)  SpO2: 98% (18 Nov 2023 10:05) (98% - 100%)    Parameters below as of 18 Nov 2023 10:05  Patient On (Oxygen Delivery Method): room air        PHYSICAL EXAM:  GENERAL: NAD, well-groomed, well-developed  HEAD:  Atraumatic, Normocephalic  EYES: EOMI, PERRLA, conjunctiva and sclera clear  ABIDA: NC/AT, R anterior neck staple line c/d/i,  Maxillofacial: intraoral flap pink, good cap refill, suture site c/i/hemostatic  : voiding freely  Ext: L forearm wrapped w/ ACE Bandage,  CHEST/LUNG: Clear to percussion bilaterally; No rales, rhonchi, wheezing, or rubs  HEART: Regular rate and rhythm; No murmurs, rubs, or gallops  ABDOMEN: Soft, Nontender, Nondistended; Bowel sounds present  EXTREMITIES:  2+ Peripheral Pulses, No clubbing, cyanosis, or edema  LYMPH: No lymphadenopathy noted  SKIN: No rashes or lesions  NERVOUS SYSTEM:  Alert & Oriented X3, Good concentration; Motor Strength 5/5 B/L upper and lower extremities;    Consultant(s) Notes Reviewed:  [x ] YES  [ ] NO  Care Discussed with Consultants/Other Providers [ x] YES  [ ] NO    LABS:  CBC   11-18-23 @ 07:30  Hematcorit 24.9  Hemoglobin 7.5  Mean Cell Hemoglobin 26.3  Platelet Count-Automated 330  RBC Count 2.85  Red Cell Distrib Width 15.3  Wbc Count 6.74      BMP  11-18-23 @ 07:30  Anion Gap. Serum 11  Blood Urea Nitrogen,Serm 13  Calcium, Total Serum 9.0  Carbon Dioxide, Serum 26  Chloride, Serum 99  Creatinine, Serum 0.50  eGFR in  --  eGFR in Non Afican American --  Gloucose, serum 225  Potassium, Serum 3.6  Sodium, Serum 136              11-17-23 @ 19:15  Anion Gap. Serum --  Blood Urea Nitrogen,Serm --  Calcium, Total Serum --  Carbon Dioxide, Serum --  Chloride, Serum --  Creatinine, Serum --  eGFR in  --  eGFR in Non Afican American --  Gloucose, serum --  Potassium, Serum 4.0  Sodium, Serum --              11-17-23 @ 06:20  Anion Gap. Serum 12  Blood Urea Nitrogen,Serm 12  Calcium, Total Serum 9.2  Carbon Dioxide, Serum 27  Chloride, Serum 101  Creatinine, Serum 0.52  eGFR in  --  eGFR in Non Afican American --  Gloucose, serum 194  Potassium, Serum 3.3  Sodium, Serum 140              11-16-23 @ 06:05  Anion Gap. Serum 10  Blood Urea Nitrogen,Serm 11  Calcium, Total Serum 8.8  Carbon Dioxide, Serum 27  Chloride, Serum 102  Creatinine, Serum 0.46  eGFR in  --  eGFR in Non Afican American --  Gloucose, serum 189  Potassium, Serum 3.7  Sodium, Serum 139              11-15-23 @ 17:55  Anion Gap. Serum --  Blood Urea Nitrogen,Serm --  Calcium, Total Serum --  Carbon Dioxide, Serum --  Chloride, Serum --  Creatinine, Serum --  eGFR in  --  eGFR in Non Afican American --  Gloucose, serum --  Potassium, Serum 3.5  Sodium, Serum --                  CMP  11-18-23 @ 07:30  Irene Aminotransferase(ALT/SGPT)--  Albumin, Serum --  Alkaline Phosphatase, Serum --  Anion Gap, Serum 11  Aspartate Aminotransferase (AST/SGOT)--  Bilirubin Total, Serum --  Blood Urea Nitrogen, Serum 13  Calcium,Total Serum 9.0  Carbon Dioxide, Serum 26  Chloride, Serum 99  Creatinine, Serum 0.50  eGFR if  --  eGFR if Non African American --  Glucose, Serum 225  Potassium, Serum 3.6  Protein Total, Serum --  Sodium, Serum 136                      11-17-23 @ 19:15  Irene Aminotransferase(ALT/SGPT)--  Albumin, Serum --  Alkaline Phosphatase, Serum --  Anion Gap, Serum --  Aspartate Aminotransferase (AST/SGOT)--  Bilirubin Total, Serum --  Blood Urea Nitrogen, Serum --  Calcium,Total Serum --  Carbon Dioxide, Serum --  Chloride, Serum --  Creatinine, Serum --  eGFR if  --  eGFR if Non African American --  Glucose, Serum --  Potassium, Serum 4.0  Protein Total, Serum --  Sodium, Serum --                      11-17-23 @ 06:20  Irene Aminotransferase(ALT/SGPT)--  Albumin, Serum --  Alkaline Phosphatase, Serum --  Anion Gap, Serum 12  Aspartate Aminotransferase (AST/SGOT)--  Bilirubin Total, Serum --  Blood Urea Nitrogen, Serum 12  Calcium,Total Serum 9.2  Carbon Dioxide, Serum 27  Chloride, Serum 101  Creatinine, Serum 0.52  eGFR if  --  eGFR if Non African American --  Glucose, Serum 194  Potassium, Serum 3.3  Protein Total, Serum --  Sodium, Serum 140                      11-16-23 @ 06:05  Irene Aminotransferase(ALT/SGPT)--  Albumin, Serum --  Alkaline Phosphatase, Serum --  Anion Gap, Serum 10  Aspartate Aminotransferase (AST/SGOT)--  Bilirubin Total, Serum --  Blood Urea Nitrogen, Serum 11  Calcium,Total Serum 8.8  Carbon Dioxide, Serum 27  Chloride, Serum 102  Creatinine, Serum 0.46  eGFR if  --  eGFR if Non African American --  Glucose, Serum 189  Potassium, Serum 3.7  Protein Total, Serum --  Sodium, Serum 139                      11-15-23 @ 17:55  Irene Aminotransferase(ALT/SGPT)--  Albumin, Serum --  Alkaline Phosphatase, Serum --  Anion Gap, Serum --  Aspartate Aminotransferase (AST/SGOT)--  Bilirubin Total, Serum --  Blood Urea Nitrogen, Serum --  Calcium,Total Serum --  Carbon Dioxide, Serum --  Chloride, Serum --  Creatinine, Serum --  eGFR if  --  eGFR if Non African American --  Glucose, Serum --  Potassium, Serum 3.5  Protein Total, Serum --  Sodium, Serum --                          PT/INR      Amylase/Lipase              Imaging Personally Reviewed:  [ x] YES  [ ] NO

## 2023-11-18 NOTE — CONSULT NOTE ADULT - ASSESSMENT
*Incomplete*    73M w/ PMHx of DM2, HTN, HLD, and SCC R maxillary alveolar ridge. Pt presents to the hospital for scheduled operation with OMFS and plastic surgery. Pt is now s/p maxillectomy and R neck dissection, L RFFF recon, L STSG. Pt briefly in SICU q1 flap checks, but now transitioned to general floors. Medicine consulted for management of DM2.    #DM2  Pt with hx of DM2, A1c 6.5% indicating good outpt glycemic control. FS in hospital >180. Currently on lantus 24u and Lispro 2u TID with low ISS. Pt required 4u pre-meal ISS yesterday. Goal FS inpt 140-180  - Would increase lispro 3u TID  - Increase Lantus 25u qhs  - Increase to moderate ISS and add low bedtime ISS  - Pt on janumet at home with A1c 6.5%. Can continue with Janumet outpt once discharged    #HLD  Pt with hx of HLD, on home atorvastatin 20mg  - Can continue with atorvastatin upon discharge    #Anemia  Pt with hgb ~7 since procedure likely i/s/o recent procedure. Hgb currently stable  - trend CBC, transfuse >7 as appropriate  - Pt should undergo outpt age appropriate cancer screening    #Hypotension  Pt with soft BPs during admission. Has reported hx of HTN, but not on anti-hypertensive medications. Chart review revealed pt on midodrine outpt.  - Monitor BP, can add midodrine as needed  - Would obtain orthostatic BPs    #SCC R maxillary alveolar ridge  Pt is s/p maxillectomy and R neck dissection, L RFFF recon, L STSG.  - Care per OMFS and plastic surgery    Recommendations are not finalized until signed by the attending    Joshua Alexis MD  IM Resident, PGY-3  Available via [employer] Teams 73M w/ PMHx of DM2, HTN, HLD, and SCC R maxillary alveolar ridge. Pt presents to the hospital for scheduled operation with OMFS and plastic surgery. Pt is now s/p maxillectomy and R neck dissection, L RFFF recon, L STSG. Pt briefly in SICU q1 flap checks, but now transitioned to general floors. Medicine consulted for management of DM2.    #DM2  Pt with hx of DM2, A1c 6.5% indicating good outpt glycemic control. FS in hospital >180. Currently on lantus 24u and Lispro 2u TID with low ISS. Pt required 4u pre-meal ISS yesterday. Goal FS inpt 140-180  - Would increase lispro 3u TID  - Increase Lantus 25u qhs  - Increase to moderate ISS and add low bedtime ISS  - Pt on janumet and farxiga at home with A1c 6.5%. Can continue with Janumet and farxiga outpt once discharged    #CAD  Pt with hx of CAD s/p stent x2. Unclear if pt still supposed to be on DAPT as discontinued prematurely without consulting his cardiologist. Reported started on ASA recently.  -  can restart ASA if no further concern for bleeding from surgery perspective.     #HLD  Pt with hx of HLD, on home atorvastatin 20mg  - Can continue with atorvastatin upon discharge    #Anemia  Pt with hgb ~7 since procedure likely i/s/o recent procedure and post-procedure bleeding. Hgb currently stable  - trend CBC, given hx of CAD transfuse >8  - Pt should undergo outpt age appropriate cancer screening    #Hypotension/dizziness  Pt with soft BPs during admission. Has reported hx of HTN, but not on anti-hypertensive medications. Chart review and per darion, pt on midodrine outpt.  - Monitor BP, can add midodrine as needed.   - Encourage PO hydration  - Would obtain orthostatic BPs  - If orthostatic or if pt continues to be hypotensive, can consider switching tamsulosin to doxazosin if covered by insurance    #SCC R maxillary alveolar ridge  Pt is s/p maxillectomy and R neck dissection, L RFFF recon, L STSG.  - Care per OMFS and plastic surgery    Recommendations are not finalized until signed by the attending    Joshua Alexis MD  IM Resident, PGY-3  Available via [employer] Teams

## 2023-11-18 NOTE — CONSULT NOTE ADULT - ATTENDING COMMENTS
73M with PMH DM2, HTN, SCC s/p maxillectomy and R neck dissection, L RFFF recon, L STSG. Medicine consulted for Diabetes management. Patient is taking Janumet and Farxiga at home. Currently on basal/bolus regimen with FS ranging from 161 to 223 in the past 24hrs. Patient is concern about his FS over 200s. He does not monitor FSG at home, but his A1c has usually been around 7 in outpatient setting. He endorsed difficulty sleeping due the movement of the pneumonic bed. He also feels tired.    Overall FSG is acceptable. agree with increasing basal/bolus for better glycemic control. can resume home oral hyperglycemic regimen on discharge. 73M with PMH DM2, HTN, SCC s/p maxillectomy and R neck dissection, L RFFF recon, L STSG. Medicine consulted for Diabetes management. Patient is taking Janumet and Farxiga at home. Currently on basal/bolus regimen with FS ranging from 161 to 223 in the past 24hrs. Patient is concern about his FS over 200s. He does not monitor FSG at home, but his A1c has usually been around 7 in outpatient setting. He endorsed difficulty sleeping due the movement of the pneumonic bed. He also feels tired. EKG reviewed, NSR, no NE/QRS prolongation. patient tolerating PO diet.     Overall FSG is acceptable. agree with increasing basal/bolus for better glycemic control. can resume home oral hyperglycemic regimen on discharge, given the recent outpatient A1c is 7.  Patient was hypotensive this morning. patient has been on Tamsulosin at home prior to surgery. BP is likely due to dehydration and surgery. continue to encourage PO hydration. IVF prn.    Rest of care per primary team. 73M with PMH DM2, HTN, SCC s/p maxillectomy and R neck dissection, L RFFF recon, L STSG. Medicine consulted for Diabetes management. Patient is taking Janumet and Farxiga at home. Currently on basal/bolus regimen with FS ranging from 161 to 223 in the past 24hrs. Patient is concern about his FS over 200s. He does not monitor FSG at home, but his A1c has usually been around 7 in outpatient setting. He endorsed difficulty sleeping due the movement of the pneumonic bed. He also feels tired. EKG reviewed, NSR, no IN/QRS prolongation. patient tolerating PO diet.     #DM2 - Overall FSG is acceptable. agree with increasing basal/bolus for better glycemic control. can resume home oral hyperglycemic regimen on discharge, given the recent outpatient A1c is 7.  #Hypotension - Patient was hypotensive this morning. patient has been on Tamsulosin at home prior to surgery. BP is likely due to dehydration and surgery. continue to encourage PO hydration. IVF prn.  #h/o CAD - had PCI over 1.5yr ago. patient was not on DAPT. was recently started on ASA 81mg prior to surgery per his cardiologist. can restart ASA if no further concern for bleeding from surgery perspective.   #Anemia - Hb was 12 in PST and had post op Hb drop to 9.2 now 7-8 range in the past few days. acute blood loss anemia. would transfuse and maintain patient's Hb >8 given history of CAD.    Rest of care per primary team. 73M with PMH DM2, HTN, SCC s/p maxillectomy and R neck dissection, L RFFF recon, L STSG. Medicine consulted for Diabetes management. Patient is taking Janumet and Farxiga at home. Currently on basal/bolus regimen with FS ranging from 161 to 223 in the past 24hrs. Patient is concern about his FS over 200s. He does not monitor FSG at home, but his A1c has usually been around 7 in outpatient setting. He endorsed difficulty sleeping due the movement of the pneumonic bed. He also feels tired. EKG reviewed, NSR, no AK/QRS prolongation. patient tolerating PO diet.     #DM2 - Overall FSG is acceptable. agree with increasing basal/bolus for better glycemic control. can resume home oral hyperglycemic regimen on discharge, given the recent outpatient A1c is 7.  #Hypotension - Patient was hypotensive this morning. patient has been on Tamsulosin at home prior to surgery. BP is likely due to dehydration and surgery. continue to encourage PO hydration. IVF prn.  #h/o CAD - had PCI over 1.5yr ago. patient was not on DAPT. was recently started on ASA 81mg prior to surgery per his cardiologist. can restart ASA if no further concern for bleeding from surgery perspective.   #Anemia - Hb was 12 in PST and had post op Hb drop to 9.2 now 7-8 range in the past few days. acute blood loss anemia. would transfuse and maintain patient's Hb >8 given history of CAD.    Rest of care per primary team

## 2023-11-18 NOTE — PROGRESS NOTE ADULT - SUBJECTIVE AND OBJECTIVE BOX
Plastic Surgery Progress Note (pg [facility]: [id number], [place]: [phone])    SUBJECTIVE  The patient was seen and examined. No acute events overnight. Pain controlled, afebrile w/ stable vitals. Tolerating diet.     OBJECTIVE  ___________________________________________________  VITAL SIGNS / I&O's   Vital Signs Last 24 Hrs  T(C): 36.5 (18 Nov 2023 07:15), Max: 36.7 (17 Nov 2023 10:45)  T(F): 97.7 (18 Nov 2023 07:15), Max: 98 (17 Nov 2023 10:45)  HR: 69 (18 Nov 2023 07:15) (69 - 81)  BP: 95/42 (18 Nov 2023 07:15) (81/34 - 140/62)  BP(mean): --  RR: 18 (18 Nov 2023 07:15) (17 - 18)  SpO2: 100% (18 Nov 2023 07:15) (100% - 100%)    Parameters below as of 18 Nov 2023 07:15  Patient On (Oxygen Delivery Method): room air          17 Nov 2023 07:01  -  18 Nov 2023 07:00  --------------------------------------------------------  IN:    IV PiggyBack: 100 mL    Lactated Ringers Bolus: 500 mL    Oral Fluid: 960 mL  Total IN: 1560 mL    OUT:    Voided (mL): 1100 mL  Total OUT: 1100 mL    Total NET: 460 mL        ___________________________________________________  PHYSICAL EXAM    General: NAD, Lying in bed comfortably  Neuro: awake and alert  Pulm: No increased work of breathing on room air  HEENT: Maxillary flap, good color, interval removal of neck drain  Neck: incision intact w/ nylon sutures  L arm: Skin graft w/ good take. Xeroform/telfa/ace dressing applied  L thigh: donor site appropriate; open to air    ___________________________________________________  LABS                        7.5    6.74  )-----------( 330      ( 18 Nov 2023 07:30 )             24.9     18 Nov 2023 07:30    136    |  99     |  13     ----------------------------<  225    3.6     |  26     |  0.50     Ca    9.0        18 Nov 2023 07:30  Phos  3.2       18 Nov 2023 07:30  Mg     2.10      18 Nov 2023 07:30        CAPILLARY BLOOD GLUCOSE      POCT Blood Glucose.: 161 mg/dL (17 Nov 2023 21:48)  POCT Blood Glucose.: 193 mg/dL (17 Nov 2023 18:06)  POCT Blood Glucose.: 202 mg/dL (17 Nov 2023 12:23)  POCT Blood Glucose.: 188 mg/dL (17 Nov 2023 08:35)        Urinalysis Basic - ( 18 Nov 2023 07:30 )    Color: x / Appearance: x / SG: x / pH: x  Gluc: 225 mg/dL / Ketone: x  / Bili: x / Urobili: x   Blood: x / Protein: x / Nitrite: x   Leuk Esterase: x / RBC: x / WBC x   Sq Epi: x / Non Sq Epi: x / Bacteria: x      ___________________________________________________  MICRO  Recent Cultures:    ___________________________________________________  MEDICATIONS  (STANDING):  acetaminophen   Oral Liquid .. 975 milliGRAM(s) Oral every 6 hours  AQUAPHOR (petrolatum Ointment) 1 Application(s) Topical daily  chlorhexidine 0.12% Liquid 15 milliLiter(s) Swish and Spit <User Schedule>  chlorhexidine 2% Cloths 1 Application(s) Topical daily  dextrose 5%. 1000 milliLiter(s) (50 mL/Hr) IV Continuous <Continuous>  dextrose 5%. 1000 milliLiter(s) (100 mL/Hr) IV Continuous <Continuous>  dextrose 5%. 1000 milliLiter(s) (50 mL/Hr) IV Continuous <Continuous>  dextrose 5%. 1000 milliLiter(s) (100 mL/Hr) IV Continuous <Continuous>  dextrose 50% Injectable 12.5 Gram(s) IV Push once  dextrose 50% Injectable 25 Gram(s) IV Push once  dextrose 50% Injectable 25 Gram(s) IV Push once  dextrose 50% Injectable 12.5 Gram(s) IV Push once  dextrose 50% Injectable 25 Gram(s) IV Push once  enoxaparin Injectable 40 milliGRAM(s) SubCutaneous every 24 hours  glucagon  Injectable 1 milliGRAM(s) IntraMuscular once  glucagon  Injectable 1 milliGRAM(s) IntraMuscular once  insulin glargine Injectable (LANTUS) 24 Unit(s) SubCutaneous at bedtime  insulin lispro (ADMELOG) corrective regimen sliding scale   SubCutaneous three times a day before meals  insulin lispro Injectable (ADMELOG) 2 Unit(s) SubCutaneous before breakfast  insulin lispro Injectable (ADMELOG) 2 Unit(s) SubCutaneous before lunch  insulin lispro Injectable (ADMELOG) 2 Unit(s) SubCutaneous before dinner  melatonin 6 milliGRAM(s) Oral at bedtime  polyethylene glycol 3350 17 Gram(s) Oral daily  senna 2 Tablet(s) Oral at bedtime  sodium chloride 0.65% Nasal 1 Spray(s) Both Nostrils four times a day  tamsulosin 0.4 milliGRAM(s) Oral at bedtime    MEDICATIONS  (PRN):  dextrose Oral Gel 15 Gram(s) Oral once PRN Blood Glucose LESS THAN 70 milliGRAM(s)/deciliter  oxyCODONE    Solution 5 milliGRAM(s) Oral every 4 hours PRN Moderate Pain (4 - 6)  oxyCODONE    Solution 10 milliGRAM(s) Oral every 4 hours PRN Severe Pain (7 - 10)

## 2023-11-18 NOTE — PROGRESS NOTE ADULT - SUBJECTIVE AND OBJECTIVE BOX
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     Interval Events: Pt required LR Bolus 500 mL this AM due to BP 96/46 w/ pulse of 72. NAD but pt feeling slightly lightheaded. R neck RAE drain removed yesterday    Vital Signs Last 24 Hrs  T(C): 36.5 (18 Nov 2023 07:15), Max: 36.7 (17 Nov 2023 10:45)  T(F): 97.7 (18 Nov 2023 07:15), Max: 98 (17 Nov 2023 10:45)  HR: 69 (18 Nov 2023 07:15) (69 - 81)  BP: 95/42 (18 Nov 2023 07:15) (81/34 - 140/62)  BP(mean): --  RR: 18 (18 Nov 2023 07:15) (17 - 18)  SpO2: 100% (18 Nov 2023 07:15) (100% - 100%)    Parameters below as of 18 Nov 2023 07:15  Patient On (Oxygen Delivery Method): room air    I&O's Detail    17 Nov 2023 07:01  -  18 Nov 2023 07:00  --------------------------------------------------------  IN:    IV PiggyBack: 100 mL    Lactated Ringers Bolus: 500 mL    Oral Fluid: 960 mL  Total IN: 1560 mL    OUT:    Voided (mL): 1100 mL  Total OUT: 1100 mL    MEDICATIONS  (STANDING):  acetaminophen   Oral Liquid .. 975 milliGRAM(s) Oral every 6 hours  AQUAPHOR (petrolatum Ointment) 1 Application(s) Topical daily  chlorhexidine 0.12% Liquid 15 milliLiter(s) Swish and Spit <User Schedule>  chlorhexidine 2% Cloths 1 Application(s) Topical daily  dextrose 5%. 1000 milliLiter(s) (50 mL/Hr) IV Continuous <Continuous>  dextrose 5%. 1000 milliLiter(s) (100 mL/Hr) IV Continuous <Continuous>  dextrose 5%. 1000 milliLiter(s) (50 mL/Hr) IV Continuous <Continuous>  dextrose 5%. 1000 milliLiter(s) (100 mL/Hr) IV Continuous <Continuous>  dextrose 50% Injectable 12.5 Gram(s) IV Push once  dextrose 50% Injectable 25 Gram(s) IV Push once  dextrose 50% Injectable 25 Gram(s) IV Push once  dextrose 50% Injectable 12.5 Gram(s) IV Push once  dextrose 50% Injectable 25 Gram(s) IV Push once  enoxaparin Injectable 40 milliGRAM(s) SubCutaneous every 24 hours  glucagon  Injectable 1 milliGRAM(s) IntraMuscular once  glucagon  Injectable 1 milliGRAM(s) IntraMuscular once  insulin glargine Injectable (LANTUS) 24 Unit(s) SubCutaneous at bedtime  insulin lispro (ADMELOG) corrective regimen sliding scale   SubCutaneous three times a day before meals  insulin lispro Injectable (ADMELOG) 2 Unit(s) SubCutaneous before breakfast  insulin lispro Injectable (ADMELOG) 2 Unit(s) SubCutaneous before lunch  insulin lispro Injectable (ADMELOG) 2 Unit(s) SubCutaneous before dinner  melatonin 6 milliGRAM(s) Oral at bedtime  polyethylene glycol 3350 17 Gram(s) Oral daily  senna 2 Tablet(s) Oral at bedtime  sodium chloride 0.65% Nasal 1 Spray(s) Both Nostrils four times a day  tamsulosin 0.4 milliGRAM(s) Oral at bedtime    MEDICATIONS  (PRN):  dextrose Oral Gel 15 Gram(s) Oral once PRN Blood Glucose LESS THAN 70 milliGRAM(s)/deciliter  oxyCODONE    Solution 5 milliGRAM(s) Oral every 4 hours PRN Moderate Pain (4 - 6)  oxyCODONE    Solution 10 milliGRAM(s) Oral every 4 hours PRN Severe Pain (7 - 10)      Physical Exam:  Gen: AAOx3, pt not in acute distress  ABIDA: NC/AT, R anterior neck staple line c/d/i,  Maxillofacial: intraoral flap pink, good cap refill, suture site c/i/hemostatic  : voiding freely  Ext: L forearm wrapped w/ ACE Bandage,

## 2023-11-19 ENCOUNTER — TRANSCRIPTION ENCOUNTER (OUTPATIENT)
Age: 73
End: 2023-11-19

## 2023-11-19 LAB
GLUCOSE BLDC GLUCOMTR-MCNC: 128 MG/DL — HIGH (ref 70–99)
GLUCOSE BLDC GLUCOMTR-MCNC: 128 MG/DL — HIGH (ref 70–99)
GLUCOSE BLDC GLUCOMTR-MCNC: 162 MG/DL — HIGH (ref 70–99)
GLUCOSE BLDC GLUCOMTR-MCNC: 162 MG/DL — HIGH (ref 70–99)
GLUCOSE BLDC GLUCOMTR-MCNC: 220 MG/DL — HIGH (ref 70–99)
GLUCOSE BLDC GLUCOMTR-MCNC: 220 MG/DL — HIGH (ref 70–99)
GLUCOSE BLDC GLUCOMTR-MCNC: 69 MG/DL — LOW (ref 70–99)
GLUCOSE BLDC GLUCOMTR-MCNC: 69 MG/DL — LOW (ref 70–99)
GLUCOSE BLDC GLUCOMTR-MCNC: 97 MG/DL — SIGNIFICANT CHANGE UP (ref 70–99)
GLUCOSE BLDC GLUCOMTR-MCNC: 97 MG/DL — SIGNIFICANT CHANGE UP (ref 70–99)

## 2023-11-19 RX ORDER — ASPIRIN/CALCIUM CARB/MAGNESIUM 324 MG
81 TABLET ORAL DAILY
Refills: 0 | Status: DISCONTINUED | OUTPATIENT
Start: 2023-11-19 | End: 2023-11-21

## 2023-11-19 RX ADMIN — CHLORHEXIDINE GLUCONATE 15 MILLILITER(S): 213 SOLUTION TOPICAL at 21:57

## 2023-11-19 RX ADMIN — ENOXAPARIN SODIUM 40 MILLIGRAM(S): 100 INJECTION SUBCUTANEOUS at 05:08

## 2023-11-19 RX ADMIN — Medication 6 MILLIGRAM(S): at 21:56

## 2023-11-19 RX ADMIN — CHLORHEXIDINE GLUCONATE 15 MILLILITER(S): 213 SOLUTION TOPICAL at 05:08

## 2023-11-19 RX ADMIN — CHLORHEXIDINE GLUCONATE 1 APPLICATION(S): 213 SOLUTION TOPICAL at 12:50

## 2023-11-19 RX ADMIN — Medication 975 MILLIGRAM(S): at 12:51

## 2023-11-19 RX ADMIN — Medication 1 SPRAY(S): at 17:26

## 2023-11-19 RX ADMIN — Medication 3 UNIT(S): at 12:51

## 2023-11-19 RX ADMIN — Medication 1 SPRAY(S): at 05:08

## 2023-11-19 RX ADMIN — SODIUM CHLORIDE 70 MILLILITER(S): 9 INJECTION, SOLUTION INTRAVENOUS at 09:03

## 2023-11-19 RX ADMIN — Medication 4: at 12:51

## 2023-11-19 RX ADMIN — CHLORHEXIDINE GLUCONATE 15 MILLILITER(S): 213 SOLUTION TOPICAL at 14:08

## 2023-11-19 RX ADMIN — Medication 975 MILLIGRAM(S): at 05:08

## 2023-11-19 RX ADMIN — Medication 81 MILLIGRAM(S): at 12:54

## 2023-11-19 RX ADMIN — Medication 975 MILLIGRAM(S): at 18:10

## 2023-11-19 RX ADMIN — Medication 3 UNIT(S): at 17:26

## 2023-11-19 RX ADMIN — Medication 1 APPLICATION(S): at 12:49

## 2023-11-19 RX ADMIN — Medication 2: at 17:26

## 2023-11-19 RX ADMIN — Medication 1 SPRAY(S): at 12:53

## 2023-11-19 RX ADMIN — Medication 3 UNIT(S): at 08:57

## 2023-11-19 RX ADMIN — Medication 975 MILLIGRAM(S): at 13:40

## 2023-11-19 RX ADMIN — Medication 975 MILLIGRAM(S): at 17:27

## 2023-11-19 NOTE — DISCHARGE NOTE PROVIDER - CARE PROVIDER_API CALL
Cleve May  [address] [address] [place] [id number]  Phone: [phone]  Fax: [phone]  Established Patient  Follow Up Time: 1 week   Cleve May  [address] [address] [place] [id number]  Phone: [phone]  Fax: [phone]  Established Patient  Follow Up Time: 1 week   Cleve May  [address] [address] [place] [id number]  Phone: [phone]  Fax: [phone]  Established Patient  Follow Up Time: 1 week

## 2023-11-19 NOTE — DISCHARGE NOTE PROVIDER - NSDCFUADDINST_GEN_ALL_CORE_FT
Full liquid diet - pureed diet as tolerated, with carbohydrate control for glycemic control Full liquid diet - pureed diet as tolerated, with carbohydrate control for glycemic control    Dressing/wound care:  -L arm: xeroform/telfa/ACE wrap to graft side. This dressing should be changed daily.   -Thigh donor site: Aquaphor to donor site daily.

## 2023-11-19 NOTE — DISCHARGE NOTE PROVIDER - NSDCCPCAREPLAN_GEN_ALL_CORE_FT
PRINCIPAL DISCHARGE DIAGNOSIS  Diagnosis: H/O squamous cell carcinoma excision  Assessment and Plan of Treatment:

## 2023-11-19 NOTE — PROGRESS NOTE ADULT - ASSESSMENT
s/p maxillectomy with radial forearm reconstruction 11/10/2023:    Plan:  - continue ERAS protocol  - Xeroform+aquaphor/telfa/ace wrap dressing changes to L arm q1d  - leg thigh donor site dressing down; aquaphor daily to site starting 11/18  - Remove nylons in neck POD10  - Appreciate rest of care per primary team    Plastic Surgery  [id number]# [facility] pager  [phone] [facility] pager  Available on Teams

## 2023-11-19 NOTE — DISCHARGE NOTE PROVIDER - REASON FOR ADMISSION
Right sided hemimaxillectomy in the setting of squamous cell carcinoma of the right maxillary alveolar ridge

## 2023-11-19 NOTE — PROGRESS NOTE ADULT - SUBJECTIVE AND OBJECTIVE BOX
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 Pt was extubated on 11/11/23, tolerating well on room air. A- line and yang removed prior to transferred the floor, d/c Unasyn on 11/13/23. Passed TOV and voiding well on 11/14/23. Diet advanced to FLD 11/15/23.     INTERVAL EVENTS:  - Radha OG  - Medicine following for glycemic control and outpatient med recs  - Lantus changed from 24u -> 25u, lispro 2u-> 3u TID, ISS increased from low -> moderate, added low bedtime ISS; goal FS inpt 140-180    ICU Vital Signs Last 24 Hrs  T(C): 36.6 (19 Nov 2023 05:00), Max: 36.6 (19 Nov 2023 02:29)  T(F): 97.9 (19 Nov 2023 05:00), Max: 97.9 (19 Nov 2023 05:00)  HR: 74 (19 Nov 2023 05:00) (68 - 79)  BP: 93/57 (19 Nov 2023 05:00) (93/57 - 132/67)  BP(mean): --  ABP: --  ABP(mean): --  RR: 18 (19 Nov 2023 05:00) (16 - 18)  SpO2: 100% (19 Nov 2023 05:00) (98% - 100%)    O2 Parameters below as of 19 Nov 2023 05:00  Patient On (Oxygen Delivery Method): room air    I&O's Detail    18 Nov 2023 07:01  -  19 Nov 2023 07:00  --------------------------------------------------------  IN:    Lactated Ringers: 630 mL    Oral Fluid: 1060 mL  Total IN: 1690 mL    OUT:    IV PiggyBack: 0 mL    Voided (mL): 3050 mL  Total OUT: 3050 mL    Total NET: -1360 mL    MEDICATIONS  (STANDING):  acetaminophen   Oral Liquid .. 975 milliGRAM(s) Oral every 6 hours  AQUAPHOR (petrolatum Ointment) 1 Application(s) Topical daily  chlorhexidine 0.12% Liquid 15 milliLiter(s) Swish and Spit <User Schedule>  chlorhexidine 2% Cloths 1 Application(s) Topical daily  dextrose 5%. 1000 milliLiter(s) (50 mL/Hr) IV Continuous <Continuous>  dextrose 5%. 1000 milliLiter(s) (100 mL/Hr) IV Continuous <Continuous>  dextrose 5%. 1000 milliLiter(s) (50 mL/Hr) IV Continuous <Continuous>  dextrose 5%. 1000 milliLiter(s) (100 mL/Hr) IV Continuous <Continuous>  dextrose 5%. 1000 milliLiter(s) (50 mL/Hr) IV Continuous <Continuous>  dextrose 5%. 1000 milliLiter(s) (100 mL/Hr) IV Continuous <Continuous>  dextrose 50% Injectable 25 Gram(s) IV Push once  dextrose 50% Injectable 12.5 Gram(s) IV Push once  dextrose 50% Injectable 25 Gram(s) IV Push once  dextrose 50% Injectable 12.5 Gram(s) IV Push once  dextrose 50% Injectable 25 Gram(s) IV Push once  dextrose 50% Injectable 25 Gram(s) IV Push once  dextrose 50% Injectable 12.5 Gram(s) IV Push once  dextrose 50% Injectable 25 Gram(s) IV Push once  enoxaparin Injectable 40 milliGRAM(s) SubCutaneous every 24 hours  glucagon  Injectable 1 milliGRAM(s) IntraMuscular once  glucagon  Injectable 1 milliGRAM(s) IntraMuscular once  glucagon  Injectable 1 milliGRAM(s) IntraMuscular once  insulin glargine Injectable (LANTUS) 25 Unit(s) SubCutaneous at bedtime  insulin lispro (ADMELOG) corrective regimen sliding scale   SubCutaneous three times a day before meals  insulin lispro (ADMELOG) corrective regimen sliding scale   SubCutaneous at bedtime  insulin lispro Injectable (ADMELOG) 3 Unit(s) SubCutaneous before breakfast  insulin lispro Injectable (ADMELOG) 3 Unit(s) SubCutaneous before lunch  insulin lispro Injectable (ADMELOG) 3 Unit(s) SubCutaneous before dinner  lactated ringers. 1000 milliLiter(s) (70 mL/Hr) IV Continuous <Continuous>  melatonin 6 milliGRAM(s) Oral at bedtime  polyethylene glycol 3350 17 Gram(s) Oral daily  senna 2 Tablet(s) Oral at bedtime  sodium chloride 0.65% Nasal 1 Spray(s) Both Nostrils four times a day    MEDICATIONS  (PRN):  dextrose Oral Gel 15 Gram(s) Oral once PRN Blood Glucose LESS THAN 70 milliGRAM(s)/deciliter  dextrose Oral Gel 15 Gram(s) Oral once PRN Blood Glucose LESS THAN 70 milliGRAM(s)/deciliter  oxyCODONE    Solution 5 milliGRAM(s) Oral every 4 hours PRN Moderate Pain (4 - 6)    Physical Exam:  Gen: AAOx3, pt not in acute distress  ABIDA: NC/AT, R anterior neck staple line c/d/i, sutures intact  Maxillofacial: intraoral flap pink, good cap refill without dehiscence, suture site c/i/hemostatic  : voiding freely  Ext: L forearm wrapped w/ ACE Bandage, LLE STSG donor site intact

## 2023-11-19 NOTE — DISCHARGE NOTE PROVIDER - HOSPITAL COURSE
11/10/23: 73 male patient with hx of biopsy-proven squamous cell carcinoma of the right sided maxillary alveolar ridge presents for right sided hemimaxillectomy, Right supraomohyoid neck dissection, Left sided radial free fibular flap, Left sided split thickness skin graft. No intraoperative complications, patient discharged to SICU in stable condition.  11/11/23: Patient was extubated, tolerating well on room air.   11/13/23: A- line and yang removed, passed trial of void, Unasyn discontinued prior to being transferred to the floor.  11/14/23: No acute events overnight, vitals stable, afebrile, patient is voiding well, pain is well controlled, progressing appropriately.   11/15/23: No acute events overnight. Diet advanced to clear liquid diet, tolerating well.  11/16/13: No acute events overnight. Cook doppler removed, Left arm RAE drain and VAC removed. Diet advanced to full liquid diet, tolerating well. Patient is progressing well without acute events.  11/17/23: Patient slipped, fell on the floor, with no trauma to the face/extremities. Right neck RAE drain removed. Insulin regimen adjusted for better glycemic control. Otherwise, no acute events overnight,   11/18/23: No acute events overnight. Medicine consulted for improved glycemic control and outpatient medication recommendations. Patient is progressing well without acute events.  11/19/23: No acute events overnight. Improved glycemic control.   11/20/23: plan for discharge to subacute rehab facilities - pt will not require radiation and chemotherapy in the facility. 11/10/23: 73 male patient with hx of biopsy-proven squamous cell carcinoma of the right sided maxillary alveolar ridge presents for right sided hemimaxillectomy, Right supraomohyoid neck dissection, Left sided radial free fibular flap, Left sided split thickness skin graft. No intraoperative complications, patient discharged to SICU in stable condition.  11/11/23: Patient was extubated, tolerating well on room air.   11/13/23: A- line and yang removed, passed trial of void, Unasyn discontinued prior to being transferred to the floor.  11/14/23: No acute events overnight, vitals stable, afebrile, patient is voiding well, pain is well controlled, progressing appropriately.   11/15/23: No acute events overnight. Diet advanced to clear liquid diet, tolerating well.  11/16/13: No acute events overnight. Cook doppler removed, Left arm RAE drain and VAC removed. Diet advanced to full liquid diet, tolerating well. Patient is progressing well without acute events.  11/17/23: Patient slipped, fell on the floor, with no trauma to the face/extremities. Right neck RAE drain removed. Insulin regimen adjusted for better glycemic control. Otherwise, no acute events overnight,   11/18/23: No acute events overnight. Medicine consulted for improved glycemic control and outpatient medication recommendations. Patient is progressing well without acute events.  11/19/23: No acute events overnight. Improved glycemic control.   11/20/23: Plan for discharge to subacute rehab facilities - pt will not require radiation and chemotherapy in the facility. 11/10/23: 73 male patient with hx of biopsy-proven squamous cell carcinoma of the right sided maxillary alveolar ridge presents for right sided hemimaxillectomy, Right supraomohyoid neck dissection, Left sided radial free fibular flap, Left sided split thickness skin graft. No intraoperative complications, patient discharged to SICU in stable condition.  11/11/23: Patient was extubated, tolerating well on room air.   11/13/23: A- line and yang removed, passed trial of void, Unasyn discontinued prior to being transferred to the floor.  11/14/23: No acute events overnight, vitals stable, afebrile, patient is voiding well, pain is well controlled, progressing appropriately.   11/15/23: No acute events overnight. Diet advanced to clear liquid diet, tolerating well.  11/16/13: No acute events overnight. Cook doppler removed, Left arm RAE drain and VAC removed. Diet advanced to full liquid diet, tolerating well. Patient is progressing well without acute events.  11/17/23: Patient slipped, fell on the floor, with no trauma to the face/extremities. Right neck RAE drain removed. Insulin regimen adjusted for better glycemic control. Otherwise, no acute events overnight,   11/18/23: No acute events overnight. Medicine consulted for improved glycemic control and outpatient medication recommendations. Patient is progressing well without acute events.  11/19/23: No acute events overnight. Improved glycemic control.   11/20/23: No acute events overnight.   11/21/23: Plan for discharge to home

## 2023-11-19 NOTE — PROGRESS NOTE ADULT - ASSESSMENT
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 Pt was extubated on 11/11/23, tolerating well on room air. A- line and yang removed prior to transferred the floor, d/c Unasyn on 11/13/23. Passed TOV and voiding well on 11/14/23. PT dispo plan for subacute rehab 2-3x/week, SW dispo plan for skilled nursing facility short-term. H/H 7.5/24.9, -223 Pt is progressing appropriately w/o acute events.    Plan:  - Potential discharge to [facility] tomorrow (11/20) -  during [facility] treatment pt will not require Radiation Therapy/Chemo  - Multimodal pain control  - Strict I&Os  - Monitor FS, adjust insulin for goal -180mg/dl  - FLD  - Activity: OOBTC, PT  - DVT ppx: Lovenox 40mg QD  - Xeroform+aquaphor/telfa/ace wrap dressing changes to L arm and daily aquaphor application to L thigh donor site change q1d by PRS  - Remove nylons in neck POD10      Juanita Sultana  Oral and Maxillofacial Surgery  [facility] Pager #[phone]  Available on Teams

## 2023-11-19 NOTE — DISCHARGE NOTE PROVIDER - NSDCFUADDAPPT_GEN_ALL_CORE_FT
Please call [phone] to schedule follow up appointment with Dr. May.  Please call [phone] to schedule follow up appointment with Dr. May.  Please call [phone] to schedule follow up appointment with Dr. May.

## 2023-11-19 NOTE — PROGRESS NOTE ADULT - SUBJECTIVE AND OBJECTIVE BOX
Plastic Surgery Progress Note (pg [facility]: [id number], [facility]: [phone])    SUBJECTIVE  The patient was seen and examined. No acute events overnight.    OBJECTIVE  ___________________________________________________  VITAL SIGNS / I&O's   Vital Signs Last 24 Hrs  T(C): 36.6 (19 Nov 2023 05:00), Max: 36.6 (19 Nov 2023 02:29)  T(F): 97.9 (19 Nov 2023 05:00), Max: 97.9 (19 Nov 2023 05:00)  HR: 74 (19 Nov 2023 05:00) (68 - 79)  BP: 93/57 (19 Nov 2023 05:00) (93/57 - 132/67)  BP(mean): --  RR: 18 (19 Nov 2023 05:00) (16 - 18)  SpO2: 100% (19 Nov 2023 05:00) (98% - 100%)    Parameters below as of 19 Nov 2023 05:00  Patient On (Oxygen Delivery Method): room air          18 Nov 2023 07:01  -  19 Nov 2023 07:00  --------------------------------------------------------  IN:    Lactated Ringers: 630 mL    Oral Fluid: 1060 mL  Total IN: 1690 mL    OUT:    IV PiggyBack: 0 mL    Voided (mL): 3050 mL  Total OUT: 3050 mL    Total NET: -1360 mL        ___________________________________________________  PHYSICAL EXAM    General: NAD, Lying in bed comfortably  Neuro: awake and alert  Pulm: No increased work of breathing on room air  HEENT: Maxillary flap, good color  Neck: incision intact w/ nylon sutures  L arm: Skin graft w/ good take. Xeroform/telfa/ace dressing applied  L thigh: donor site appropriate; open to air      ___________________________________________________  LABS                        7.5    6.74  )-----------( 330      ( 18 Nov 2023 07:30 )             24.9     18 Nov 2023 07:30    136    |  99     |  13     ----------------------------<  225    3.6     |  26     |  0.50     Ca    9.0        18 Nov 2023 07:30  Phos  3.2       18 Nov 2023 07:30  Mg     2.10      18 Nov 2023 07:30        CAPILLARY BLOOD GLUCOSE      POCT Blood Glucose.: 128 mg/dL (19 Nov 2023 08:30)  POCT Blood Glucose.: 159 mg/dL (18 Nov 2023 21:11)  POCT Blood Glucose.: 143 mg/dL (18 Nov 2023 17:01)  POCT Blood Glucose.: 223 mg/dL (18 Nov 2023 12:03)        Urinalysis Basic - ( 18 Nov 2023 07:30 )    Color: x / Appearance: x / SG: x / pH: x  Gluc: 225 mg/dL / Ketone: x  / Bili: x / Urobili: x   Blood: x / Protein: x / Nitrite: x   Leuk Esterase: x / RBC: x / WBC x   Sq Epi: x / Non Sq Epi: x / Bacteria: x      ___________________________________________________  MICRO  Recent Cultures:    ___________________________________________________  MEDICATIONS  (STANDING):  acetaminophen   Oral Liquid .. 975 milliGRAM(s) Oral every 6 hours  AQUAPHOR (petrolatum Ointment) 1 Application(s) Topical daily  chlorhexidine 0.12% Liquid 15 milliLiter(s) Swish and Spit <User Schedule>  chlorhexidine 2% Cloths 1 Application(s) Topical daily  dextrose 5%. 1000 milliLiter(s) (50 mL/Hr) IV Continuous <Continuous>  dextrose 5%. 1000 milliLiter(s) (100 mL/Hr) IV Continuous <Continuous>  dextrose 5%. 1000 milliLiter(s) (50 mL/Hr) IV Continuous <Continuous>  dextrose 5%. 1000 milliLiter(s) (100 mL/Hr) IV Continuous <Continuous>  dextrose 5%. 1000 milliLiter(s) (50 mL/Hr) IV Continuous <Continuous>  dextrose 5%. 1000 milliLiter(s) (100 mL/Hr) IV Continuous <Continuous>  dextrose 50% Injectable 25 Gram(s) IV Push once  dextrose 50% Injectable 12.5 Gram(s) IV Push once  dextrose 50% Injectable 25 Gram(s) IV Push once  dextrose 50% Injectable 12.5 Gram(s) IV Push once  dextrose 50% Injectable 25 Gram(s) IV Push once  dextrose 50% Injectable 25 Gram(s) IV Push once  dextrose 50% Injectable 12.5 Gram(s) IV Push once  dextrose 50% Injectable 25 Gram(s) IV Push once  enoxaparin Injectable 40 milliGRAM(s) SubCutaneous every 24 hours  glucagon  Injectable 1 milliGRAM(s) IntraMuscular once  glucagon  Injectable 1 milliGRAM(s) IntraMuscular once  glucagon  Injectable 1 milliGRAM(s) IntraMuscular once  insulin glargine Injectable (LANTUS) 25 Unit(s) SubCutaneous at bedtime  insulin lispro (ADMELOG) corrective regimen sliding scale   SubCutaneous three times a day before meals  insulin lispro (ADMELOG) corrective regimen sliding scale   SubCutaneous at bedtime  insulin lispro Injectable (ADMELOG) 3 Unit(s) SubCutaneous before breakfast  insulin lispro Injectable (ADMELOG) 3 Unit(s) SubCutaneous before lunch  insulin lispro Injectable (ADMELOG) 3 Unit(s) SubCutaneous before dinner  lactated ringers. 1000 milliLiter(s) (70 mL/Hr) IV Continuous <Continuous>  melatonin 6 milliGRAM(s) Oral at bedtime  polyethylene glycol 3350 17 Gram(s) Oral daily  senna 2 Tablet(s) Oral at bedtime  sodium chloride 0.65% Nasal 1 Spray(s) Both Nostrils four times a day    MEDICATIONS  (PRN):  dextrose Oral Gel 15 Gram(s) Oral once PRN Blood Glucose LESS THAN 70 milliGRAM(s)/deciliter  dextrose Oral Gel 15 Gram(s) Oral once PRN Blood Glucose LESS THAN 70 milliGRAM(s)/deciliter  oxyCODONE    Solution 5 milliGRAM(s) Oral every 4 hours PRN Moderate Pain (4 - 6)

## 2023-11-19 NOTE — DISCHARGE NOTE PROVIDER - NSDCMRMEDTOKEN_GEN_ALL_CORE_FT
Flomax 0.4 mg oral capsule: 1 cap(s) orally once a day  Janumet 50 mg-1000 mg oral tablet: 1 tab(s) orally 2 times a day (resume on 3/27)   acetaminophen 650 mg/20.3 mL oral liquid: 20.3 milliliter(s) orally every 6 hours  Flomax 0.4 mg oral capsule: 1 cap(s) orally once a day  Janumet 50 mg-1000 mg oral tablet: 1 tab(s) orally 2 times a day (resume on 3/27)  oxyCODONE 5 mg/5 mL oral solution: 5 milliliter(s) orally every 6 hours as needed for  severe pain MDD: 20  Peridex 0.12% mucous membrane liquid: 15 milliliter(s) orally 2 times a day Swish and spit, do not swallow   acetaminophen 650 mg/20.3 mL oral liquid: 20.3 milliliter(s) orally every 6 hours  Flomax 0.4 mg oral capsule: 1 cap(s) orally once a day  Janumet 50 mg-1000 mg oral tablet: 1 tab(s) orally 2 times a day (resume on 3/27)  oxyCODONE 5 mg/5 mL oral solution: 5 milliliter(s) orally every 6 hours as needed for  severe pain MDD: 20  Peridex 0.12% mucous membrane liquid: 15 milliliter(s) orally 2 times a day Swish and spit, do not swallow  Rolling Walker: Rolling Walker

## 2023-11-19 NOTE — DISCHARGE NOTE PROVIDER - PROVIDER TOKENS
FREE:[LAST:[Lalo],FIRST:[Cleve],PHONE:[phone]],FAX:[phone]:[[address] [place] [phone]],FOLLOWUP:[1 week],ESTABLISHEDPATIENT:[T]] FREE:[LAST:[Lalo],FIRST:[Cleve],PHONE:[phone]:[phone]],ADDRESS:[[address] [place] [phone]],FOLLOWUP:[1 week],ESTABLISHEDPATIENT:[T]] FREE:[LAST:[Lalo],FIRST:[Cleve],PHONE:[phone]],FAX:[phone]:[[address] [place] [phone]],FOLLOWUP:[1 week],ESTABLISHEDPATIENT:[T]]

## 2023-11-20 LAB
ANION GAP SERPL CALC-SCNC: 11 MMOL/L — SIGNIFICANT CHANGE UP (ref 7–14)
ANION GAP SERPL CALC-SCNC: 11 MMOL/L — SIGNIFICANT CHANGE UP (ref 7–14)
BUN SERPL-MCNC: 9 MG/DL — SIGNIFICANT CHANGE UP (ref 7–23)
BUN SERPL-MCNC: 9 MG/DL — SIGNIFICANT CHANGE UP (ref 7–23)
CALCIUM SERPL-MCNC: 9 MG/DL — SIGNIFICANT CHANGE UP (ref 8.4–10.5)
CALCIUM SERPL-MCNC: 9 MG/DL — SIGNIFICANT CHANGE UP (ref 8.4–10.5)
CHLORIDE SERPL-SCNC: 100 MMOL/L — SIGNIFICANT CHANGE UP (ref 98–107)
CHLORIDE SERPL-SCNC: 100 MMOL/L — SIGNIFICANT CHANGE UP (ref 98–107)
CO2 SERPL-SCNC: 27 MMOL/L — SIGNIFICANT CHANGE UP (ref 22–31)
CO2 SERPL-SCNC: 27 MMOL/L — SIGNIFICANT CHANGE UP (ref 22–31)
CREAT SERPL-MCNC: 0.5 MG/DL — SIGNIFICANT CHANGE UP (ref 0.5–1.3)
CREAT SERPL-MCNC: 0.5 MG/DL — SIGNIFICANT CHANGE UP (ref 0.5–1.3)
EGFR: 108 ML/MIN/1.73M2 — SIGNIFICANT CHANGE UP
EGFR: 108 ML/MIN/1.73M2 — SIGNIFICANT CHANGE UP
GLUCOSE BLDC GLUCOMTR-MCNC: 120 MG/DL — HIGH (ref 70–99)
GLUCOSE BLDC GLUCOMTR-MCNC: 120 MG/DL — HIGH (ref 70–99)
GLUCOSE BLDC GLUCOMTR-MCNC: 176 MG/DL — HIGH (ref 70–99)
GLUCOSE BLDC GLUCOMTR-MCNC: 205 MG/DL — HIGH (ref 70–99)
GLUCOSE BLDC GLUCOMTR-MCNC: 205 MG/DL — HIGH (ref 70–99)
GLUCOSE BLDC GLUCOMTR-MCNC: 230 MG/DL — HIGH (ref 70–99)
GLUCOSE BLDC GLUCOMTR-MCNC: 230 MG/DL — HIGH (ref 70–99)
GLUCOSE BLDC GLUCOMTR-MCNC: 92 MG/DL — SIGNIFICANT CHANGE UP (ref 70–99)
GLUCOSE BLDC GLUCOMTR-MCNC: 92 MG/DL — SIGNIFICANT CHANGE UP (ref 70–99)
GLUCOSE SERPL-MCNC: 136 MG/DL — HIGH (ref 70–99)
GLUCOSE SERPL-MCNC: 136 MG/DL — HIGH (ref 70–99)
HCT VFR BLD CALC: 27 % — LOW (ref 39–50)
HCT VFR BLD CALC: 27 % — LOW (ref 39–50)
HGB BLD-MCNC: 8.4 G/DL — LOW (ref 13–17)
HGB BLD-MCNC: 8.4 G/DL — LOW (ref 13–17)
MAGNESIUM SERPL-MCNC: 2 MG/DL — SIGNIFICANT CHANGE UP (ref 1.6–2.6)
MAGNESIUM SERPL-MCNC: 2 MG/DL — SIGNIFICANT CHANGE UP (ref 1.6–2.6)
MCHC RBC-ENTMCNC: 26.4 PG — LOW (ref 27–34)
MCHC RBC-ENTMCNC: 26.4 PG — LOW (ref 27–34)
MCHC RBC-ENTMCNC: 31.1 GM/DL — LOW (ref 32–36)
MCHC RBC-ENTMCNC: 31.1 GM/DL — LOW (ref 32–36)
MCV RBC AUTO: 84.9 FL — SIGNIFICANT CHANGE UP (ref 80–100)
MCV RBC AUTO: 84.9 FL — SIGNIFICANT CHANGE UP (ref 80–100)
NRBC # BLD: 0 /100 WBCS — SIGNIFICANT CHANGE UP (ref 0–0)
NRBC # BLD: 0 /100 WBCS — SIGNIFICANT CHANGE UP (ref 0–0)
NRBC # FLD: 0 K/UL — SIGNIFICANT CHANGE UP (ref 0–0)
NRBC # FLD: 0 K/UL — SIGNIFICANT CHANGE UP (ref 0–0)
PHOSPHATE SERPL-MCNC: 2.9 MG/DL — SIGNIFICANT CHANGE UP (ref 2.5–4.5)
PHOSPHATE SERPL-MCNC: 2.9 MG/DL — SIGNIFICANT CHANGE UP (ref 2.5–4.5)
PLATELET # BLD AUTO: 443 K/UL — HIGH (ref 150–400)
PLATELET # BLD AUTO: 443 K/UL — HIGH (ref 150–400)
POTASSIUM SERPL-MCNC: 3.8 MMOL/L — SIGNIFICANT CHANGE UP (ref 3.5–5.3)
POTASSIUM SERPL-MCNC: 3.8 MMOL/L — SIGNIFICANT CHANGE UP (ref 3.5–5.3)
POTASSIUM SERPL-SCNC: 3.8 MMOL/L — SIGNIFICANT CHANGE UP (ref 3.5–5.3)
POTASSIUM SERPL-SCNC: 3.8 MMOL/L — SIGNIFICANT CHANGE UP (ref 3.5–5.3)
RBC # BLD: 3.18 M/UL — LOW (ref 4.2–5.8)
RBC # BLD: 3.18 M/UL — LOW (ref 4.2–5.8)
RBC # FLD: 15.3 % — HIGH (ref 10.3–14.5)
RBC # FLD: 15.3 % — HIGH (ref 10.3–14.5)
SODIUM SERPL-SCNC: 138 MMOL/L — SIGNIFICANT CHANGE UP (ref 135–145)
SODIUM SERPL-SCNC: 138 MMOL/L — SIGNIFICANT CHANGE UP (ref 135–145)
WBC # BLD: 9.84 K/UL — SIGNIFICANT CHANGE UP (ref 3.8–10.5)
WBC # BLD: 9.84 K/UL — SIGNIFICANT CHANGE UP (ref 3.8–10.5)
WBC # FLD AUTO: 9.84 K/UL — SIGNIFICANT CHANGE UP (ref 3.8–10.5)
WBC # FLD AUTO: 9.84 K/UL — SIGNIFICANT CHANGE UP (ref 3.8–10.5)

## 2023-11-20 PROCEDURE — 99233 SBSQ HOSP IP/OBS HIGH 50: CPT

## 2023-11-20 RX ORDER — OXYCODONE HYDROCHLORIDE 5 MG/1
5 TABLET ORAL
Qty: 40 | Refills: 0
Start: 2023-11-20 | End: 2023-11-21

## 2023-11-20 RX ORDER — TAMSULOSIN HYDROCHLORIDE 0.4 MG/1
0.4 CAPSULE ORAL
Refills: 0 | Status: DISCONTINUED | OUTPATIENT
Start: 2023-11-20 | End: 2023-11-21

## 2023-11-20 RX ORDER — SODIUM CHLORIDE 9 MG/ML
1000 INJECTION, SOLUTION INTRAVENOUS
Refills: 0 | Status: DISCONTINUED | OUTPATIENT
Start: 2023-11-20 | End: 2023-11-21

## 2023-11-20 RX ORDER — CHLORHEXIDINE GLUCONATE 213 G/1000ML
15 SOLUTION TOPICAL
Qty: 1 | Refills: 0
Start: 2023-11-20 | End: 2023-11-26

## 2023-11-20 RX ORDER — ACETAMINOPHEN 500 MG
20.3 TABLET ORAL
Qty: 812 | Refills: 0
Start: 2023-11-20 | End: 2023-11-29

## 2023-11-20 RX ORDER — INSULIN GLARGINE 100 [IU]/ML
20 INJECTION, SOLUTION SUBCUTANEOUS AT BEDTIME
Refills: 0 | Status: DISCONTINUED | OUTPATIENT
Start: 2023-11-20 | End: 2023-11-21

## 2023-11-20 RX ADMIN — TAMSULOSIN HYDROCHLORIDE 0.4 MILLIGRAM(S): 0.4 CAPSULE ORAL at 08:51

## 2023-11-20 RX ADMIN — CHLORHEXIDINE GLUCONATE 15 MILLILITER(S): 213 SOLUTION TOPICAL at 14:00

## 2023-11-20 RX ADMIN — POLYETHYLENE GLYCOL 3350 17 GRAM(S): 17 POWDER, FOR SOLUTION ORAL at 12:17

## 2023-11-20 RX ADMIN — Medication 2: at 18:50

## 2023-11-20 RX ADMIN — Medication 975 MILLIGRAM(S): at 19:24

## 2023-11-20 RX ADMIN — Medication 6 MILLIGRAM(S): at 21:54

## 2023-11-20 RX ADMIN — Medication 2: at 08:50

## 2023-11-20 RX ADMIN — INSULIN GLARGINE 20 UNIT(S): 100 INJECTION, SOLUTION SUBCUTANEOUS at 21:54

## 2023-11-20 RX ADMIN — Medication 1 SPRAY(S): at 05:56

## 2023-11-20 RX ADMIN — Medication 3 UNIT(S): at 12:19

## 2023-11-20 RX ADMIN — Medication 81 MILLIGRAM(S): at 12:16

## 2023-11-20 RX ADMIN — Medication 1 APPLICATION(S): at 12:18

## 2023-11-20 RX ADMIN — Medication 4: at 12:19

## 2023-11-20 RX ADMIN — CHLORHEXIDINE GLUCONATE 15 MILLILITER(S): 213 SOLUTION TOPICAL at 21:53

## 2023-11-20 RX ADMIN — ENOXAPARIN SODIUM 40 MILLIGRAM(S): 100 INJECTION SUBCUTANEOUS at 05:56

## 2023-11-20 RX ADMIN — Medication 1 SPRAY(S): at 18:54

## 2023-11-20 RX ADMIN — Medication 975 MILLIGRAM(S): at 05:55

## 2023-11-20 RX ADMIN — Medication 3 UNIT(S): at 08:49

## 2023-11-20 RX ADMIN — CHLORHEXIDINE GLUCONATE 15 MILLILITER(S): 213 SOLUTION TOPICAL at 05:59

## 2023-11-20 RX ADMIN — Medication 1 SPRAY(S): at 12:17

## 2023-11-20 RX ADMIN — Medication 975 MILLIGRAM(S): at 12:46

## 2023-11-20 RX ADMIN — Medication 975 MILLIGRAM(S): at 18:54

## 2023-11-20 RX ADMIN — Medication 975 MILLIGRAM(S): at 12:16

## 2023-11-20 RX ADMIN — CHLORHEXIDINE GLUCONATE 1 APPLICATION(S): 213 SOLUTION TOPICAL at 12:18

## 2023-11-20 NOTE — PROGRESS NOTE ADULT - ASSESSMENT
73M w/ PMHx of DM2, HTN, HLD, and SCC R maxillary alveolar ridge. Pt presents to the hospital for scheduled operation with OMFS and plastic surgery. Pt is now s/p maxillectomy and R neck dissection, L RFFF recon, L STSG. Pt briefly in SICU q1 flap checks, but now transitioned to general floors. Medicine consulted for management of DM2.    #DM2  Pt with hx of DM2, A1c 6.5% indicating good outpt glycemic control. FS in hospital >180. Goal FS inpt 140-180  - Would increase lispro 3u TID  - Increase Lantus 25u qhs--hypoglycemic 11/19 evening, will decr lantus to 20 u 11/20  - Encourage patient to ambulate the halls  - Increase to moderate ISS and add low bedtime ISS  - Pt on janumet and farxiga at home with A1c 6.5%. Can continue with Janumet and farxiga outpt once discharged    #CAD  Pt with hx of CAD s/p stent x2. Unclear if pt still supposed to be on DAPT as discontinued prematurely without consulting his cardiologist. Reported started on ASA recently.  -  can restart ASA if no further concern for bleeding from surgery perspective.     #HLD  Pt with hx of HLD, on home atorvastatin 20mg  - Can continue with atorvastatin upon discharge    #Anemia  Pt with hgb ~7 since procedure likely i/s/o recent procedure and post-procedure bleeding. Hgb currently stable  - trend CBC, given hx of CAD transfuse >8  - Pt should undergo outpt age appropriate cancer screening    #Hypotension/dizziness, resolved  Pt with soft BPs during admission. Has reported hx of HTN, but not on anti-hypertensive medications. Chart review and per darion, pt on midodrine outpt.  - Monitor BP, can add midodrine as needed.   - Encourage PO hydration  - Would obtain orthostatic BPs  - If orthostatic or if pt continues to be hypotensive, would d/c flomax    #SCC R maxillary alveolar ridge  Pt is s/p maxillectomy and R neck dissection, L RFFF recon, L STSG.  - Care per OMFS and plastic surgery

## 2023-11-20 NOTE — PROGRESS NOTE ADULT - ASSESSMENT
A: 73M who is now s/p maxillectomy with radial forearm reconstruction 11/10/2023:    Plan:  - continue ERAS protocol  - Xeroform+aquaphor/telfa/ace wrap dressing changes to L arm q1d  - leg thigh donor site dressing down; aquaphor daily to site   - Will consider removing nylon sutures   - Appreciate rest of care per primary team    Felix Daish  Plastic Surgery  #[id number] [facility] pager  [phone] [facility] pager  Available on teams    A: 73M who is now s/p maxillectomy with radial forearm reconstruction 11/10/2023:    Plan:  - continue ERAS protocol  - Xeroform+aquaphor/telfa/ace wrap dressing changes to L arm q1d  - leg thigh donor site dressing down; aquaphor daily to site   - Nylon sutures removed today  - Appreciate rest of care per primary team    Felix Daish  Plastic Surgery  #[phone] [facility] pager  [phone] [facility] pager  Available on teams

## 2023-11-20 NOTE — PROGRESS NOTE ADULT - SUBJECTIVE AND OBJECTIVE BOX
Plastic Surgery Progress Note (pg [facility]: [id number], [facility]: [phone])    SUBJECTIVE  The patient was seen and examined. No acute events overnight. Dressing changed this AM.    OBJECTIVE  ___________________________________________________  VITAL SIGNS / I&O's   Vital Signs Last 24 Hrs  T(C): 36.4 (20 Nov 2023 06:00), Max: 36.7 (20 Nov 2023 02:00)  T(F): 97.6 (20 Nov 2023 06:00), Max: 98 (20 Nov 2023 02:00)  HR: 80 (20 Nov 2023 06:00) (66 - 84)  BP: 140/67 (20 Nov 2023 06:00) (101/58 - 140/67)  BP(mean): --  ABP: --  ABP(mean): --  RR: 16 (20 Nov 2023 06:00) (16 - 19)  SpO2: 98% (20 Nov 2023 06:00) (97% - 100%)    O2 Parameters below as of 20 Nov 2023 06:00  Patient On (Oxygen Delivery Method): room air      I&O's Detail    19 Nov 2023 07:01  -  20 Nov 2023 07:00  --------------------------------------------------------  IN:    Lactated Ringers: 1330 mL    Oral Fluid: 1160 mL  Total IN: 2490 mL    OUT:    IV PiggyBack: 0 mL    Voided (mL): 1300 mL  Total OUT: 1300 mL    Total NET: 1190 mL      ___________________________________________________  PHYSICAL EXAM    General: NAD, Lying in bed comfortably  Neuro: awake and alert  Pulm: No increased work of breathing on room air  HEENT: Maxillary flap, good color  Neck: incision intact w/ nylon sutures  L arm: Skin graft w/ good take. Xeroform/telfa/ace dressing applied  L thigh: donor site appropriate; open to air      ___________________________________________________  LABS                             7.5    6.74  )-----------( 330      ( 18 Nov 2023 07:30 )             24.9   11-18    136  |  99  |  13  ----------------------------<  225<H>  3.6   |  26  |  0.50    Ca    9.0      18 Nov 2023 07:30  Phos  3.2     11-18  Mg     2.10     11-18                 ___________________________________________________  MICRO  Recent Cultures:    ___________________________________________________  MEDICATIONS  (STANDING):  acetaminophen   Oral Liquid .. 975 milliGRAM(s) Oral every 6 hours  AQUAPHOR (petrolatum Ointment) 1 Application(s) Topical daily  chlorhexidine 0.12% Liquid 15 milliLiter(s) Swish and Spit <User Schedule>  chlorhexidine 2% Cloths 1 Application(s) Topical daily  dextrose 5%. 1000 milliLiter(s) (50 mL/Hr) IV Continuous <Continuous>  dextrose 5%. 1000 milliLiter(s) (100 mL/Hr) IV Continuous <Continuous>  dextrose 5%. 1000 milliLiter(s) (50 mL/Hr) IV Continuous <Continuous>  dextrose 5%. 1000 milliLiter(s) (100 mL/Hr) IV Continuous <Continuous>  dextrose 5%. 1000 milliLiter(s) (50 mL/Hr) IV Continuous <Continuous>  dextrose 5%. 1000 milliLiter(s) (100 mL/Hr) IV Continuous <Continuous>  dextrose 50% Injectable 25 Gram(s) IV Push once  dextrose 50% Injectable 12.5 Gram(s) IV Push once  dextrose 50% Injectable 25 Gram(s) IV Push once  dextrose 50% Injectable 12.5 Gram(s) IV Push once  dextrose 50% Injectable 25 Gram(s) IV Push once  dextrose 50% Injectable 25 Gram(s) IV Push once  dextrose 50% Injectable 12.5 Gram(s) IV Push once  dextrose 50% Injectable 25 Gram(s) IV Push once  enoxaparin Injectable 40 milliGRAM(s) SubCutaneous every 24 hours  glucagon  Injectable 1 milliGRAM(s) IntraMuscular once  glucagon  Injectable 1 milliGRAM(s) IntraMuscular once  glucagon  Injectable 1 milliGRAM(s) IntraMuscular once  insulin glargine Injectable (LANTUS) 25 Unit(s) SubCutaneous at bedtime  insulin lispro (ADMELOG) corrective regimen sliding scale   SubCutaneous three times a day before meals  insulin lispro (ADMELOG) corrective regimen sliding scale   SubCutaneous at bedtime  insulin lispro Injectable (ADMELOG) 3 Unit(s) SubCutaneous before breakfast  insulin lispro Injectable (ADMELOG) 3 Unit(s) SubCutaneous before lunch  insulin lispro Injectable (ADMELOG) 3 Unit(s) SubCutaneous before dinner  lactated ringers. 1000 milliLiter(s) (70 mL/Hr) IV Continuous <Continuous>  melatonin 6 milliGRAM(s) Oral at bedtime  polyethylene glycol 3350 17 Gram(s) Oral daily  senna 2 Tablet(s) Oral at bedtime  sodium chloride 0.65% Nasal 1 Spray(s) Both Nostrils four times a day    MEDICATIONS  (PRN):  dextrose Oral Gel 15 Gram(s) Oral once PRN Blood Glucose LESS THAN 70 milliGRAM(s)/deciliter  dextrose Oral Gel 15 Gram(s) Oral once PRN Blood Glucose LESS THAN 70 milliGRAM(s)/deciliter  oxyCODONE    Solution 5 milliGRAM(s) Oral every 4 hours PRN Moderate Pain (4 - 6)   Plastic Surgery Progress Note (pg [facility]: [id number], [facility]: [phone])    SUBJECTIVE  The patient was seen and examined. No acute events overnight. Dressing changed this AM. Nylon sutures removed this AM.     OBJECTIVE  ___________________________________________________  VITAL SIGNS / I&O's   Vital Signs Last 24 Hrs  T(C): 36.4 (20 Nov 2023 06:00), Max: 36.7 (20 Nov 2023 02:00)  T(F): 97.6 (20 Nov 2023 06:00), Max: 98 (20 Nov 2023 02:00)  HR: 80 (20 Nov 2023 06:00) (66 - 84)  BP: 140/67 (20 Nov 2023 06:00) (101/58 - 140/67)  BP(mean): --  ABP: --  ABP(mean): --  RR: 16 (20 Nov 2023 06:00) (16 - 19)  SpO2: 98% (20 Nov 2023 06:00) (97% - 100%)    O2 Parameters below as of 20 Nov 2023 06:00  Patient On (Oxygen Delivery Method): room air      I&O's Detail    19 Nov 2023 07:01  -  20 Nov 2023 07:00  --------------------------------------------------------  IN:    Lactated Ringers: 1330 mL    Oral Fluid: 1160 mL  Total IN: 2490 mL    OUT:    IV PiggyBack: 0 mL    Voided (mL): 1300 mL  Total OUT: 1300 mL    Total NET: 1190 mL      ___________________________________________________  PHYSICAL EXAM    General: NAD, Lying in bed comfortably  Neuro: awake and alert  Pulm: No increased work of breathing on room air  HEENT: Maxillary flap, good color  Neck: incision intact w/ nylon sutures  L arm: Skin graft w/ good take. Xeroform/telfa/ace dressing applied  L thigh: donor site appropriate; open to air      ___________________________________________________  LABS                             7.5    6.74  )-----------( 330      ( 18 Nov 2023 07:30 )             24.9   11-18    136  |  99  |  13  ----------------------------<  225<H>  3.6   |  26  |  0.50    Ca    9.0      18 Nov 2023 07:30  Phos  3.2     11-18  Mg     2.10     11-18                 ___________________________________________________  MICRO  Recent Cultures:    ___________________________________________________  MEDICATIONS  (STANDING):  acetaminophen   Oral Liquid .. 975 milliGRAM(s) Oral every 6 hours  AQUAPHOR (petrolatum Ointment) 1 Application(s) Topical daily  chlorhexidine 0.12% Liquid 15 milliLiter(s) Swish and Spit <User Schedule>  chlorhexidine 2% Cloths 1 Application(s) Topical daily  dextrose 5%. 1000 milliLiter(s) (50 mL/Hr) IV Continuous <Continuous>  dextrose 5%. 1000 milliLiter(s) (100 mL/Hr) IV Continuous <Continuous>  dextrose 5%. 1000 milliLiter(s) (50 mL/Hr) IV Continuous <Continuous>  dextrose 5%. 1000 milliLiter(s) (100 mL/Hr) IV Continuous <Continuous>  dextrose 5%. 1000 milliLiter(s) (50 mL/Hr) IV Continuous <Continuous>  dextrose 5%. 1000 milliLiter(s) (100 mL/Hr) IV Continuous <Continuous>  dextrose 50% Injectable 25 Gram(s) IV Push once  dextrose 50% Injectable 12.5 Gram(s) IV Push once  dextrose 50% Injectable 25 Gram(s) IV Push once  dextrose 50% Injectable 12.5 Gram(s) IV Push once  dextrose 50% Injectable 25 Gram(s) IV Push once  dextrose 50% Injectable 25 Gram(s) IV Push once  dextrose 50% Injectable 12.5 Gram(s) IV Push once  dextrose 50% Injectable 25 Gram(s) IV Push once  enoxaparin Injectable 40 milliGRAM(s) SubCutaneous every 24 hours  glucagon  Injectable 1 milliGRAM(s) IntraMuscular once  glucagon  Injectable 1 milliGRAM(s) IntraMuscular once  glucagon  Injectable 1 milliGRAM(s) IntraMuscular once  insulin glargine Injectable (LANTUS) 25 Unit(s) SubCutaneous at bedtime  insulin lispro (ADMELOG) corrective regimen sliding scale   SubCutaneous three times a day before meals  insulin lispro (ADMELOG) corrective regimen sliding scale   SubCutaneous at bedtime  insulin lispro Injectable (ADMELOG) 3 Unit(s) SubCutaneous before breakfast  insulin lispro Injectable (ADMELOG) 3 Unit(s) SubCutaneous before lunch  insulin lispro Injectable (ADMELOG) 3 Unit(s) SubCutaneous before dinner  lactated ringers. 1000 milliLiter(s) (70 mL/Hr) IV Continuous <Continuous>  melatonin 6 milliGRAM(s) Oral at bedtime  polyethylene glycol 3350 17 Gram(s) Oral daily  senna 2 Tablet(s) Oral at bedtime  sodium chloride 0.65% Nasal 1 Spray(s) Both Nostrils four times a day    MEDICATIONS  (PRN):  dextrose Oral Gel 15 Gram(s) Oral once PRN Blood Glucose LESS THAN 70 milliGRAM(s)/deciliter  dextrose Oral Gel 15 Gram(s) Oral once PRN Blood Glucose LESS THAN 70 milliGRAM(s)/deciliter  oxyCODONE    Solution 5 milliGRAM(s) Oral every 4 hours PRN Moderate Pain (4 - 6)

## 2023-11-20 NOTE — PROGRESS NOTE ADULT - SUBJECTIVE AND OBJECTIVE BOX
[facility] Division of Hospital Medicine  Carli Love MD  Pager (M-F, 8A-5P): [phone] or [phone]  Other Times:  [phone]      SUBJECTIVE / OVERNIGHT EVENTS: Pt feeling fine this am, though did have a nose bleed, now improved. Encouraged him to ambulate the halls w assistance.    MEDICATIONS  (STANDING):  acetaminophen   Oral Liquid .. 975 milliGRAM(s) Oral every 6 hours  AQUAPHOR (petrolatum Ointment) 1 Application(s) Topical daily  aspirin  chewable 81 milliGRAM(s) Oral daily  chlorhexidine 0.12% Liquid 15 milliLiter(s) Swish and Spit <User Schedule>  chlorhexidine 2% Cloths 1 Application(s) Topical daily  dextrose 5%. 1000 milliLiter(s) (50 mL/Hr) IV Continuous <Continuous>  dextrose 5%. 1000 milliLiter(s) (50 mL/Hr) IV Continuous <Continuous>  dextrose 5%. 1000 milliLiter(s) (100 mL/Hr) IV Continuous <Continuous>  dextrose 5%. 1000 milliLiter(s) (100 mL/Hr) IV Continuous <Continuous>  dextrose 5%. 1000 milliLiter(s) (100 mL/Hr) IV Continuous <Continuous>  dextrose 5%. 1000 milliLiter(s) (50 mL/Hr) IV Continuous <Continuous>  dextrose 50% Injectable 12.5 Gram(s) IV Push once  dextrose 50% Injectable 25 Gram(s) IV Push once  dextrose 50% Injectable 12.5 Gram(s) IV Push once  dextrose 50% Injectable 25 Gram(s) IV Push once  dextrose 50% Injectable 25 Gram(s) IV Push once  dextrose 50% Injectable 25 Gram(s) IV Push once  dextrose 50% Injectable 12.5 Gram(s) IV Push once  dextrose 50% Injectable 25 Gram(s) IV Push once  enoxaparin Injectable 40 milliGRAM(s) SubCutaneous every 24 hours  glucagon  Injectable 1 milliGRAM(s) IntraMuscular once  glucagon  Injectable 1 milliGRAM(s) IntraMuscular once  glucagon  Injectable 1 milliGRAM(s) IntraMuscular once  insulin glargine Injectable (LANTUS) 20 Unit(s) SubCutaneous at bedtime  insulin lispro (ADMELOG) corrective regimen sliding scale   SubCutaneous at bedtime  insulin lispro (ADMELOG) corrective regimen sliding scale   SubCutaneous three times a day before meals  insulin lispro Injectable (ADMELOG) 3 Unit(s) SubCutaneous before lunch  insulin lispro Injectable (ADMELOG) 3 Unit(s) SubCutaneous before breakfast  insulin lispro Injectable (ADMELOG) 3 Unit(s) SubCutaneous before dinner  lactated ringers. 1000 milliLiter(s) (35 mL/Hr) IV Continuous <Continuous>  melatonin 6 milliGRAM(s) Oral at bedtime  polyethylene glycol 3350 17 Gram(s) Oral daily  senna 2 Tablet(s) Oral at bedtime  sodium chloride 0.65% Nasal 1 Spray(s) Both Nostrils four times a day  tamsulosin 0.4 milliGRAM(s) Oral with breakfast    MEDICATIONS  (PRN):  dextrose Oral Gel 15 Gram(s) Oral once PRN Blood Glucose LESS THAN 70 milliGRAM(s)/deciliter  dextrose Oral Gel 15 Gram(s) Oral once PRN Blood Glucose LESS THAN 70 milliGRAM(s)/deciliter  oxyCODONE    Solution 5 milliGRAM(s) Oral every 4 hours PRN Moderate Pain (4 - 6)      I&O's Summary    19 Nov 2023 07:01  -  20 Nov 2023 07:00  --------------------------------------------------------  IN: 2525 mL / OUT: 1760 mL / NET: 765 mL    20 Nov 2023 07:01  -  20 Nov 2023 11:11  --------------------------------------------------------  IN: 275 mL / OUT: 0 mL / NET: 275 mL        PHYSICAL EXAM:  Vital Signs Last 24 Hrs  T(C): 36.4 (20 Nov 2023 10:00), Max: 36.7 (20 Nov 2023 02:00)  T(F): 97.5 (20 Nov 2023 10:00), Max: 98 (20 Nov 2023 02:00)  HR: 89 (20 Nov 2023 10:00) (66 - 89)  BP: 107/59 (20 Nov 2023 10:00) (107/59 - 140/67)  BP(mean): --  RR: 17 (20 Nov 2023 10:00) (16 - 18)  SpO2: 98% (20 Nov 2023 10:00) (97% - 100%)    Parameters below as of 20 Nov 2023 10:00  Patient On (Oxygen Delivery Method): room air      PHYSICAL EXAM:  GENERAL: NAd  HEAD:  Atraumatic, Normocephalic  EYES: EOMI, PERRLA, conjunctiva and sclera clear  ABIDA: NC/AT, R anterior neck staple line c/d/i,staples removed on 11/20  Maxillofacial: intraoral flap pink, good cap refill, suture site c/i/hemostatic  : voiding freely  Ext: L forearm wrapped w/ ACE Bandage,  CHEST/LUNG: Clear to percussion bilaterally; No rales, rhonchi, wheezing, or rubs  HEART: Regular rate and rhythm; No murmurs, rubs, or gallops  ABDOMEN: Soft, Nontender, Nondistended; Bowel sounds present  EXTREMITIES:  2+ Peripheral Pulses, No clubbing, cyanosis, or edema  LYMPH: No lymphadenopathy notedns    LABS:                        8.4    9.84  )-----------( 443      ( 20 Nov 2023 05:42 )             27.0     11-20    138  |  100  |  9   ----------------------------<  136<H>  3.8   |  27  |  0.50    Ca    9.0      20 Nov 2023 05:42  Phos  2.9     11-20  Mg     2.00     11-20            Urinalysis Basic - ( 20 Nov 2023 05:42 )    Color: x / Appearance: x / SG: x / pH: x  Gluc: 136 mg/dL / Ketone: x  / Bili: x / Urobili: x   Blood: x / Protein: x / Nitrite: x   Leuk Esterase: x / RBC: x / WBC x   Sq Epi: x / Non Sq Epi: x / Bacteria: x          RADIOLOGY & ADDITIONAL TESTS:  Results Reviewed:   Imaging Personally Reviewed:  Electrocardiogram Personally Reviewed:    COORDINATION OF CARE:  Care Discussed with Consultants/Other Providers [Y/N]: Dental  Prior or Outpatient Records Reviewed [Y/N]:

## 2023-11-20 NOTE — PROGRESS NOTE ADULT - ASSESSMENT
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 Pt was extubated on 11/11/23, tolerating well on room air. A- line and yang removed prior to transferred the floor, d/c Unasyn on 11/13/23. Passed TOV and voiding well on 11/14/23. PT dispo plan for subacute rehab 2-3x/week, SW dispo plan for skilled nursing facility short-term. H/H 7.5/24.9, -223 Pt is progressing appropriately w/o acute events.    Plan:  - Potential discharge to [facility] today (11/20) -  during [facility] treatment pt will not require Radiation Therapy/Chemo  - Multimodal pain control  - Strict I&Os  - Monitor FS, adjust insulin for goal -180mg/dl  - FLD  - Activity: OOBTC, PT  - DVT ppx: Lovenox 40mg QD  - Xeroform+aquaphor/telfa/ace wrap dressing changes to L arm and daily aquaphor application to L thigh donor site change q1d by PRS  - Remove nylons in neck POD10 per PRS    Tyler Phelps  Oral and Maxillofacial Surgery  [facility] OMFS Pager #[phone]  Available on Teams

## 2023-11-20 NOTE — PROGRESS NOTE ADULT - SUBJECTIVE AND OBJECTIVE BOX
73M w/ hx of SCC R maxillary alveolar ridge POD10 s/p R hemimaxillectomy, R SOHND, L RFFF, L ALT STSG 10/10/23. Pt was extubated on 11/11/23, tolerating well on room air. A- line and yang removed prior to transferred the floor, d/c Unasyn on 11/13/23. Passed TOV and voiding well on 11/14/23. Diet advanced to FLD 11/15/23.     INTERVAL EVENTS:  - NAEON, aVSS, NAD    Vital Signs Last 24 Hrs  T(C): 36.4 (20 Nov 2023 06:00), Max: 36.7 (20 Nov 2023 02:00)  T(F): 97.6 (20 Nov 2023 06:00), Max: 98 (20 Nov 2023 02:00)  HR: 80 (20 Nov 2023 06:00) (66 - 84)  BP: 140/67 (20 Nov 2023 06:00) (101/58 - 140/67)  BP(mean): --  RR: 16 (20 Nov 2023 06:00) (16 - 19)  SpO2: 98% (20 Nov 2023 06:00) (97% - 100%)    Parameters below as of 20 Nov 2023 06:00  Patient On (Oxygen Delivery Method): room air    I&O's Detail    19 Nov 2023 07:01  -  20 Nov 2023 07:00  --------------------------------------------------------  IN:    Lactated Ringers: 1330 mL    Oral Fluid: 1160 mL  Total IN: 2490 mL    OUT:    IV PiggyBack: 0 mL    Voided (mL): 1300 mL  Total OUT: 1300 mL    Total NET: 1190 mL    Medications:    MEDICATIONS  (STANDING):  acetaminophen   Oral Liquid .. 975 milliGRAM(s) Oral every 6 hours  AQUAPHOR (petrolatum Ointment) 1 Application(s) Topical daily  aspirin  chewable 81 milliGRAM(s) Oral daily  chlorhexidine 0.12% Liquid 15 milliLiter(s) Swish and Spit <User Schedule>  chlorhexidine 2% Cloths 1 Application(s) Topical daily  dextrose 5%. 1000 milliLiter(s) (100 mL/Hr) IV Continuous <Continuous>  dextrose 5%. 1000 milliLiter(s) (50 mL/Hr) IV Continuous <Continuous>  dextrose 5%. 1000 milliLiter(s) (100 mL/Hr) IV Continuous <Continuous>  dextrose 5%. 1000 milliLiter(s) (50 mL/Hr) IV Continuous <Continuous>  dextrose 5%. 1000 milliLiter(s) (50 mL/Hr) IV Continuous <Continuous>  dextrose 5%. 1000 milliLiter(s) (100 mL/Hr) IV Continuous <Continuous>  dextrose 50% Injectable 25 Gram(s) IV Push once  dextrose 50% Injectable 12.5 Gram(s) IV Push once  dextrose 50% Injectable 25 Gram(s) IV Push once  dextrose 50% Injectable 12.5 Gram(s) IV Push once  dextrose 50% Injectable 25 Gram(s) IV Push once  dextrose 50% Injectable 25 Gram(s) IV Push once  dextrose 50% Injectable 25 Gram(s) IV Push once  dextrose 50% Injectable 12.5 Gram(s) IV Push once  enoxaparin Injectable 40 milliGRAM(s) SubCutaneous every 24 hours  glucagon  Injectable 1 milliGRAM(s) IntraMuscular once  glucagon  Injectable 1 milliGRAM(s) IntraMuscular once  glucagon  Injectable 1 milliGRAM(s) IntraMuscular once  insulin glargine Injectable (LANTUS) 25 Unit(s) SubCutaneous at bedtime  insulin lispro (ADMELOG) corrective regimen sliding scale   SubCutaneous at bedtime  insulin lispro (ADMELOG) corrective regimen sliding scale   SubCutaneous three times a day before meals  insulin lispro Injectable (ADMELOG) 3 Unit(s) SubCutaneous before lunch  insulin lispro Injectable (ADMELOG) 3 Unit(s) SubCutaneous before breakfast  insulin lispro Injectable (ADMELOG) 3 Unit(s) SubCutaneous before dinner  lactated ringers. 1000 milliLiter(s) (35 mL/Hr) IV Continuous <Continuous>  melatonin 6 milliGRAM(s) Oral at bedtime  polyethylene glycol 3350 17 Gram(s) Oral daily  senna 2 Tablet(s) Oral at bedtime  sodium chloride 0.65% Nasal 1 Spray(s) Both Nostrils four times a day    MEDICATIONS  (PRN):  dextrose Oral Gel 15 Gram(s) Oral once PRN Blood Glucose LESS THAN 70 milliGRAM(s)/deciliter  dextrose Oral Gel 15 Gram(s) Oral once PRN Blood Glucose LESS THAN 70 milliGRAM(s)/deciliter  oxyCODONE    Solution 5 milliGRAM(s) Oral every 4 hours PRN Moderate Pain (4 - 6)    Labs:                        7.5    6.74  )-----------( 330      ( 18 Nov 2023 07:30 )             24.9     11-18    136  |  99  |  13  ----------------------------<  225<H>  3.6   |  26  |  0.50    Ca    9.0      18 Nov 2023 07:30  Phos  3.2     11-18  Mg     2.10     11-18      Physical Exam:  Gen: AAOx3, pt not in acute distress  ABIDA: NC/AT, R anterior neck staple line c/d/i, sutures intact  Maxillofacial: intraoral flap pink, good cap refill without dehiscence, suture site c/i/hemostatic  : voiding freely  Ext: L forearm wrapped w/ ACE Bandage, LLE STSG donor site intact

## 2023-11-21 ENCOUNTER — TRANSCRIPTION ENCOUNTER (OUTPATIENT)
Age: 73
End: 2023-11-21

## 2023-11-21 VITALS
TEMPERATURE: 98 F | RESPIRATION RATE: 18 BRPM | DIASTOLIC BLOOD PRESSURE: 55 MMHG | SYSTOLIC BLOOD PRESSURE: 101 MMHG | OXYGEN SATURATION: 99 % | HEART RATE: 79 BPM

## 2023-11-21 LAB
GLUCOSE BLDC GLUCOMTR-MCNC: 187 MG/DL — HIGH (ref 70–99)
GLUCOSE BLDC GLUCOMTR-MCNC: 187 MG/DL — HIGH (ref 70–99)
GLUCOSE BLDC GLUCOMTR-MCNC: 237 MG/DL — HIGH (ref 70–99)
GLUCOSE BLDC GLUCOMTR-MCNC: 237 MG/DL — HIGH (ref 70–99)
GLUCOSE BLDC GLUCOMTR-MCNC: 92 MG/DL — SIGNIFICANT CHANGE UP (ref 70–99)
GLUCOSE BLDC GLUCOMTR-MCNC: 92 MG/DL — SIGNIFICANT CHANGE UP (ref 70–99)
SURGICAL PATHOLOGY STUDY: SIGNIFICANT CHANGE UP
SURGICAL PATHOLOGY STUDY: SIGNIFICANT CHANGE UP

## 2023-11-21 PROCEDURE — 99233 SBSQ HOSP IP/OBS HIGH 50: CPT

## 2023-11-21 RX ADMIN — Medication 3 UNIT(S): at 12:17

## 2023-11-21 RX ADMIN — Medication 1 SPRAY(S): at 17:54

## 2023-11-21 RX ADMIN — ENOXAPARIN SODIUM 40 MILLIGRAM(S): 100 INJECTION SUBCUTANEOUS at 05:44

## 2023-11-21 RX ADMIN — CHLORHEXIDINE GLUCONATE 15 MILLILITER(S): 213 SOLUTION TOPICAL at 13:12

## 2023-11-21 RX ADMIN — CHLORHEXIDINE GLUCONATE 15 MILLILITER(S): 213 SOLUTION TOPICAL at 05:44

## 2023-11-21 RX ADMIN — TAMSULOSIN HYDROCHLORIDE 0.4 MILLIGRAM(S): 0.4 CAPSULE ORAL at 10:04

## 2023-11-21 RX ADMIN — Medication 1 APPLICATION(S): at 12:17

## 2023-11-21 RX ADMIN — Medication 1 SPRAY(S): at 05:44

## 2023-11-21 RX ADMIN — Medication 81 MILLIGRAM(S): at 12:18

## 2023-11-21 RX ADMIN — Medication 1 SPRAY(S): at 00:05

## 2023-11-21 RX ADMIN — POLYETHYLENE GLYCOL 3350 17 GRAM(S): 17 POWDER, FOR SOLUTION ORAL at 12:16

## 2023-11-21 RX ADMIN — Medication 975 MILLIGRAM(S): at 12:48

## 2023-11-21 RX ADMIN — Medication 975 MILLIGRAM(S): at 12:18

## 2023-11-21 RX ADMIN — Medication 1 SPRAY(S): at 12:17

## 2023-11-21 RX ADMIN — Medication 4: at 12:16

## 2023-11-21 RX ADMIN — Medication 975 MILLIGRAM(S): at 05:44

## 2023-11-21 RX ADMIN — CHLORHEXIDINE GLUCONATE 1 APPLICATION(S): 213 SOLUTION TOPICAL at 12:17

## 2023-11-21 NOTE — DISCHARGE NOTE NURSING/CASE MANAGEMENT/SOCIAL WORK - NSDCPEFALRISK_GEN_ALL_CORE
For information on Fall & Injury Prevention, visit: [email or web address] OR  [email or web address] OR  https://www.cdc.gov/steadi/patient.html

## 2023-11-21 NOTE — PROVIDER CONTACT NOTE (OTHER) - NAME OF MD/NP/PA/DO NOTIFIED:
[id number]
Juanita Sultana MD
Juanita dubois
Juanita dubois
[facility]
[place], [facility]

## 2023-11-21 NOTE — PROVIDER CONTACT NOTE (OTHER) - ASSESSMENT
BP 95/42 p 69 O2 100 T97.7. Patient reports dizziness
no KFT before starting night shift
Patient Bp is 114/51, diastolic BP low, asymptomatic
patient is stable, no c/o pain, no c/o light headed or dizziness. Vitals stabl, patient states that he keeps trying to clean it and the oozing will not stop, it has been continous for the last hour.
pt Galo noted on his lap, pt did not know how it happed.
pt a&ox4, no s/s of respiratory distress, no n/v, no dizziness

## 2023-11-21 NOTE — PROGRESS NOTE ADULT - PROVIDER SPECIALTY LIST ADULT
Hospitalist
Hospitalist
OMFS
OMFS
Plastic Surgery
Plastic Surgery
OMFS
Plastic Surgery
SICU
OMFS
Plastic Surgery
SICU
SICU

## 2023-11-21 NOTE — PROVIDER CONTACT NOTE (OTHER) - REASON
KFT is came out
Pt BP 82/44
Galo came out
BP 95/42
Patient Bp is 114/51, diastolic BP low, asymptomatic
Nosebleed

## 2023-11-21 NOTE — PROVIDER CONTACT NOTE (OTHER) - RECOMMENDATIONS
Resident should come asses the patient to see if anything else needs to be done. Pictures sent to the resident thru teams
Keep full liquid diet this time and will evaluate later.
notify the provider, bolus?
notify the provider
provider aware
Make provider aware

## 2023-11-21 NOTE — PROVIDER CONTACT NOTE (OTHER) - ACTION/TREATMENT ORDERED:
provider aware, no interventions, patient ready for discharge
Per resident, she is not concerned at this time, bu RN to monitor and if bleeding gets worse in next 2-3 hours to make the resident aware again.,
team aware, re- evaluate for tomorrow, continue liquid diet now.
Administer bolus of LR. Then reassess pt BP after.
none this time, pt is on full liquid diet.
Provider aware, states that he will be coming to bedside soon
normal...

## 2023-11-21 NOTE — DISCHARGE NOTE NURSING/CASE MANAGEMENT/SOCIAL WORK - NSDPLANG ASIS_GEN_ALL_CORE
Occupational Therapy    Precautions:  Medical precautions:  fall risk; standard precautions.  PPE worn during session: procedural mask and gloves worn throughout    Past medical history significant for Progressing alzheimer's dementia, Hypothyroidism, Overactive bladder lives with  presenting with multiple falls and altered mental status.     Lumbar puncture scheduled for 9/12/21 to assess for reversible cause for cognitive decline.  OT re-ordered for re-assessment of functional status prior to and after LP.   PT also ordered for formal pre and post LP procedure.       Lines:     Basic: indwelling urinary catheter      Lines in chart and on patient reviewed, precautions maintained throughout session.  Safety Measures: bed alarm    SUBJECTIVE  Patient agreed to participate in therapy this date.  Patient verbally agrees to allow the following to be present during session: spouse and relative  \"I want to lay down.\"  Patient / Family Goal: unable to state    OBJECTIVE   Level of consciousness: alert    Oriented to person and place     Disoriented to time and situation    Affect/Behavior: pleasant, confused and fearful affect  Patient activity tolerance: 1 to 1 activity to rest  Functional Communication/Cognition    Overall status:  Impaired     Attention span:  Difficulty attending to directions and difficulty dividing attention    Attention Span Impairment: distractibility, reduced memory, perseveration, internal factors and fatigue    Commands: follows one step commands with repetition and follows one step commands with increased time.  Additional Details: Required max cues/setup to complete basic steps of self cares.  Patient with severe motor apraxia.   Bed mobility:      Supine to sit: maximal assist (due to motor apraxia and poor initiation. )    Sit to supine: minimal assist  Transfers:      Sit to stand: minimal assist, moderate assist and maximal assist (required max assist and several trials (rocking,  pushing from armrests, reaching for walker from armchair at end of session.  Min assist required from edge of bed. )    Stand to sit: minimal assist    Functional Ambulation:    Assistance:moderate assist    Details/Comments: Patient required moderate assist for mobility to bathroom with 2ww.  Patient has difficulty with crossing threshold, impaired motor planning, reaches out for door frame.  Difficulty with turning to sit on chair.  Became stuck in chair, unable to stand with several attempts but eventually able to stand with moderate lifting assist when reaching out with both hands onto walker.    Activities of Daily Living (ADLs):  Grooming/Oral Hygiene:     Grooming assist: maximal assist    Oral hygiene assist: moderate assist    Position: standing at sink    Assist needed for: increased time to complete, steadying, verbal cueing, supervision/safety, teeth care and brushing hair  Upper Body Dressing:    Assist: maximal assist    Assist needed for: thread left upper extermity, thread right upper extermity and pull around back  Lower Body Dressing:     Footwear assistance: maximal assist    Assist needed for: don/doff left sock and don/doff right sock      Interventions    Training provided: ADL training, activity tolerance, balance retraining, compensatory techniques, functional ambulation, positioning, IADL training, safety training and transfer training  Skilled input: verbal instruction/cues and tactile instruction/cues  Verbal Consent: Writer verbally educated and received verbal consent for hand placement, positioning of patient, and techniques to be performed today from patient for clothing adjustments for techniques and therapist position for techniques as described above and how they are pertinent to the patient's plan of care.        ASSESSMENT    Impairments: activity tolerance, bed mobility, strength and safety awareness  Functional Limitations: bathing, toileting, functional transfers, dressing and  showering    Patient seen on ACE nursing unit. Today's treatment focused on donning robe and slippersocks, functional mobility to bathroom, oral cares and hair care in standing at sink, functional transfers.  The patient is demonstrating limited progress as evidenced by today was able to stand at sink to gather hair in elastic ponytail chau with cueing, brush teeth with setup and cues for initation only but required moderate to max assist to complete mobility and transfers safely.  Patient had great difficulty coming to stand from arm chair, requiring multiple attempts with assistance from OT trialing rocking, propulsion, pushing up from armrest and pulling up on walker.      Patient is unsafe to discharge to current setting.  Will require 24 hour assist.  Recommend memory care setting.    Discharge Recommendations:  OT Identified Barriers to Discharge: spouse ability to provide needed assist and supervision, increased assist required due to baseline cognitive deficits   Recommendations for Discharge: OT WI: 24 Hour assist, Other (comment) (supportive memory care unit)      PT/OT Mobility Equipment for Discharge: None anticipated  PT/OT ADL Equipment for Discharge: none anticipated         Skilled therapy is required to address these limitations in attempt to maximize the patient's independence.    End of Session:   Location: in bed  Safety measures: call light within reach and equipment intact    PLAN  Suggestions for next session as indicated: Plan: Reassess direction following, problem solving, safety with seated self cares, functional transfers for improvement     OT Frequency: 3 days/week  Frequency Comments:  primary         GOALS  Review Date: 9/19/2021  Long Term Goals: (to be met by time of discharge from hospital)  Grooming: Patient will complete grooming tasks in sitting and in standing supervision.  Upper body dressing: Patient will complete upper body dressing supervision and set up.  Lower body  dressing: Patient will complete lower body dressing supervision and set up.  Toileting: Patient will complete toileting supervision.  Bathing: Patient will complete bathingsupervision and set up Toilet transfer: Patient will complete toilet transfer with grab bar use, supervision.   Tub/shower transfer: Patient will complete tub/shower transfer with shower chair and grab bar, supervision.     Family teaching:  Spouse to demonstrate strategies to assist patient with  Safe tub transfer and shower  Documented in the chart in the following areas: Assessment. Plan.      Therapy procedure time and total treatment time can be found documented on the Time Entry flowsheet   No

## 2023-11-21 NOTE — DISCHARGE NOTE NURSING/CASE MANAGEMENT/SOCIAL WORK - PATIENT PORTAL LINK FT
You can access the FollowMyHealth Patient Portal offered by [facility] by registering at the following website: [email or web address]. By joining [employer]’s FollowMyHealth portal, you will also be able to view your health information using other applications (apps) compatible with our system.

## 2023-11-21 NOTE — PROVIDER CONTACT NOTE (OTHER) - SITUATION
Galo came out.
Patient BP 95/42 P 69 after 500 ml bolus of lactated ringers
Patient Bp is 114/51, diastolic BP low, asymptomatic
Patient is having continous bloody oozing from his nose, has a tissue in but everytime time he removes it , the tissue is covered with light red blood.
pt does not have KFT now
Pt BP 82/44, pt asymptomatic

## 2023-11-21 NOTE — CHART NOTE - NSCHARTNOTEFT_GEN_A_CORE
Contacted by SICU team that per Chest X-Ray read, NGT placed in right lower lobe bronchial tree.    Observed patient bedside and confirmed findings on x-ray.    Removed sutures in place, holding NGT and used a GlideScope to guide NGT out of Trachea and into Esophagus.     Sutures NGT in place with 2-0 Silk Sutures.    Confirmed placement of NGT with Chest X-Ray.     Pt tolerated procedure well.     Tyler Phelps  Oral and Maxillofacial Surgery  [facility] Pager #[phone]  Available on Teams
OMFS Transfer Note:    PRATIMA VAZQUEZ  MRN-[id number]  [facility] 8S    patient transferred from SICU to 8S without complication.     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 Admitted to SICU for q1h flap checks, +2 cook doppler signal, KO tube via R naris, yang, A-line.     Vitals:    T(C): 36.4 (11-14-23 @ 04:00), Max: 37 (11-13-23 @ 08:00)  HR: 74 (11-14-23 @ 04:00) (64 - 74)  BP: 139/76 (11-14-23 @ 04:00) (117/53 - 140/56)  RR: 13 (11-14-23 @ 04:00) (13 - 18)  SpO2: 100% (11-14-23 @ 04:00) (97% - 100%)    I&O's Detail    12 Nov 2023 07:01  -  13 Nov 2023 07:00  --------------------------------------------------------  IN:    Glucerna 1.5: 715 mL    IV PiggyBack: 200 mL  Total IN: 915 mL    OUT:    Bulb (mL): 30 mL    Bulb (mL): 9 mL    Indwelling Catheter - Urethral (mL): 1975 mL    VAC (Vacuum Assisted Closure) System (mL): 0 mL  Total OUT: 2014 mL    Total NET: -1099 mL      13 Nov 2023 07:01  -  14 Nov 2023 06:32  --------------------------------------------------------  IN:    Glucerna 1.5: 1130 mL    IV PiggyBack: 100 mL  Total IN: 1230 mL    OUT:    Bulb (mL): 30 mL    Bulb (mL): 15 mL    Indwelling Catheter - Urethral (mL): 1340 mL    VAC (Vacuum Assisted Closure) System (mL): 0 mL    Voided (mL): 550 mL  Total OUT: 1935 mL    Total NET: -705 mL          Medications:    MEDICATIONS  (STANDING):  acetaminophen   Oral Liquid .. 975 milliGRAM(s) Oral every 6 hours  chlorhexidine 0.12% Liquid 15 milliLiter(s) Swish and Spit <User Schedule>  chlorhexidine 2% Cloths 1 Application(s) Topical daily  dextrose 50% Injectable 25 Gram(s) IV Push once  dextrose 50% Injectable 25 Gram(s) IV Push once  dextrose 50% Injectable 12.5 Gram(s) IV Push once  heparin   Injectable 5000 Unit(s) SubCutaneous every 8 hours  insulin lispro (ADMELOG) corrective regimen sliding scale   SubCutaneous every 6 hours  polyethylene glycol 3350 17 Gram(s) Oral daily  senna 2 Tablet(s) Oral at bedtime  tamsulosin 0.4 milliGRAM(s) Oral at bedtime    MEDICATIONS  (PRN):  oxyCODONE    Solution 5 milliGRAM(s) Oral every 4 hours PRN Moderate Pain (4 - 6)  oxyCODONE    Solution 10 milliGRAM(s) Oral every 4 hours PRN Severe Pain (7 - 10)      Labs:                          7.2    7.95  )-----------( 137      ( 13 Nov 2023 01:02 )             22.9       11-13    137  |  103  |  11  ----------------------------<  209<H>  3.2<L>   |  30  |  0.53    Ca    8.6      13 Nov 2023 01:02  Phos  1.8     11-13  Mg     1.80     11-13      Plan:  - ERAS Day 4 Protocol  - pending TOV  - Multimodal pain control  - OOBTC  - Trend RAE/Vac outputs    ISAAC Lee Pager: [phone]  [place] Pager: [phone]  [facility] Pager: [phone]  Avaliable on [employer] Teams (PLEASE USE RESIDENT)
OMFS paged due to pts low BP prior to discharge. Explained to nurse/family that this is pt's normal BP, and this has been his BP throughout his hospital stay. Pt is stable for d/c, w/ no contraindications from OMFS perspective.
POST-OP NOTE    PRATIMA VAZQUEZ| [id number]| [facility] ISC 08    Subjective: Pt's pain well controlled, resting comfortably in bed. No acute complaints. All surgical sites hemostatic.     Vitals:     Vital Signs Last 24 Hrs  T(C): 37.2 (10 Nov 2023 18:45), Max: 37.2 (10 Nov 2023 18:45)  T(F): 99 (10 Nov 2023 18:45), Max: 99 (10 Nov 2023 18:45)  HR: 84 (10 Nov 2023 19:53) (72 - 93)  BP: 116/60 (10 Nov 2023 18:35) (114/94 - 116/60)  BP(mean): 78 (10 Nov 2023 18:35) (78 - 78)  RR: 12 (10 Nov 2023 19:30) (12 - 18)  SpO2: 100% (10 Nov 2023 19:53) (99% - 100%)    Parameters below as of 10 Nov 2023 19:30  Patient On (Oxygen Delivery Method): ventilator    O2 Concentration (%): 40    I&O's Detail      Medications:    MEDICATIONS  (STANDING):  ampicillin/sulbactam  IVPB      ampicillin/sulbactam  IVPB 3 Gram(s) IV Intermittent every 6 hours  ampicillin/sulbactam  IVPB 3 Gram(s) IV Intermittent once  chlorhexidine 0.12% Liquid 15 milliLiter(s) Swish and Spit <User Schedule>  chlorhexidine 2% Cloths 1 Application(s) Topical daily  dexMEDEtomidine Infusion 0.5 MICROgram(s)/kG/Hr (8.5 mL/Hr) IV Continuous <Continuous>  fentaNYL   Infusion 0.5 MICROgram(s)/kG/Hr (3.4 mL/Hr) IV Continuous <Continuous>  heparin   Injectable 5000 Unit(s) SubCutaneous every 8 hours  lactated ringers. 1000 milliLiter(s) (75 mL/Hr) IV Continuous <Continuous>  ondansetron Injectable 4 milliGRAM(s) IV Push every 8 hours  polyethylene glycol 3350 17 Gram(s) Oral daily  propofol Infusion 50 MICROgram(s)/kG/Min (20.4 mL/Hr) IV Continuous <Continuous>  senna 2 Tablet(s) Oral at bedtime    MEDICATIONS  (PRN):      Labs:                          9.2    11.88 )-----------( 133      ( 10 Nov 2023 19:00 )             29.3       11-10    138  |  103  |  12  ----------------------------<  183<H>  3.8   |  25  |  0.55    Ca    8.2<L>      10 Nov 2023 19:00  Phos  3.5     11-10  Mg     1.40     11-10     PHYSICAL EXAM:  Gen: NAD  Resp: no stridor  CV: RRR  Abdomen: soft, nontender, nondistended  Skin: Incision c/d/i. Normal color, no rashes or lesions.    Tyler Phelps  Oral and Maxillofacial Surgery  [facility] OMFS Pager #[phone]  Available on Teams
Pt transferred to 8S 845A. Patient signed out to primary team.

## 2023-11-21 NOTE — PROGRESS NOTE ADULT - SUBJECTIVE AND OBJECTIVE BOX
Plastic Surgery Progress Note (pg [facility]: [id number], [facility]: [phone])    SUBJECTIVE  The patient was seen and examined. No acute events overnight. Dressing changed this AM. L forearm with some swelling this AM, loosened ACE wrap.      OBJECTIVE  ___________________________________________________  VITAL SIGNS / I&O's   Vital Signs Last 24 Hrs  T(C): 36.9 (21 Nov 2023 06:00), Max: 36.9 (21 Nov 2023 06:00)  T(F): 98.4 (21 Nov 2023 06:00), Max: 98.4 (21 Nov 2023 06:00)  HR: 89 (21 Nov 2023 06:00) (66 - 89)  BP: 114/63 (21 Nov 2023 06:00) (105/61 - 134/59)  BP(mean): --  ABP: --  ABP(mean): --  RR: 18 (21 Nov 2023 06:00) (17 - 18)  SpO2: 100% (21 Nov 2023 06:00) (96% - 100%)    O2 Parameters below as of 21 Nov 2023 06:00  Patient On (Oxygen Delivery Method): room air      I&O's Detail    20 Nov 2023 07:01  -  21 Nov 2023 07:00  --------------------------------------------------------  IN:    Lactated Ringers: 875 mL    Oral Fluid: 550 mL  Total IN: 1425 mL    OUT:    Voided (mL): 800 mL  Total OUT: 800 mL    Total NET: 625 mL      ___________________________________________________  PHYSICAL EXAM    General: NAD, Lying in bed comfortably  Neuro: awake and alert  Pulm: No increased work of breathing on room air  HEENT: Maxillary flap, good color  Neck: incision cdi  L arm: Skin graft w/ good take. Xeroform/telfa/ace dressing applied  L thigh: donor site appropriate; open to air      ___________________________________________________  LABS                                    8.4    9.84  )-----------( 443      ( 20 Nov 2023 05:42 )             27.0   11-20    138  |  100  |  9   ----------------------------<  136<H>  3.8   |  27  |  0.50    Ca    9.0      20 Nov 2023 05:42  Phos  2.9     11-20  Mg     2.00     11-20                   ___________________________________________________  MICRO  Recent Cultures:    ___________________________________________________  MEDICATIONS  (STANDING):  acetaminophen   Oral Liquid .. 975 milliGRAM(s) Oral every 6 hours  AQUAPHOR (petrolatum Ointment) 1 Application(s) Topical daily  chlorhexidine 0.12% Liquid 15 milliLiter(s) Swish and Spit <User Schedule>  chlorhexidine 2% Cloths 1 Application(s) Topical daily  dextrose 5%. 1000 milliLiter(s) (50 mL/Hr) IV Continuous <Continuous>  dextrose 5%. 1000 milliLiter(s) (100 mL/Hr) IV Continuous <Continuous>  dextrose 5%. 1000 milliLiter(s) (50 mL/Hr) IV Continuous <Continuous>  dextrose 5%. 1000 milliLiter(s) (100 mL/Hr) IV Continuous <Continuous>  dextrose 5%. 1000 milliLiter(s) (50 mL/Hr) IV Continuous <Continuous>  dextrose 5%. 1000 milliLiter(s) (100 mL/Hr) IV Continuous <Continuous>  dextrose 50% Injectable 25 Gram(s) IV Push once  dextrose 50% Injectable 12.5 Gram(s) IV Push once  dextrose 50% Injectable 25 Gram(s) IV Push once  dextrose 50% Injectable 12.5 Gram(s) IV Push once  dextrose 50% Injectable 25 Gram(s) IV Push once  dextrose 50% Injectable 25 Gram(s) IV Push once  dextrose 50% Injectable 12.5 Gram(s) IV Push once  dextrose 50% Injectable 25 Gram(s) IV Push once  enoxaparin Injectable 40 milliGRAM(s) SubCutaneous every 24 hours  glucagon  Injectable 1 milliGRAM(s) IntraMuscular once  glucagon  Injectable 1 milliGRAM(s) IntraMuscular once  glucagon  Injectable 1 milliGRAM(s) IntraMuscular once  insulin glargine Injectable (LANTUS) 25 Unit(s) SubCutaneous at bedtime  insulin lispro (ADMELOG) corrective regimen sliding scale   SubCutaneous three times a day before meals  insulin lispro (ADMELOG) corrective regimen sliding scale   SubCutaneous at bedtime  insulin lispro Injectable (ADMELOG) 3 Unit(s) SubCutaneous before breakfast  insulin lispro Injectable (ADMELOG) 3 Unit(s) SubCutaneous before lunch  insulin lispro Injectable (ADMELOG) 3 Unit(s) SubCutaneous before dinner  lactated ringers. 1000 milliLiter(s) (70 mL/Hr) IV Continuous <Continuous>  melatonin 6 milliGRAM(s) Oral at bedtime  polyethylene glycol 3350 17 Gram(s) Oral daily  senna 2 Tablet(s) Oral at bedtime  sodium chloride 0.65% Nasal 1 Spray(s) Both Nostrils four times a day    MEDICATIONS  (PRN):  dextrose Oral Gel 15 Gram(s) Oral once PRN Blood Glucose LESS THAN 70 milliGRAM(s)/deciliter  dextrose Oral Gel 15 Gram(s) Oral once PRN Blood Glucose LESS THAN 70 milliGRAM(s)/deciliter  oxyCODONE    Solution 5 milliGRAM(s) Oral every 4 hours PRN Moderate Pain (4 - 6)

## 2023-11-21 NOTE — PROGRESS NOTE ADULT - ASSESSMENT
A: 73M who is now s/p maxillectomy with radial forearm reconstruction 11/10/2023:    Plan:  - continue ERAS protocol  - Xeroform+aquaphor/telfa/ace wrap dressing changes to L arm q1d  - leg thigh donor site dressing down; aquaphor daily to site   - Nylon sutures removed   - Appreciate rest of care per primary team    Felix Daish  Plastic Surgery  #[phone] [facility] pager  [phone] [facility] pager  Available on teams

## 2023-11-21 NOTE — PROGRESS NOTE ADULT - ASSESSMENT
73M w/ PMHx of DM2, HTN, HLD, and SCC R maxillary alveolar ridge. Pt presents to the hospital for scheduled operation with OMFS and plastic surgery. Pt is now s/p maxillectomy and R neck dissection, L RFFF recon, L STSG. Pt briefly in SICU q1 flap checks, but now transitioned to general floors. Medicine consulted for management of DM2.    #DM2  Pt with hx of DM2, A1c 6.5% indicating good outpt glycemic control. FS in hospital >180. Goal FS inpt 140-180  - Would increase lispro 3u TID  - Increase Lantus 25u qhs--hypoglycemic 11/19 evening, will decr lantus to 20 u 11/20  - Encourage patient to ambulate the halls  - Increase to moderate ISS and add low bedtime ISS  - Pt on janumet and farxiga at home with A1c 6.5%. Can continue with Janumet and farxiga outpt once discharged    #CAD  Pt with hx of CAD s/p stent x2. Unclear if pt still supposed to be on DAPT as discontinued prematurely without consulting his cardiologist. Reported started on ASA recently.  -  can restart ASA if no further concern for bleeding from surgery perspective.     #HLD  Pt with hx of HLD, on home atorvastatin 20mg  - Can continue with atorvastatin upon discharge    #Epistaxis since 11/20  - Can do afrin spray prn in addition to saline spray, though will defer to dental/plastics teams    #Anemia  Pt with hgb ~7 since procedure likely i/s/o recent procedure and post-procedure bleeding. Hgb currently stable  - trend CBC, given hx of CAD transfuse >8  - Pt should undergo outpt age appropriate cancer screening    #Hypotension/dizziness, resolved  Pt with soft BPs during admission. Has reported hx of HTN, but not on anti-hypertensive medications. Chart review and per darion, pt on midodrine outpt.  - Monitor BP, can add midodrine as needed.   - Encourage PO hydration  - Would obtain orthostatic BPs  - If orthostatic or if pt continues to be hypotensive, would d/c flomax    #SCC R maxillary alveolar ridge  Pt is s/p maxillectomy and R neck dissection, L RFFF recon, L STSG.  - Care per OMFS and plastic surgery

## 2023-11-21 NOTE — PROVIDER CONTACT NOTE (OTHER) - BACKGROUND
Patient s/p R maxillectomy, R neck dissection
pt s/p of maxillectomy and R neck dissection
S/p R maxillectomy and R neck dissection,
Patient admitted on 11/10 , s/p right maxillectomy and right neck dissection.
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
pt s/p of maxillectomy and R neck dissection

## 2023-11-21 NOTE — PROGRESS NOTE ADULT - ASSESSMENT
73M w/ hx of SCC R maxillary alveolar ridge POD11 s/p R hemimaxillectomy, R SOHND, L RFFF, L ALT STSG 10/10/23. Pt was extubated on 11/11/23, tolerating well on room air. A- line and yang removed prior to transferred the floor, d/c Unasyn on 11/13/23. Passed TOV and voiding well on 11/14/23. PT dispo plan for subacute rehab 2-3x/week, SW dispo plan to home. Pt is progressing appropriately w/o acute events.    Plan:  - Potential discharge to home today  - Multimodal pain control  - Strict I&Os  - Monitor FS, adjust insulin for goal -180mg/dl  - Pureed Diet  - f/u outpatient [facility] clinic at appointment (#[phone])    Tyler Phelps  Oral and Maxillofacial Surgery  [facility] Pager #[phone]  Available on Teams

## 2023-11-21 NOTE — PROGRESS NOTE ADULT - SUBJECTIVE AND OBJECTIVE BOX
[facility] Division of Hospital Medicine  Carli Love MD  Pager (M-F, 8A-5P): [phone] or TEAMS  Other Times:  [phone]      SUBJECTIVE / OVERNIGHT EVENTS: Pt feeling fine this am, though did again have a nose bleed, has nasal saline at bedside, no afrin spray. Ambulated the halls yesterday without issue.   MEDICATIONS  (STANDING):  acetaminophen   Oral Liquid .. 975 milliGRAM(s) Oral every 6 hours  AQUAPHOR (petrolatum Ointment) 1 Application(s) Topical daily  aspirin  chewable 81 milliGRAM(s) Oral daily  chlorhexidine 0.12% Liquid 15 milliLiter(s) Swish and Spit <User Schedule>  chlorhexidine 2% Cloths 1 Application(s) Topical daily  dextrose 5%. 1000 milliLiter(s) (50 mL/Hr) IV Continuous <Continuous>  dextrose 5%. 1000 milliLiter(s) (50 mL/Hr) IV Continuous <Continuous>  dextrose 5%. 1000 milliLiter(s) (100 mL/Hr) IV Continuous <Continuous>  dextrose 5%. 1000 milliLiter(s) (100 mL/Hr) IV Continuous <Continuous>  dextrose 5%. 1000 milliLiter(s) (100 mL/Hr) IV Continuous <Continuous>  dextrose 5%. 1000 milliLiter(s) (50 mL/Hr) IV Continuous <Continuous>  dextrose 50% Injectable 12.5 Gram(s) IV Push once  dextrose 50% Injectable 25 Gram(s) IV Push once  dextrose 50% Injectable 12.5 Gram(s) IV Push once  dextrose 50% Injectable 25 Gram(s) IV Push once  dextrose 50% Injectable 25 Gram(s) IV Push once  dextrose 50% Injectable 25 Gram(s) IV Push once  dextrose 50% Injectable 12.5 Gram(s) IV Push once  dextrose 50% Injectable 25 Gram(s) IV Push once  enoxaparin Injectable 40 milliGRAM(s) SubCutaneous every 24 hours  glucagon  Injectable 1 milliGRAM(s) IntraMuscular once  glucagon  Injectable 1 milliGRAM(s) IntraMuscular once  glucagon  Injectable 1 milliGRAM(s) IntraMuscular once  insulin glargine Injectable (LANTUS) 20 Unit(s) SubCutaneous at bedtime  insulin lispro (ADMELOG) corrective regimen sliding scale   SubCutaneous at bedtime  insulin lispro (ADMELOG) corrective regimen sliding scale   SubCutaneous three times a day before meals  insulin lispro Injectable (ADMELOG) 3 Unit(s) SubCutaneous before lunch  insulin lispro Injectable (ADMELOG) 3 Unit(s) SubCutaneous before breakfast  insulin lispro Injectable (ADMELOG) 3 Unit(s) SubCutaneous before dinner  lactated ringers. 1000 milliLiter(s) (35 mL/Hr) IV Continuous <Continuous>  melatonin 6 milliGRAM(s) Oral at bedtime  polyethylene glycol 3350 17 Gram(s) Oral daily  senna 2 Tablet(s) Oral at bedtime  sodium chloride 0.65% Nasal 1 Spray(s) Both Nostrils four times a day  tamsulosin 0.4 milliGRAM(s) Oral with breakfast    MEDICATIONS  (PRN):  dextrose Oral Gel 15 Gram(s) Oral once PRN Blood Glucose LESS THAN 70 milliGRAM(s)/deciliter  dextrose Oral Gel 15 Gram(s) Oral once PRN Blood Glucose LESS THAN 70 milliGRAM(s)/deciliter  oxyCODONE    Solution 5 milliGRAM(s) Oral every 4 hours PRN Moderate Pain (4 - 6)      I&O's Summary    19 Nov 2023 07:01  -  20 Nov 2023 07:00  --------------------------------------------------------  IN: 2525 mL / OUT: 1760 mL / NET: 765 mL    20 Nov 2023 07:01  -  20 Nov 2023 11:11  --------------------------------------------------------  IN: 275 mL / OUT: 0 mL / NET: 275 mL        PHYSICAL EXAM:  Vital Signs Last 24 Hrs  T(C): 36.4 (20 Nov 2023 10:00), Max: 36.7 (20 Nov 2023 02:00)  T(F): 97.5 (20 Nov 2023 10:00), Max: 98 (20 Nov 2023 02:00)  HR: 89 (20 Nov 2023 10:00) (66 - 89)  BP: 107/59 (20 Nov 2023 10:00) (107/59 - 140/67)  BP(mean): --  RR: 17 (20 Nov 2023 10:00) (16 - 18)  SpO2: 98% (20 Nov 2023 10:00) (97% - 100%)    Parameters below as of 20 Nov 2023 10:00  Patient On (Oxygen Delivery Method): room air      PHYSICAL EXAM:  GENERAL: NAd  HEAD:  Atraumatic, Normocephalic  EYES: EOMI, PERRLA, conjunctiva and sclera clear  ABIDA: NC/AT, R anterior neck staple line c/d/i,staples removed on 11/20  Maxillofacial: intraoral flap pink, good cap refill, suture site c/i/hemostatic  : voiding freely  Ext: L forearm wrapped w/ ACE Bandage,  CHEST/LUNG: Clear to percussion bilaterally; No rales, rhonchi, wheezing, or rubs  HEART: Regular rate and rhythm; No murmurs, rubs, or gallops  ABDOMEN: Soft, Nontender, Nondistended; Bowel sounds present  EXTREMITIES:  2+ Peripheral Pulses, No clubbing, cyanosis, or edema  LYMPH: No lymphadenopathy notedns    LABS:                        8.4    9.84  )-----------( 443      ( 20 Nov 2023 05:42 )             27.0     11-20    138  |  100  |  9   ----------------------------<  136<H>  3.8   |  27  |  0.50    Ca    9.0      20 Nov 2023 05:42  Phos  2.9     11-20  Mg     2.00     11-20            Urinalysis Basic - ( 20 Nov 2023 05:42 )    Color: x / Appearance: x / SG: x / pH: x  Gluc: 136 mg/dL / Ketone: x  / Bili: x / Urobili: x   Blood: x / Protein: x / Nitrite: x   Leuk Esterase: x / RBC: x / WBC x   Sq Epi: x / Non Sq Epi: x / Bacteria: x          RADIOLOGY & ADDITIONAL TESTS:  Results Reviewed:   Imaging Personally Reviewed:  Electrocardiogram Personally Reviewed:    COORDINATION OF CARE:  Care Discussed with Consultants/Other Providers [Y/N]: Dental  Prior or Outpatient Records Reviewed [Y/N]:

## 2023-11-21 NOTE — PROVIDER CONTACT NOTE (OTHER) - DATE AND TIME:
17-Nov-2023 10:30
15-Nov-2023 20:51
21-Nov-2023 11:24
18-Nov-2023 07:28
19-Nov-2023 17:30
15-Nov-2023 18:45

## 2023-11-21 NOTE — PROGRESS NOTE ADULT - SUBJECTIVE AND OBJECTIVE BOX
73M w/ hx of SCC R maxillary alveolar ridge POD11 s/p R hemimaxillectomy, R SOHND, L RFFF, L ALT STSG 10/10/23. Pt was extubated on 11/11/23, tolerating well on room air. A- line and yang removed prior to transferred the floor, d/c Unasyn on 11/13/23. Passed TOV and voiding well on 11/14/23. Diet advanced to FLD 11/15/23.     INTERVAL EVENTS:  - NAEON, aVSS, NAD    Vital Signs Last 24 Hrs  T(C): 36.9 (21 Nov 2023 06:00), Max: 36.9 (21 Nov 2023 06:00)  T(F): 98.4 (21 Nov 2023 06:00), Max: 98.4 (21 Nov 2023 06:00)  HR: 89 (21 Nov 2023 06:00) (66 - 89)  BP: 114/63 (21 Nov 2023 06:00) (105/61 - 134/59)  BP(mean): --  RR: 18 (21 Nov 2023 06:00) (17 - 18)  SpO2: 100% (21 Nov 2023 06:00) (96% - 100%)    Parameters below as of 21 Nov 2023 06:00  Patient On (Oxygen Delivery Method): room air    I&O's Detail    20 Nov 2023 07:01  -  21 Nov 2023 07:00  --------------------------------------------------------  IN:    Lactated Ringers: 875 mL    Oral Fluid: 550 mL  Total IN: 1425 mL    OUT:    Voided (mL): 800 mL  Total OUT: 800 mL    Total NET: 625 mL    Medications:    MEDICATIONS  (STANDING):  acetaminophen   Oral Liquid .. 975 milliGRAM(s) Oral every 6 hours  AQUAPHOR (petrolatum Ointment) 1 Application(s) Topical daily  aspirin  chewable 81 milliGRAM(s) Oral daily  chlorhexidine 0.12% Liquid 15 milliLiter(s) Swish and Spit <User Schedule>  chlorhexidine 2% Cloths 1 Application(s) Topical daily  dextrose 5%. 1000 milliLiter(s) (100 mL/Hr) IV Continuous <Continuous>  dextrose 5%. 1000 milliLiter(s) (50 mL/Hr) IV Continuous <Continuous>  dextrose 5%. 1000 milliLiter(s) (100 mL/Hr) IV Continuous <Continuous>  dextrose 5%. 1000 milliLiter(s) (50 mL/Hr) IV Continuous <Continuous>  dextrose 5%. 1000 milliLiter(s) (50 mL/Hr) IV Continuous <Continuous>  dextrose 5%. 1000 milliLiter(s) (100 mL/Hr) IV Continuous <Continuous>  dextrose 50% Injectable 25 Gram(s) IV Push once  dextrose 50% Injectable 25 Gram(s) IV Push once  dextrose 50% Injectable 12.5 Gram(s) IV Push once  dextrose 50% Injectable 25 Gram(s) IV Push once  dextrose 50% Injectable 25 Gram(s) IV Push once  dextrose 50% Injectable 12.5 Gram(s) IV Push once  dextrose 50% Injectable 25 Gram(s) IV Push once  dextrose 50% Injectable 12.5 Gram(s) IV Push once  enoxaparin Injectable 40 milliGRAM(s) SubCutaneous every 24 hours  glucagon  Injectable 1 milliGRAM(s) IntraMuscular once  glucagon  Injectable 1 milliGRAM(s) IntraMuscular once  glucagon  Injectable 1 milliGRAM(s) IntraMuscular once  insulin glargine Injectable (LANTUS) 20 Unit(s) SubCutaneous at bedtime  insulin lispro (ADMELOG) corrective regimen sliding scale   SubCutaneous three times a day before meals  insulin lispro (ADMELOG) corrective regimen sliding scale   SubCutaneous at bedtime  insulin lispro Injectable (ADMELOG) 3 Unit(s) SubCutaneous before breakfast  insulin lispro Injectable (ADMELOG) 3 Unit(s) SubCutaneous before dinner  insulin lispro Injectable (ADMELOG) 3 Unit(s) SubCutaneous before lunch  melatonin 6 milliGRAM(s) Oral at bedtime  polyethylene glycol 3350 17 Gram(s) Oral daily  senna 2 Tablet(s) Oral at bedtime  sodium chloride 0.65% Nasal 1 Spray(s) Both Nostrils four times a day  tamsulosin 0.4 milliGRAM(s) Oral with breakfast    MEDICATIONS  (PRN):  dextrose Oral Gel 15 Gram(s) Oral once PRN Blood Glucose LESS THAN 70 milliGRAM(s)/deciliter  dextrose Oral Gel 15 Gram(s) Oral once PRN Blood Glucose LESS THAN 70 milliGRAM(s)/deciliter  oxyCODONE    Solution 5 milliGRAM(s) Oral every 4 hours PRN Moderate Pain (4 - 6)    Labs:                        8.4    9.84  )-----------( 443      ( 20 Nov 2023 05:42 )             27.0       11-20    138  |  100  |  9   ----------------------------<  136<H>  3.8   |  27  |  0.50    Ca    9.0      20 Nov 2023 05:42  Phos  2.9     11-20  Mg     2.00     11-20        Physical Exam:  Gen: AAOx3, pt not in acute distress  ABIDA: NC/AT, R anterior neck staple line c/d/i, sutures intact  Maxillofacial: intraoral flap pink, good cap refill without dehiscence, suture site c/i/hemostatic  : voiding freely  Ext: L forearm wrapped w/ ACE Bandage, LLE STSG donor site intact

## 2023-11-24 PROBLEM — Z86.69 PERSONAL HISTORY OF OTHER DISEASES OF THE NERVOUS SYSTEM AND SENSE ORGANS: Chronic | Status: ACTIVE | Noted: 2023-11-03

## 2023-11-24 PROBLEM — R29.6 REPEATED FALLS: Chronic | Status: ACTIVE | Noted: 2023-11-03

## 2023-11-24 PROBLEM — Z86.79 PERSONAL HISTORY OF OTHER DISEASES OF THE CIRCULATORY SYSTEM: Chronic | Status: ACTIVE | Noted: 2023-11-03

## 2023-11-24 PROBLEM — H91.91 UNSPECIFIED HEARING LOSS, RIGHT EAR: Chronic | Status: ACTIVE | Noted: 2023-11-03

## 2023-11-24 PROBLEM — G51.9 DISORDER OF FACIAL NERVE, UNSPECIFIED: Chronic | Status: ACTIVE | Noted: 2023-11-03

## 2023-11-24 PROBLEM — C41.0 MALIGNANT NEOPLASM OF BONES OF SKULL AND FACE: Chronic | Status: ACTIVE | Noted: 2023-11-03

## 2023-11-25 ENCOUNTER — EMERGENCY (EMERGENCY)
Facility: HOSPITAL | Age: 73
LOS: 1 days | Discharge: HOME | End: 2023-11-25
Attending: STUDENT IN AN ORGANIZED HEALTH CARE EDUCATION/TRAINING PROGRAM
Payer: MEDICARE

## 2023-11-25 VITALS
TEMPERATURE: 98 F | HEIGHT: 72 IN | OXYGEN SATURATION: 100 % | WEIGHT: 154.98 LBS | DIASTOLIC BLOOD PRESSURE: 56 MMHG | HEART RATE: 80 BPM | RESPIRATION RATE: 18 BRPM | SYSTOLIC BLOOD PRESSURE: 108 MMHG

## 2023-11-25 DIAGNOSIS — Z98.61 CORONARY ANGIOPLASTY STATUS: Chronic | ICD-10-CM

## 2023-11-25 DIAGNOSIS — Z98.49 CATARACT EXTRACTION STATUS, UNSPECIFIED EYE: Chronic | ICD-10-CM

## 2023-11-25 DIAGNOSIS — Z98.890 OTHER SPECIFIED POSTPROCEDURAL STATES: Chronic | ICD-10-CM

## 2023-11-25 LAB
ALBUMIN SERPL ELPH-MCNC: 3 G/DL — LOW (ref 3.3–5.2)
ALBUMIN SERPL ELPH-MCNC: 3 G/DL — LOW (ref 3.3–5.2)
ALP SERPL-CCNC: 88 U/L — SIGNIFICANT CHANGE UP (ref 40–120)
ALP SERPL-CCNC: 88 U/L — SIGNIFICANT CHANGE UP (ref 40–120)
ALT FLD-CCNC: 8 U/L — SIGNIFICANT CHANGE UP
ALT FLD-CCNC: 8 U/L — SIGNIFICANT CHANGE UP
ANION GAP SERPL CALC-SCNC: 11 MMOL/L — SIGNIFICANT CHANGE UP (ref 5–17)
ANION GAP SERPL CALC-SCNC: 11 MMOL/L — SIGNIFICANT CHANGE UP (ref 5–17)
APTT BLD: 33.1 SEC — SIGNIFICANT CHANGE UP (ref 24.5–35.6)
APTT BLD: 33.1 SEC — SIGNIFICANT CHANGE UP (ref 24.5–35.6)
AST SERPL-CCNC: 9 U/L — SIGNIFICANT CHANGE UP
AST SERPL-CCNC: 9 U/L — SIGNIFICANT CHANGE UP
BASOPHILS # BLD AUTO: 0.06 K/UL — SIGNIFICANT CHANGE UP (ref 0–0.2)
BASOPHILS # BLD AUTO: 0.06 K/UL — SIGNIFICANT CHANGE UP (ref 0–0.2)
BASOPHILS NFR BLD AUTO: 0.6 % — SIGNIFICANT CHANGE UP (ref 0–2)
BASOPHILS NFR BLD AUTO: 0.6 % — SIGNIFICANT CHANGE UP (ref 0–2)
BILIRUB SERPL-MCNC: 0.3 MG/DL — LOW (ref 0.4–2)
BILIRUB SERPL-MCNC: 0.3 MG/DL — LOW (ref 0.4–2)
BUN SERPL-MCNC: 13.4 MG/DL — SIGNIFICANT CHANGE UP (ref 8–20)
BUN SERPL-MCNC: 13.4 MG/DL — SIGNIFICANT CHANGE UP (ref 8–20)
CALCIUM SERPL-MCNC: 8.9 MG/DL — SIGNIFICANT CHANGE UP (ref 8.4–10.5)
CALCIUM SERPL-MCNC: 8.9 MG/DL — SIGNIFICANT CHANGE UP (ref 8.4–10.5)
CHLORIDE SERPL-SCNC: 101 MMOL/L — SIGNIFICANT CHANGE UP (ref 96–108)
CHLORIDE SERPL-SCNC: 101 MMOL/L — SIGNIFICANT CHANGE UP (ref 96–108)
CK SERPL-CCNC: 37 U/L — SIGNIFICANT CHANGE UP (ref 30–200)
CK SERPL-CCNC: 37 U/L — SIGNIFICANT CHANGE UP (ref 30–200)
CO2 SERPL-SCNC: 24 MMOL/L — SIGNIFICANT CHANGE UP (ref 22–29)
CO2 SERPL-SCNC: 24 MMOL/L — SIGNIFICANT CHANGE UP (ref 22–29)
CREAT SERPL-MCNC: 0.51 MG/DL — SIGNIFICANT CHANGE UP (ref 0.5–1.3)
CREAT SERPL-MCNC: 0.51 MG/DL — SIGNIFICANT CHANGE UP (ref 0.5–1.3)
EGFR: 108 ML/MIN/1.73M2 — SIGNIFICANT CHANGE UP
EGFR: 108 ML/MIN/1.73M2 — SIGNIFICANT CHANGE UP
EOSINOPHIL # BLD AUTO: 0.02 K/UL — SIGNIFICANT CHANGE UP (ref 0–0.5)
EOSINOPHIL # BLD AUTO: 0.02 K/UL — SIGNIFICANT CHANGE UP (ref 0–0.5)
EOSINOPHIL NFR BLD AUTO: 0.2 % — SIGNIFICANT CHANGE UP (ref 0–6)
EOSINOPHIL NFR BLD AUTO: 0.2 % — SIGNIFICANT CHANGE UP (ref 0–6)
GLUCOSE BLDC GLUCOMTR-MCNC: 194 MG/DL — HIGH (ref 70–99)
GLUCOSE BLDC GLUCOMTR-MCNC: 194 MG/DL — HIGH (ref 70–99)
GLUCOSE SERPL-MCNC: 197 MG/DL — HIGH (ref 70–99)
GLUCOSE SERPL-MCNC: 197 MG/DL — HIGH (ref 70–99)
HCT VFR BLD CALC: 26.6 % — LOW (ref 39–50)
HCT VFR BLD CALC: 26.6 % — LOW (ref 39–50)
HGB BLD-MCNC: 8.3 G/DL — LOW (ref 13–17)
HGB BLD-MCNC: 8.3 G/DL — LOW (ref 13–17)
IMM GRANULOCYTES NFR BLD AUTO: 0.3 % — SIGNIFICANT CHANGE UP (ref 0–0.9)
IMM GRANULOCYTES NFR BLD AUTO: 0.3 % — SIGNIFICANT CHANGE UP (ref 0–0.9)
INR BLD: 1.13 RATIO — SIGNIFICANT CHANGE UP (ref 0.85–1.18)
INR BLD: 1.13 RATIO — SIGNIFICANT CHANGE UP (ref 0.85–1.18)
LACTATE BLDV-MCNC: 1.7 MMOL/L — SIGNIFICANT CHANGE UP (ref 0.5–2)
LACTATE BLDV-MCNC: 1.7 MMOL/L — SIGNIFICANT CHANGE UP (ref 0.5–2)
LYMPHOCYTES # BLD AUTO: 0.92 K/UL — LOW (ref 1–3.3)
LYMPHOCYTES # BLD AUTO: 0.92 K/UL — LOW (ref 1–3.3)
LYMPHOCYTES # BLD AUTO: 9.3 % — LOW (ref 13–44)
LYMPHOCYTES # BLD AUTO: 9.3 % — LOW (ref 13–44)
MAGNESIUM SERPL-MCNC: 1.9 MG/DL — SIGNIFICANT CHANGE UP (ref 1.6–2.6)
MAGNESIUM SERPL-MCNC: 1.9 MG/DL — SIGNIFICANT CHANGE UP (ref 1.6–2.6)
MCHC RBC-ENTMCNC: 26.3 PG — LOW (ref 27–34)
MCHC RBC-ENTMCNC: 26.3 PG — LOW (ref 27–34)
MCHC RBC-ENTMCNC: 31.2 GM/DL — LOW (ref 32–36)
MCHC RBC-ENTMCNC: 31.2 GM/DL — LOW (ref 32–36)
MCV RBC AUTO: 84.4 FL — SIGNIFICANT CHANGE UP (ref 80–100)
MCV RBC AUTO: 84.4 FL — SIGNIFICANT CHANGE UP (ref 80–100)
MONOCYTES # BLD AUTO: 0.41 K/UL — SIGNIFICANT CHANGE UP (ref 0–0.9)
MONOCYTES # BLD AUTO: 0.41 K/UL — SIGNIFICANT CHANGE UP (ref 0–0.9)
MONOCYTES NFR BLD AUTO: 4.2 % — SIGNIFICANT CHANGE UP (ref 2–14)
MONOCYTES NFR BLD AUTO: 4.2 % — SIGNIFICANT CHANGE UP (ref 2–14)
NEUTROPHILS # BLD AUTO: 8.41 K/UL — HIGH (ref 1.8–7.4)
NEUTROPHILS # BLD AUTO: 8.41 K/UL — HIGH (ref 1.8–7.4)
NEUTROPHILS NFR BLD AUTO: 85.4 % — HIGH (ref 43–77)
NEUTROPHILS NFR BLD AUTO: 85.4 % — HIGH (ref 43–77)
PLATELET # BLD AUTO: 500 K/UL — HIGH (ref 150–400)
PLATELET # BLD AUTO: 500 K/UL — HIGH (ref 150–400)
POTASSIUM SERPL-MCNC: 4.2 MMOL/L — SIGNIFICANT CHANGE UP (ref 3.5–5.3)
POTASSIUM SERPL-MCNC: 4.2 MMOL/L — SIGNIFICANT CHANGE UP (ref 3.5–5.3)
POTASSIUM SERPL-SCNC: 4.2 MMOL/L — SIGNIFICANT CHANGE UP (ref 3.5–5.3)
POTASSIUM SERPL-SCNC: 4.2 MMOL/L — SIGNIFICANT CHANGE UP (ref 3.5–5.3)
PROT SERPL-MCNC: 6.7 G/DL — SIGNIFICANT CHANGE UP (ref 6.6–8.7)
PROT SERPL-MCNC: 6.7 G/DL — SIGNIFICANT CHANGE UP (ref 6.6–8.7)
PROTHROM AB SERPL-ACNC: 12.5 SEC — SIGNIFICANT CHANGE UP (ref 9.5–13)
PROTHROM AB SERPL-ACNC: 12.5 SEC — SIGNIFICANT CHANGE UP (ref 9.5–13)
RBC # BLD: 3.15 M/UL — LOW (ref 4.2–5.8)
RBC # BLD: 3.15 M/UL — LOW (ref 4.2–5.8)
RBC # FLD: 14.8 % — HIGH (ref 10.3–14.5)
RBC # FLD: 14.8 % — HIGH (ref 10.3–14.5)
SODIUM SERPL-SCNC: 136 MMOL/L — SIGNIFICANT CHANGE UP (ref 135–145)
SODIUM SERPL-SCNC: 136 MMOL/L — SIGNIFICANT CHANGE UP (ref 135–145)
TROPONIN T, HIGH SENSITIVITY RESULT: 12 NG/L — SIGNIFICANT CHANGE UP (ref 0–51)
TROPONIN T, HIGH SENSITIVITY RESULT: 12 NG/L — SIGNIFICANT CHANGE UP (ref 0–51)
WBC # BLD: 9.85 K/UL — SIGNIFICANT CHANGE UP (ref 3.8–10.5)
WBC # BLD: 9.85 K/UL — SIGNIFICANT CHANGE UP (ref 3.8–10.5)
WBC # FLD AUTO: 9.85 K/UL — SIGNIFICANT CHANGE UP (ref 3.8–10.5)
WBC # FLD AUTO: 9.85 K/UL — SIGNIFICANT CHANGE UP (ref 3.8–10.5)

## 2023-11-25 PROCEDURE — 70450 CT HEAD/BRAIN W/O DYE: CPT | Mod: 26,MA

## 2023-11-25 PROCEDURE — 93010 ELECTROCARDIOGRAM REPORT: CPT

## 2023-11-25 PROCEDURE — 71045 X-RAY EXAM CHEST 1 VIEW: CPT | Mod: 26

## 2023-11-25 PROCEDURE — 71275 CT ANGIOGRAPHY CHEST: CPT | Mod: 26,MA

## 2023-11-25 PROCEDURE — 72125 CT NECK SPINE W/O DYE: CPT | Mod: 26,MA

## 2023-11-25 PROCEDURE — 99233 SBSQ HOSP IP/OBS HIGH 50: CPT

## 2023-11-25 PROCEDURE — 99223 1ST HOSP IP/OBS HIGH 75: CPT

## 2023-11-25 PROCEDURE — 70486 CT MAXILLOFACIAL W/O DYE: CPT | Mod: 26,MA

## 2023-11-25 RX ORDER — DEXTROSE 50 % IN WATER 50 %
25 SYRINGE (ML) INTRAVENOUS ONCE
Refills: 0 | Status: DISCONTINUED | OUTPATIENT
Start: 2023-11-25 | End: 2023-12-02

## 2023-11-25 RX ORDER — SODIUM CHLORIDE 9 MG/ML
1000 INJECTION, SOLUTION INTRAVENOUS
Refills: 0 | Status: DISCONTINUED | OUTPATIENT
Start: 2023-11-25 | End: 2023-12-02

## 2023-11-25 RX ORDER — DEXTROSE 50 % IN WATER 50 %
15 SYRINGE (ML) INTRAVENOUS ONCE
Refills: 0 | Status: DISCONTINUED | OUTPATIENT
Start: 2023-11-25 | End: 2023-12-02

## 2023-11-25 RX ORDER — ATORVASTATIN CALCIUM 80 MG/1
20 TABLET, FILM COATED ORAL AT BEDTIME
Refills: 0 | Status: DISCONTINUED | OUTPATIENT
Start: 2023-11-25 | End: 2023-12-02

## 2023-11-25 RX ORDER — GLUCAGON INJECTION, SOLUTION 0.5 MG/.1ML
1 INJECTION, SOLUTION SUBCUTANEOUS ONCE
Refills: 0 | Status: DISCONTINUED | OUTPATIENT
Start: 2023-11-25 | End: 2023-12-02

## 2023-11-25 RX ORDER — OXYCODONE HYDROCHLORIDE 5 MG/1
5 TABLET ORAL EVERY 6 HOURS
Refills: 0 | Status: DISCONTINUED | OUTPATIENT
Start: 2023-11-25 | End: 2023-11-25

## 2023-11-25 RX ORDER — MIDODRINE HYDROCHLORIDE 2.5 MG/1
5 TABLET ORAL THREE TIMES A DAY
Refills: 0 | Status: DISCONTINUED | OUTPATIENT
Start: 2023-11-25 | End: 2023-12-02

## 2023-11-25 RX ORDER — METFORMIN HYDROCHLORIDE 850 MG/1
1000 TABLET ORAL
Refills: 0 | Status: DISCONTINUED | OUTPATIENT
Start: 2023-11-25 | End: 2023-12-02

## 2023-11-25 RX ORDER — SODIUM CHLORIDE 9 MG/ML
1000 INJECTION INTRAMUSCULAR; INTRAVENOUS; SUBCUTANEOUS ONCE
Refills: 0 | Status: COMPLETED | OUTPATIENT
Start: 2023-11-25 | End: 2023-11-25

## 2023-11-25 RX ORDER — DEXTROSE 50 % IN WATER 50 %
12.5 SYRINGE (ML) INTRAVENOUS ONCE
Refills: 0 | Status: DISCONTINUED | OUTPATIENT
Start: 2023-11-25 | End: 2023-12-02

## 2023-11-25 RX ORDER — TAMSULOSIN HYDROCHLORIDE 0.4 MG/1
0.4 CAPSULE ORAL AT BEDTIME
Refills: 0 | Status: DISCONTINUED | OUTPATIENT
Start: 2023-11-25 | End: 2023-12-02

## 2023-11-25 RX ORDER — LINAGLIPTIN 5 MG/1
5 TABLET, FILM COATED ORAL DAILY
Refills: 0 | Status: DISCONTINUED | OUTPATIENT
Start: 2023-11-25 | End: 2023-12-02

## 2023-11-25 RX ADMIN — ATORVASTATIN CALCIUM 20 MILLIGRAM(S): 80 TABLET, FILM COATED ORAL at 22:04

## 2023-11-25 RX ADMIN — TAMSULOSIN HYDROCHLORIDE 0.4 MILLIGRAM(S): 0.4 CAPSULE ORAL at 22:04

## 2023-11-25 RX ADMIN — METFORMIN HYDROCHLORIDE 1000 MILLIGRAM(S): 850 TABLET ORAL at 22:04

## 2023-11-25 RX ADMIN — LINAGLIPTIN 5 MILLIGRAM(S): 5 TABLET, FILM COATED ORAL at 22:04

## 2023-11-25 RX ADMIN — SODIUM CHLORIDE 1000 MILLILITER(S): 9 INJECTION INTRAMUSCULAR; INTRAVENOUS; SUBCUTANEOUS at 09:54

## 2023-11-25 NOTE — ED CDU PROVIDER INITIAL DAY NOTE - OBJECTIVE STATEMENT
73-year-old male with history of  htn, hld, cad with 2 stents, diabetes on Janumet, recent right jaw tumor resection with bone grafting from the left forearm and skin grafting from the left thigh at [facility] by Dr. Smith 1 week ago presents with episode of syncope at home and fell.  Patient states that his fingerstick was 53 to his knowledge.  Patient works patient lives with his wife and she was present at home when this happened.  Patient states that he has not been eating and drinking well.

## 2023-11-25 NOTE — ED PROVIDER NOTE - PROGRESS NOTE DETAILS
Patient was seen by cardiology and symptoms likely related to dehydration and clear liquid diet.  Patient well-hydrated in the ED pending echo.  Wife has concerns that she cannot take care of him we will also get him seen by social work placed in obs

## 2023-11-25 NOTE — ED PROVIDER NOTE - PHYSICAL EXAMINATION
Patient is alert dehydrated appearing male with obvious postsurgical changes on the right face and the right neck and chronic left eye drooping s/p Cataract repair per patient.    S1-S2 is normal regular,   bilateral clear breath sounds,   abdomen is soft nontender nondistended,   neuroexam is alert oriented x 3 no focal deficits,   skin warm dry poor turgor,   left forearm is wrapped in bandage after the bone grafting and patient has an IV line placed by EMS on the right forearm,   airway is patent   there is no obvious discharge on the right face and neck from the surgical site

## 2023-11-25 NOTE — ED ADULT NURSE NOTE - CHIEF COMPLAINT QUOTE
Pt BIBA s/p syncopal episode at home after standing up to go to the bathroom.  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  Surgical site noted to right neck.  Pt received 250cc IVF NS for SBP 90's.

## 2023-11-25 NOTE — ED PROVIDER NOTE - OBJECTIVE STATEMENT
73-year-old male with history of  htn, hld, cad with 2 stents, diabetes on Janumet, recent right jaw tumor resection with bone grafting from the left forearm and skin grafting from the left thigh at [facility] by Dr. Smith 1 week ago presents with episode of syncope at home and fell.  Patient states that his fingerstick was 53 to his knowledge.  Patient works patient lives with his wife and she was present at home when this happened.  Patient states that he has not been eating and drinking well.

## 2023-11-25 NOTE — CONSULT NOTE ADULT - SUBJECTIVE AND OBJECTIVE BOX
[facility]                                              CARDIOLOGY AT [address], [place] [address]                                             Telephone: [phone]. Fax:[phone]                                                       CARDIOLOGY CONSULTATION NOTE                                                                                             History obtained by: Patient and medical record  Community Cardiologist:    obtained: Yes [  ] No [x  ]  Reason for Consultation: syncope  Available out pt records reviewed: Yes [  ] No [x  ]    Chief complaint:    Patient is a 73y old  Male who presents with a chief complaint of fall.        HPI:    73-year-old male with history of  htn, hld, cad with 2 stents, diabetes on Janumet, recent right jaw tumor resection with bone grafting from the left forearm and skin grafting from the left thigh at [facility] by Dr. Smith 1 week ago presents with episode of syncope at home and fell.  Patient states that his fingerstick was 53 to his knowledge.  Patient works patient lives with his wife and she was present at home when this happened.  Patient states that he has not been eating and drinking well. Just started eating mashed foods post jaw surgery.    Patient states he did not eat this morning. He was using the bathroom and then was walking to the living room when his wife told him to sit on the side of the couch because he was shaking. He reports he then could not keep his balance on the side of the couch and fall forward. He reports he never lost consciousness. Denies chest pain/pressure, palpitations, irregular and/or rapid heart beat, SOB, SCHULTZ, syncope/near syncope, dizziness, orthopnea, PND, cough, edema, f/c, n/v/d, hematuria, or hematochezia.    He reports he underwent full cardiac workup prior to his jaw surgery by his primary cardiologist in [place]. He reports that he did undergo stenting in 2021 after a STEMI. No equivalent symptoms since his STEMI.        CARDIAC TESTING   ECHO: not available    STRESS: not available    CATH: Per EMR, hx of RCA  and LAD PCI 2021 Dr. 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    Acoustic neuroma    Facial nerve disorder    Blindness of left eye    Unsteady gait    CAD (coronary artery disease)    Hearing loss, right    H/O neuropathy        PAST SURGICAL HISTORY  S/P excision of acoustic neuroma    S/P cataract surgery    S/P coronary angioplasty        SOCIAL HISTORY:  Denies smoking/alcohol/drugs  CIGARETTES:     ALCOHOL:  DRUGS:    FAMILY HISTORY:  Family history of CABG (Sibling)    Family history of diabetes mellitus (DM) (Sibling, Mother, Sibling)      Family History of Cardiovascular Disease:  Yes [  ] No [  ]  Coronary Artery Disease in first degree relative: Yes [  ] No [  ]  Sudden Cardiac Death in First degree relative: Yes [  ] No [  ]    HOME MEDICATIONS:  Flomax 0.4 mg oral capsule: 1 cap(s) orally once a day (10 Nov 2023 07:25)  Janumet 50 mg-1000 mg oral tablet: 1 tab(s) orally 2 times a day (resume on 3/27) (10 Nov 2023 07:25)      CURRENT CARDIAC MEDICATIONS:      CURRENT OTHER MEDICATIONS:      ALLERGIES:   No Known Allergies      REVIEW OF SYMPTOMS:   CONSTITUTIONAL: No fever, no chills, no weight loss, no weight gain, no fatigue   ENMT:  No vertigo; No sinus or throat pain  NECK: No pain or stiffness  CARDIOVASCULAR: No chest pain, no dyspnea, no syncope/presyncope, no palpitations, no dizziness, no Orthopnea, no Paroxsymal nocturnal dyspnea  RESPIRATORY: no Shortness of breath, no cough, no wheezing  : No dysuria, no hematuria   GI: No dark color stool, no nausea, no diarrhea, no constipation, no abdominal pain   NEURO: No headache, no slurred speech   MUSCULOSKELETAL: No joint pain or swelling; No muscle, back, or extremity pain  PSYCH: No agitation, no anxiety.    ALL OTHER REVIEW OF SYSTEMS ARE NEGATIVE.    VITAL SIGNS:  T(C): 36.6 (11-25-23 @ 15:32), Max: 36.6 (11-25-23 @ 15:32)  T(F): 97.8 (11-25-23 @ 15:32), Max: 97.8 (11-25-23 @ 15:32)  HR: 79 (11-25-23 @ 15:32) (78 - 80)  BP: 133/67 (11-25-23 @ 15:32) (98/57 - 133/67)  RR: 20 (11-25-23 @ 15:32) (18 - 20)  SpO2: 100% (11-25-23 @ 15:32) (100% - 100%)    INTAKE AND OUTPUT:       PHYSICAL EXAM:  Constitutional: Comfortable . No acute distress.   HEENT: traumatic, sutures, edema, blood noted.  CNS: A&Ox3. No focal deficits.   Respiratory: CTAB, unlabored   Cardiovascular: RRR normal s1 s2. No murmur. No rubs or gallop.  Gastrointestinal: Soft, non-tender. +Bowel sounds.   Extremities: 2+ Peripheral Pulses, left extremity arm gauze.  Psychiatric: Calm . no agitation.   Skin: Warm and dry, no ulcers on extremities     LABS:  ( 25 Nov 2023 09:19 )  Troponin T  X    ,  CPK  37   , CKMB  X    , BNP X                                  8.3    9.85  )-----------( 500      ( 25 Nov 2023 09:19 )             26.6     11-25    136  |  101  |  13.4  ----------------------------<  197<H>  4.2   |  24.0  |  0.51    Ca    8.9      25 Nov 2023 09:19  Mg     1.9     11-25    TPro  6.7  /  Alb  3.0<L>  /  TBili  0.3<L>  /  DBili  x   /  AST  9   /  ALT  8   /  AlkPhos  88  11-25    PT/INR - ( 25 Nov 2023 09:19 )   PT: 12.5 sec;   INR: 1.13 ratio         PTT - ( 25 Nov 2023 09:19 )  PTT:33.1 sec  Urinalysis Basic - ( 25 Nov 2023 09:19 )    Color: x / Appearance: x / SG: x / pH: x  Gluc: 197 mg/dL / Ketone: x  / Bili: x / Urobili: x   Blood: x / Protein: x / Nitrite: x   Leuk Esterase: x / RBC: x / WBC x   Sq Epi: x / Non Sq Epi: x / Bacteria: x              INTERPRETATION OF TELEMETRY:     ECG:   Prior ECG: Yes [x  ] No [  ]  sinus rhythm    RADIOLOGY & ADDITIONAL STUDIES: n/a   X-ray:    CT scan:   MRI:   US:

## 2023-11-25 NOTE — ED CDU PROVIDER INITIAL DAY NOTE - ENMT NEGATIVE STATEMENT, MLM
Ears: no ear pain and no hearing problems. Nose: no nasal congestion and no nasal drainage. Mouth/Throat: no dysphagia, no hoarseness and no throat pain. Neck: no lumps, no pain, no stiffness and no swollen glands
Statement Selected

## 2023-11-25 NOTE — CONSULT NOTE ADULT - ASSESSMENT
73-year-old male with history of  htn, hld, cad with 2 stents, diabetes on Janumet, recent right jaw tumor resection with bone grafting from the left forearm and skin grafting from the left thigh at [facility] by Dr. Smith 1 week ago presents with episode of syncope at home and fell.  Patient states that his fingerstick was 53 to his knowledge.  Patient works patient lives with his wife and she was present at home when this happened.  Patient states that he has not been eating and drinking well. Just started eating mashed foods post jaw surgery.    Hypoglycemia  near syncope  mechanical fall  hx of coronary artery disease s/p PCI x 2 LAD 2021  Hypertension  Hyperlipidemia  type 2 DM  recent Right JAw bone grafting      Patient presents after fall. There is no EKG to review.  On telemetry he is sinus rhythm, his BP is appropriate.  His labs are appropriate.  He said it was due to poor nutrition and hypoglycemia of 52. He never lost consciousness. He never had cardiac symptoms.  He examines warm and euvolemic.    obtain a 2D TTE for review. Obtain records from outpatient cardiologist (difficult due to weekend).    If no abnormalities will follow peripherally.  Will await 2D TTE.

## 2023-11-25 NOTE — ED PROVIDER NOTE - CLINICAL SUMMARY MEDICAL DECISION MAKING FREE TEXT BOX
Patient likely had a syncope will do EKG which was normal initially rule out ACS will check troponin and electrolytes and do a chest x-ray given he has a recent surgery we will also rule out PE.  Will do a CT head neck and max face given recent surgery in the fall.

## 2023-11-25 NOTE — ED CDU PROVIDER INITIAL DAY NOTE - ATTENDING APP SHARED VISIT CONTRIBUTION OF CARE
I agree with the PA's note and was available for any issues/concerns. I was directly involved in patient care. My brief overall assessment is as follows:    73 year old male multiple PMHx c/o syncope; initial work up and cardiology assessment noted; admit to obs for further work up

## 2023-11-25 NOTE — ED ADULT NURSE NOTE - NSFALLUNIVINTERV_ED_ALL_ED
Bed/Stretcher in lowest position, wheels locked, appropriate side rails in place/Call bell, personal items and telephone in reach/Instruct patient to call for assistance before getting out of bed/chair/stretcher/Non-slip footwear applied when patient is off stretcher/[place] to call system/Physically safe environment - no spills, clutter or unnecessary equipment/Purposeful proactive rounding/Room/bathroom lighting operational, light cord in reach

## 2023-11-25 NOTE — ED ADULT NURSE REASSESSMENT NOTE - NS ED NURSE REASSESS COMMENT FT1
Assumed care of pt. Pt connected to cardiac monitor and pulse ox. VSS. Airway patent, respirations even unlabored. Pt awaiting Echo.

## 2023-11-25 NOTE — ED CDU PROVIDER INITIAL DAY NOTE - CLINICAL SUMMARY MEDICAL DECISION MAKING FREE TEXT BOX
73-year-old male with history of  htn, hld, cad with 2 stents, diabetes on Janumet, recent right jaw tumor resection with bone grafting from the left forearm and skin grafting from the left thigh at [facility] by Dr. Smith 1 week ago presents with episode of syncope at home and fell.     Hypoglycemia  - pt was on insulin while in ED, on janumet at home  - Fingerstick has been stable  - will hold off on insulin at this time due to hypoglycemia  - continue on janumet  - finger sticks q 4 hours    Syncope, CAD  - hx of hypotension, was recently started on midodrine 5 mg tid as needed  - echo  -cardio following  - trops stable    HLD  - continue on atorvastatin    Recent jaw tumor resection with grafting  - Grafting sites don't appear to be infected  - Continue on pureed diet, has been decreased po intake due to pain  - Continue on oxycodone as needed    BPH  - Continue on flomax at night

## 2023-11-25 NOTE — ED ADULT TRIAGE NOTE - CHIEF COMPLAINT QUOTE
Pt BIBA s/p syncopal episode at home after standing up to go to the bathroom.  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  Surgical site noted to right neck.  Pt received 250cc IVF NS for  Pt BIBA s/p syncopal episode at home after standing up to go to the bathroom.  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  Surgical site noted to right neck.  Pt received 250cc IVF NS for SBP 90's.

## 2023-11-26 VITALS
SYSTOLIC BLOOD PRESSURE: 155 MMHG | RESPIRATION RATE: 18 BRPM | OXYGEN SATURATION: 100 % | HEART RATE: 84 BPM | DIASTOLIC BLOOD PRESSURE: 79 MMHG | TEMPERATURE: 98 F

## 2023-11-26 LAB
GLUCOSE BLDC GLUCOMTR-MCNC: 175 MG/DL — HIGH (ref 70–99)
GLUCOSE BLDC GLUCOMTR-MCNC: 175 MG/DL — HIGH (ref 70–99)
GLUCOSE BLDC GLUCOMTR-MCNC: 191 MG/DL — HIGH (ref 70–99)
GLUCOSE BLDC GLUCOMTR-MCNC: 191 MG/DL — HIGH (ref 70–99)

## 2023-11-26 PROCEDURE — 80053 COMPREHEN METABOLIC PANEL: CPT

## 2023-11-26 PROCEDURE — 99239 HOSP IP/OBS DSCHRG MGMT >30: CPT

## 2023-11-26 PROCEDURE — G0378: CPT

## 2023-11-26 PROCEDURE — 72125 CT NECK SPINE W/O DYE: CPT | Mod: MA

## 2023-11-26 PROCEDURE — 83735 ASSAY OF MAGNESIUM: CPT

## 2023-11-26 PROCEDURE — 70547 MR ANGIOGRAPHY NECK W/O DYE: CPT | Mod: MA

## 2023-11-26 PROCEDURE — 70450 CT HEAD/BRAIN W/O DYE: CPT | Mod: MA

## 2023-11-26 PROCEDURE — 82550 ASSAY OF CK (CPK): CPT

## 2023-11-26 PROCEDURE — 85610 PROTHROMBIN TIME: CPT

## 2023-11-26 PROCEDURE — 85730 THROMBOPLASTIN TIME PARTIAL: CPT

## 2023-11-26 PROCEDURE — 70544 MR ANGIOGRAPHY HEAD W/O DYE: CPT | Mod: 26,59,MA

## 2023-11-26 PROCEDURE — 99285 EMERGENCY DEPT VISIT HI MDM: CPT | Mod: 25

## 2023-11-26 PROCEDURE — 71045 X-RAY EXAM CHEST 1 VIEW: CPT

## 2023-11-26 PROCEDURE — 83605 ASSAY OF LACTIC ACID: CPT

## 2023-11-26 PROCEDURE — 70551 MRI BRAIN STEM W/O DYE: CPT | Mod: MA

## 2023-11-26 PROCEDURE — 70551 MRI BRAIN STEM W/O DYE: CPT | Mod: 26,MA

## 2023-11-26 PROCEDURE — 85025 COMPLETE CBC W/AUTO DIFF WBC: CPT

## 2023-11-26 PROCEDURE — 70547 MR ANGIOGRAPHY NECK W/O DYE: CPT | Mod: 26,MA

## 2023-11-26 PROCEDURE — 71275 CT ANGIOGRAPHY CHEST: CPT | Mod: MA

## 2023-11-26 PROCEDURE — 70544 MR ANGIOGRAPHY HEAD W/O DYE: CPT | Mod: MA

## 2023-11-26 PROCEDURE — 84484 ASSAY OF TROPONIN QUANT: CPT

## 2023-11-26 PROCEDURE — 36415 COLL VENOUS BLD VENIPUNCTURE: CPT

## 2023-11-26 PROCEDURE — 93005 ELECTROCARDIOGRAM TRACING: CPT

## 2023-11-26 PROCEDURE — 93306 TTE W/DOPPLER COMPLETE: CPT

## 2023-11-26 PROCEDURE — 93306 TTE W/DOPPLER COMPLETE: CPT | Mod: 26

## 2023-11-26 PROCEDURE — 82962 GLUCOSE BLOOD TEST: CPT

## 2023-11-26 PROCEDURE — 70486 CT MAXILLOFACIAL W/O DYE: CPT | Mod: MA

## 2023-11-26 RX ADMIN — METFORMIN HYDROCHLORIDE 1000 MILLIGRAM(S): 850 TABLET ORAL at 05:36

## 2023-11-26 RX ADMIN — LINAGLIPTIN 5 MILLIGRAM(S): 5 TABLET, FILM COATED ORAL at 14:59

## 2023-11-26 NOTE — ED CDU PROVIDER SUBSEQUENT DAY NOTE - ATTENDING APP SHARED VISIT CONTRIBUTION OF CARE
pt feels well but symptoms more c/w dysequilibrium than syncope. Pt in fact denies ever passing out.  given hx of carotid disease.  will put in for MRI/MRA.

## 2023-11-26 NOTE — ED CDU PROVIDER SUBSEQUENT DAY NOTE - NS ED ROS FT
Gen: denies fever, chills, fatigue, weight loss  Skin: denies rashes, laceration, bruising  HEENT: denies visual changes, ear pain, nasal congestion, throat pain  Respiratory: denies SCHULTZ, SOB, cough, wheezing  Cardiovascular: denies chest pain, palpitations, diaphoresis, LE edema  GI: denies abdominal pain, n/v/d  : denies dysuria, frequency, urgency, bowel/bladder incontinence  MSK: denies joint swelling/pain, back pain, neck pain  Neuro: +near syncope. denies headache, dizziness, weakness, numbness  Psych: denies anxiety, depression, SI/HI, visual/auditory hallucinations

## 2023-11-26 NOTE — PROVIDER CONTACT NOTE (OTHER) - ASSESSMENT
Pt required Mt to get back into bed secondary to c/o lightheadness. BP obtained 112/63, HR 92 SpO2 100%. JARED Toure made aware of pt's episode of lightheadness. Pt otherwise supervision with transfers and ambulation, occasional CGA due to increase postural sway and pt's wide LUCINA. Unable to assess stairs at this time given c/o lightheadness during ambulation.

## 2023-11-26 NOTE — ED CDU PROVIDER SUBSEQUENT DAY NOTE - PHYSICAL EXAMINATION
Gen: No acute distress, non toxic.   HEENT: Mucous membranes moist, pink conjunctivae, chronic left eye drooping s/p Cataract repair per patient.  CV: RRR, nl s1/s2.  Resp: CTAB, normal rate and effort  GI: Abdomen soft, NT, ND. No rebound, no guarding  : No CVAT  Neuro: A&O x 3, moving all 4 extremities  MSK: No spine or joint tenderness to palpation. left forearm is wrapped in bandage after the bone grafting  Skin: with obvious postsurgical changes on the right face and the right neck. No rashes. intact and perfused.   Vascular: Radial and dorsalis pedal pulses 2+ b/l.

## 2023-11-26 NOTE — PHYSICAL THERAPY INITIAL EVALUATION ADULT - GENERAL OBSERVATIONS, REHAB EVAL
Pt received seated at edge of stretcher, breathing on RA in NAD, + tele/, in 0/10 pain, agreeable to PT evaluation

## 2023-11-26 NOTE — PHYSICAL THERAPY INITIAL EVALUATION ADULT - HEALTH SCREEN CRITERIA
-- Message is from the [facility]--    Reason for Call: Patient's mother is calling to request a copy of recent physical. Please call back once ready for .    Caller Information       Type Contact Phone    07/29/2019 11:54 AM Phone (Incoming) ABA STEPHENSON (Mother) [phone] (M)          Alternative phone number: none  messa  Turnaround time given to caller:   \"This message will be sent to [state Provider's name]. The clinical team will fulfill your request as soon as they review your message.\"     yes

## 2023-11-26 NOTE — ED CDU PROVIDER DISPOSITION NOTE - CLINICAL COURSE
72 y/o M with unsteady gait s/p jaw surgery last week - labs, TTE and MRI head normal - stable vitals while in ED - cleared by PT

## 2023-11-26 NOTE — PROVIDER CONTACT NOTE (OTHER) - ACTION/TREATMENT ORDERED:
Goals:  Bed mobility: independent  transfers: independent with LRAD   gait: independent with LRAD 150FT  stairs: TBA when able

## 2023-11-26 NOTE — ED ADULT NURSE REASSESSMENT NOTE - NS ED NURSE REASSESS COMMENT FT1
Assumed care for patient from IVANA Ng. Patient AXOX4. Vss. Patient resting comfortably on the stretcher. RR even and unlabored. No acute distress noted.

## 2023-11-26 NOTE — PROVIDER CONTACT NOTE (OTHER) - SITUATION
Chart reviewed, contents noted, orders received. PT eval completed & documented see note for details. Pt with 0/10 pain pre/post, Returned to stretcher in NAD

## 2023-11-26 NOTE — ED CDU PROVIDER DISPOSITION NOTE - PATIENT PORTAL LINK FT
You can access the FollowMyHealth Patient Portal offered by [facility] by registering at the following website: [email or web address]. By joining [employer]’s FollowMyHealth portal, you will also be able to view your health information using other applications (apps) compatible with our system.

## 2023-11-26 NOTE — ED ADULT NURSE REASSESSMENT NOTE - NS ED NURSE REASSESS COMMENT FT1
Pt comfortably resting at this time denies pain. VSS. Connected to cardiac monitor and pulse ox. Bed locked lowest position.

## 2023-11-26 NOTE — PHYSICAL THERAPY INITIAL EVALUATION ADULT - PERTINENT HX OF CURRENT PROBLEM, REHAB EVAL
73-year-old male with history of  htn, hld, cad with 2 stents, diabetes on Janumet, recent right jaw tumor resection with bone grafting from the left forearm and skin grafting from the left thigh at [facility] by Dr. Smith 1 week ago presents with episode of near syncopal at home. Pt acute pathology on imaging. Cards evaluated pt and recommended TTE.

## 2023-11-26 NOTE — ED CDU PROVIDER SUBSEQUENT DAY NOTE - CLINICAL SUMMARY MEDICAL DECISION MAKING FREE TEXT BOX
73-year-old male with history of  htn, hld, cad with 2 stents, diabetes on Janumet, recent right jaw tumor resection with bone grafting from the left forearm and skin grafting from the left thigh at [facility] by Dr. Smith 1 week ago presents with episode of near syncopal at home. Pt acute pathology on imaging. Cards evaluated pt and recommended TTE.

## 2023-11-26 NOTE — PHYSICAL THERAPY INITIAL EVALUATION ADULT - LEVEL OF INDEPENDENCE: SIT/SUPINE, REHAB EVAL
pt requiring help due to reports of feeling lightheaded post ambulation, BP obtained 112/63, HR 92 SpO2 100% PA made aware/minimum assist (75% patients effort)

## 2023-11-26 NOTE — PHYSICAL THERAPY INITIAL EVALUATION ADULT - ADDITIONAL COMMENTS
Pt reports living in private home with wife and son who are able to assist. Pt reports living in basement has 10 JONY with handrail and no stairs inside. Independent prior to admission. Owns no DME.

## 2023-11-28 ENCOUNTER — APPOINTMENT (OUTPATIENT)
Age: 73
End: 2023-11-28
Payer: MEDICARE

## 2023-11-28 PROCEDURE — 99214 OFFICE O/P EST MOD 30 MIN: CPT | Mod: 25

## 2023-11-30 ENCOUNTER — OUTPATIENT (OUTPATIENT)
Dept: OUTPATIENT SERVICES | Facility: HOSPITAL | Age: 73
LOS: 1 days | Discharge: HOME | End: 2023-11-30
Payer: SELF-PAY

## 2023-11-30 DIAGNOSIS — Z98.61 CORONARY ANGIOPLASTY STATUS: Chronic | ICD-10-CM

## 2023-11-30 DIAGNOSIS — Z98.49 CATARACT EXTRACTION STATUS, UNSPECIFIED EYE: Chronic | ICD-10-CM

## 2023-11-30 DIAGNOSIS — Z98.890 OTHER SPECIFIED POSTPROCEDURAL STATES: Chronic | ICD-10-CM

## 2023-12-03 ENCOUNTER — OUTPATIENT (OUTPATIENT)
Dept: OUTPATIENT SERVICES | Facility: HOSPITAL | Age: 73
LOS: 1 days | Discharge: HOME | End: 2023-12-03

## 2023-12-03 DIAGNOSIS — Z98.49 CATARACT EXTRACTION STATUS, UNSPECIFIED EYE: Chronic | ICD-10-CM

## 2023-12-03 DIAGNOSIS — Z98.61 CORONARY ANGIOPLASTY STATUS: Chronic | ICD-10-CM

## 2023-12-03 DIAGNOSIS — C31.0 MALIGNANT NEOPLASM OF MAXILLARY SINUS: ICD-10-CM

## 2023-12-03 DIAGNOSIS — Z98.890 OTHER SPECIFIED POSTPROCEDURAL STATES: Chronic | ICD-10-CM

## 2023-12-06 ENCOUNTER — APPOINTMENT (OUTPATIENT)
Dept: RADIATION ONCOLOGY | Facility: CLINIC | Age: 73
End: 2023-12-06
Payer: MEDICARE

## 2023-12-06 ENCOUNTER — APPOINTMENT (OUTPATIENT)
Dept: HEMATOLOGY ONCOLOGY | Facility: CLINIC | Age: 73
End: 2023-12-06
Payer: MEDICARE

## 2023-12-06 ENCOUNTER — RESULT REVIEW (OUTPATIENT)
Age: 73
End: 2023-12-06

## 2023-12-06 VITALS
SYSTOLIC BLOOD PRESSURE: 106 MMHG | HEIGHT: 72 IN | TEMPERATURE: 98.1 F | DIASTOLIC BLOOD PRESSURE: 65 MMHG | WEIGHT: 134.04 LBS | BODY MASS INDEX: 18.16 KG/M2 | OXYGEN SATURATION: 100 % | HEART RATE: 75 BPM

## 2023-12-06 DIAGNOSIS — Z86.018 PERSONAL HISTORY OF OTHER BENIGN NEOPLASM: ICD-10-CM

## 2023-12-06 DIAGNOSIS — H91.91 UNSPECIFIED HEARING LOSS, RIGHT EAR: ICD-10-CM

## 2023-12-06 DIAGNOSIS — H54.62 UNQUALIFIED VISUAL LOSS, LEFT EYE, NORMAL VISION RIGHT EYE: ICD-10-CM

## 2023-12-06 LAB
BASOPHILS # BLD AUTO: 0.1 K/UL — SIGNIFICANT CHANGE UP (ref 0–0.2)
BASOPHILS # BLD AUTO: 0.1 K/UL — SIGNIFICANT CHANGE UP (ref 0–0.2)
BASOPHILS NFR BLD AUTO: 0.9 % — SIGNIFICANT CHANGE UP (ref 0–2)
BASOPHILS NFR BLD AUTO: 0.9 % — SIGNIFICANT CHANGE UP (ref 0–2)
EOSINOPHIL # BLD AUTO: 0.1 K/UL — SIGNIFICANT CHANGE UP (ref 0–0.5)
EOSINOPHIL # BLD AUTO: 0.1 K/UL — SIGNIFICANT CHANGE UP (ref 0–0.5)
EOSINOPHIL NFR BLD AUTO: 1.1 % — SIGNIFICANT CHANGE UP (ref 0–6)
EOSINOPHIL NFR BLD AUTO: 1.1 % — SIGNIFICANT CHANGE UP (ref 0–6)
HCT VFR BLD CALC: 29.5 % — LOW (ref 39–50)
HCT VFR BLD CALC: 29.5 % — LOW (ref 39–50)
HGB BLD-MCNC: 8.8 G/DL — LOW (ref 13–17)
HGB BLD-MCNC: 8.8 G/DL — LOW (ref 13–17)
LYMPHOCYTES # BLD AUTO: 1.5 K/UL — SIGNIFICANT CHANGE UP (ref 1–3.3)
LYMPHOCYTES # BLD AUTO: 1.5 K/UL — SIGNIFICANT CHANGE UP (ref 1–3.3)
LYMPHOCYTES # BLD AUTO: 23 % — SIGNIFICANT CHANGE UP (ref 13–44)
LYMPHOCYTES # BLD AUTO: 23 % — SIGNIFICANT CHANGE UP (ref 13–44)
MCHC RBC-ENTMCNC: 25.1 PG — LOW (ref 27–34)
MCHC RBC-ENTMCNC: 25.1 PG — LOW (ref 27–34)
MCHC RBC-ENTMCNC: 29.9 G/DL — LOW (ref 32–36)
MCHC RBC-ENTMCNC: 29.9 G/DL — LOW (ref 32–36)
MCV RBC AUTO: 83.9 FL — SIGNIFICANT CHANGE UP (ref 80–100)
MCV RBC AUTO: 83.9 FL — SIGNIFICANT CHANGE UP (ref 80–100)
MONOCYTES # BLD AUTO: 0.3 K/UL — SIGNIFICANT CHANGE UP (ref 0–0.9)
MONOCYTES # BLD AUTO: 0.3 K/UL — SIGNIFICANT CHANGE UP (ref 0–0.9)
MONOCYTES NFR BLD AUTO: 4.3 % — SIGNIFICANT CHANGE UP (ref 2–14)
MONOCYTES NFR BLD AUTO: 4.3 % — SIGNIFICANT CHANGE UP (ref 2–14)
NEUTROPHILS # BLD AUTO: 4.7 K/UL — SIGNIFICANT CHANGE UP (ref 1.8–7.4)
NEUTROPHILS # BLD AUTO: 4.7 K/UL — SIGNIFICANT CHANGE UP (ref 1.8–7.4)
NEUTROPHILS NFR BLD AUTO: 70.9 % — SIGNIFICANT CHANGE UP (ref 43–77)
NEUTROPHILS NFR BLD AUTO: 70.9 % — SIGNIFICANT CHANGE UP (ref 43–77)
PLATELET # BLD AUTO: 351 K/UL — SIGNIFICANT CHANGE UP (ref 150–400)
PLATELET # BLD AUTO: 351 K/UL — SIGNIFICANT CHANGE UP (ref 150–400)
RBC # BLD: 3.52 M/UL — LOW (ref 4.2–5.8)
RBC # BLD: 3.52 M/UL — LOW (ref 4.2–5.8)
RBC # FLD: 14.3 % — SIGNIFICANT CHANGE UP (ref 10.3–14.5)
RBC # FLD: 14.3 % — SIGNIFICANT CHANGE UP (ref 10.3–14.5)
WBC # BLD: 6.6 K/UL — SIGNIFICANT CHANGE UP (ref 3.8–10.5)
WBC # BLD: 6.6 K/UL — SIGNIFICANT CHANGE UP (ref 3.8–10.5)
WBC # FLD AUTO: 6.6 K/UL — SIGNIFICANT CHANGE UP (ref 3.8–10.5)
WBC # FLD AUTO: 6.6 K/UL — SIGNIFICANT CHANGE UP (ref 3.8–10.5)

## 2023-12-06 PROCEDURE — 99205 OFFICE O/P NEW HI 60 MIN: CPT

## 2023-12-06 PROCEDURE — 77470 SPECIAL RADIATION TREATMENT: CPT | Mod: 26

## 2023-12-06 PROCEDURE — 99204 OFFICE O/P NEW MOD 45 MIN: CPT

## 2023-12-07 ENCOUNTER — NON-APPOINTMENT (OUTPATIENT)
Age: 73
End: 2023-12-07

## 2023-12-07 ENCOUNTER — APPOINTMENT (OUTPATIENT)
Dept: GASTROENTEROLOGY | Facility: CLINIC | Age: 73
End: 2023-12-07
Payer: MEDICARE

## 2023-12-07 VITALS
HEART RATE: 94 BPM | HEIGHT: 78 IN | DIASTOLIC BLOOD PRESSURE: 68 MMHG | SYSTOLIC BLOOD PRESSURE: 110 MMHG | BODY MASS INDEX: 15.5 KG/M2 | RESPIRATION RATE: 16 BRPM | OXYGEN SATURATION: 100 % | WEIGHT: 134 LBS

## 2023-12-07 DIAGNOSIS — Z71.9 COUNSELING, UNSPECIFIED: ICD-10-CM

## 2023-12-07 PROBLEM — Z86.018 HISTORY OF ACOUSTIC NEUROMA: Status: ACTIVE | Noted: 2023-12-07

## 2023-12-07 PROBLEM — H91.91 HEARING LOSS ON RIGHT: Status: ACTIVE | Noted: 2023-10-31

## 2023-12-07 PROBLEM — H54.62 VISION LOSS OF LEFT EYE: Status: ACTIVE | Noted: 2023-10-31

## 2023-12-07 LAB
ALBUMIN SERPL ELPH-MCNC: 3.8 G/DL
ALP BLD-CCNC: 107 U/L
ALT SERPL-CCNC: 9 U/L
ANION GAP SERPL CALC-SCNC: 12 MMOL/L
APTT BLD: 33 SEC
AST SERPL-CCNC: 10 U/L
BILIRUB SERPL-MCNC: 0.3 MG/DL
BUN SERPL-MCNC: 19 MG/DL
CALCIUM SERPL-MCNC: 9.6 MG/DL
CHLORIDE SERPL-SCNC: 95 MMOL/L
CO2 SERPL-SCNC: 28 MMOL/L
CREAT SERPL-MCNC: 0.57 MG/DL
EGFR: 104 ML/MIN/1.73M2
GLUCOSE SERPL-MCNC: 185 MG/DL
HAV IGM SER QL: NONREACTIVE
HBV CORE IGG+IGM SER QL: REACTIVE
HBV CORE IGM SER QL: NONREACTIVE
HBV SURFACE AG SER QL: NONREACTIVE
HCV AB SER QL: NONREACTIVE
HCV S/CO RATIO: 0.05 S/CO
INR PPP: 1.06 RATIO
MAGNESIUM SERPL-MCNC: 1.8 MG/DL
POTASSIUM SERPL-SCNC: 4 MMOL/L
PROT SERPL-MCNC: 7 G/DL
PT BLD: 12.1 SEC
SODIUM SERPL-SCNC: 136 MMOL/L

## 2023-12-07 PROCEDURE — 99204 OFFICE O/P NEW MOD 45 MIN: CPT

## 2023-12-07 RX ORDER — ERGOCALCIFEROL 1.25 MG/1
1.25 MG CAPSULE ORAL
Refills: 0 | Status: ACTIVE | COMMUNITY

## 2023-12-07 RX ORDER — LORATADINE 10 MG
17 TABLET,DISINTEGRATING ORAL
Refills: 0 | Status: ACTIVE | COMMUNITY

## 2023-12-07 RX ORDER — ACETAMINOPHEN 500 MG
500 TABLET ORAL
Refills: 0 | Status: ACTIVE | COMMUNITY

## 2023-12-07 RX ORDER — CHLORHEXIDINE GLUCONATE, 0.12% ORAL RINSE 1.2 MG/ML
0.12 SOLUTION DENTAL
Refills: 0 | Status: ACTIVE | COMMUNITY

## 2023-12-07 RX ORDER — DEXTRAN 70 IN 0.9 % NACL 6 %-0.9 %
6-0.9 INTRAVENOUS SOLUTION INTRAVENOUS
Refills: 0 | Status: ACTIVE | COMMUNITY

## 2023-12-07 RX ORDER — MIRTAZAPINE 15 MG/1
15 TABLET, FILM COATED ORAL
Refills: 0 | Status: ACTIVE | COMMUNITY

## 2023-12-07 RX ORDER — TAMSULOSIN HYDROCHLORIDE 0.4 MG/1
0.4 CAPSULE ORAL
Refills: 0 | Status: ACTIVE | COMMUNITY

## 2023-12-07 RX ORDER — MIDODRINE HYDROCHLORIDE 5 MG/1
5 TABLET ORAL
Refills: 0 | Status: ACTIVE | COMMUNITY

## 2023-12-07 RX ORDER — CHLORHEXIDINE GLUCONATE 4 %
325 (65 FE) LIQUID (ML) TOPICAL
Refills: 0 | Status: ACTIVE | COMMUNITY

## 2023-12-07 RX ORDER — AMOXICILLIN 875 MG/1
875 TABLET, FILM COATED ORAL
Qty: 14 | Refills: 0 | Status: DISCONTINUED | COMMUNITY
Start: 2022-06-01 | End: 2023-12-07

## 2023-12-07 RX ORDER — DAPAGLIFLOZIN 10 MG/1
10 TABLET, FILM COATED ORAL
Refills: 0 | Status: ACTIVE | COMMUNITY

## 2023-12-07 RX ORDER — ASPIRIN 81 MG
81 TABLET, DELAYED RELEASE (ENTERIC COATED) ORAL
Refills: 0 | Status: ACTIVE | COMMUNITY

## 2023-12-07 RX ORDER — METFORMIN HYDROCHLORIDE 500 MG/1
500 TABLET, COATED ORAL
Refills: 0 | Status: ACTIVE | COMMUNITY

## 2023-12-07 RX ORDER — TAMSULOSIN HYDROCHLORIDE 0.4 MG/1
0.4 CAPSULE ORAL
Qty: 90 | Refills: 0 | Status: DISCONTINUED | COMMUNITY
Start: 2022-01-15 | End: 2023-12-07

## 2023-12-07 RX ORDER — ELECTROLYTES/DEXTROSE
SOLUTION, ORAL ORAL
Refills: 0 | Status: ACTIVE | COMMUNITY

## 2023-12-07 RX ORDER — ATORVASTATIN CALCIUM 80 MG/1
TABLET, FILM COATED ORAL
Refills: 0 | Status: ACTIVE | COMMUNITY

## 2023-12-07 RX ORDER — IRON SUCROSE 20 MG/ML
20 INJECTION, SOLUTION INTRAVENOUS
Refills: 0 | Status: ACTIVE | COMMUNITY

## 2023-12-08 DIAGNOSIS — C10.2 MALIGNANT NEOPLASM OF LATERAL WALL OF OROPHARYNX: ICD-10-CM

## 2023-12-08 DIAGNOSIS — C06.9 MALIGNANT NEOPLASM OF MOUTH, UNSPECIFIED: ICD-10-CM

## 2023-12-11 ENCOUNTER — APPOINTMENT (OUTPATIENT)
Age: 73
End: 2023-12-11
Payer: MEDICARE

## 2023-12-11 PROCEDURE — 99024 POSTOP FOLLOW-UP VISIT: CPT

## 2023-12-13 NOTE — REASON FOR VISIT
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 May and Dr. Lang. Pt discharged home on 11/21/23. Pt's wife presented for last f/u without patient stating pt was weak and needed to go to rehab, pt's wife brought pt to [facility] ER after which he was discharged to subacute rehab. Today, pt sates he feels "so-so", tolerating liquids and very soft foods only. Pt has appointment with GI for PEG placement as well as appointment for consult for RT with Dr. Davis.  Exam: Gen: thin male, NAD Face: mild edema in surgical area Neck: incision site CDI Intraoral: flap site well perfused, soft, no drainage, discharge or bleeding noted Forearm: (left): site healing  PATH 11/10/23: Surgical Pathology Report - Auth (Verified) Specimen(s) Submitted 1) Level 1A  2) Level 2 3) Level 1B 4) Level 3 5) Lateral margin 6) Right partial maxillectomy 7) Right coronoid process  Final Diagnosis 1. Lymph nodes, level 1A, neck dissection - 2 lymph nodes negative for metastatic carcinoma (0/2)  2. Lymph nodes, level 2, neck dissection - 1 of 9 lymph nodes positive for metastatic carcinoma (1/9) - Submandibular gland  3. Lymph nodes, level 1B, neck dissection - 1 of 5 lymph nodes positive for metastatic carcinoma (1/5) - Submandibular gland  4. Lymph nodes, level 3, neck dissection - 9 lymph nodes negative for metastatic carcinoma (0/9)  5. Oral cavity, lateral margin, biopsy - Oral mucosa exhibiting hyperparakeratosis, focal lichenoid mucositis and underlying skeletal muscle and minor salivary gland elements  6. Maxilla, right, partial maxillectomy - Squamous cell carcinoma, well to moderately differentiated, invading through maxillary cortical bone, see synoptic summary - Perineural invasion is identified - Carcinoma is present 4 mm from the lateral resection margin  7. Coronoid process, right, excision - Viable bone, skeletal muscle, and dense fibrous connective tissue  Comment: Prior [facility] pathologies [id number] and [id number] were reviewed. PDL-1 immunohistochemical stain is performed on block 6C and will be reported as an addendum.  Verified by: Guille Henao DDS (Electronic Signature) Reported on: 11/21/23 14:37 EST, [facility], [address] [place] [address], [place] [address] Phone: [phone]   Fax: [phone] _________________________________________________________________  Synoptic Summary 6: Lip and Oral Cavity Specimen  Procedure:  Maxillectomy - Right partial Tumor Tumor Focality:   Unifocal Tumor Site:  Oral cavity - Maxilla Tumor Subsite:   Upper gingiva Tumor Laterality:   Right Tumor Size:  1.5 x 1.5 Centimeters (cm) Histologic Type:   Squamous cell carcinoma, conventional (keratinizing) Histologic Grade:   G2, moderately differentiated Tumor Depth of Invasion (DOI):   11 mm Lymphatic and / or Vascular Invasion:    Not identified Perineural Invasion:   Present Margins Specimen Margin Status for Invasive Tumor:    All specimen margins negative for invasive tumor Distance from Invasive Tumor to Closest Specimen Margin:     4 mm Closest Specimen Margin(s) to Invasive Tumor:    lateral Specimen Margin Status for Noninvasive Tumor (High-grade Dysplasia):  All specimen margins negative for high-grade dysplasia / in situ disease Tumor Bed Margin Status: Tumor Bed Margin Orientation:   Oriented to true margin surface Tumor Bed Margin Status for Invasive Tumor:    All tumor bed margins negative for invasive tumor Tumor Bed Margin Status for Noninvasive Tumor:    All tumor bed margins negative for high-grade dysplasia / in situ disease Regional Lymph Nodes Regional Lymph Node Status:   Tumor present in regional lymph node(s) Number of Lymph Nodes with Tumor:    2 Laterality of Lymph Node(s) with Tumor:    Ipsilateral (including midline) Size of Largest Jossue Metastatic Deposit:    1.2 cm Extranodal Extension (JASMIN):   Not identified Number of Lymph Nodes Examined:   25 pTNM Classification (AJCC 8th Edition) pT Category:   pT4a pN Category:   pN2b Best Tumor Blocks for Future Studies Tumor Block(s):   6C  CAP eCP 2023 Q2 Release   Intraoperative Consultation 5) Lateral margin - Negative for tumor, negative for dysplasia 6A) Posterior margin - Negative for tumor 6B) Medial margin - Negative for tumor 6C) Anterior margin - Negative for tumor By: Dr. 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 Rec silvadene cream to site with dressing daily  -PEG w/ GI planned -RT/chemo -F/u in Jan with Dr. May

## 2023-12-14 PROCEDURE — 77333 RADIATION TREATMENT AID(S): CPT | Mod: 26

## 2023-12-14 PROCEDURE — 77290 THER RAD SIMULAJ FIELD CPLX: CPT | Mod: 26

## 2023-12-14 PROCEDURE — 77263 THER RADIOLOGY TX PLNG CPLX: CPT

## 2023-12-14 NOTE — ADDENDUM
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 May and Dr. Lang. Pt discharged home on 11/21/23. Pt's wife presented for last f/u without patient stating pt was weak and needed to go to rehab, pt's wife brought pt to [facility] ER after which he was discharged to subacute rehab. Today, pt sates he feels "so-so", tolerating liquids and very soft foods only. Pt has appointment with GI for PEG placement as well as appointment for consult for RT with Dr. Davis.  Exam: Gen: thin male, NAD Face: mild edema in surgical area Neck: incision site CDI Intraoral: flap site well perfused, soft, no drainage, discharge or bleeding noted Forearm: (left): site healing  PATH: 11/10/23  Surgical Pathology Report - Auth (Verified) Specimen(s) Submitted 1) Level 1A  2) Level 2 3) Level 1B 4) Level 3 5) Lateral margin 6) Right partial maxillectomy 7) Right coronoid process  Final Diagnosis 1. Lymph nodes, level 1A, neck dissection - 2 lymph nodes negative for metastatic carcinoma (0/2)  2. Lymph nodes, level 2, neck dissection - 1 of 9 lymph nodes positive for metastatic carcinoma (1/9) - Submandibular gland  3. Lymph nodes, level 1B, neck dissection - 1 of 5 lymph nodes positive for metastatic carcinoma (1/5) - Submandibular gland  4. Lymph nodes, level 3, neck dissection - 9 lymph nodes negative for metastatic carcinoma (0/9)  5. Oral cavity, lateral margin, biopsy - Oral mucosa exhibiting hyperparakeratosis, focal lichenoid mucositis and underlying skeletal muscle and minor salivary gland elements  6. Maxilla, right, partial maxillectomy - Squamous cell carcinoma, well to moderately differentiated, invading through maxillary cortical bone, see synoptic summary - Perineural invasion is identified - Carcinoma is present 4 mm from the lateral resection margin  7. Coronoid process, right, excision - Viable bone, skeletal muscle, and dense fibrous connective tissue  Comment: Prior [facility] pathologies [id number] and [id number] were reviewed. PDL-1 immunohistochemical stain is performed on block 6C and will be reported as an addendum.  Verified by: Guille Henao DDS (Electronic Signature) Reported on: 11/21/23 14:37 EST, [facility], [id number], [place] [address] Phone: [phone]   Fax: [phone] _________________________________________________________________  Synoptic Summary 6: Lip and Oral Cavity Specimen  Procedure:  Maxillectomy - Right partial Tumor Tumor Focality:   Unifocal Tumor Site:  Oral cavity - Maxilla Tumor Subsite:   Upper gingiva Tumor Laterality:   Right Tumor Size:  1.5 x 1.5 Centimeters (cm) Histologic Type:   Squamous cell carcinoma, conventional (keratinizing) Histologic Grade:   G2, moderately differentiated Tumor Depth of Invasion (DOI):   11 mm Lymphatic and / or Vascular Invasion:    Not identified Perineural Invasion:   Present Margins Specimen Margin Status for Invasive Tumor:    All specimen margins negative for invasive tumor Distance from Invasive Tumor to Closest Specimen Margin:     4 mm Closest Specimen Margin(s) to Invasive Tumor:    lateral Specimen Margin Status for Noninvasive Tumor (High-grade Dysplasia):  All specimen margins negative for high-grade dysplasia / in situ disease Tumor Bed Margin Status: Tumor Bed Margin Orientation:   Oriented to true margin surface Tumor Bed Margin Status for Invasive Tumor:    All tumor bed margins negative for invasive tumor Tumor Bed Margin Status for Noninvasive Tumor:    All tumor bed margins negative for high-grade dysplasia / in situ disease Regional Lymph Nodes Regional Lymph Node Status:   Tumor present in regional lymph node(s) Number of Lymph Nodes with Tumor:    2 Laterality of Lymph Node(s) with Tumor:    Ipsilateral (including midline) Size of Largest Jossue Metastatic Deposit:    1.2 cm Extranodal Extension (JASMIN):   Not identified Number of Lymph Nodes Examined:   25 pTNM Classification (AJCC 8th Edition) pT Category:   pT4a pN Category:   pN2b Best Tumor Blocks for Future Studies Tumor Block(s):   6C  CAP eCP 2023 Q2 Release   Intraoperative Consultation 5) Lateral margin - Negative for tumor, negative for dysplasia 6A) Posterior margin - Negative for tumor 6B) Medial margin - Negative for tumor 6C) Anterior margin - Negative for tumor By: Dr. 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 Rec silvadene cream to site with dressing daily  -PEG w/ GI planned at [facility] -Chemo/RT - already had consult for both -F/u in Jan with Dr. Lalo Kumar PAJenniferC Dr. May

## 2023-12-18 ENCOUNTER — NON-APPOINTMENT (OUTPATIENT)
Age: 73
End: 2023-12-18

## 2023-12-21 ENCOUNTER — NON-APPOINTMENT (OUTPATIENT)
Age: 73
End: 2023-12-21

## 2023-12-21 ENCOUNTER — APPOINTMENT (OUTPATIENT)
Dept: THORACIC SURGERY | Facility: CLINIC | Age: 73
End: 2023-12-21
Payer: MEDICARE

## 2023-12-21 VITALS
DIASTOLIC BLOOD PRESSURE: 66 MMHG | OXYGEN SATURATION: 100 % | RESPIRATION RATE: 16 BRPM | SYSTOLIC BLOOD PRESSURE: 116 MMHG | HEART RATE: 88 BPM

## 2023-12-21 DIAGNOSIS — E11.40 TYPE 2 DIABETES MELLITUS WITH DIABETIC NEUROPATHY, UNSPECIFIED: ICD-10-CM

## 2023-12-21 PROCEDURE — 99203 OFFICE O/P NEW LOW 30 MIN: CPT

## 2023-12-21 NOTE — HISTORY OF PRESENT ILLNESS
[FreeTextEntry1] : Mr. PRATIMA VAZQUEZ is a 73-year-old male who presents for consultation regarding CT findings of a lung nodule. 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 He then presented to [facility] ED on 11/25/23 after an episode of syncope at home and fell, fingerstick 53. CTA chest imaging from 11/25/23 noted no acute pulmonary embolism or acute pulmonary disease, however, did note a calcified right middle lobe granuloma and a noncalcified right lower lobe subpleural pulmonary nodule.  Past medical history includes HTN, HLD, CAD with 2 stents, Diabetes on Janumet, STEMI, cardiac stenting in 2021. He is now residing at [facility] for his hypotension and difficulty with management at home. He has some peripheral neuropathy. He presents to the office accompanied by his wife in a wheelchair. He has skin grafts on the left arm and leg thigh that were used for his maxillary surgery. He is tolerating a puree diet well. He has no cough, fever, chills.

## 2023-12-21 NOTE — DATA REVIEWED
[FreeTextEntry1] : EXAM:  CT ANGIO CHEST PULM ART [facility]   PROCEDURE DATE:  11/25/2023   INTERPRETATION:  CLINICAL INFORMATION: Syncope COMPARISON: Chest radiograph 11/25/2023 CONTRAST/COMPLICATIONS: IV Contrast: Omnipaque 350  61 cc administered   39 cc discarded Oral Contrast: NONE Complications: None reported at time of study completion  PROCEDURE: CT Angiography of the Chest. Sagittal and coronal reformats were performed as well as 3D (MIP)  reconstructions.  FINDINGS: Some images are degraded by artifacts from the patient's arm  within the scanning field of view.  LUNGS AND AIRWAYS: Patent central airways.  Mild dependent atelectasis.  Mild paraseptal emphysema Calcified right middle lobe granuloma. Noncalcified right lower lobe 4 mm subpleural pulmonary nodule,  nonspecific however also possibly granulomatous. PLEURA: No pleural effusion. MEDIASTINUM AND SARAH: Mild bilateral hilar lymphadenopathy VESSELS: No acute pulmonary embolism. Atherosclerosis of aorta and  coronary arteries HEART: Heart size is normal. No pericardial effusion. CHEST WALL AND LOWER NECK: Mild bilateral gynecomastia VISUALIZED UPPER ABDOMEN: No significant acute abnormality. BONES: Multilevel degenerative changes throughout the spine.  IMPRESSION: No acute pulmonary embolism or acute pulmonary disease  --- End of Report ---  ENOCH GIBBONS MD; Attending Radiologist This document has been electronically signed. Nov 25 2023  1:20PM        EXAM: [id number] - PETCT [facility] ONC FDG INIT  - ORDERED BY: ISABELL CAMPOS PROCEDURE DATE:  10/12/2023   INTERPRETATION:  PROCEDURE: PET SKULL TO THIGH ONC FDG INIT  CLINICAL INFORMATION: 73 year-old Male with squamous cell carcinoma of  the RIGHT maxillary alveolar ridge.  TREATMENT STRATEGY EVALUATION: Initial FASTING BLOOD SUGAR: 142 mg/dl RADIOPHARMACEUTICAL:  10.13 mCi F-18, FDG, I.V. UPTAKE PERIOD: 56 minutes SCANNER: [email or web address] ORAL CONTRAST: Patient drank OMNIPAQUE contrast during the uptake period.  TECHNIQUE: Following intravenous injection of radiopharmaceutical and  above uptake period, PET/CT was obtained from base of skull to mid-thigh  followed by dedicated images of the head and neck. CT protocol was  optimized for PET attenuation correction and anatomic localization and  was not designed to produce and cannot replace state-of-the-art  diagnostic CT images with specific imaging protocols for different body  parts and indications. Images were reconstructed and reviewed in axial,  coronal and sagittal views and three-dimensional MIP.  Standardized uptake values ("SUV") are normalized to patient body weight  and indicate the highest activity concentration (SUVmax) in a given  disease site. All image numbers refer to axial image numbers.  COMPARISON: No prior imaging available.  FINDINGS:  HEAD/NECK: At the RIGHT maxilla there is a 2.0 x 1.4 cm area of erosive  change in the bone, with focal hypermetabolic uptake (SUV 11.2, image 50,  dedicated head and neck series). Within the oral cavity, there is  increased uptake along the LEFT tongue border, with an area of asymmetric  hypodense-appearing tissue seen on the RIGHT, with fat density  (Hounsfield unit attenuation -67, image 65). Adenoids, tonsils,  nasopharynx, hypopharynx, larynx, and trachea without suspicious uptake,  anatomic effacement or mass, or other anatomic abnormality. Vocal cords  exhibit symmetric, physiologic activity.  Mildly avid nodes are seen BILATERALLY. These include BILATERAL level 1,  level IIA. For example, a prominent RIGHT level 1 nodes seen posterior to  the mandible (1.7 x 0.9 cm, SUV 3.4, image 65), and ill-defined focus at  level 2 on the RIGHT (SUV 3.9, image 64). Nodes on the LEFT on a similar  distribution but are smaller and less avid.  Symmetric activity and normal low-dose CT appearance observed at  BILATERAL salivary glands.  Thyroid gland is metabolically and morphologically normal.  Extraocular muscles, optic nerves, and eye globes without suspicious  findings. No abnormal sinus uptake or opacification.  Remaining skin and soft tissues of scalp and neck without suspicious  uptake. Visible brain parenchyma exhibits symmetric, physiologic  activity. Remaining nonosseous head and neck without additional  suspicious findings.    CHEST: Mild BILATERAL hilar uptake is seen within nonspecific and likely  reactive.  LUNGS: No abnormal FDG activity. No lung nodule(s).  PLEURA/PERICARDIUM: No abnormal FDG activity. No effusions.  ESOPHAGUS/STOMACH: No abnormal FDG activity.  HEPATOBILIARY/PANCREAS: Physiologic hepatobiliary FDG activity. For  reference, liver SUV mean is 2.5.  SPLEEN: Physiologic FDG activity. Normal size.  ADRENAL GLANDS: No abnormal FDG activity. No nodule(s).  KIDNEYS/URINARY BLADDER: Physiologic excreted FDG activity.  REPRODUCTIVE ORGANS: No abnormal FDG activity.  ABDOMINOPELVIC NODES: No enlarged or FDG-avid lymph node.  BOWEL/PERITONEUM/MESENTERY: No abnormal FDG activity. Oral contrast has  reached terminal ileum.  ABDOMINAL WALL: No abnormal FDG activity.  BONES/SOFT TISSUES: No abnormal FDG activity.  VESSELS: Atherosclerotic calcification of nonaneurysmal abdominal aorta  including visceral and/or runoff branches.  IMPRESSION: 1.  Intense uptake at previously reported RIGHT maxillary lesion  consistent with malignant involvement. 2.  Bilateral nodes in the neck where additional malignant involvement is  not excluded. Differential diagnosis also includes reaction to recent  biopsy. Additionally, there is some fatty change in the region adjacent  to the RIGHT maxilla within the oral cavity. Activity seen at the  contralateral tongue border may be due to altered mastication patterns.  Consider MRI of the neck if not recently obtained or planned as of yet. 3.  Mild BILATERAL hilar uptake, nonspecific and most likely reactive. 4.  Otherwise, no evidence of FDG-avid malignancy outside the head and  neck.  --- End of Report ---  MELINA PANCHAL MD; Attending Radiologist  This document has been electronically signed. Oct 16 2023  1:14PM

## 2023-12-21 NOTE — PHYSICAL EXAM
[General Appearance - In No Acute Distress] : in no acute distress [Sclera] : the sclera and conjunctiva were normal [Outer Ear] : the ears and nose were normal in appearance [Respiration, Rhythm And Depth] : normal respiratory rhythm and effort [Heart Rate And Rhythm] : heart rate was normal and rhythm regular [Examination Of The Chest] : the chest was normal in appearance [Abnormal Walk] : normal gait [FreeTextEntry1] : skin grafts to left arm and left leg [Sensation] : the sensory exam was normal to light touch and pinprick [Oriented To Time, Place, And Person] : oriented to person, place, and time [Impaired Insight] : insight and judgment were intact

## 2023-12-21 NOTE — ASSESSMENT
[FreeTextEntry1] : Mr. PRATIMA VAZQUEZ is a 73-year-old male who presents for consultation regarding CT findings of a lung nodule. 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 He then presented to [facility] ED on 11/25/23 after an episode of syncope at home and fell, fingerstick 53. CTA chest imaging from 11/25/23 noted no acute pulmonary embolism or acute pulmonary disease, however, did note a calcified right middle lobe granuloma and a noncalcified right lower lobe subpleural pulmonary nodule.  Past medical history includes HTN, HLD, CAD with 2 stents, Diabetes on Janumet, STEMI, cardiac stenting in 2021. He is now residing at [facility] for his hypotension and difficulty with management at home.  After reviewing the imaging there are nodules present. We discussed that nodules can be infectious, inflammatory or malignant in nature. When evaluating suspicion we look at certain characteristics of nodules such as size, consistency, shape, and rate of growth. The area noted on independent review is a 4 mm nodule that is not suspicious for malignancy or metastasis. Given his history surveillance is recommended in 3 months with CT imaging of the chest. He is agreeable and will return to care after images are complete.   PLAN: - CT Scan of the chest in 3 months       IDr. Bliss personally performed the evaluation and management (E/M) services for this new patient. That E/M includes conducting the initial examination, assessing all conditions, and establishing the plan of care. Today, Tariq Cain NP was here to observe my evaluation and management services for this patient.

## 2023-12-21 NOTE — REVIEW OF SYSTEMS
[Feeling Poorly] : feeling poorly [Feeling Tired] : feeling tired [SOB on Exertion] : shortness of breath during exertion [Skin Wound] : skin wound [Negative] : Heme/Lymph [Chest Pain] : no chest pain [Palpitations] : no palpitations [Lower Ext Edema] : no extremity edema [Shortness Of Breath] : no shortness of breath [Cough] : no cough

## 2023-12-22 ENCOUNTER — APPOINTMENT (OUTPATIENT)
Dept: GASTROENTEROLOGY | Facility: HOSPITAL | Age: 73
End: 2023-12-22

## 2023-12-26 ENCOUNTER — NON-APPOINTMENT (OUTPATIENT)
Age: 73
End: 2023-12-26

## 2024-01-01 ENCOUNTER — OUTPATIENT (OUTPATIENT)
Dept: OUTPATIENT SERVICES | Facility: HOSPITAL | Age: 74
LOS: 1 days | Discharge: HOME | End: 2024-01-01

## 2024-01-01 DIAGNOSIS — Z98.61 CORONARY ANGIOPLASTY STATUS: Chronic | ICD-10-CM

## 2024-01-01 DIAGNOSIS — Z98.49 CATARACT EXTRACTION STATUS, UNSPECIFIED EYE: Chronic | ICD-10-CM

## 2024-01-01 DIAGNOSIS — C31.0 MALIGNANT NEOPLASM OF MAXILLARY SINUS: ICD-10-CM

## 2024-01-01 DIAGNOSIS — Z98.890 OTHER SPECIFIED POSTPROCEDURAL STATES: Chronic | ICD-10-CM

## 2024-01-01 LAB
BASOPHILS # BLD AUTO: 0.1 K/UL — SIGNIFICANT CHANGE UP (ref 0–0.2)
BASOPHILS NFR BLD AUTO: 1.4 % — SIGNIFICANT CHANGE UP (ref 0–2)
EOSINOPHIL # BLD AUTO: 0 K/UL — SIGNIFICANT CHANGE UP (ref 0–0.5)
EOSINOPHIL NFR BLD AUTO: 0.5 % — SIGNIFICANT CHANGE UP (ref 0–6)
HCT VFR BLD CALC: 43.2 % — SIGNIFICANT CHANGE UP (ref 39–50)
HGB BLD-MCNC: 13.5 G/DL — SIGNIFICANT CHANGE UP (ref 13–17)
LYMPHOCYTES # BLD AUTO: 1.2 K/UL — SIGNIFICANT CHANGE UP (ref 1–3.3)
LYMPHOCYTES # BLD AUTO: 26.6 % — SIGNIFICANT CHANGE UP (ref 13–44)
MCHC RBC-ENTMCNC: 27 PG — SIGNIFICANT CHANGE UP (ref 27–34)
MCHC RBC-ENTMCNC: 31.3 G/DL — LOW (ref 32–36)
MCV RBC AUTO: 86.4 FL — SIGNIFICANT CHANGE UP (ref 80–100)
MONOCYTES # BLD AUTO: 0.4 K/UL — SIGNIFICANT CHANGE UP (ref 0–0.9)
MONOCYTES NFR BLD AUTO: 8.3 % — SIGNIFICANT CHANGE UP (ref 2–14)
NEUTROPHILS # BLD AUTO: 2.8 K/UL — SIGNIFICANT CHANGE UP (ref 1.8–7.4)
NEUTROPHILS NFR BLD AUTO: 63.3 % — SIGNIFICANT CHANGE UP (ref 43–77)
PLATELET # BLD AUTO: 176 K/UL — SIGNIFICANT CHANGE UP (ref 150–400)
RBC # BLD: 5.01 M/UL — SIGNIFICANT CHANGE UP (ref 4.2–5.8)
RBC # FLD: 16.7 % — HIGH (ref 10.3–14.5)
WBC # BLD: 4.5 K/UL — SIGNIFICANT CHANGE UP (ref 3.8–10.5)
WBC # FLD AUTO: 4.5 K/UL — SIGNIFICANT CHANGE UP (ref 3.8–10.5)

## 2024-01-02 ENCOUNTER — NON-APPOINTMENT (OUTPATIENT)
Age: 74
End: 2024-01-02

## 2024-01-03 ENCOUNTER — APPOINTMENT (OUTPATIENT)
Dept: HEMATOLOGY ONCOLOGY | Facility: CLINIC | Age: 74
End: 2024-01-03

## 2024-01-03 PROCEDURE — 77301 RADIOTHERAPY DOSE PLAN IMRT: CPT | Mod: 26

## 2024-01-03 PROCEDURE — 77338 DESIGN MLC DEVICE FOR IMRT: CPT | Mod: 26

## 2024-01-03 PROCEDURE — 77300 RADIATION THERAPY DOSE PLAN: CPT | Mod: 26

## 2024-01-08 PROCEDURE — 77280 THER RAD SIMULAJ FIELD SMPL: CPT | Mod: 26

## 2024-01-10 ENCOUNTER — OUTPATIENT (OUTPATIENT)
Dept: OUTPATIENT SERVICES | Facility: HOSPITAL | Age: 74
LOS: 1 days | Discharge: HOME | End: 2024-01-10

## 2024-01-10 DIAGNOSIS — Z98.61 CORONARY ANGIOPLASTY STATUS: Chronic | ICD-10-CM

## 2024-01-10 DIAGNOSIS — C31.0 MALIGNANT NEOPLASM OF MAXILLARY SINUS: ICD-10-CM

## 2024-01-10 DIAGNOSIS — Z98.890 OTHER SPECIFIED POSTPROCEDURAL STATES: Chronic | ICD-10-CM

## 2024-01-10 DIAGNOSIS — Z98.49 CATARACT EXTRACTION STATUS, UNSPECIFIED EYE: Chronic | ICD-10-CM

## 2024-01-11 ENCOUNTER — TRANSCRIPTION ENCOUNTER (OUTPATIENT)
Age: 74
End: 2024-01-11

## 2024-01-11 ENCOUNTER — APPOINTMENT (OUTPATIENT)
Dept: HEMATOLOGY ONCOLOGY | Facility: CLINIC | Age: 74
End: 2024-01-11

## 2024-01-11 ENCOUNTER — OUTPATIENT (OUTPATIENT)
Dept: OUTPATIENT SERVICES | Facility: HOSPITAL | Age: 74
LOS: 1 days | End: 2024-01-11
Payer: MEDICARE

## 2024-01-11 ENCOUNTER — APPOINTMENT (OUTPATIENT)
Dept: GASTROENTEROLOGY | Facility: HOSPITAL | Age: 74
End: 2024-01-11

## 2024-01-11 DIAGNOSIS — Z98.61 CORONARY ANGIOPLASTY STATUS: Chronic | ICD-10-CM

## 2024-01-11 DIAGNOSIS — Z98.49 CATARACT EXTRACTION STATUS, UNSPECIFIED EYE: Chronic | ICD-10-CM

## 2024-01-11 DIAGNOSIS — G03.0 NONPYOGENIC MENINGITIS: ICD-10-CM

## 2024-01-11 DIAGNOSIS — Z98.890 OTHER SPECIFIED POSTPROCEDURAL STATES: Chronic | ICD-10-CM

## 2024-01-11 LAB
GLUCOSE BLDC GLUCOMTR-MCNC: 143 MG/DL — HIGH (ref 70–99)
GLUCOSE BLDC GLUCOMTR-MCNC: 143 MG/DL — HIGH (ref 70–99)

## 2024-01-11 PROCEDURE — 82962 GLUCOSE BLOOD TEST: CPT

## 2024-01-11 PROCEDURE — L8699: CPT

## 2024-01-11 PROCEDURE — 43246 EGD PLACE GASTROSTOMY TUBE: CPT

## 2024-01-11 DEVICE — FEEDING TUBE PEG KIT ENDOVIVE STANDARD 20FR PUSH WITH LIDOCAINE AMPULE: Type: IMPLANTABLE DEVICE | Status: FUNCTIONAL

## 2024-01-11 NOTE — PHYSICAL EXAM
[Alert] : alert [Normal Voice/Communication] : normal voice/communication [Healthy Appearing] : healthy appearing [No Acute Distress] : no acute distress [Well Developed] : well developed [Sclera] : the sclera and conjunctiva were normal [Hearing Threshold Finger Rub Not Jones] : hearing was normal [Normal Lips/Gums] : the lips and gums were normal [Oropharynx] : the oropharynx was normal [Normal Appearance] : the appearance of the neck was normal [No Neck Mass] : no neck mass was observed [No Respiratory Distress] : no respiratory distress [No Acc Muscle Use] : no accessory muscle use [Respiration, Rhythm And Depth] : normal respiratory rhythm and effort [Auscultation Breath Sounds / Voice Sounds] : lungs were clear to auscultation bilaterally [Heart Rate And Rhythm] : heart rate was normal and rhythm regular [Normal S1, S2] : normal S1 and S2 [Murmurs] : no murmurs [None] : no edema [Bowel Sounds] : normal bowel sounds [Abdomen Tenderness] : non-tender [No Masses] : no abdominal mass palpated [Abdomen Soft] : soft [] : no hepatosplenomegaly [Normal Color / Pigmentation] : normal skin color and pigmentation [Oriented To Time, Place, And Person] : oriented to person, place, and time [de-identified] : cachectic appearing

## 2024-01-11 NOTE — ASSESSMENT
Work Queue/ Outside Referral: [phone]  [facility] Phone Number: [phone]    Referred by: self    Additional info: AUB, failed ablation    Referral Comments: pt lives in [facility], [address] [place] [address] to pt, she would like to see Dr Addie Burnham at [address] [place] [address]. Scheduled 7/14/22. [FreeTextEntry1] : Medically optimized.

## 2024-01-11 NOTE — REVIEW OF SYSTEMS
[As Noted in HPI] : as noted in HPI [Negative] : Heme/Lymph [Abdominal Pain] : no abdominal pain [Vomiting] : no vomiting [Constipation] : no constipation [Diarrhea] : no diarrhea [Heartburn] : no heartburn [Melena (black stool)] : no melena [Bleeding] : no bleeding [Fecal Incontinence (soiling)] : no fecal incontinence [Bloating (gassiness)] : no bloating [FreeTextEntry7] : dysphagia

## 2024-01-16 ENCOUNTER — NON-APPOINTMENT (OUTPATIENT)
Age: 74
End: 2024-01-16

## 2024-01-16 VITALS
RESPIRATION RATE: 16 BRPM | BODY MASS INDEX: 15.97 KG/M2 | SYSTOLIC BLOOD PRESSURE: 89 MMHG | HEIGHT: 78 IN | WEIGHT: 138 LBS | DIASTOLIC BLOOD PRESSURE: 59 MMHG | HEART RATE: 93 BPM | OXYGEN SATURATION: 99 %

## 2024-01-16 PROCEDURE — 77014: CPT | Mod: 26

## 2024-01-16 PROCEDURE — 77427 RADIATION TX MANAGEMENT X5: CPT

## 2024-01-17 ENCOUNTER — NON-APPOINTMENT (OUTPATIENT)
Age: 74
End: 2024-01-17

## 2024-01-17 ENCOUNTER — APPOINTMENT (OUTPATIENT)
Age: 74
End: 2024-01-17

## 2024-01-17 ENCOUNTER — RESULT REVIEW (OUTPATIENT)
Age: 74
End: 2024-01-17

## 2024-01-17 ENCOUNTER — APPOINTMENT (OUTPATIENT)
Dept: HEMATOLOGY ONCOLOGY | Facility: CLINIC | Age: 74
End: 2024-01-17

## 2024-01-17 LAB
BASOPHILS # BLD AUTO: 0.1 K/UL — SIGNIFICANT CHANGE UP (ref 0–0.2)
BASOPHILS NFR BLD AUTO: 0.5 % — SIGNIFICANT CHANGE UP (ref 0–2)
EOSINOPHIL # BLD AUTO: 0 K/UL — SIGNIFICANT CHANGE UP (ref 0–0.5)
EOSINOPHIL NFR BLD AUTO: 0.4 % — SIGNIFICANT CHANGE UP (ref 0–6)
HCT VFR BLD CALC: 35.4 % — LOW (ref 39–50)
HGB BLD-MCNC: 10.3 G/DL — LOW (ref 13–17)
LYMPHOCYTES # BLD AUTO: 1.1 K/UL — SIGNIFICANT CHANGE UP (ref 1–3.3)
LYMPHOCYTES # BLD AUTO: 11.1 % — LOW (ref 13–44)
MCHC RBC-ENTMCNC: 23.9 PG — LOW (ref 27–34)
MCHC RBC-ENTMCNC: 29 G/DL — LOW (ref 32–36)
MCV RBC AUTO: 82.2 FL — SIGNIFICANT CHANGE UP (ref 80–100)
MONOCYTES # BLD AUTO: 0.5 K/UL — SIGNIFICANT CHANGE UP (ref 0–0.9)
MONOCYTES NFR BLD AUTO: 4.7 % — SIGNIFICANT CHANGE UP (ref 2–14)
NEUTROPHILS # BLD AUTO: 8.3 K/UL — HIGH (ref 1.8–7.4)
NEUTROPHILS NFR BLD AUTO: 83.2 % — HIGH (ref 43–77)
PLATELET # BLD AUTO: 262 K/UL — SIGNIFICANT CHANGE UP (ref 150–400)
RBC # BLD: 4.31 M/UL — SIGNIFICANT CHANGE UP (ref 4.2–5.8)
RBC # FLD: 17.2 % — HIGH (ref 10.3–14.5)
WBC # BLD: 10 K/UL — SIGNIFICANT CHANGE UP (ref 3.8–10.5)
WBC # FLD AUTO: 10 K/UL — SIGNIFICANT CHANGE UP (ref 3.8–10.5)

## 2024-01-17 PROCEDURE — G6017: CPT

## 2024-01-18 DIAGNOSIS — R11.2 NAUSEA WITH VOMITING, UNSPECIFIED: ICD-10-CM

## 2024-01-18 DIAGNOSIS — Z51.11 ENCOUNTER FOR ANTINEOPLASTIC CHEMOTHERAPY: ICD-10-CM

## 2024-01-18 PROCEDURE — G6017: CPT

## 2024-01-19 PROCEDURE — G6017: CPT

## 2024-01-22 ENCOUNTER — NON-APPOINTMENT (OUTPATIENT)
Age: 74
End: 2024-01-22

## 2024-01-22 ENCOUNTER — RESULT REVIEW (OUTPATIENT)
Age: 74
End: 2024-01-22

## 2024-01-22 ENCOUNTER — APPOINTMENT (OUTPATIENT)
Dept: HEMATOLOGY ONCOLOGY | Facility: CLINIC | Age: 74
End: 2024-01-22

## 2024-01-22 VITALS
SYSTOLIC BLOOD PRESSURE: 90 MMHG | HEART RATE: 83 BPM | DIASTOLIC BLOOD PRESSURE: 54 MMHG | OXYGEN SATURATION: 96 % | RESPIRATION RATE: 16 BRPM | HEIGHT: 78 IN | WEIGHT: 137 LBS | BODY MASS INDEX: 15.85 KG/M2

## 2024-01-22 LAB
BASOPHILS # BLD AUTO: 0.1 K/UL — SIGNIFICANT CHANGE UP (ref 0–0.2)
BASOPHILS NFR BLD AUTO: 0.8 % — SIGNIFICANT CHANGE UP (ref 0–2)
EOSINOPHIL # BLD AUTO: 0 K/UL — SIGNIFICANT CHANGE UP (ref 0–0.5)
EOSINOPHIL NFR BLD AUTO: 0.5 % — SIGNIFICANT CHANGE UP (ref 0–6)
HCT VFR BLD CALC: 33.4 % — LOW (ref 39–50)
HGB BLD-MCNC: 10.4 G/DL — LOW (ref 13–17)
LYMPHOCYTES # BLD AUTO: 1.3 K/UL — SIGNIFICANT CHANGE UP (ref 1–3.3)
LYMPHOCYTES # BLD AUTO: 16.3 % — SIGNIFICANT CHANGE UP (ref 13–44)
MCHC RBC-ENTMCNC: 24 PG — LOW (ref 27–34)
MCHC RBC-ENTMCNC: 31 G/DL — LOW (ref 32–36)
MCV RBC AUTO: 77.4 FL — LOW (ref 80–100)
MONOCYTES # BLD AUTO: 0.5 K/UL — SIGNIFICANT CHANGE UP (ref 0–0.9)
MONOCYTES NFR BLD AUTO: 6 % — SIGNIFICANT CHANGE UP (ref 2–14)
NEUTROPHILS # BLD AUTO: 6 K/UL — SIGNIFICANT CHANGE UP (ref 1.8–7.4)
NEUTROPHILS NFR BLD AUTO: 76.5 % — SIGNIFICANT CHANGE UP (ref 43–77)
PLATELET # BLD AUTO: 295 K/UL — SIGNIFICANT CHANGE UP (ref 150–400)
RBC # BLD: 4.31 M/UL — SIGNIFICANT CHANGE UP (ref 4.2–5.8)
RBC # FLD: 16.9 % — HIGH (ref 10.3–14.5)
WBC # BLD: 7.9 K/UL — SIGNIFICANT CHANGE UP (ref 3.8–10.5)
WBC # FLD AUTO: 7.9 K/UL — SIGNIFICANT CHANGE UP (ref 3.8–10.5)

## 2024-01-22 PROCEDURE — G6017: CPT

## 2024-01-22 PROCEDURE — 77427 RADIATION TX MANAGEMENT X5: CPT

## 2024-01-22 NOTE — VITALS
[Maximal Pain Intensity: 0/10] : 0/10 [NoTreatment Scheduled] : no treatment scheduled [70: Cares for self; unalbe to carry on normal activity or do active work.] : 70: Cares for self; unable to carry on normal activity or do active work. [ECOG Performance Status: 2 - Ambulatory and capable of all self care but unable to carry out any work activities] : Performance Status: 2 - Ambulatory and capable of all self care but unable to carry out any work activities. Up and about more than 50% of waking hours

## 2024-01-22 NOTE — DISEASE MANAGEMENT
[Pathological] : TNM Stage: p [FreeTextEntry4] : squamous cell carcinoma [TTNM] : 4a [NTNM] : 2b [MTNM] : 0 [PATRICIA] : PATRICIA [de-identified] : 1000 cGy [de-identified] : 6000 cGy [de-identified] : post-op neck

## 2024-01-23 ENCOUNTER — NON-APPOINTMENT (OUTPATIENT)
Age: 74
End: 2024-01-23

## 2024-01-23 ENCOUNTER — APPOINTMENT (OUTPATIENT)
Age: 74
End: 2024-01-23

## 2024-01-23 PROCEDURE — 77427 RADIATION TX MANAGEMENT X5: CPT

## 2024-01-23 PROCEDURE — G6017: CPT

## 2024-01-23 NOTE — DISEASE MANAGEMENT
[Pathological] : TNM Stage: p [PATRICIA] : PATRICIA [FreeTextEntry4] : squamous cell carcinoma [TTNM] : 4a [MTNM] : 0 [NTNM] : 2b [de-identified] : 1000 cGy [de-identified] : 6000 cGy [de-identified] : post-op neck

## 2024-01-24 PROCEDURE — 77387B: CUSTOM | Mod: 26

## 2024-01-25 ENCOUNTER — APPOINTMENT (OUTPATIENT)
Dept: HEMATOLOGY ONCOLOGY | Facility: CLINIC | Age: 74
End: 2024-01-25
Payer: MEDICARE

## 2024-01-25 VITALS
OXYGEN SATURATION: 97 % | BODY MASS INDEX: 15.78 KG/M2 | SYSTOLIC BLOOD PRESSURE: 97 MMHG | HEART RATE: 77 BPM | HEIGHT: 78 IN | DIASTOLIC BLOOD PRESSURE: 61 MMHG | TEMPERATURE: 97.5 F | WEIGHT: 136.36 LBS

## 2024-01-25 PROCEDURE — 77387B: CUSTOM | Mod: 26

## 2024-01-25 PROCEDURE — 99214 OFFICE O/P EST MOD 30 MIN: CPT

## 2024-01-26 DIAGNOSIS — B37.0 CANDIDAL STOMATITIS: ICD-10-CM

## 2024-01-26 PROCEDURE — 77387B: CUSTOM | Mod: 26

## 2024-01-26 RX ORDER — FLUCONAZOLE 40 MG/ML
40 POWDER, FOR SUSPENSION ORAL
Qty: 1 | Refills: 0 | Status: ACTIVE | COMMUNITY
Start: 2024-01-26 | End: 1900-01-01

## 2024-01-29 ENCOUNTER — APPOINTMENT (OUTPATIENT)
Age: 74
End: 2024-01-29
Payer: MEDICARE

## 2024-01-29 ENCOUNTER — RESULT REVIEW (OUTPATIENT)
Age: 74
End: 2024-01-29

## 2024-01-29 ENCOUNTER — NON-APPOINTMENT (OUTPATIENT)
Age: 74
End: 2024-01-29

## 2024-01-29 ENCOUNTER — APPOINTMENT (OUTPATIENT)
Dept: HEMATOLOGY ONCOLOGY | Facility: CLINIC | Age: 74
End: 2024-01-29

## 2024-01-29 VITALS
RESPIRATION RATE: 16 BRPM | HEART RATE: 86 BPM | BODY MASS INDEX: 15.25 KG/M2 | SYSTOLIC BLOOD PRESSURE: 120 MMHG | DIASTOLIC BLOOD PRESSURE: 68 MMHG | OXYGEN SATURATION: 98 % | WEIGHT: 132 LBS

## 2024-01-29 LAB
BASOPHILS # BLD AUTO: 0 K/UL — SIGNIFICANT CHANGE UP (ref 0–0.2)
BASOPHILS NFR BLD AUTO: 0.5 % — SIGNIFICANT CHANGE UP (ref 0–2)
EOSINOPHIL # BLD AUTO: 0 K/UL — SIGNIFICANT CHANGE UP (ref 0–0.5)
EOSINOPHIL NFR BLD AUTO: 0.5 % — SIGNIFICANT CHANGE UP (ref 0–6)
HCT VFR BLD CALC: 35.5 % — LOW (ref 39–50)
HGB BLD-MCNC: 11.3 G/DL — LOW (ref 13–17)
LYMPHOCYTES # BLD AUTO: 0.6 K/UL — LOW (ref 1–3.3)
LYMPHOCYTES # BLD AUTO: 9.7 % — LOW (ref 13–44)
MCHC RBC-ENTMCNC: 24.5 PG — LOW (ref 27–34)
MCHC RBC-ENTMCNC: 31.9 G/DL — LOW (ref 32–36)
MCV RBC AUTO: 76.8 FL — LOW (ref 80–100)
MONOCYTES # BLD AUTO: 0.4 K/UL — SIGNIFICANT CHANGE UP (ref 0–0.9)
MONOCYTES NFR BLD AUTO: 5.8 % — SIGNIFICANT CHANGE UP (ref 2–14)
NEUTROPHILS # BLD AUTO: 5.5 K/UL — SIGNIFICANT CHANGE UP (ref 1.8–7.4)
NEUTROPHILS NFR BLD AUTO: 83.6 % — HIGH (ref 43–77)
PLATELET # BLD AUTO: 234 K/UL — SIGNIFICANT CHANGE UP (ref 150–400)
RBC # BLD: 4.62 M/UL — SIGNIFICANT CHANGE UP (ref 4.2–5.8)
RBC # FLD: 17.3 % — HIGH (ref 10.3–14.5)
WBC # BLD: 6.6 K/UL — SIGNIFICANT CHANGE UP (ref 3.8–10.5)
WBC # FLD AUTO: 6.6 K/UL — SIGNIFICANT CHANGE UP (ref 3.8–10.5)

## 2024-01-29 PROCEDURE — 99024 POSTOP FOLLOW-UP VISIT: CPT

## 2024-01-29 PROCEDURE — 77014: CPT | Mod: 26

## 2024-01-29 NOTE — DISEASE MANAGEMENT
[Pathological] : TNM Stage: p [FreeTextEntry4] : squamous cell carcinoma [TTNM] : 4a [MTNM] : 0 [NTNM] : 2b [PATRICIA] : PATRICIA [de-identified] : 2000 cGy [de-identified] : 6000 cGy [de-identified] : post-op neck

## 2024-01-30 ENCOUNTER — APPOINTMENT (OUTPATIENT)
Age: 74
End: 2024-01-30

## 2024-01-30 PROCEDURE — 77387B: CUSTOM | Mod: 26

## 2024-01-31 PROCEDURE — 77387B: CUSTOM | Mod: 26

## 2024-02-01 PROCEDURE — 77387B: CUSTOM | Mod: 26

## 2024-02-01 PROCEDURE — 77427 RADIATION TX MANAGEMENT X5: CPT

## 2024-02-02 PROCEDURE — 77387B: CUSTOM | Mod: 26

## 2024-02-05 ENCOUNTER — RESULT REVIEW (OUTPATIENT)
Age: 74
End: 2024-02-05

## 2024-02-05 ENCOUNTER — APPOINTMENT (OUTPATIENT)
Dept: HEMATOLOGY ONCOLOGY | Facility: CLINIC | Age: 74
End: 2024-02-05

## 2024-02-05 ENCOUNTER — NON-APPOINTMENT (OUTPATIENT)
Age: 74
End: 2024-02-05

## 2024-02-05 VITALS
OXYGEN SATURATION: 100 % | BODY MASS INDEX: 15.02 KG/M2 | SYSTOLIC BLOOD PRESSURE: 95 MMHG | DIASTOLIC BLOOD PRESSURE: 59 MMHG | RESPIRATION RATE: 16 BRPM | HEART RATE: 87 BPM | WEIGHT: 130 LBS

## 2024-02-05 LAB
BASOPHILS # BLD AUTO: 0 K/UL — SIGNIFICANT CHANGE UP (ref 0–0.2)
BASOPHILS NFR BLD AUTO: 0.7 % — SIGNIFICANT CHANGE UP (ref 0–2)
EOSINOPHIL # BLD AUTO: 0 K/UL — SIGNIFICANT CHANGE UP (ref 0–0.5)
EOSINOPHIL NFR BLD AUTO: 0.5 % — SIGNIFICANT CHANGE UP (ref 0–6)
HCT VFR BLD CALC: 36.7 % — LOW (ref 39–50)
HGB BLD-MCNC: 11.4 G/DL — LOW (ref 13–17)
LYMPHOCYTES # BLD AUTO: 0.6 K/UL — LOW (ref 1–3.3)
LYMPHOCYTES # BLD AUTO: 10.6 % — LOW (ref 13–44)
MCHC RBC-ENTMCNC: 24.1 PG — LOW (ref 27–34)
MCHC RBC-ENTMCNC: 31 G/DL — LOW (ref 32–36)
MCV RBC AUTO: 77.6 FL — LOW (ref 80–100)
MONOCYTES # BLD AUTO: 0.4 K/UL — SIGNIFICANT CHANGE UP (ref 0–0.9)
MONOCYTES NFR BLD AUTO: 7.5 % — SIGNIFICANT CHANGE UP (ref 2–14)
NEUTROPHILS # BLD AUTO: 4.5 K/UL — SIGNIFICANT CHANGE UP (ref 1.8–7.4)
NEUTROPHILS NFR BLD AUTO: 80.6 % — HIGH (ref 43–77)
PLATELET # BLD AUTO: 176 K/UL — SIGNIFICANT CHANGE UP (ref 150–400)
RBC # BLD: 4.74 M/UL — SIGNIFICANT CHANGE UP (ref 4.2–5.8)
RBC # FLD: 18.8 % — HIGH (ref 10.3–14.5)
WBC # BLD: 5.6 K/UL — SIGNIFICANT CHANGE UP (ref 3.8–10.5)
WBC # FLD AUTO: 5.6 K/UL — SIGNIFICANT CHANGE UP (ref 3.8–10.5)

## 2024-02-05 PROCEDURE — 77014: CPT | Mod: 26

## 2024-02-05 NOTE — VITALS
[Maximal Pain Intensity: 3/10] : 3/10 [Least Pain Intensity: 2/10] : 2/10 [Pain Description/Quality: ___] : Pain description/quality: [unfilled] [Pain Duration: ___] : Pain duration: [unfilled] [Pain Location: ___] : Pain Location: [unfilled] [Pain Interferes with ADLs] : Pain interferes with activities of daily living. [OTC] : OTC [70: Cares for self; unalbe to carry on normal activity or do active work.] : 70: Cares for self; unable to carry on normal activity or do active work. [ECOG Performance Status: 2 - Ambulatory and capable of all self care but unable to carry out any work activities] : Performance Status: 2 - Ambulatory and capable of all self care but unable to carry out any work activities. Up and about more than 50% of waking hours

## 2024-02-05 NOTE — DISEASE MANAGEMENT
[Pathological] : TNM Stage: p [PATRICIA] : PATRICIA [FreeTextEntry4] : squamous cell carcinoma [TTNM] : 4a [NTNM] : 2b [MTNM] : 0 [de-identified] : 3000 cGy [de-identified] : 6000 cGy [de-identified] : post-op neck

## 2024-02-05 NOTE — PHYSICAL EXAM
[FreeTextEntry1] : WDWN.  In no acute distress. Skin in RT portals intact. No erythema. Oral mucositis.

## 2024-02-06 ENCOUNTER — RESULT REVIEW (OUTPATIENT)
Age: 74
End: 2024-02-06

## 2024-02-06 ENCOUNTER — APPOINTMENT (OUTPATIENT)
Age: 74
End: 2024-02-06

## 2024-02-06 LAB
ALBUMIN SERPL ELPH-MCNC: 4.1 G/DL — SIGNIFICANT CHANGE UP (ref 3.3–5)
ALP SERPL-CCNC: 83 U/L — SIGNIFICANT CHANGE UP (ref 40–120)
ALT FLD-CCNC: 15 U/L — SIGNIFICANT CHANGE UP (ref 10–45)
ANION GAP SERPL CALC-SCNC: 14 MMOL/L — SIGNIFICANT CHANGE UP (ref 5–17)
AST SERPL-CCNC: 26 U/L — SIGNIFICANT CHANGE UP (ref 10–40)
BILIRUB SERPL-MCNC: 0.2 MG/DL — SIGNIFICANT CHANGE UP (ref 0.2–1.2)
BUN SERPL-MCNC: 35 MG/DL — HIGH (ref 7–23)
CALCIUM SERPL-MCNC: 9.5 MG/DL — SIGNIFICANT CHANGE UP (ref 8.4–10.5)
CHLORIDE SERPL-SCNC: 95 MMOL/L — LOW (ref 96–108)
CO2 SERPL-SCNC: 27 MMOL/L — SIGNIFICANT CHANGE UP (ref 22–31)
CREAT SERPL-MCNC: 0.51 MG/DL — SIGNIFICANT CHANGE UP (ref 0.5–1.3)
EGFR: 108 ML/MIN/1.73M2 — SIGNIFICANT CHANGE UP
GLUCOSE SERPL-MCNC: 179 MG/DL — HIGH (ref 70–99)
MAGNESIUM SERPL-MCNC: 2.1 MG/DL — SIGNIFICANT CHANGE UP (ref 1.6–2.6)
POTASSIUM SERPL-MCNC: 4.5 MMOL/L — SIGNIFICANT CHANGE UP (ref 3.5–5.3)
POTASSIUM SERPL-SCNC: 4.5 MMOL/L — SIGNIFICANT CHANGE UP (ref 3.5–5.3)
PROT SERPL-MCNC: 8 G/DL — SIGNIFICANT CHANGE UP (ref 6–8.3)
SODIUM SERPL-SCNC: 137 MMOL/L — SIGNIFICANT CHANGE UP (ref 135–145)

## 2024-02-06 PROCEDURE — 77427 RADIATION TX MANAGEMENT X5: CPT

## 2024-02-06 PROCEDURE — 77387B: CUSTOM | Mod: 26

## 2024-02-07 PROCEDURE — 77387B: CUSTOM | Mod: 26

## 2024-02-08 ENCOUNTER — APPOINTMENT (OUTPATIENT)
Dept: HEMATOLOGY ONCOLOGY | Facility: CLINIC | Age: 74
End: 2024-02-08
Payer: MEDICARE

## 2024-02-08 VITALS
OXYGEN SATURATION: 100 % | WEIGHT: 132.28 LBS | BODY MASS INDEX: 15.3 KG/M2 | HEART RATE: 81 BPM | DIASTOLIC BLOOD PRESSURE: 58 MMHG | TEMPERATURE: 98.4 F | SYSTOLIC BLOOD PRESSURE: 93 MMHG | HEIGHT: 78 IN

## 2024-02-08 PROCEDURE — 99215 OFFICE O/P EST HI 40 MIN: CPT

## 2024-02-08 PROCEDURE — 77387B: CUSTOM | Mod: 26

## 2024-02-08 NOTE — HISTORY OF PRESENT ILLNESS
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  Patient states he had a bilateral cataract surgery ~1 year ago in [place] that led to a severe infection of his left eye that was treated with strong medications for an extended length of time and ultimately caused vision loss of his left eye. He states he developed an oral blister which he feels may have been a side effect of the strong medications he was taking for the infection. He had inflammation, pain, and continued blistering of his mouth and followed up with his PCP who referred him to an oral specialist who referred him to Oolaryngology, Dr. May. Pt had a biopsy of the right maxillary alveolar ridge on 10/02/23 that returned positive for SCCa.  Staging PET on 10/12/23 showed intense uptake at previously reported RIGHT maxillary lesion consistent with malignant involvement.  Bilateral nodes in the neck where additional malignant involvement is not excluded. Differential diagnosis also includes reaction to recent biopsy. Additionally, there is some fatty change in the region adjacent to the RIGHT maxilla within the oral cavity. Activity seen at the contralateral tongue border may be due to altered mastication patterns. Consider MRI of the neck if not recently obtained or planned as of yet.  Mild BILATERAL hilar uptake, nonspecific and most likely reactive.  Otherwise, no evidence of FDG-avid malignancy outside the head and neck.  On 11/10/23 he underwent a right hemimaxillectomy and supraomohyoidal neck dissection levels 1, 2, and 3, left sided radial free fibular flap and eft sided split thickness skin graft. Path report was c/w squamous cell carcinoma, well to moderately differentiated, invading through maxillary cortical bone. Perineural invasion is identified. Carcinoma is present 4 mm from the lateral resection margin. All tumor bed margins negative for invasive tumor.  Tumor present in 2 regional ipsilateral lymph nodes.  He comes today for medical oncology consultation,. His eating is variable at the rehab due to the quality of the food per the patient and he has lost weight.  He is able to stand only with signficant assistance. [de-identified] : Patient presents on C2D3 CRT with weekly Carbo for SCCa of the right maxillary alveolar ridge. + Dysgeusia. + Stomatitis. No dysphagia or odynophagia. + PEG, calories 70/30 PEG vs PO. + Diarrhea. + Excessive thick oral secretions. No N/V. No neuropathy. No fever or chills.   -02/08/24: Mr Madera comes for follow up. He is tolerating ChemRT well. He has some new nontender swelling/edema in the Right neck that he attributes to sleeping on that sign. No fevers. Tolerating some PO and using his PEG tube.  Finishing RT on 02/27/24.

## 2024-02-08 NOTE — ASSESSMENT
[FreeTextEntry1] : 72 yo M with h/o DM and BPH who was diagnosed with SCCa of the right maxillary alveolar ridge in October 2023.  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  Perineural invasion also noted on path.   -Dr. Frazier discussed with him the role of chemotherapy or antibody therapy to enhance the effectiveness of radiation therapy and explained that is also helps eradicate micrometastatic diseaes to reduce the risk of both distant and local recurrence.   -he has an ECOG 3 performance status and is quite frail and may have a difficult time tolerating treatment. To help mitigate that and hopefully improve his strength prior to treatment was recommended a feeding tube be placed to supplement his nutritional needs. PEG placed on 1/11/24.  RT SUJIT Singh and MedOnc SUJIT Koehler will follow.  -discussed proper mouth care to help with dysgeusia and advised mucinex for excessive thick oral secretions   -02/08/24: Mr Madera comes for follow up. He is tolerating ChemRT well. He has some new nontender swelling/edema in the Right neck that he attributes to sleeping on that sign. No fevers. Tolerating some PO and using his PEG tube.  Finishing RT on 02/27/24. Follow up in one week to monitor new swelling/edema which does not appear infectious or as tumor progression clinically at this time.

## 2024-02-08 NOTE — PHYSICAL EXAM
[Capable of only limited self care, confined to bed or chair more than 50% of waking hours] : Status 3- Capable of only limited self care, confined to bed or chair more than 50% of waking hours [Normal] : affect appropriate [de-identified] : thin, frail, wheelchair bound

## 2024-02-08 NOTE — REASON FOR VISIT
Regino Duran is a 40year old male. HPI:   Patient presents with: Allergies: Pt reports Dr. Jessica Tovar referred to Dr. Bjorn Gardner. Allergy symptoms since December. Congestion, affected his breathing. Does nebulizer treatments.  Has had a dog for the las Height  69 inh  Age 40year old   Weight  234 lbs  Sex Male         Spirometry:   FEV1 2.89 L which is 73%   FEV1/FVC ratio 77%   No significant bronchodilator response       FVL:   Normal       Lung Volume:   TLC 5.71 L which is 85%   RV/TLC is normal needed 1 each 0   • aspirin 81 MG Oral Tab EC Take 81 mg by mouth daily. • albuterol sulfate (2.5 MG/3ML) 0.083% Inhalation Nebu Soln Take 3 mL (2.5 mg total) by nebulization every 6 (six) hours as needed for Wheezing.  1 Box 5   • methylPREDNISolone (M cyanosis, or clubbing  Neurological:Oriented to time, place, person & situation       ASSESSMENT/PLAN:   Assessment   Perennial allergic rhinitis with seasonal variation  (primary encounter diagnosis)  Moderate persistent extrinsic asthma without complicat Breath Activated 1 each 0     Sig: Inhale 1 puff into the lungs daily. • Levocetirizine Dihydrochloride (XYZAL) 5 MG Oral Tab 30 tablet 0     Sig: Take 1 tablet (5 mg total) by mouth nightly.    • predniSONE 20 MG Oral Tab 10 tablet 0     Sig: Take 2 tabs [Follow-Up Visit] : a follow-up [FreeTextEntry2] : SCCa right maxillary alveolar ridge

## 2024-02-08 NOTE — REVIEW OF SYSTEMS
[Patient Intake Form Reviewed] : Patient intake form was reviewed [Fatigue] : fatigue [Recent Change In Weight] : ~T recent weight change [Constipation] : constipation [Fever] : no fever [Eye Pain] : no eye pain [Vision Problems] : no vision problems [Dysphagia] : no dysphagia [Loss of Hearing] : no loss of hearing [Hoarseness] : no hoarseness [Odynophagia] : no odynophagia [Chest Pain] : no chest pain [Lower Ext Edema] : no lower extremity edema [Shortness Of Breath] : no shortness of breath [Cough] : no cough [SOB on Exertion] : no shortness of breath during exertion [Abdominal Pain] : no abdominal pain [Vomiting] : no vomiting [Dysuria] : no dysuria [Joint Pain] : no joint pain [Proptosis] : no proptosis [Easy Bleeding] : no tendency for easy bleeding [Easy Bruising] : no tendency for easy bruising [Swollen Glands] : no swollen glands

## 2024-02-09 PROCEDURE — 77387B: CUSTOM | Mod: 26

## 2024-02-12 ENCOUNTER — APPOINTMENT (OUTPATIENT)
Dept: HEMATOLOGY ONCOLOGY | Facility: CLINIC | Age: 74
End: 2024-02-12

## 2024-02-12 ENCOUNTER — RESULT REVIEW (OUTPATIENT)
Age: 74
End: 2024-02-12

## 2024-02-12 ENCOUNTER — NON-APPOINTMENT (OUTPATIENT)
Age: 74
End: 2024-02-12

## 2024-02-12 VITALS
SYSTOLIC BLOOD PRESSURE: 92 MMHG | RESPIRATION RATE: 16 BRPM | HEART RATE: 89 BPM | DIASTOLIC BLOOD PRESSURE: 55 MMHG | BODY MASS INDEX: 14.92 KG/M2 | OXYGEN SATURATION: 99 % | HEIGHT: 78 IN | WEIGHT: 129 LBS

## 2024-02-12 LAB
ALBUMIN SERPL ELPH-MCNC: 4.2 G/DL
ALBUMIN SERPL ELPH-MCNC: 4.2 G/DL
ALBUMIN SERPL ELPH-MCNC: 4.3 G/DL
ALP BLD-CCNC: 82 U/L
ALP BLD-CCNC: 83 U/L
ALP BLD-CCNC: 95 U/L
ALT SERPL-CCNC: 12 U/L
ALT SERPL-CCNC: 12 U/L
ALT SERPL-CCNC: 14 U/L
ANION GAP SERPL CALC-SCNC: 13 MMOL/L
ANION GAP SERPL CALC-SCNC: 15 MMOL/L
ANION GAP SERPL CALC-SCNC: 15 MMOL/L
AST SERPL-CCNC: 11 U/L
AST SERPL-CCNC: 11 U/L
AST SERPL-CCNC: 16 U/L
BASOPHILS # BLD AUTO: 0 K/UL — SIGNIFICANT CHANGE UP (ref 0–0.2)
BASOPHILS NFR BLD AUTO: 0.7 % — SIGNIFICANT CHANGE UP (ref 0–2)
BILIRUB SERPL-MCNC: 0.2 MG/DL
BUN SERPL-MCNC: 23 MG/DL
BUN SERPL-MCNC: 28 MG/DL
BUN SERPL-MCNC: 34 MG/DL
CALCIUM SERPL-MCNC: 10 MG/DL
CALCIUM SERPL-MCNC: 9.6 MG/DL
CALCIUM SERPL-MCNC: 9.9 MG/DL
CHLORIDE SERPL-SCNC: 94 MMOL/L
CHLORIDE SERPL-SCNC: 94 MMOL/L
CHLORIDE SERPL-SCNC: 95 MMOL/L
CO2 SERPL-SCNC: 28 MMOL/L
CO2 SERPL-SCNC: 29 MMOL/L
CO2 SERPL-SCNC: 29 MMOL/L
CREAT SERPL-MCNC: 0.53 MG/DL
CREAT SERPL-MCNC: 0.55 MG/DL
CREAT SERPL-MCNC: 0.57 MG/DL
EGFR: 104 ML/MIN/1.73M2
EGFR: 105 ML/MIN/1.73M2
EGFR: 106 ML/MIN/1.73M2
EOSINOPHIL # BLD AUTO: 0 K/UL — SIGNIFICANT CHANGE UP (ref 0–0.5)
EOSINOPHIL NFR BLD AUTO: 0.4 % — SIGNIFICANT CHANGE UP (ref 0–6)
GLUCOSE SERPL-MCNC: 139 MG/DL
GLUCOSE SERPL-MCNC: 157 MG/DL
GLUCOSE SERPL-MCNC: 176 MG/DL
HCT VFR BLD CALC: 38.5 % — LOW (ref 39–50)
HGB BLD-MCNC: 11.3 G/DL — LOW (ref 13–17)
LYMPHOCYTES # BLD AUTO: 0.3 K/UL — LOW (ref 1–3.3)
LYMPHOCYTES # BLD AUTO: 7.8 % — LOW (ref 13–44)
MAGNESIUM SERPL-MCNC: 1.6 MG/DL
MAGNESIUM SERPL-MCNC: 1.7 MG/DL
MAGNESIUM SERPL-MCNC: 1.8 MG/DL
MCHC RBC-ENTMCNC: 24.2 PG — LOW (ref 27–34)
MCHC RBC-ENTMCNC: 29.4 G/DL — LOW (ref 32–36)
MCV RBC AUTO: 82.5 FL — SIGNIFICANT CHANGE UP (ref 80–100)
MONOCYTES # BLD AUTO: 0.2 K/UL — SIGNIFICANT CHANGE UP (ref 0–0.9)
MONOCYTES NFR BLD AUTO: 6.3 % — SIGNIFICANT CHANGE UP (ref 2–14)
NEUTROPHILS # BLD AUTO: 3.3 K/UL — SIGNIFICANT CHANGE UP (ref 1.8–7.4)
NEUTROPHILS NFR BLD AUTO: 84.9 % — HIGH (ref 43–77)
PLATELET # BLD AUTO: 97 K/UL — LOW (ref 150–400)
POTASSIUM SERPL-SCNC: 4.3 MMOL/L
POTASSIUM SERPL-SCNC: 4.5 MMOL/L
POTASSIUM SERPL-SCNC: 4.6 MMOL/L
PROT SERPL-MCNC: 7.3 G/DL
PROT SERPL-MCNC: 7.4 G/DL
PROT SERPL-MCNC: 7.4 G/DL
RBC # BLD: 4.66 M/UL — SIGNIFICANT CHANGE UP (ref 4.2–5.8)
RBC # FLD: 20.2 % — HIGH (ref 10.3–14.5)
SODIUM SERPL-SCNC: 136 MMOL/L
SODIUM SERPL-SCNC: 137 MMOL/L
SODIUM SERPL-SCNC: 138 MMOL/L
WBC # BLD: 3.8 K/UL — SIGNIFICANT CHANGE UP (ref 3.8–10.5)
WBC # FLD AUTO: 3.8 K/UL — SIGNIFICANT CHANGE UP (ref 3.8–10.5)

## 2024-02-12 PROCEDURE — 77387B: CUSTOM | Mod: 26

## 2024-02-12 NOTE — REVIEW OF SYSTEMS
[Fatigue: Grade 1 - Fatigue relieved by rest] : Fatigue: Grade 1 - Fatigue relieved by rest [Mucositis Oral: Grade 1 - Asymptomatic or mild symptoms; intervention not indicated] : Mucositis Oral: Grade 1 - Asymptomatic or mild symptoms; intervention not indicated [Skin Hyperpigmentation: Grade 1 - Hyperpigmentation covering <10% BSA; no psychosocial impact] : Skin Hyperpigmentation: Grade 1 - Hyperpigmentation covering <10% BSA; no psychosocial impact [Dermatitis Radiation: Grade 1 - Faint erythema or dry desquamation] : Dermatitis Radiation: Grade 1 - Faint erythema or dry desquamation

## 2024-02-12 NOTE — PHYSICAL EXAM
[Normal] : oriented to person, place and time, the affect was normal, the mood was normal and not anxious [de-identified] : trismus.  lip sores. oral mucositis [de-identified] : right neck edema; no palpable adenopathy

## 2024-02-12 NOTE — DISEASE MANAGEMENT
[Pathological] : TNM Stage: p [PATRICIA] : PATRICIA [FreeTextEntry4] : squamous cell carcinoma [TTNM] : 4a [NTNM] : 2b [MTNM] : 0 [de-identified] : 4000 cGy [de-identified] : 6000 cGy [de-identified] : post-op neck

## 2024-02-12 NOTE — VITALS
[Maximal Pain Intensity: 3/10] : 3/10 [Least Pain Intensity: 2/10] : 2/10 [Pain Description/Quality: ___] : Pain description/quality: [unfilled] [Pain Duration: ___] : Pain duration: [unfilled] [Pain Location: ___] : Pain Location: [unfilled] [Pain Interferes with ADLs] : Pain interferes with activities of daily living. [OTC] : OTC [70: Cares for self; unalbe to carry on normal activity or do active work.] : 70: Cares for self; unable to carry on normal activity or do active work.

## 2024-02-13 ENCOUNTER — APPOINTMENT (OUTPATIENT)
Age: 74
End: 2024-02-13

## 2024-02-13 ENCOUNTER — RESULT REVIEW (OUTPATIENT)
Age: 74
End: 2024-02-13

## 2024-02-13 LAB
ALBUMIN SERPL ELPH-MCNC: 4 G/DL — SIGNIFICANT CHANGE UP (ref 3.3–5)
ALP SERPL-CCNC: 79 U/L — SIGNIFICANT CHANGE UP (ref 40–120)
ALT FLD-CCNC: 10 U/L — SIGNIFICANT CHANGE UP (ref 10–45)
ANION GAP SERPL CALC-SCNC: 12 MMOL/L — SIGNIFICANT CHANGE UP (ref 5–17)
AST SERPL-CCNC: 14 U/L — SIGNIFICANT CHANGE UP (ref 10–40)
BASOPHILS # BLD AUTO: 0 K/UL — SIGNIFICANT CHANGE UP (ref 0–0.2)
BASOPHILS NFR BLD AUTO: 0.8 % — SIGNIFICANT CHANGE UP (ref 0–2)
BILIRUB SERPL-MCNC: 0.2 MG/DL — SIGNIFICANT CHANGE UP (ref 0.2–1.2)
BUN SERPL-MCNC: 33 MG/DL — HIGH (ref 7–23)
CALCIUM SERPL-MCNC: 9.6 MG/DL — SIGNIFICANT CHANGE UP (ref 8.4–10.5)
CHLORIDE SERPL-SCNC: 97 MMOL/L — SIGNIFICANT CHANGE UP (ref 96–108)
CO2 SERPL-SCNC: 29 MMOL/L — SIGNIFICANT CHANGE UP (ref 22–31)
CREAT SERPL-MCNC: 0.44 MG/DL — LOW (ref 0.5–1.3)
EGFR: 113 ML/MIN/1.73M2 — SIGNIFICANT CHANGE UP
EOSINOPHIL # BLD AUTO: 0 K/UL — SIGNIFICANT CHANGE UP (ref 0–0.5)
EOSINOPHIL NFR BLD AUTO: 0.3 % — SIGNIFICANT CHANGE UP (ref 0–6)
GLUCOSE SERPL-MCNC: 158 MG/DL — HIGH (ref 70–99)
HCT VFR BLD CALC: 34.4 % — LOW (ref 39–50)
HGB BLD-MCNC: 10.7 G/DL — LOW (ref 13–17)
LYMPHOCYTES # BLD AUTO: 0.4 K/UL — LOW (ref 1–3.3)
LYMPHOCYTES # BLD AUTO: 11.7 % — LOW (ref 13–44)
MAGNESIUM SERPL-MCNC: 1.8 MG/DL — SIGNIFICANT CHANGE UP (ref 1.6–2.6)
MCHC RBC-ENTMCNC: 24.6 PG — LOW (ref 27–34)
MCHC RBC-ENTMCNC: 31 G/DL — LOW (ref 32–36)
MCV RBC AUTO: 79.1 FL — LOW (ref 80–100)
MONOCYTES # BLD AUTO: 0.3 K/UL — SIGNIFICANT CHANGE UP (ref 0–0.9)
MONOCYTES NFR BLD AUTO: 8.8 % — SIGNIFICANT CHANGE UP (ref 2–14)
NEUTROPHILS # BLD AUTO: 2.6 K/UL — SIGNIFICANT CHANGE UP (ref 1.8–7.4)
NEUTROPHILS NFR BLD AUTO: 78.4 % — HIGH (ref 43–77)
PLATELET # BLD AUTO: 91 K/UL — LOW (ref 150–400)
POTASSIUM SERPL-MCNC: 4.1 MMOL/L — SIGNIFICANT CHANGE UP (ref 3.5–5.3)
POTASSIUM SERPL-SCNC: 4.1 MMOL/L — SIGNIFICANT CHANGE UP (ref 3.5–5.3)
PROT SERPL-MCNC: 6.7 G/DL — SIGNIFICANT CHANGE UP (ref 6–8.3)
RBC # BLD: 4.35 M/UL — SIGNIFICANT CHANGE UP (ref 4.2–5.8)
RBC # FLD: 19.7 % — HIGH (ref 10.3–14.5)
SODIUM SERPL-SCNC: 139 MMOL/L — SIGNIFICANT CHANGE UP (ref 135–145)
WBC # BLD: 3.3 K/UL — LOW (ref 3.8–10.5)
WBC # FLD AUTO: 3.3 K/UL — LOW (ref 3.8–10.5)

## 2024-02-13 PROCEDURE — 77387B: CUSTOM | Mod: 26

## 2024-02-13 RX ORDER — SITAGLIPTIN AND METFORMIN HYDROCHLORIDE 500; 50 MG/1; MG/1
1 TABLET, FILM COATED ORAL
Qty: 0 | Refills: 0 | DISCHARGE

## 2024-02-14 PROCEDURE — 77427 RADIATION TX MANAGEMENT X5: CPT

## 2024-02-14 PROCEDURE — 77387B: CUSTOM | Mod: 26

## 2024-02-15 PROCEDURE — 77387B: CUSTOM | Mod: 26

## 2024-02-16 ENCOUNTER — APPOINTMENT (OUTPATIENT)
Dept: HEMATOLOGY ONCOLOGY | Facility: CLINIC | Age: 74
End: 2024-02-16
Payer: MEDICARE

## 2024-02-16 ENCOUNTER — APPOINTMENT (OUTPATIENT)
Age: 74
End: 2024-02-16

## 2024-02-16 ENCOUNTER — NON-APPOINTMENT (OUTPATIENT)
Age: 74
End: 2024-02-16

## 2024-02-16 ENCOUNTER — RESULT REVIEW (OUTPATIENT)
Age: 74
End: 2024-02-16

## 2024-02-16 VITALS
BODY MASS INDEX: 14.92 KG/M2 | HEIGHT: 78 IN | HEART RATE: 92 BPM | SYSTOLIC BLOOD PRESSURE: 91 MMHG | OXYGEN SATURATION: 97 % | TEMPERATURE: 98.2 F | WEIGHT: 129 LBS | DIASTOLIC BLOOD PRESSURE: 58 MMHG

## 2024-02-16 LAB
BASOPHILS # BLD AUTO: 0 K/UL — SIGNIFICANT CHANGE UP (ref 0–0.2)
BASOPHILS NFR BLD AUTO: 1.1 % — SIGNIFICANT CHANGE UP (ref 0–2)
EOSINOPHIL # BLD AUTO: 0 K/UL — SIGNIFICANT CHANGE UP (ref 0–0.5)
EOSINOPHIL NFR BLD AUTO: 0.6 % — SIGNIFICANT CHANGE UP (ref 0–6)
HCT VFR BLD CALC: 36.7 % — LOW (ref 39–50)
HGB BLD-MCNC: 11.4 G/DL — LOW (ref 13–17)
LYMPHOCYTES # BLD AUTO: 0.3 K/UL — LOW (ref 1–3.3)
LYMPHOCYTES # BLD AUTO: 8.8 % — LOW (ref 13–44)
MCHC RBC-ENTMCNC: 24.2 PG — LOW (ref 27–34)
MCHC RBC-ENTMCNC: 30.9 G/DL — LOW (ref 32–36)
MCV RBC AUTO: 78 FL — LOW (ref 80–100)
MONOCYTES # BLD AUTO: 0.2 K/UL — SIGNIFICANT CHANGE UP (ref 0–0.9)
MONOCYTES NFR BLD AUTO: 6.6 % — SIGNIFICANT CHANGE UP (ref 2–14)
NEUTROPHILS # BLD AUTO: 2.6 K/UL — SIGNIFICANT CHANGE UP (ref 1.8–7.4)
NEUTROPHILS NFR BLD AUTO: 82.9 % — HIGH (ref 43–77)
PLATELET # BLD AUTO: 89 K/UL — LOW (ref 150–400)
RBC # BLD: 4.7 M/UL — SIGNIFICANT CHANGE UP (ref 4.2–5.8)
RBC # FLD: 20.6 % — HIGH (ref 10.3–14.5)
WBC # BLD: 3.1 K/UL — LOW (ref 3.8–10.5)
WBC # FLD AUTO: 3.1 K/UL — LOW (ref 3.8–10.5)

## 2024-02-16 PROCEDURE — 99214 OFFICE O/P EST MOD 30 MIN: CPT

## 2024-02-16 PROCEDURE — 77387B: CUSTOM | Mod: 26

## 2024-02-16 RX ORDER — SILVER SULFADIAZINE 10 MG/G
1 CREAM TOPICAL DAILY
Qty: 5 | Refills: 5 | Status: ACTIVE | COMMUNITY
Start: 2023-12-11 | End: 1900-01-01

## 2024-02-16 NOTE — ASSESSMENT
[FreeTextEntry1] : 72 yo M with h/o DM and BPH who was diagnosed with SCCa of the right maxillary alveolar ridge in October 2023.  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  Perineural invasion also noted on path.   -Dr. Frazier discussed with him the role of chemotherapy or antibody therapy to enhance the effectiveness of radiation therapy and explained that is also helps eradicate micrometastatic diseaes to reduce the risk of both distant and local recurrence.   -he has an ECOG 3 performance status and is quite frail and may have a difficult time tolerating treatment. To help mitigate that and hopefully improve his strength prior to treatment was recommended a feeding tube be placed to supplement his nutritional needs. PEG placed on 1/11/24.   -discussed proper mouth care to help with dysgeusia and advised mucinex for excessive thick oral secretions  -continue silvaden for right neck -SUJIT Koehler will follow up today -scheduled to complete RT on 2/27/24.  Final Carbo scheduled for 2/20/24.  -will get IVF today given hypotension and episodes of dizziness. -MD follow up in 2-3 weeks

## 2024-02-16 NOTE — PHYSICAL EXAM
[Capable of only limited self care, confined to bed or chair more than 50% of waking hours] : Status 3- Capable of only limited self care, confined to bed or chair more than 50% of waking hours [Normal] : affect appropriate [de-identified] : thin, frail, wheelchair bound

## 2024-02-16 NOTE — HISTORY OF PRESENT ILLNESS
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  Patient states he had a bilateral cataract surgery ~1 year ago in [place] that led to a severe infection of his left eye that was treated with strong medications for an extended length of time and ultimately caused vision loss of his left eye. He states he developed an oral blister which he feels may have been a side effect of the strong medications he was taking for the infection. He had inflammation, pain, and continued blistering of his mouth and followed up with his PCP who referred him to an oral specialist who referred him to Oolaryngology, Dr. May. Pt had a biopsy of the right maxillary alveolar ridge on 10/02/23 that returned positive for SCCa.  Staging PET on 10/12/23 showed intense uptake at previously reported RIGHT maxillary lesion consistent with malignant involvement.  Bilateral nodes in the neck where additional malignant involvement is not excluded. Differential diagnosis also includes reaction to recent biopsy. Additionally, there is some fatty change in the region adjacent to the RIGHT maxilla within the oral cavity. Activity seen at the contralateral tongue border may be due to altered mastication patterns. Consider MRI of the neck if not recently obtained or planned as of yet.  Mild BILATERAL hilar uptake, nonspecific and most likely reactive.  Otherwise, no evidence of FDG-avid malignancy outside the head and neck.  On 11/10/23 he underwent a right hemimaxillectomy and supraomohyoidal neck dissection levels 1, 2, and 3, left sided radial free fibular flap and eft sided split thickness skin graft. Path report was c/w squamous cell carcinoma, well to moderately differentiated, invading through maxillary cortical bone. Perineural invasion is identified. Carcinoma is present 4 mm from the lateral resection margin. All tumor bed margins negative for invasive tumor.  Tumor present in 2 regional ipsilateral lymph nodes.  He comes today for medical oncology consultation,. His eating is variable at the rehab due to the quality of the food per the patient and he has lost weight.  He is able to stand only with signficant assistance. 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  Scheduled to complete RT on 2/27/24.  Final Carbo scheduled for 2/20/24.  + Intermittent dizziness. + Dysgeusia and decreased appetite. + Stomatitis. + PEG, calories 70/30 PEG vs PO. No dysphagia or odynophagia. + Intermittent diarrhea. + Excessive thick oral secretions. + Significant erythema, right neck.  No N/V. No neuropathy. No fever or chills.

## 2024-02-20 ENCOUNTER — RESULT REVIEW (OUTPATIENT)
Age: 74
End: 2024-02-20

## 2024-02-20 ENCOUNTER — APPOINTMENT (OUTPATIENT)
Age: 74
End: 2024-02-20

## 2024-02-20 ENCOUNTER — NON-APPOINTMENT (OUTPATIENT)
Age: 74
End: 2024-02-20

## 2024-02-20 VITALS
RESPIRATION RATE: 16 BRPM | WEIGHT: 128 LBS | OXYGEN SATURATION: 97 % | SYSTOLIC BLOOD PRESSURE: 95 MMHG | DIASTOLIC BLOOD PRESSURE: 65 MMHG | BODY MASS INDEX: 14.79 KG/M2 | HEART RATE: 109 BPM

## 2024-02-20 DIAGNOSIS — E86.0 DEHYDRATION: ICD-10-CM

## 2024-02-20 LAB
ANISOCYTOSIS BLD QL: SLIGHT — SIGNIFICANT CHANGE UP
BASOPHILS # BLD AUTO: 0 K/UL — SIGNIFICANT CHANGE UP (ref 0–0.2)
BASOPHILS NFR BLD AUTO: 1 % — SIGNIFICANT CHANGE UP (ref 0–2)
BUN SERPL-MCNC: 27 MG/DL — HIGH (ref 7–23)
CA-I BLDA-SCNC: 1.22 MMOL/L — SIGNIFICANT CHANGE UP (ref 1.12–1.3)
CHLORIDE SERPL-SCNC: 97 MMOL/L — SIGNIFICANT CHANGE UP (ref 96–108)
CO2 SERPL-SCNC: 30 MMOL/L — SIGNIFICANT CHANGE UP (ref 22–31)
CREAT SERPL-MCNC: 0.5 MG/DL — SIGNIFICANT CHANGE UP (ref 0.5–1.3)
EOSINOPHIL # BLD AUTO: 0 K/UL — SIGNIFICANT CHANGE UP (ref 0–0.5)
GLUCOSE SERPL-MCNC: 183 MG/DL — HIGH (ref 70–99)
HCT VFR BLD CALC: 40.1 % — SIGNIFICANT CHANGE UP (ref 39–50)
HGB BLD-MCNC: 12.3 G/DL — LOW (ref 13–17)
LYMPHOCYTES # BLD AUTO: 0.3 K/UL — LOW (ref 1–3.3)
LYMPHOCYTES # BLD AUTO: 12 % — LOW (ref 13–44)
MACROCYTES BLD QL: SLIGHT — SIGNIFICANT CHANGE UP
MCHC RBC-ENTMCNC: 24.4 PG — LOW (ref 27–34)
MCHC RBC-ENTMCNC: 30.7 G/DL — LOW (ref 32–36)
MCV RBC AUTO: 79.4 FL — LOW (ref 80–100)
MICROCYTES BLD QL: SLIGHT — SIGNIFICANT CHANGE UP
MONOCYTES # BLD AUTO: 0.1 K/UL — SIGNIFICANT CHANGE UP (ref 0–0.9)
MONOCYTES NFR BLD AUTO: 6 % — SIGNIFICANT CHANGE UP (ref 2–14)
NEUTROPHILS # BLD AUTO: 1.4 K/UL — LOW (ref 1.8–7.4)
NEUTROPHILS NFR BLD AUTO: 79 % — HIGH (ref 43–77)
NEUTS BAND # BLD: 2 % — SIGNIFICANT CHANGE UP (ref 0–8)
PLAT MORPH BLD: NORMAL — SIGNIFICANT CHANGE UP
PLATELET # BLD AUTO: 129 K/UL — LOW (ref 150–400)
POIKILOCYTOSIS BLD QL AUTO: SLIGHT — SIGNIFICANT CHANGE UP
POTASSIUM SERPL-MCNC: 3.6 MMOL/L — SIGNIFICANT CHANGE UP (ref 3.5–5.3)
POTASSIUM SERPL-SCNC: 3.6 MMOL/L — SIGNIFICANT CHANGE UP (ref 3.5–5.3)
RBC # BLD: 5.04 M/UL — SIGNIFICANT CHANGE UP (ref 4.2–5.8)
RBC # FLD: 20.8 % — HIGH (ref 10.3–14.5)
RBC BLD AUTO: SIGNIFICANT CHANGE UP
SODIUM SERPL-SCNC: 139 MMOL/L — SIGNIFICANT CHANGE UP (ref 135–145)
STOMATOCYTES BLD QL SMEAR: PRESENT — SIGNIFICANT CHANGE UP
WBC # BLD: 1.7 K/UL — LOW (ref 3.8–10.5)
WBC # FLD AUTO: 1.7 K/UL — LOW (ref 3.8–10.5)

## 2024-02-20 PROCEDURE — 77014: CPT | Mod: 26

## 2024-02-20 RX ORDER — NYSTATIN 100000 [USP'U]/ML
100000 SUSPENSION ORAL
Qty: 1 | Refills: 1 | Status: COMPLETED | COMMUNITY
Start: 2024-02-20 | End: 2024-03-11

## 2024-02-20 NOTE — VITALS
[Maximal Pain Intensity: 3/10] : 3/10 [Least Pain Intensity: 2/10] : 2/10 [Pain Description/Quality: ___] : Pain description/quality: [unfilled] [Pain Duration: ___] : Pain duration: [unfilled] [Pain Location: ___] : Pain Location: [unfilled] [Pain Interferes with ADLs] : Pain interferes with activities of daily living. [OTC] : OTC [70: Cares for self; unalbe to carry on normal activity or do active work.] : 70: Cares for self; unable to carry on normal activity or do active work. [60: Requires occasional assistance, but is able to care for most of his/her needs] : 60: Requires occasional assistance, but is able to care for most of his/her needs

## 2024-02-20 NOTE — PHYSICAL EXAM
[Normal] : oriented to person, place and time, the affect was normal, the mood was normal and not anxious [Feeding Tube] : a feeding tube was present [de-identified] : trismus.  lip sores. oral mucositis.  thrush [de-identified] : no erythema [de-identified] : right neck edema; patchy dry desquamation; no moist desquamation

## 2024-02-20 NOTE — DISEASE MANAGEMENT
[Pathological] : TNM Stage: p [PATRICIA] : PATRICIA [FreeTextEntry4] : squamous cell carcinoma [TTNM] : 4a [NTNM] : 2b [MTNM] : 0 [de-identified] : 5000 cGy [de-identified] : 6000 cGy [de-identified] : post-op neck

## 2024-02-20 NOTE — REVIEW OF SYSTEMS
[Fatigue: Grade 1 - Fatigue relieved by rest] : Fatigue: Grade 1 - Fatigue relieved by rest [Mucositis Oral: Grade 1 - Asymptomatic or mild symptoms; intervention not indicated] : Mucositis Oral: Grade 1 - Asymptomatic or mild symptoms; intervention not indicated [Skin Hyperpigmentation: Grade 1 - Hyperpigmentation covering <10% BSA; no psychosocial impact] : Skin Hyperpigmentation: Grade 1 - Hyperpigmentation covering <10% BSA; no psychosocial impact [Dermatitis Radiation: Grade 1 - Faint erythema or dry desquamation] : Dermatitis Radiation: Grade 1 - Faint erythema or dry desquamation [Mucositis Oral: Grade 2 - Moderate pain; not interfering with oral intake; modified diet indicated] : Mucositis Oral: Grade 2 - Moderate pain; not interfering with oral intake; modified diet indicated [Xerostomia: Grade 1 - Symptomatic (e.g., dry or thick saliva) without significant dietary alteration; unstimulated saliva flow >0.2 ml/min] : Xerostomia: Grade 1 - Symptomatic (e.g., dry or thick saliva) without significant dietary alteration; unstimulated saliva flow >0.2 ml/min [Oral Pain: Grade 2 - Moderate pain; limiting instrumental ADL] : Oral Pain: Grade 2 - Moderate pain; limiting instrumental ADL [Dermatitis Radiation: Grade 2 - Moderate to brisk erythema; patchy moist desquamation, mostly confined to skin folds and creases; moderate edema] : Dermatitis Radiation: Grade 2 - Moderate to brisk erythema; patchy moist desquamation, mostly confined to skin folds and creases; moderate edema

## 2024-02-21 LAB
ALBUMIN SERPL ELPH-MCNC: 3.9 G/DL — SIGNIFICANT CHANGE UP (ref 3.3–5)
ALP SERPL-CCNC: 81 U/L — SIGNIFICANT CHANGE UP (ref 40–120)
ALT FLD-CCNC: 17 U/L — SIGNIFICANT CHANGE UP (ref 10–45)
ANION GAP SERPL CALC-SCNC: 14 MMOL/L — SIGNIFICANT CHANGE UP (ref 5–17)
AST SERPL-CCNC: 38 U/L — SIGNIFICANT CHANGE UP (ref 10–40)
BILIRUB SERPL-MCNC: 0.2 MG/DL — SIGNIFICANT CHANGE UP (ref 0.2–1.2)
BUN SERPL-MCNC: 28 MG/DL — HIGH (ref 7–23)
CALCIUM SERPL-MCNC: 10.3 MG/DL — SIGNIFICANT CHANGE UP (ref 8.4–10.5)
CHLORIDE SERPL-SCNC: 95 MMOL/L — LOW (ref 96–108)
CO2 SERPL-SCNC: 28 MMOL/L — SIGNIFICANT CHANGE UP (ref 22–31)
CREAT SERPL-MCNC: 0.4 MG/DL — LOW (ref 0.5–1.3)
EGFR: 116 ML/MIN/1.73M2 — SIGNIFICANT CHANGE UP
GLUCOSE SERPL-MCNC: 146 MG/DL — HIGH (ref 70–99)
MAGNESIUM SERPL-MCNC: 1.9 MG/DL — SIGNIFICANT CHANGE UP (ref 1.6–2.6)
POTASSIUM SERPL-MCNC: 5.3 MMOL/L — SIGNIFICANT CHANGE UP (ref 3.5–5.3)
POTASSIUM SERPL-SCNC: 5.3 MMOL/L — SIGNIFICANT CHANGE UP (ref 3.5–5.3)
PROT SERPL-MCNC: 8.2 G/DL — SIGNIFICANT CHANGE UP (ref 6–8.3)
SODIUM SERPL-SCNC: 137 MMOL/L — SIGNIFICANT CHANGE UP (ref 135–145)

## 2024-02-21 PROCEDURE — 77427 RADIATION TX MANAGEMENT X5: CPT

## 2024-02-21 PROCEDURE — 77387B: CUSTOM | Mod: 26

## 2024-02-22 PROCEDURE — 77387B: CUSTOM | Mod: 26

## 2024-02-23 PROCEDURE — 77387B: CUSTOM | Mod: 26

## 2024-02-26 ENCOUNTER — NON-APPOINTMENT (OUTPATIENT)
Age: 74
End: 2024-02-26

## 2024-02-26 VITALS
DIASTOLIC BLOOD PRESSURE: 62 MMHG | SYSTOLIC BLOOD PRESSURE: 100 MMHG | WEIGHT: 127 LBS | OXYGEN SATURATION: 98 % | RESPIRATION RATE: 16 BRPM | HEIGHT: 78 IN | BODY MASS INDEX: 14.69 KG/M2 | HEART RATE: 103 BPM

## 2024-02-26 PROCEDURE — 77014: CPT | Mod: 26

## 2024-02-26 NOTE — DISEASE MANAGEMENT
[Pathological] : TNM Stage: p [FreeTextEntry4] : squamous cell carcinoma [TTNM] : 4a [NTNM] : 2b [PATRICIA] : PATRICIA [MTNM] : 0 [de-identified] : 5800 cGy [de-identified] : 6000 cGy [de-identified] : post-op neck

## 2024-02-26 NOTE — VITALS
[Maximal Pain Intensity: 3/10] : 3/10 [Least Pain Intensity: 1/10] : 1/10 [Pain Description/Quality: ___] : Pain description/quality: [unfilled] [Pain Duration: ___] : Pain duration: [unfilled] [Pain Location: ___] : Pain Location: [unfilled] [Pain Interferes with ADLs] : Pain interferes with activities of daily living. [OTC] : OTC [70: Cares for self; unalbe to carry on normal activity or do active work.] : 70: Cares for self; unable to carry on normal activity or do active work.

## 2024-02-26 NOTE — REVIEW OF SYSTEMS
[Fatigue: Grade 1 - Fatigue relieved by rest] : Fatigue: Grade 1 - Fatigue relieved by rest [Mucositis Oral: Grade 1 - Asymptomatic or mild symptoms; intervention not indicated] : Mucositis Oral: Grade 1 - Asymptomatic or mild symptoms; intervention not indicated [Xerostomia: Grade 1 - Symptomatic (e.g., dry or thick saliva) without significant dietary alteration; unstimulated saliva flow >0.2 ml/min] : Xerostomia: Grade 1 - Symptomatic (e.g., dry or thick saliva) without significant dietary alteration; unstimulated saliva flow >0.2 ml/min [Oral Pain: Grade 1 - Mild pain] : Oral Pain: Grade 1 - Mild pain [Dysgeusia: Grade 1- Altered taste but no change in diet] : Dysgeusia: Grade 1 - Altered taste but no change in diet [Skin Hyperpigmentation: Grade 1 - Hyperpigmentation covering <10% BSA; no psychosocial impact] : Skin Hyperpigmentation: Grade 1 - Hyperpigmentation covering <10% BSA; no psychosocial impact [Dermatitis Radiation: Grade 1 - Faint erythema or dry desquamation] : Dermatitis Radiation: Grade 1 - Faint erythema or dry desquamation

## 2024-02-26 NOTE — PHYSICAL EXAM
[Normal] : oriented to person, place and time, the affect was normal, the mood was normal and not anxious [de-identified] : trismus.  lip sores. oral mucositis [de-identified] : right neck mass remains

## 2024-02-27 ENCOUNTER — APPOINTMENT (OUTPATIENT)
Age: 74
End: 2024-02-27

## 2024-02-27 PROCEDURE — 77387B: CUSTOM | Mod: 26

## 2024-03-17 ENCOUNTER — INPATIENT (INPATIENT)
Facility: HOSPITAL | Age: 74
LOS: 11 days | Discharge: HOME HEALTH | DRG: 871 | End: 2024-03-29
Attending: INTERNAL MEDICINE | Admitting: HOSPITALIST
Payer: MEDICARE

## 2024-03-17 VITALS
DIASTOLIC BLOOD PRESSURE: 69 MMHG | TEMPERATURE: 98 F | HEIGHT: 72 IN | RESPIRATION RATE: 18 BRPM | HEART RATE: 79 BPM | OXYGEN SATURATION: 99 % | SYSTOLIC BLOOD PRESSURE: 116 MMHG

## 2024-03-17 DIAGNOSIS — Z98.49 CATARACT EXTRACTION STATUS, UNSPECIFIED EYE: Chronic | ICD-10-CM

## 2024-03-17 DIAGNOSIS — Z98.890 OTHER SPECIFIED POSTPROCEDURAL STATES: Chronic | ICD-10-CM

## 2024-03-17 DIAGNOSIS — Z98.61 CORONARY ANGIOPLASTY STATUS: Chronic | ICD-10-CM

## 2024-03-17 DIAGNOSIS — K81.9 CHOLECYSTITIS, UNSPECIFIED: ICD-10-CM

## 2024-03-17 LAB
ALBUMIN SERPL ELPH-MCNC: 2.9 G/DL — LOW (ref 3.3–5.2)
ALBUMIN SERPL ELPH-MCNC: 3.4 G/DL — SIGNIFICANT CHANGE UP (ref 3.3–5.2)
ALBUMIN SERPL ELPH-MCNC: 3.8 G/DL — SIGNIFICANT CHANGE UP (ref 3.3–5.2)
ALP SERPL-CCNC: 329 U/L — HIGH (ref 40–120)
ALP SERPL-CCNC: 353 U/L — HIGH (ref 40–120)
ALP SERPL-CCNC: 394 U/L — HIGH (ref 40–120)
ALT FLD-CCNC: 252 U/L — HIGH
ALT FLD-CCNC: 253 U/L — HIGH
ALT FLD-CCNC: 273 U/L — HIGH
ANION GAP SERPL CALC-SCNC: 13 MMOL/L — SIGNIFICANT CHANGE UP (ref 5–17)
ANION GAP SERPL CALC-SCNC: 13 MMOL/L — SIGNIFICANT CHANGE UP (ref 5–17)
ANION GAP SERPL CALC-SCNC: 16 MMOL/L — SIGNIFICANT CHANGE UP (ref 5–17)
APPEARANCE UR: CLEAR — SIGNIFICANT CHANGE UP
APTT BLD: 35.1 SEC — SIGNIFICANT CHANGE UP (ref 24.5–35.6)
AST SERPL-CCNC: 211 U/L — HIGH
AST SERPL-CCNC: 215 U/L — HIGH
AST SERPL-CCNC: 379 U/L — HIGH
BACTERIA # UR AUTO: NEGATIVE /HPF — SIGNIFICANT CHANGE UP
BASE EXCESS BLDV CALC-SCNC: 3.7 MMOL/L — HIGH (ref -2–3)
BASOPHILS # BLD AUTO: 0 K/UL — SIGNIFICANT CHANGE UP (ref 0–0.2)
BASOPHILS NFR BLD AUTO: 0 % — SIGNIFICANT CHANGE UP (ref 0–2)
BILIRUB SERPL-MCNC: 1.4 MG/DL — SIGNIFICANT CHANGE UP (ref 0.4–2)
BILIRUB SERPL-MCNC: 2.5 MG/DL — HIGH (ref 0.4–2)
BILIRUB SERPL-MCNC: 2.6 MG/DL — HIGH (ref 0.4–2)
BILIRUB UR-MCNC: ABNORMAL
BUN SERPL-MCNC: 13.6 MG/DL — SIGNIFICANT CHANGE UP (ref 8–20)
BUN SERPL-MCNC: 14.7 MG/DL — SIGNIFICANT CHANGE UP (ref 8–20)
BUN SERPL-MCNC: 19.5 MG/DL — SIGNIFICANT CHANGE UP (ref 8–20)
CA-I SERPL-SCNC: 1.13 MMOL/L — LOW (ref 1.15–1.33)
CALCIUM SERPL-MCNC: 8.3 MG/DL — LOW (ref 8.4–10.5)
CALCIUM SERPL-MCNC: 8.8 MG/DL — SIGNIFICANT CHANGE UP (ref 8.4–10.5)
CALCIUM SERPL-MCNC: 9.7 MG/DL — SIGNIFICANT CHANGE UP (ref 8.4–10.5)
CHLORIDE BLDV-SCNC: 98 MMOL/L — SIGNIFICANT CHANGE UP (ref 96–108)
CHLORIDE SERPL-SCNC: 94 MMOL/L — LOW (ref 96–108)
CHLORIDE SERPL-SCNC: 94 MMOL/L — LOW (ref 96–108)
CHLORIDE SERPL-SCNC: 97 MMOL/L — SIGNIFICANT CHANGE UP (ref 96–108)
CO2 SERPL-SCNC: 26 MMOL/L — SIGNIFICANT CHANGE UP (ref 22–29)
CO2 SERPL-SCNC: 26 MMOL/L — SIGNIFICANT CHANGE UP (ref 22–29)
CO2 SERPL-SCNC: 32 MMOL/L — HIGH (ref 22–29)
COLOR SPEC: SIGNIFICANT CHANGE UP
CREAT SERPL-MCNC: 0.41 MG/DL — LOW (ref 0.5–1.3)
CREAT SERPL-MCNC: 0.44 MG/DL — LOW (ref 0.5–1.3)
CREAT SERPL-MCNC: 0.51 MG/DL — SIGNIFICANT CHANGE UP (ref 0.5–1.3)
DIFF PNL FLD: NEGATIVE — SIGNIFICANT CHANGE UP
EGFR: 108 ML/MIN/1.73M2 — SIGNIFICANT CHANGE UP
EGFR: 113 ML/MIN/1.73M2 — SIGNIFICANT CHANGE UP
EGFR: 115 ML/MIN/1.73M2 — SIGNIFICANT CHANGE UP
EOSINOPHIL # BLD AUTO: 0.13 K/UL — SIGNIFICANT CHANGE UP (ref 0–0.5)
EOSINOPHIL NFR BLD AUTO: 0.9 % — SIGNIFICANT CHANGE UP (ref 0–6)
GAS PNL BLDV: 133 MMOL/L — LOW (ref 136–145)
GAS PNL BLDV: SIGNIFICANT CHANGE UP
GAS PNL BLDV: SIGNIFICANT CHANGE UP
GLUCOSE BLDC GLUCOMTR-MCNC: 139 MG/DL — HIGH (ref 70–99)
GLUCOSE BLDC GLUCOMTR-MCNC: 164 MG/DL — HIGH (ref 70–99)
GLUCOSE BLDV-MCNC: 145 MG/DL — HIGH (ref 70–99)
GLUCOSE SERPL-MCNC: 140 MG/DL — HIGH (ref 70–99)
GLUCOSE SERPL-MCNC: 157 MG/DL — HIGH (ref 70–99)
GLUCOSE SERPL-MCNC: 161 MG/DL — HIGH (ref 70–99)
GLUCOSE UR QL: 500 MG/DL
GRAM STN FLD: ABNORMAL
GRAM STN FLD: ABNORMAL
HCO3 BLDV-SCNC: 28 MMOL/L — SIGNIFICANT CHANGE UP (ref 22–29)
HCT VFR BLD CALC: 39.3 % — SIGNIFICANT CHANGE UP (ref 39–50)
HCT VFR BLDA CALC: 33 % — SIGNIFICANT CHANGE UP
HGB BLD CALC-MCNC: 11 G/DL — LOW (ref 12.6–17.4)
HGB BLD-MCNC: 12 G/DL — LOW (ref 13–17)
INR BLD: 1.17 RATIO — SIGNIFICANT CHANGE UP (ref 0.85–1.18)
KETONES UR-MCNC: 15 MG/DL
LACTATE BLDV-MCNC: 4 MMOL/L — CRITICAL HIGH (ref 0.5–2)
LACTATE BLDV-MCNC: 8.1 MMOL/L — CRITICAL HIGH (ref 0.5–2)
LACTATE SERPL-SCNC: 1.6 MMOL/L — SIGNIFICANT CHANGE UP (ref 0.5–2)
LACTATE SERPL-SCNC: 4 MMOL/L — CRITICAL HIGH (ref 0.5–2)
LEUKOCYTE ESTERASE UR-ACNC: NEGATIVE — SIGNIFICANT CHANGE UP
LIDOCAIN IGE QN: 34 U/L — SIGNIFICANT CHANGE UP (ref 22–51)
LYMPHOCYTES # BLD AUTO: 0 % — LOW (ref 13–44)
LYMPHOCYTES # BLD AUTO: 0 K/UL — LOW (ref 1–3.3)
MAGNESIUM SERPL-MCNC: 1.3 MG/DL — LOW (ref 1.6–2.6)
MAGNESIUM SERPL-MCNC: 1.7 MG/DL — SIGNIFICANT CHANGE UP (ref 1.6–2.6)
MAGNESIUM SERPL-MCNC: 1.7 MG/DL — SIGNIFICANT CHANGE UP (ref 1.6–2.6)
MANUAL SMEAR VERIFICATION: SIGNIFICANT CHANGE UP
MCHC RBC-ENTMCNC: 24.2 PG — LOW (ref 27–34)
MCHC RBC-ENTMCNC: 30.5 GM/DL — LOW (ref 32–36)
MCV RBC AUTO: 79.2 FL — LOW (ref 80–100)
MONOCYTES # BLD AUTO: 0.38 K/UL — SIGNIFICANT CHANGE UP (ref 0–0.9)
MONOCYTES NFR BLD AUTO: 2.6 % — SIGNIFICANT CHANGE UP (ref 2–14)
NEUTROPHILS # BLD AUTO: 14.16 K/UL — HIGH (ref 1.8–7.4)
NEUTROPHILS NFR BLD AUTO: 93.9 % — HIGH (ref 43–77)
NEUTS BAND # BLD: 2.6 % — SIGNIFICANT CHANGE UP (ref 0–8)
NITRITE UR-MCNC: NEGATIVE — SIGNIFICANT CHANGE UP
PCO2 BLDV: 46 MMHG — SIGNIFICANT CHANGE UP (ref 42–55)
PH BLDV: 7.4 — SIGNIFICANT CHANGE UP (ref 7.32–7.43)
PH UR: 8 — SIGNIFICANT CHANGE UP (ref 5–8)
PHOSPHATE SERPL-MCNC: 2.5 MG/DL — SIGNIFICANT CHANGE UP (ref 2.4–4.7)
PHOSPHATE SERPL-MCNC: 3.3 MG/DL — SIGNIFICANT CHANGE UP (ref 2.4–4.7)
PLAT MORPH BLD: NORMAL — SIGNIFICANT CHANGE UP
PLATELET # BLD AUTO: 282 K/UL — SIGNIFICANT CHANGE UP (ref 150–400)
PO2 BLDV: 114 MMHG — HIGH (ref 25–45)
POTASSIUM BLDV-SCNC: 3.4 MMOL/L — LOW (ref 3.5–5.1)
POTASSIUM SERPL-MCNC: 3.4 MMOL/L — LOW (ref 3.5–5.3)
POTASSIUM SERPL-MCNC: 3.7 MMOL/L — SIGNIFICANT CHANGE UP (ref 3.5–5.3)
POTASSIUM SERPL-MCNC: 4.8 MMOL/L — SIGNIFICANT CHANGE UP (ref 3.5–5.3)
POTASSIUM SERPL-SCNC: 3.4 MMOL/L — LOW (ref 3.5–5.3)
POTASSIUM SERPL-SCNC: 3.7 MMOL/L — SIGNIFICANT CHANGE UP (ref 3.5–5.3)
POTASSIUM SERPL-SCNC: 4.8 MMOL/L — SIGNIFICANT CHANGE UP (ref 3.5–5.3)
PROT SERPL-MCNC: 5.9 G/DL — LOW (ref 6.6–8.7)
PROT SERPL-MCNC: 6.7 G/DL — SIGNIFICANT CHANGE UP (ref 6.6–8.7)
PROT SERPL-MCNC: 7.7 G/DL — SIGNIFICANT CHANGE UP (ref 6.6–8.7)
PROT UR-MCNC: 30 MG/DL
PROTHROM AB SERPL-ACNC: 12.9 SEC — SIGNIFICANT CHANGE UP (ref 9.5–13)
RAPID RVP RESULT: SIGNIFICANT CHANGE UP
RBC # BLD: 4.96 M/UL — SIGNIFICANT CHANGE UP (ref 4.2–5.8)
RBC # FLD: 22.8 % — HIGH (ref 10.3–14.5)
RBC BLD AUTO: NORMAL — SIGNIFICANT CHANGE UP
RBC CASTS # UR COMP ASSIST: 4 /HPF — SIGNIFICANT CHANGE UP (ref 0–4)
SAO2 % BLDV: 99 % — SIGNIFICANT CHANGE UP
SARS-COV-2 RNA SPEC QL NAA+PROBE: SIGNIFICANT CHANGE UP
SODIUM SERPL-SCNC: 136 MMOL/L — SIGNIFICANT CHANGE UP (ref 135–145)
SODIUM SERPL-SCNC: 136 MMOL/L — SIGNIFICANT CHANGE UP (ref 135–145)
SODIUM SERPL-SCNC: 138 MMOL/L — SIGNIFICANT CHANGE UP (ref 135–145)
SP GR SPEC: >1.03 — HIGH (ref 1–1.03)
SPECIMEN SOURCE: SIGNIFICANT CHANGE UP
SPECIMEN SOURCE: SIGNIFICANT CHANGE UP
SQUAMOUS # UR AUTO: 1 /HPF — SIGNIFICANT CHANGE UP (ref 0–5)
UROBILINOGEN FLD QL: 1 MG/DL — SIGNIFICANT CHANGE UP (ref 0.2–1)
WBC # BLD: 14.67 K/UL — HIGH (ref 3.8–10.5)
WBC # FLD AUTO: 14.67 K/UL — HIGH (ref 3.8–10.5)
WBC UR QL: 1 /HPF — SIGNIFICANT CHANGE UP (ref 0–5)

## 2024-03-17 PROCEDURE — 74177 CT ABD & PELVIS W/CONTRAST: CPT | Mod: 26,MC

## 2024-03-17 PROCEDURE — 99285 EMERGENCY DEPT VISIT HI MDM: CPT

## 2024-03-17 PROCEDURE — 76705 ECHO EXAM OF ABDOMEN: CPT | Mod: 26

## 2024-03-17 PROCEDURE — 71045 X-RAY EXAM CHEST 1 VIEW: CPT | Mod: 26

## 2024-03-17 PROCEDURE — 47490 INCISION OF GALLBLADDER: CPT

## 2024-03-17 RX ORDER — GLUCAGON INJECTION, SOLUTION 0.5 MG/.1ML
1 INJECTION, SOLUTION SUBCUTANEOUS ONCE
Refills: 0 | Status: DISCONTINUED | OUTPATIENT
Start: 2024-03-17 | End: 2024-03-29

## 2024-03-17 RX ORDER — DEXTROSE 50 % IN WATER 50 %
12.5 SYRINGE (ML) INTRAVENOUS ONCE
Refills: 0 | Status: DISCONTINUED | OUTPATIENT
Start: 2024-03-17 | End: 2024-03-29

## 2024-03-17 RX ORDER — MAGNESIUM SULFATE 500 MG/ML
2 VIAL (ML) INJECTION ONCE
Refills: 0 | Status: COMPLETED | OUTPATIENT
Start: 2024-03-17 | End: 2024-03-18

## 2024-03-17 RX ORDER — PIPERACILLIN AND TAZOBACTAM 4; .5 G/20ML; G/20ML
3.38 INJECTION, POWDER, LYOPHILIZED, FOR SOLUTION INTRAVENOUS EVERY 8 HOURS
Refills: 0 | Status: DISCONTINUED | OUTPATIENT
Start: 2024-03-17 | End: 2024-03-18

## 2024-03-17 RX ORDER — SODIUM CHLORIDE 9 MG/ML
1000 INJECTION, SOLUTION INTRAVENOUS
Refills: 0 | Status: DISCONTINUED | OUTPATIENT
Start: 2024-03-17 | End: 2024-03-20

## 2024-03-17 RX ORDER — ACETAMINOPHEN 500 MG
1000 TABLET ORAL ONCE
Refills: 0 | Status: COMPLETED | OUTPATIENT
Start: 2024-03-17 | End: 2024-03-17

## 2024-03-17 RX ORDER — SODIUM CHLORIDE 9 MG/ML
500 INJECTION, SOLUTION INTRAVENOUS ONCE
Refills: 0 | Status: COMPLETED | OUTPATIENT
Start: 2024-03-17 | End: 2024-03-17

## 2024-03-17 RX ORDER — POTASSIUM CHLORIDE 20 MEQ
10 PACKET (EA) ORAL
Refills: 0 | Status: COMPLETED | OUTPATIENT
Start: 2024-03-17 | End: 2024-03-18

## 2024-03-17 RX ORDER — DEXTROSE 50 % IN WATER 50 %
15 SYRINGE (ML) INTRAVENOUS ONCE
Refills: 0 | Status: DISCONTINUED | OUTPATIENT
Start: 2024-03-17 | End: 2024-03-29

## 2024-03-17 RX ORDER — MORPHINE SULFATE 50 MG/1
2 CAPSULE, EXTENDED RELEASE ORAL ONCE
Refills: 0 | Status: DISCONTINUED | OUTPATIENT
Start: 2024-03-17 | End: 2024-03-17

## 2024-03-17 RX ORDER — INSULIN LISPRO 100/ML
VIAL (ML) SUBCUTANEOUS EVERY 6 HOURS
Refills: 0 | Status: DISCONTINUED | OUTPATIENT
Start: 2024-03-17 | End: 2024-03-23

## 2024-03-17 RX ORDER — CHLORHEXIDINE GLUCONATE 213 G/1000ML
1 SOLUTION TOPICAL
Refills: 0 | Status: DISCONTINUED | OUTPATIENT
Start: 2024-03-17 | End: 2024-03-29

## 2024-03-17 RX ORDER — PIPERACILLIN AND TAZOBACTAM 4; .5 G/20ML; G/20ML
3.38 INJECTION, POWDER, LYOPHILIZED, FOR SOLUTION INTRAVENOUS ONCE
Refills: 0 | Status: COMPLETED | OUTPATIENT
Start: 2024-03-17 | End: 2024-03-17

## 2024-03-17 RX ORDER — KETOROLAC TROMETHAMINE 30 MG/ML
15 SYRINGE (ML) INJECTION ONCE
Refills: 0 | Status: DISCONTINUED | OUTPATIENT
Start: 2024-03-17 | End: 2024-03-17

## 2024-03-17 RX ORDER — SODIUM CHLORIDE 9 MG/ML
1000 INJECTION INTRAMUSCULAR; INTRAVENOUS; SUBCUTANEOUS ONCE
Refills: 0 | Status: COMPLETED | OUTPATIENT
Start: 2024-03-17 | End: 2024-03-17

## 2024-03-17 RX ORDER — FAMOTIDINE 10 MG/ML
20 INJECTION INTRAVENOUS ONCE
Refills: 0 | Status: COMPLETED | OUTPATIENT
Start: 2024-03-17 | End: 2024-03-17

## 2024-03-17 RX ORDER — SODIUM CHLORIDE 9 MG/ML
1000 INJECTION, SOLUTION INTRAVENOUS
Refills: 0 | Status: DISCONTINUED | OUTPATIENT
Start: 2024-03-17 | End: 2024-03-29

## 2024-03-17 RX ORDER — MIDODRINE HYDROCHLORIDE 2.5 MG/1
10 TABLET ORAL EVERY 8 HOURS
Refills: 0 | Status: DISCONTINUED | OUTPATIENT
Start: 2024-03-17 | End: 2024-03-20

## 2024-03-17 RX ORDER — MAGNESIUM SULFATE 500 MG/ML
2 VIAL (ML) INJECTION ONCE
Refills: 0 | Status: COMPLETED | OUTPATIENT
Start: 2024-03-17 | End: 2024-03-17

## 2024-03-17 RX ORDER — NOREPINEPHRINE BITARTRATE/D5W 8 MG/250ML
0.05 PLASTIC BAG, INJECTION (ML) INTRAVENOUS
Qty: 8 | Refills: 0 | Status: DISCONTINUED | OUTPATIENT
Start: 2024-03-17 | End: 2024-03-18

## 2024-03-17 RX ORDER — DEXTROSE 50 % IN WATER 50 %
25 SYRINGE (ML) INTRAVENOUS ONCE
Refills: 0 | Status: DISCONTINUED | OUTPATIENT
Start: 2024-03-17 | End: 2024-03-29

## 2024-03-17 RX ORDER — SODIUM CHLORIDE 9 MG/ML
1000 INJECTION INTRAMUSCULAR; INTRAVENOUS; SUBCUTANEOUS ONCE
Refills: 0 | Status: DISCONTINUED | OUTPATIENT
Start: 2024-03-17 | End: 2024-03-17

## 2024-03-17 RX ORDER — SODIUM CHLORIDE 9 MG/ML
1000 INJECTION, SOLUTION INTRAVENOUS ONCE
Refills: 0 | Status: COMPLETED | OUTPATIENT
Start: 2024-03-17 | End: 2024-03-17

## 2024-03-17 RX ADMIN — Medication 1000 MILLIGRAM(S): at 04:54

## 2024-03-17 RX ADMIN — SODIUM CHLORIDE 1000 MILLILITER(S): 9 INJECTION, SOLUTION INTRAVENOUS at 21:37

## 2024-03-17 RX ADMIN — MORPHINE SULFATE 2 MILLIGRAM(S): 50 CAPSULE, EXTENDED RELEASE ORAL at 04:56

## 2024-03-17 RX ADMIN — PIPERACILLIN AND TAZOBACTAM 25 GRAM(S): 4; .5 INJECTION, POWDER, LYOPHILIZED, FOR SOLUTION INTRAVENOUS at 23:31

## 2024-03-17 RX ADMIN — CHLORHEXIDINE GLUCONATE 1 APPLICATION(S): 213 SOLUTION TOPICAL at 19:00

## 2024-03-17 RX ADMIN — SODIUM CHLORIDE 500 MILLILITER(S): 9 INJECTION, SOLUTION INTRAVENOUS at 14:19

## 2024-03-17 RX ADMIN — PIPERACILLIN AND TAZOBACTAM 200 GRAM(S): 4; .5 INJECTION, POWDER, LYOPHILIZED, FOR SOLUTION INTRAVENOUS at 08:36

## 2024-03-17 RX ADMIN — PIPERACILLIN AND TAZOBACTAM 25 GRAM(S): 4; .5 INJECTION, POWDER, LYOPHILIZED, FOR SOLUTION INTRAVENOUS at 16:20

## 2024-03-17 RX ADMIN — PIPERACILLIN AND TAZOBACTAM 3.38 GRAM(S): 4; .5 INJECTION, POWDER, LYOPHILIZED, FOR SOLUTION INTRAVENOUS at 09:07

## 2024-03-17 RX ADMIN — Medication 400 MILLIGRAM(S): at 21:37

## 2024-03-17 RX ADMIN — MIDODRINE HYDROCHLORIDE 10 MILLIGRAM(S): 2.5 TABLET ORAL at 21:37

## 2024-03-17 RX ADMIN — SODIUM CHLORIDE 1000 MILLILITER(S): 9 INJECTION, SOLUTION INTRAVENOUS at 10:15

## 2024-03-17 RX ADMIN — Medication 1000 MILLIGRAM(S): at 22:12

## 2024-03-17 RX ADMIN — FAMOTIDINE 20 MILLIGRAM(S): 10 INJECTION INTRAVENOUS at 04:29

## 2024-03-17 RX ADMIN — MORPHINE SULFATE 2 MILLIGRAM(S): 50 CAPSULE, EXTENDED RELEASE ORAL at 05:45

## 2024-03-17 RX ADMIN — Medication 15 MILLIGRAM(S): at 09:06

## 2024-03-17 RX ADMIN — Medication 1000 MILLIGRAM(S): at 04:40

## 2024-03-17 RX ADMIN — Medication 5.45 MICROGRAM(S)/KG/MIN: at 23:04

## 2024-03-17 RX ADMIN — Medication 25 GRAM(S): at 19:35

## 2024-03-17 RX ADMIN — SODIUM CHLORIDE 1000 MILLILITER(S): 9 INJECTION, SOLUTION INTRAVENOUS at 09:03

## 2024-03-17 RX ADMIN — SODIUM CHLORIDE 125 MILLILITER(S): 9 INJECTION, SOLUTION INTRAVENOUS at 19:27

## 2024-03-17 RX ADMIN — SODIUM CHLORIDE 1000 MILLILITER(S): 9 INJECTION INTRAMUSCULAR; INTRAVENOUS; SUBCUTANEOUS at 04:56

## 2024-03-17 RX ADMIN — Medication 15 MILLIGRAM(S): at 08:36

## 2024-03-17 RX ADMIN — Medication 400 MILLIGRAM(S): at 04:28

## 2024-03-17 RX ADMIN — SODIUM CHLORIDE 500 MILLILITER(S): 9 INJECTION, SOLUTION INTRAVENOUS at 15:15

## 2024-03-17 NOTE — H&P ADULT - ASSESSMENT
74 y/o M with PMHx of HTN, HLD, CAD s/p stents, DM type 2, SCC of face s/p resection with creation of free flap and chemo/rad, and PEG admitted with:    Severe sepsis  Acute cholecystis  Lactic acidosis  Transaminitis  Hypomagnesemia    PLAN:    Case discussed at length with interventional radiologist, Dr. Harrington. He was agreed to place perc gemini tube for source control STAT. Will transfer to MICU post procedure.    - Mental status intact.  - IVF resuscitation.  - Low threshold to initiate vasopressor to maintain MAP > 65.  - Obtain TTE.  - Trend lactate until clearance.  - Transaminases improving.  - Monitor end points of perfusion.  - Replace magnesium.  - Broad spectrum abx coverage with zosyn.  - Follow up cultures.  - Start chemical DVT prophylaxis post procedure.  - NPO.  - Insulin sliding scale coverage q6 hrs.    Case discussed with ICU attending, Dr. Arcos.    CRITICAL CARE TIME SPENT: 45 minutes    Date of entry of this note is equal to the date of services rendered.

## 2024-03-17 NOTE — H&P ADULT - NS PANP COMMENT GEN_ALL_CORE FT
73-year-old male with history of essential hypertension dyslipidemia coronary artery disease s/p stents type 2 diabetes mellitus squamous cell carcinoma of the right mandible/alveolar ridge s/p resection with creation of free flap on chemo and radiation with carboplatin s/p PEG tube placement for nutrition/oropharyngeal dysphagia presented with sudden onset abdominal pain for day with episode of nausea and vomiting with generalized weakness being admitted to ICU for    Acute cholecystitis with contained perforation  Hypotension/tachycardia/leukocytosis attribute it to acute cholecystitis  Lactic acidosis  Diabetes mellitus    Patient seen and examined multiple times through ER and while in [facility] as well  Will admit to medical ICU for overnight observation  Hemodynamically improved after 1 L bolus during our assessment but remained borderline tachycardic  Source control achieved with IR guided percutaneous cholecystostomy and as per ACS not a surgical candidate in the meantime pending blood culture will start patient empirically on Zosyn 3.375 g IV every 8 hours as extended infusion  TTE, trend lactate; bedside point-of-care ultrasound performed with kissing sign positive with hyperdynamic LV with normal-appearing EF with no pericardial effusion as well as no B-lines  Oxygenating ventilating fine on room air  Tube feeds to be restarted through PEG tomorrow  DVT prophylaxis  Plan of care discussed with patient and wife as well as ICU team and IR team

## 2024-03-17 NOTE — ED ADULT NURSE NOTE - NSFALLHARMRISKINTERV_ED_ALL_ED

## 2024-03-17 NOTE — ED ADULT NURSE NOTE - NSICDXPASTMEDICALHX_GEN_ALL_CORE_FT
Ciprofloxacin     Codeine     Erythromycin     Nalbuphine Nausea Only    Nubain [Nalbuphine Hcl]          HPI:     Urinary Tract Infection    This is a recurrent problem. The current episode started in the past 7 days (3-5 days). The problem has been unchanged. The quality of the pain is described as burning. Associated symptoms include frequency and urgency. Pertinent negatives include no chills, discharge, flank pain, hematuria, nausea, possible pregnancy, sweats or vomiting. She has tried increased fluids for the symptoms. The treatment provided no relief. Her past medical history is significant for recurrent UTIs. There is no history of catheterization, kidney stones, a single kidney, urinary stasis or a urological procedure. Was treated for UTI in July, August, and September   Per historian unable to tolerate Bactrim or Cipro due to severe GI upset   Urine resistant to Macrobid  Does not follow w/ Urology  Insistent she needs 10-14 days of antibiotic to clear like previous PCP would give     Component  Collected  Lab    Specimen Description  08/31/2020  9:45 AM  [address]    . URINE    Special Requests  08/31/2020  9:45 AM  [address]    NOT REPORTED    Culture Abnormal    08/31/2020  9:45 AM  [place] [id number] >[id number] CFU/ML    Testing Performed By     Lab - [address]  Name  Director  Address  Valid Date Range    [address] [place] [address], [place]  [address] [facility] [address]  08/30/17 0801-Present    Susceptibility     Klebsiella pneumoniae (1)     Antibiotic  Interpretation  RADHA  Status     amikacin    Final      NOT REPORTED    ampicillin  Resistant   Final      >=32   RESISTANT    ampicillin-sulbactam    Final      NOT REPORTED    aztreonam  Sensitive   Final      <=1   SUSCEPTIBLE    ceFAZolin  Sensitive   Final      <=4   SUSCEPTIBLE    ceFAZolin  Sensitive  Cefazolin sensitivity results can be used to predict the effectiveness of oral cephalosporins (eg. Cephalexin) in uncomplicated Urinary Tract Infections due to E. coli, K. pneumoniae, and P. mirabilis  Final     cefepime    Final      NOT REPORTED    cefTRIAXone  Sensitive   Final      <=1   SUSCEPTIBLE    ciprofloxacin  Sensitive   Final      <=0.25   SUSCEPTIBLE    ertapenem    Final      NOT REPORTED    Confirmatory Extended Spectrum Beta-Lactamase  Negative  NEGATIVE  Final     gentamicin  Sensitive   Final      <=1   SUSCEPTIBLE    meropenem    Final      NOT REPORTED    nitrofurantoin  Intermediate   Final      64   INTERMEDIATE    tigecycline    Final      NOT REPORTED    tobramycin  Sensitive   Final      <=1   SUSCEPTIBLE    trimethoprim-sulfamethoxazole  Sensitive   Final      <=20   SUSCEPTIBLE    piperacillin-tazobactam  Sensitive   Final      8   SUSCEPTIBLE          Health Maintenance:      Subjective:     Review of Systems   Constitutional: Negative. Negative for appetite change, chills, diaphoresis and fever. HENT: Negative. Eyes: Negative. Respiratory: Negative. Negative for cough. Cardiovascular: Negative. Gastrointestinal: Positive for constipation (chronic takes Miralax). Negative for diarrhea, nausea and vomiting. Endocrine: Negative. Genitourinary: Positive for dysuria, frequency and urgency. Negative for difficulty urinating, flank pain and hematuria. Musculoskeletal: Positive for back pain (chronic unchanged). Negative for myalgias. Skin: Negative. Negative for color change, pallor, rash and wound. Allergic/Immunologic: Negative. Neurological: Negative. Negative for seizures, syncope, facial asymmetry and speech difficulty. Hematological: Negative. Psychiatric/Behavioral: Negative. Objective:     Vitals:    10/09/20 0946   BP: 138/78   Pulse: 71   Temp: 98.4 °F (36.9 °C)       Body mass index is 38.67 kg/m².       Physical Exam  Constitutional:       General: She is not in acute distress. Appearance: Normal appearance. She is well-developed. She is obese. She is not ill-appearing, toxic-appearing or diaphoretic. Comments: Exam limited due to wheelchair bound   HENT:      Head: Normocephalic and atraumatic. Right Ear: External ear normal.      Left Ear: External ear normal.      Nose: Nose normal.   Eyes:      General: No scleral icterus. Right eye: No discharge. Left eye: No discharge. Conjunctiva/sclera: Conjunctivae normal.      Pupils: Pupils are equal, round, and reactive to light. Neck:      Musculoskeletal: Normal range of motion and neck supple. Trachea: No tracheal deviation. Cardiovascular:      Rate and Rhythm: Normal rate and regular rhythm. Heart sounds: Normal heart sounds. No murmur. No friction rub. No gallop. Pulmonary:      Effort: Pulmonary effort is normal. No tachypnea, accessory muscle usage or respiratory distress. Breath sounds: Normal breath sounds. No stridor. No decreased breath sounds, wheezing, rhonchi or rales. Abdominal:      General: Bowel sounds are normal. There is no distension. Palpations: Abdomen is soft. Tenderness: There is no abdominal tenderness. There is no right CVA tenderness, left CVA tenderness or guarding. Musculoskeletal: Normal range of motion. General: No tenderness or deformity. Skin:     General: Skin is warm and dry. Coloration: Skin is not pale. Findings: No erythema or rash. Neurological:      Mental Status: She is alert and oriented to person, place, and time. GCS: GCS eye subscore is 4. GCS verbal subscore is 5. GCS motor subscore is 6. Gait: Gait abnormal (wheelchair). Psychiatric:         Speech: Speech normal.         Behavior: Behavior normal.         Thought Content: Thought content normal.         Judgment: Judgment normal.           Assessment:         1.  Urinary tract infection without hematuria, site unspecified 2. Recurrent UTI (urinary tract infection)    3. Chronic idiopathic constipation        Plan:     1. Urinary tract infection without hematuria, site unspecified    - POCT Urinalysis No Micro (Auto)  - Culture, Urine; Future  - cephALEXin (KEFLEX) 500 MG capsule; Take 1 capsule by mouth 2 times daily for 10 days  Dispense: 20 capsule; Refill: 0    Dip reviewed +  Send for culture   PUSH fluids   Treatment guidelines discussed  Advised even with complex UTI 14 day tx is not necessary  RX Keflex again due to allergies and previous culture results   Patient declined being able to tolerate Bactrim or Cipro   I highly encouraged Urology follow-up due to recurrent UTI's to r/o other causative pathology including urinary stasis   We discussed importance of preventing constipation which can provoke recurrent UTI's     2. Recurrent UTI (urinary tract infection)    - REX - Robin Hall MD, Urology, [place]    3. Chronic idiopathic constipation    Encouraged daily Miralax to sustain soft formed bowel movements    Of the 26 minute duration appointment visit, Meme Zavala CNP spent at least 50% of the face-to-face time in counseling, explanation of diagnosis, planning of further management, and answering all questions. Discussed use, benefit, and side effects of prescribed medications. All patient questions answered. Pt voiced understanding. Reviewed health maintenance. Instructed to continue current medications, diet and exercise. Patient agreedwith treatment plan. Follow up as directed.      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

## 2024-03-17 NOTE — ED PROVIDER NOTE - PROGRESS NOTE DETAILS
Seen by surgery who recommened MICU consult as patient appears ill and is not a surgical candidate, NITHYA consulted. - Carmen MICU PA contacted IR attending Dr. Harrington who will come in to do perc gemini tube. Patient to be admitted to MICU after intervention. BP improved after additional IVFs and patient appears improved. Jennifer Morales Perforated Shu on CT, surgery consulted. Patient reassessed and noted to be rigoring with fever, abx antipyretics, cultures, lactate, and additional IVFs ordered. - Carmen

## 2024-03-17 NOTE — ED CLERICAL - DIVISION
Group Topic:  SILVIANO Education    Date: 3/22/2022  Start Time: 10:15 AM  End Time: 11:00 AM  Facilitators: Edi Del Real V    Focus: Education discussing evidenced based material that deals with addiction and recovery.  Number in attendance: 8      Group Description: Education group is a powerful tool used to promote growth and change. Pt.'s will share their struggles and concerns with the unique opportunity to receive multiple perspectives, support, encouragement and feedback from other individuals in safe and confidential environment.           Goals: To build a solid recovery base with the support needed   Handouts: Recovery is an Inside Job  Method: Group  Attendance: Present  Mood/Affect: Appropriate  Behavior/Socialization: Appropriate to group  Participation: Active  Overall Patient Response to Group: Appropriate to topic  Individual Response to Group: The patient stated that he was always consumed with the outside image and now he has to start thinking about working from the inside out.  Ability to Apply Content to Group: 5      A D Edith RICO SAC-IT           [facility]

## 2024-03-17 NOTE — CONSULT NOTE ADULT - SUBJECTIVE AND OBJECTIVE BOX
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  EYES: No eye pain, visual disturbances, or discharge.  ENMT:  No difficulty hearing, tinnitus, or vertigo. No sinus or throat pain.  NECK: No pain or stiffness.  RESPIRATORY: No shortness of breath, cough, or wheezing.  CARDIOVASCULAR: No chest pain, palpitations, dizziness, or leg swelling.  GASTROINTESTINAL: No abdominal or epigastric pain. No nausea, vomiting,  diarrhea, or constipation. No hematemesis, melena, or hematochezia.  GENITOURINARY: No dysuria, frequency, hematuria, or incontinence.  NEUROLOGICAL: No headaches, memory loss, loss of strength, numbness, or tremors.  SKIN: No itching, burning, rashes, or lesions.  MUSCULOSKELETAL: No joint pain or swelling; No muscle, back, or extremity pain.  PSYCHIATRIC: No depression, anxiety, mood swings, or difficulty sleeping.    Medications:  piperacillin/tazobactam IVPB.. 3.375 Gram(s) IV Intermittent every 8 hours                    magnesium sulfate  IVPB 2 Gram(s) IV Intermittent Once                ICU Vital Signs Last 24 Hrs  T(C): 37.2 (17 Mar 2024 10:45), Max: 38.3 (17 Mar 2024 08:06)  T(F): 99 (17 Mar 2024 10:45), Max: 101 (17 Mar 2024 08:06)  HR: 110 (17 Mar 2024 14:30) (60 - 137)  BP: 101/56 (17 Mar 2024 14:30) (93/53 - 147/83)  BP(mean): --  ABP: --  ABP(mean): --  RR: 18 (17 Mar 2024 14:30) (18 - 18)  SpO2: 97% (17 Mar 2024 14:30) (95% - 100%)    O2 Parameters below as of 17 Mar 2024 14:30  Patient On (Oxygen Delivery Method): room air          Vital Signs Last 24 Hrs  T(C): 37.2 (17 Mar 2024 10:45), Max: 38.3 (17 Mar 2024 08:06)  T(F): 99 (17 Mar 2024 10:45), Max: 101 (17 Mar 2024 08:06)  HR: 110 (17 Mar 2024 14:30) (60 - 137)  BP: 101/56 (17 Mar 2024 14:30) (93/53 - 147/83)  BP(mean): --  RR: 18 (17 Mar 2024 14:30) (18 - 18)  SpO2: 97% (17 Mar 2024 14:30) (95% - 100%)    Parameters below as of 17 Mar 2024 14:30  Patient On (Oxygen Delivery Method): room air            I&O's Detail      LABS:                        12.0   14.67 )-----------( 282      ( 17 Mar 2024 03:50 )             39.3     03-17    136  |  94<L>  |  14.7  ----------------------------<  157<H>  3.7   |  26.0  |  0.51    Ca    8.8      17 Mar 2024 15:56  Phos  2.5     03-17  Mg     1.3     03-17    TPro  6.7  /  Alb  3.4  /  TBili  2.6<H>  /  DBili  x   /  AST  211<H>  /  ALT  253<H>  /  AlkPhos  394<H>  03-17          CAPILLARY BLOOD GLUCOSE        PT/INR - ( 17 Mar 2024 03:50 )   PT: 12.9 sec;   INR: 1.17 ratio         PTT - ( 17 Mar 2024 03:50 )  PTT:35.1 sec  Urinalysis Basic - ( 17 Mar 2024 15:56 )    Color: x / Appearance: x / SG: x / pH: x  Gluc: 157 mg/dL / Ketone: x  / Bili: x / Urobili: x   Blood: x / Protein: x / Nitrite: x   Leuk Esterase: x / RBC: x / WBC x   Sq Epi: x / Non Sq Epi: x / Bacteria: x      CULTURES:    Physical Examination:    General: Well appearing, lying in bed in NAD.    HEENT: Pupils equal, reactive to light. Symmetric. No scleral icterus or injection.    PULM: Clear to auscultation B/L. No wheezes, rales, or rhonchi appreciated. No significant sputum production or increased respiratory effort.    NECK: Supple, no lymphadenopathy, trachea midline.    CVS: Regular rate and rhythm, no murmurs appreciated, +s1/s2.    ABD: Soft, nondistended, nontender, normoactive bowel sounds.    EXT: No edema, nontender.    SKIN: Warm and well perfused, no rashes noted.    NEURO: Alert, oriented, interactive, nonfocal.    RADIOLOGY:    CRITICAL CARE TIME SPENT: 40 minutes    Date of entry of this note is equal to the date of services rendered.   Patient is a 73y old  Male who presents with a chief complaint of abdominal pain.    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 Also endorsed nausea, one episode of emesis, and weakness. Labs significant for leukocytosis (WBC count 14k), transaminitis (AST//2730, and lactic acidosis (lactate 8.1). Since presentation, pt has received zosyn, 3 L IVF, ofirmev, tordal, pepcid, and morphine x 3. CT abd/pelvis revealed evidence of acute cholecystitis with contained perforation. Evaluated by general surgery who stated not acute surgical intervention will be offered right now, and recommended obtaining RUQ ultrasound. ICU consulted for severe sepsis.    Events last 24 hours: Pt seen and examined at the bedside in the ER. Pt stated abdominal pain has resolved. Tachycardic (HR 110s), hemodynamically stable (/56, MAP 75).    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  EYES: No eye pain, visual disturbances, or discharge.  ENMT:  No difficulty hearing, tinnitus, or vertigo. No sinus or throat pain.  NECK: No pain or stiffness.  RESPIRATORY: No shortness of breath, cough, or wheezing.  CARDIOVASCULAR: No chest pain, palpitations, dizziness, or leg swelling.  GASTROINTESTINAL: No abdominal or epigastric pain. No nausea, vomiting,  diarrhea, or constipation. No hematemesis, melena, or hematochezia.  GENITOURINARY: No dysuria, frequency, hematuria, or incontinence.  NEUROLOGICAL: No headaches, memory loss, loss of strength, numbness, or tremors.  SKIN: No itching, burning, rashes, or lesions.  MUSCULOSKELETAL: No joint pain or swelling; No muscle, back, or extremity pain.  PSYCHIATRIC: No depression, anxiety, mood swings, or difficulty sleeping.    Medications:  piperacillin/tazobactam IVPB.. 3.375 Gram(s) IV Intermittent every 8 hours  magnesium sulfate  IVPB 2 Gram(s) IV Intermittent Once    ICU Vital Signs Last 24 Hrs  T(C): 37.2 (17 Mar 2024 10:45), Max: 38.3 (17 Mar 2024 08:06)  T(F): 99 (17 Mar 2024 10:45), Max: 101 (17 Mar 2024 08:06)  HR: 110 (17 Mar 2024 14:30) (60 - 137)  BP: 101/56 (17 Mar 2024 14:30) (93/53 - 147/83)  BP(mean): --  ABP: --  ABP(mean): --  RR: 18 (17 Mar 2024 14:30) (18 - 18)  SpO2: 97% (17 Mar 2024 14:30) (95% - 100%)    O2 Parameters below as of 17 Mar 2024 14:30  Patient On (Oxygen Delivery Method): room air    Vital Signs Last 24 Hrs  T(C): 37.2 (17 Mar 2024 10:45), Max: 38.3 (17 Mar 2024 08:06)  T(F): 99 (17 Mar 2024 10:45), Max: 101 (17 Mar 2024 08:06)  HR: 110 (17 Mar 2024 14:30) (60 - 137)  BP: 101/56 (17 Mar 2024 14:30) (93/53 - 147/83)  BP(mean): --  RR: 18 (17 Mar 2024 14:30) (18 - 18)  SpO2: 97% (17 Mar 2024 14:30) (95% - 100%)    Parameters below as of 17 Mar 2024 14:30  Patient On (Oxygen Delivery Method): room air    I&O's Detail    LABS:                        12.0   14.67 )-----------( 282      ( 17 Mar 2024 03:50 )             39.3     03-17    136  |  94<L>  |  14.7  ----------------------------<  157<H>  3.7   |  26.0  |  0.51    Ca    8.8      17 Mar 2024 15:56  Phos  2.5     03-17  Mg     1.3     03-17    TPro  6.7  /  Alb  3.4  /  TBili  2.6<H>  /  DBili  x   /  AST  211<H>  /  ALT  253<H>  /  AlkPhos  394<H>  03-17    CAPILLARY BLOOD GLUCOSE    PT/INR - ( 17 Mar 2024 03:50 )   PT: 12.9 sec;   INR: 1.17 ratio    PTT - ( 17 Mar 2024 03:50 )  PTT:35.1 sec    Urinalysis Basic - ( 17 Mar 2024 15:56 )  Color: x / Appearance: x / SG: x / pH: x  Gluc: 157 mg/dL / Ketone: x  / Bili: x / Urobili: x   Blood: x / Protein: x / Nitrite: x   Leuk Esterase: x / RBC: x / WBC x   Sq Epi: x / Non Sq Epi: x / Bacteria: x    CULTURES:    Physical Examination:    General: Well appearing, lying in bed in NAD.    HEENT: Pupils equal, reactive to light. Symmetric. No scleral icterus or injection.    PULM: Clear to auscultation B/L.     NECK: Supple, no lymphadenopathy, trachea midline.    CVS: Tachycardic, regular rhythm, no murmurs appreciated, +s1/s2.    ABD: Soft, nondistended, nontender, normoactive bowel sounds.    EXT: No edema, nontender.    SKIN: Warm and well perfused, no rashes noted.    NEURO: Alert, oriented, interactive, nonfocal.    RADIOLOGY:  < from: CT Abdomen and Pelvis w/ IV Cont (03.17.24 @ 10:26) >    ACC: [id number] EXAM:  CT ABDOMEN AND PELVIS IC   ORDERED BY: RON SMITH     PROCEDURE DATE:  03/17/2024      INTERPRETATION:  CLINICAL INFORMATION: Abdominal pain.    COMPARISON: CT angiogram chest 11/25/2023. PET/CT 10/12/2023.    CONTRAST/COMPLICATIONS:  IV Contrast: Omnipaque 350  95 cc administered   5 cc discarded  Oral Contrast: NONE  Complications: None reported at time of study completion    PROCEDURE:  CT of the Abdomen and Pelvis was performed.  Sagittal and coronal reformats were performed.    FINDINGS:  LOWER CHEST: Stable 5 mm nodule right lower lobe.    LIVER: 2.5 cm indeterminate hypodense lesion segment 8. Ill-defined   hypodensity in segment 5 and 4B adjacent to the gallbladder. Periportal   edema.  BILE DUCTS: Mildly dilated intrahepatic bile ducts. Normal size CBD.  GALLBLADDER: No radiopaque gallstones. Gallbladder wall thickening with a   small defect in the anterior aspect of the gallbladder body.   Multiloculated loculated rim-enhancing pericholecystic fluid with the   largest pocket measuring 1.7 cm. Infiltration of the fat in the right   upper quadrant.  SPLEEN: Within normal limits.  PANCREAS: Within normal limits.  ADRENALS: Within normal limits.  KIDNEYS/URETERS: No hydronephrosis or nephrolithiasis. Small ill-defined   hypodensities in both kidneys.    BLADDER: Within normal limits.  REPRODUCTIVE ORGANS: Enlarged prostate.    BOWEL: Percutaneous gastrostomy tube within the gastric antrum. No bowel   obstruction. Wall thickening of hepatic flexure of colon could be   reactive to gallbladder pathology. Normal appendix.  PERITONEUM: No ascites.  VESSELS: Advanced atherosclerotic disease of the abdominal aorta without   aneurysm.  RETROPERITONEUM/LYMPH NODES: No lymphadenopathy.  ABDOMINAL WALL: Within normal limits.  BONES: Mild degenerative changes.    IMPRESSION:    Evidence of cholecystitis with contained perforation. Mild intrahepatic   biliary dilatation. No radiopaque gallstones.    Indeterminate 2.5 cm hypodense hepatic lesion. Recommend follow-up for   further characterization.    Small hypodensities in both kidneys. Recommend correlation with   urinalysis to exclude polynephritis.    --- End of Report ---    DENG FOY MD; Attending Radiologist  This document has been electronically signed. Mar 17 2024 11:21AM    < end of copied text >    CRITICAL CARE TIME SPENT: 40 minutes    Date of entry of this note is equal to the date of services rendered.

## 2024-03-17 NOTE — H&P ADULT - HISTORY OF PRESENT ILLNESS
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 Also endorsed nausea, one episode of emesis, and weakness. Labs significant for leukocytosis (WBC count 14k), transaminitis (AST//2730, and lactic acidosis (lactate 8.1). Since presentation, pt has received zosyn, 3 L IVF, ofirmev, tordal, pepcid, and morphine x 3. CT abd/pelvis revealed evidence of acute cholecystitis with contained perforation. Evaluated by general surgery who stated not acute surgical intervention will be offered right now, and recommended obtaining RUQ ultrasound. ICU consulted for severe sepsis.

## 2024-03-17 NOTE — ED ADULT TRIAGE NOTE - CHIEF COMPLAINT QUOTE
BIBEMS c/o central abd pain for 2 days with nausea and 1 episode of vomiting.  pt had a peg tube placed 2 months ago at [facility].  denies diarrhea, fever, urinary symptoms, cp, sob.

## 2024-03-17 NOTE — CONSULT NOTE ADULT - ASSESSMENT
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  -   -   - Monitor renal function.  - Trend lactate until clearance.  - Abx coverage with zosyn.   - Follow up blood cultures.    Pt re evaluated at the bedside in the ER. Repeat blood pressure improved ().     Case discussed at length with interventional radiologist, Dr. Harrington. He was agreed to place perc gemini tube for source control STAT.    At this time, pt does not require ICU level of care. Please reconsult if pt's condition deteriorates.    Case discussed with ICU attending, Dr. Arcos. 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  - S/p an additional 500cc IVF bolus (total 3.5 L).  - Monitor renal function.  - Trend lactate until clearance.  - Transaminases improving.  - Broad spectrum abx coverage.  - Follow up blood cultures.    Pt re evaluated at the bedside in the ER. Repeat blood pressure improved ().     Case discussed at length with interventional radiologist, Dr. Harrington. He was agreed to place perc gemini tube for source control STAT.    At this time, pt does not require ICU level of care. Please reconsult if pt's condition deteriorates.    Case discussed with ICU attending, Dr. Arcos.

## 2024-03-17 NOTE — ED ADULT NURSE NOTE - OBJECTIVE STATEMENT
Assumed pt care at 0350. Pt AA&Ox4, resp even/unlabored, MAEx4, NAD. Pt presents to ED c/o Assumed pt care at 0350. Pt AA&Ox4, resp even/unlabored, MAEx4, NAD. Pt BIBEMS with c/o abd pain. Pt reports 2 days of worsening abd pain associated with nausea, 1 episode of nonbloody vomiting, decreased PO intake, and decreased appetite. Pt noted to have PEG tube, +normoactive bowel sounds to all 4 quadrants, abd soft, nondistended, epigastric tenderness upon palpation. Denies CP/SOB, palpitations, fever, chills, or any other acute symptoms or complaints at this time. Blood work obtained and sent to lab as ordered. Pt resting comfortably, all safety and fall measures in place, wife at bed side. Monitoring in progress.

## 2024-03-17 NOTE — CONSULT NOTE ADULT - SUBJECTIVE AND OBJECTIVE BOX
Surgery Consult    Consulting attending: Dr. Contreras       HPI: Mr. Bob phan a 74 yo male with a  PMHx HTN, HLD, CAD s/p stents, DM, SCC of face s/p resection  w/ creation of free flap and chemorads, now PEG dependent. He presents to the ED with two days of abdominal pain , mainly in epigastrium.  He also endorses N&V and weakness. Patient denies any fever/chills, changes in bowel habits, CP and/or SOB. 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    Acoustic neuroma    Facial nerve disorder    Blindness of left eye    Unsteady gait    CAD (coronary artery disease)    Hearing loss, right    H/O neuropathy          PAST SURGICAL HISTORY:  S/P excision of acoustic neuroma    S/P cataract surgery    S/P coronary angioplasty          MEDICATIONS:        ALLERGIES:  No Known Allergies        VITALS & I/Os:  Vital Signs Last 24 Hrs  T(C): 37.2 (17 Mar 2024 10:45), Max: 38.3 (17 Mar 2024 08:06)  T(F): 99 (17 Mar 2024 10:45), Max: 101 (17 Mar 2024 08:06)  HR: 115 (17 Mar 2024 11:00) (60 - 137)  BP: 95/54 (17 Mar 2024 11:00) (93/53 - 147/83)  BP(mean): --  RR: 18 (17 Mar 2024 11:00) (18 - 18)  SpO2: 97% (17 Mar 2024 11:00) (95% - 100%)    Parameters below as of 17 Mar 2024 11:00  Patient On (Oxygen Delivery Method): room air        I&O's Summary        PHYSICAL EXAM:  Constitutional: patient resting comfortably in bed, in no acute distress  Respiratory: respirations are unlabored, no accessory muscle use, no conversational dyspnea  Cardiovascular: sinus tach on tele   Gastrointestinal: Abdomen soft, mild RUQ pain, no [place] sign , non-distended, no rebound tenderness / guarding, PEG tube in place no erythema or drainage    Neurological: A&O x 3        LABS:                        12.0   14.67 )-----------( 282      ( 17 Mar 2024 03:50 )             39.3     03-17    138  |  94<L>  |  19.5  ----------------------------<  161<H>  4.8   |  32.0<H>  |  0.44<L>    Ca    9.7      17 Mar 2024 03:50  Mg     1.7     03-17    TPro  7.7  /  Alb  3.8  /  TBili  1.4  /  DBili  x   /  AST  379<H>  /  ALT  273<H>  /  AlkPhos  353<H>  03-17    Lactate:  03-17 @ 11:17  4.00  03-17 @ 08:32  8.10    PT/INR - ( 17 Mar 2024 03:50 )   PT: 12.9 sec;   INR: 1.17 ratio         PTT - ( 17 Mar 2024 03:50 )  PTT:35.1 sec          Urinalysis Basic - ( 17 Mar 2024 13:13 )    Color: Dark Yellow / Appearance: Clear / SG: >1.030 / pH: x  Gluc: x / Ketone: 15 mg/dL  / Bili: Moderate / Urobili: 1.0 mg/dL   Blood: x / Protein: 30 mg/dL / Nitrite: Negative   Leuk Esterase: Negative / RBC: x / WBC x   Sq Epi: x / Non Sq Epi: x / Bacteria: x          IMAGING:  < from: CT Abdomen and Pelvis w/ IV Cont (03.17.24 @ 10:26) >    FINDINGS:  LOWER CHEST: Stable 5 mm nodule right lower lobe.    LIVER: 2.5 cm indeterminate hypodense lesion segment 8. Ill-defined   hypodensity in segment 5 and 4B adjacent to the gallbladder. Periportal   edema.  BILE DUCTS: Mildly dilated intrahepatic bile ducts. Normal size CBD.  GALLBLADDER: No radiopaque gallstones. Gallbladder wall thickening with a   small defect in the anterior aspect of the gallbladder body.   Multiloculated loculated rim-enhancing pericholecystic fluid with the   largest pocket measuring 1.7 cm. Infiltration of the fat in the right   upper quadrant.  SPLEEN: Within normal limits.  PANCREAS: Within normal limits.  ADRENALS: Within normal limits.  KIDNEYS/URETERS: No hydronephrosis or nephrolithiasis. Small ill-defined   hypodensities in both kidneys.    BLADDER: Within normal limits.  REPRODUCTIVE ORGANS: Enlarged prostate.    BOWEL: Percutaneous gastrostomy tube within the gastric antrum. No bowel   obstruction. Wall thickening of hepatic flexure of colon could be   reactive to gallbladder pathology. Normal appendix.  PERITONEUM: No ascites.  VESSELS: Advanced atherosclerotic disease of the abdominal aorta without   aneurysm.  RETROPERITONEUM/LYMPH NODES: No lymphadenopathy.  ABDOMINAL WALL: Within normal limits.  BONES: Mild degenerative changes.    IMPRESSION:    Evidence of cholecystitis with contained perforation. Mild intrahepatic   biliary dilatation. No radiopaque gallstones.    Indeterminate 2.5 cm hypodense hepatic lesion. Recommend follow-up for   further characterization.    Small hypodensities in both kidneys. Recommend correlation with   urinalysis to exclude polynephritis.    < end of copied text >                                                                                Surgery Consult    Consulting attending: Dr. Contreras       HPI: Mr. Bob phan a 72 yo male with a  PMHx HTN, HLD, CAD s/p stents, DM, SCC of face s/p resection  w/ creation of free flap and chemorads, now PEG dependent. He presents to the ED with two days of abdominal pain , mainly in epigastrium.  He also endorses N&V and weakness. Patient denies any fever/chills, changes in bowel habits, CP and/or SOB. CT demonstrates cholecystitis with contained peforation of GB, no evidence of stones. On arrival to ED  , patient noted to have  a lactate of 8 ( improved to 4 with IVF), tachycardic and hypotensive (? baseline). Rigors per ED, concern for sepsis.       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    Acoustic neuroma    Facial nerve disorder    Blindness of left eye    Unsteady gait    CAD (coronary artery disease)    Hearing loss, right    H/O neuropathy          PAST SURGICAL HISTORY:  S/P excision of acoustic neuroma    S/P cataract surgery    S/P coronary angioplasty          MEDICATIONS:        ALLERGIES:  No Known Allergies        VITALS & I/Os:  Vital Signs Last 24 Hrs  T(C): 37.2 (17 Mar 2024 10:45), Max: 38.3 (17 Mar 2024 08:06)  T(F): 99 (17 Mar 2024 10:45), Max: 101 (17 Mar 2024 08:06)  HR: 115 (17 Mar 2024 11:00) (60 - 137)  BP: 95/54 (17 Mar 2024 11:00) (93/53 - 147/83)  BP(mean): --  RR: 18 (17 Mar 2024 11:00) (18 - 18)  SpO2: 97% (17 Mar 2024 11:00) (95% - 100%)    Parameters below as of 17 Mar 2024 11:00  Patient On (Oxygen Delivery Method): room air        I&O's Summary        PHYSICAL EXAM:  Constitutional: patient resting comfortably in bed, in no acute distress  Respiratory: respirations are unlabored, no accessory muscle use, no conversational dyspnea  Cardiovascular: sinus tach on tele   Gastrointestinal: Abdomen soft, mild RUQ pain, no [place] sign , non-distended, no rebound tenderness / guarding, PEG tube in place no erythema or drainage    Neurological: A&O x 3        LABS:                        12.0   14.67 )-----------( 282      ( 17 Mar 2024 03:50 )             39.3     03-17    138  |  94<L>  |  19.5  ----------------------------<  161<H>  4.8   |  32.0<H>  |  0.44<L>    Ca    9.7      17 Mar 2024 03:50  Mg     1.7     03-17    TPro  7.7  /  Alb  3.8  /  TBili  1.4  /  DBili  x   /  AST  379<H>  /  ALT  273<H>  /  AlkPhos  353<H>  03-17    Lactate:  03-17 @ 11:17  4.00  03-17 @ 08:32  8.10    PT/INR - ( 17 Mar 2024 03:50 )   PT: 12.9 sec;   INR: 1.17 ratio         PTT - ( 17 Mar 2024 03:50 )  PTT:35.1 sec          Urinalysis Basic - ( 17 Mar 2024 13:13 )    Color: Dark Yellow / Appearance: Clear / SG: >1.030 / pH: x  Gluc: x / Ketone: 15 mg/dL  / Bili: Moderate / Urobili: 1.0 mg/dL   Blood: x / Protein: 30 mg/dL / Nitrite: Negative   Leuk Esterase: Negative / RBC: x / WBC x   Sq Epi: x / Non Sq Epi: x / Bacteria: x          IMAGING:  < from: CT Abdomen and Pelvis w/ IV Cont (03.17.24 @ 10:26) >    FINDINGS:  LOWER CHEST: Stable 5 mm nodule right lower lobe.    LIVER: 2.5 cm indeterminate hypodense lesion segment 8. Ill-defined   hypodensity in segment 5 and 4B adjacent to the gallbladder. Periportal   edema.  BILE DUCTS: Mildly dilated intrahepatic bile ducts. Normal size CBD.  GALLBLADDER: No radiopaque gallstones. Gallbladder wall thickening with a   small defect in the anterior aspect of the gallbladder body.   Multiloculated loculated rim-enhancing pericholecystic fluid with the   largest pocket measuring 1.7 cm. Infiltration of the fat in the right   upper quadrant.  SPLEEN: Within normal limits.  PANCREAS: Within normal limits.  ADRENALS: Within normal limits.  KIDNEYS/URETERS: No hydronephrosis or nephrolithiasis. Small ill-defined   hypodensities in both kidneys.    BLADDER: Within normal limits.  REPRODUCTIVE ORGANS: Enlarged prostate.    BOWEL: Percutaneous gastrostomy tube within the gastric antrum. No bowel   obstruction. Wall thickening of hepatic flexure of colon could be   reactive to gallbladder pathology. Normal appendix.  PERITONEUM: No ascites.  VESSELS: Advanced atherosclerotic disease of the abdominal aorta without   aneurysm.  RETROPERITONEUM/LYMPH NODES: No lymphadenopathy.  ABDOMINAL WALL: Within normal limits.  BONES: Mild degenerative changes.    IMPRESSION:    Evidence of cholecystitis with contained perforation. Mild intrahepatic   biliary dilatation. No radiopaque gallstones.    Indeterminate 2.5 cm hypodense hepatic lesion. Recommend follow-up for   further characterization.    Small hypodensities in both kidneys. Recommend correlation with   urinalysis to exclude polynephritis.    < end of copied text >                                                                                Surgery Consult    Consulting attending: Dr. Contreras       HPI: Mr. Bob phan a 72 yo male with a  PMHx HTN, HLD, CAD s/p stents, DM, SCC of face s/p resection  w/ creation of free flap and chemorads, now PEG dependent. He presents to the ED with two days of abdominal pain , mainly in epigastrium.  He also endorses N&V and weakness. Patient denies any fever/chills, changes in bowel habits, CP and/or SOB. CT demonstrates cholecystitis with contained perforation of GB, no evidence of stones. On arrival to ED, patient noted to have  a lactate of 8 ( improved to 4 with IVF), tachycardic and hypotensive (? baseline). Rigors per ED, concern for sepsis.       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    Acoustic neuroma    Facial nerve disorder    Blindness of left eye    Unsteady gait    CAD (coronary artery disease)    Hearing loss, right    H/O neuropathy          PAST SURGICAL HISTORY:  S/P excision of acoustic neuroma    S/P cataract surgery    S/P coronary angioplasty          MEDICATIONS:        ALLERGIES:  No Known Allergies        VITALS & I/Os:  Vital Signs Last 24 Hrs  T(C): 37.2 (17 Mar 2024 10:45), Max: 38.3 (17 Mar 2024 08:06)  T(F): 99 (17 Mar 2024 10:45), Max: 101 (17 Mar 2024 08:06)  HR: 115 (17 Mar 2024 11:00) (60 - 137)  BP: 95/54 (17 Mar 2024 11:00) (93/53 - 147/83)  BP(mean): --  RR: 18 (17 Mar 2024 11:00) (18 - 18)  SpO2: 97% (17 Mar 2024 11:00) (95% - 100%)    Parameters below as of 17 Mar 2024 11:00  Patient On (Oxygen Delivery Method): room air        I&O's Summary        PHYSICAL EXAM:  Constitutional: patient resting comfortably in bed, in no acute distress  Respiratory: respirations are unlabored, no accessory muscle use, no conversational dyspnea  Cardiovascular: sinus tach on tele   Gastrointestinal: Abdomen soft, mild RUQ pain, no [place] sign , non-distended, no rebound tenderness / guarding, PEG tube in place no erythema or drainage    Neurological: A&O x 3        LABS:                        12.0   14.67 )-----------( 282      ( 17 Mar 2024 03:50 )             39.3     03-17    138  |  94<L>  |  19.5  ----------------------------<  161<H>  4.8   |  32.0<H>  |  0.44<L>    Ca    9.7      17 Mar 2024 03:50  Mg     1.7     03-17    TPro  7.7  /  Alb  3.8  /  TBili  1.4  /  DBili  x   /  AST  379<H>  /  ALT  273<H>  /  AlkPhos  353<H>  03-17    Lactate:  03-17 @ 11:17  4.00  03-17 @ 08:32  8.10    PT/INR - ( 17 Mar 2024 03:50 )   PT: 12.9 sec;   INR: 1.17 ratio         PTT - ( 17 Mar 2024 03:50 )  PTT:35.1 sec          Urinalysis Basic - ( 17 Mar 2024 13:13 )    Color: Dark Yellow / Appearance: Clear / SG: >1.030 / pH: x  Gluc: x / Ketone: 15 mg/dL  / Bili: Moderate / Urobili: 1.0 mg/dL   Blood: x / Protein: 30 mg/dL / Nitrite: Negative   Leuk Esterase: Negative / RBC: x / WBC x   Sq Epi: x / Non Sq Epi: x / Bacteria: x          IMAGING:  < from: CT Abdomen and Pelvis w/ IV Cont (03.17.24 @ 10:26) >    FINDINGS:  LOWER CHEST: Stable 5 mm nodule right lower lobe.    LIVER: 2.5 cm indeterminate hypodense lesion segment 8. Ill-defined   hypodensity in segment 5 and 4B adjacent to the gallbladder. Periportal   edema.  BILE DUCTS: Mildly dilated intrahepatic bile ducts. Normal size CBD.  GALLBLADDER: No radiopaque gallstones. Gallbladder wall thickening with a   small defect in the anterior aspect of the gallbladder body.   Multiloculated loculated rim-enhancing pericholecystic fluid with the   largest pocket measuring 1.7 cm. Infiltration of the fat in the right   upper quadrant.  SPLEEN: Within normal limits.  PANCREAS: Within normal limits.  ADRENALS: Within normal limits.  KIDNEYS/URETERS: No hydronephrosis or nephrolithiasis. Small ill-defined   hypodensities in both kidneys.    BLADDER: Within normal limits.  REPRODUCTIVE ORGANS: Enlarged prostate.    BOWEL: Percutaneous gastrostomy tube within the gastric antrum. No bowel   obstruction. Wall thickening of hepatic flexure of colon could be   reactive to gallbladder pathology. Normal appendix.  PERITONEUM: No ascites.  VESSELS: Advanced atherosclerotic disease of the abdominal aorta without   aneurysm.  RETROPERITONEUM/LYMPH NODES: No lymphadenopathy.  ABDOMINAL WALL: Within normal limits.  BONES: Mild degenerative changes.    IMPRESSION:    Evidence of cholecystitis with contained perforation. Mild intrahepatic   biliary dilatation. No radiopaque gallstones.    Indeterminate 2.5 cm hypodense hepatic lesion. Recommend follow-up for   further characterization.    Small hypodensities in both kidneys. Recommend correlation with   urinalysis to exclude polynephritis.    < end of copied text >

## 2024-03-17 NOTE — CONSULT NOTE ADULT - ASSESSMENT
A:     Plan:   - Recommend MICU evaluation   - IV abx - Zosyn   - IVF resuscitation , trend lactate   - Please obtain a RUQ U/S       If evidence of cholelithiasis, recommend IR evaluation for perc gemini tube   - No acute surgical intervention at this time  A: 72 yo M with an extensive medical hx who presents with two days of abdominal pain concern  for sepsis 2/2 to contained GB perforation. Poor surgical candidate.     Plan:   - Recommend MICU evaluation   - IV abx - Zosyn   - IVF resuscitation , trend lactate  - F/U blood Cx   - Please obtain a RUQ U/S       If evidence of cholelithiasis, recommend IR evaluation for perc gemini tube   - No acute surgical intervention at this time  A: 74 yo M with an extensive medical hx who presents with two days of abdominal pain concern  for sepsis 2/2 to contained GB perforation. Poor surgical candidate.     Plan:   - Recommend MICU evaluation   - IV abx - Zosyn   - IVF resuscitation , trend lactate  - F/U blood Cx   - Please obtain a RUQ U/S   - If evidence of cholelithiasis, recommend IR evaluation for perc gemini tube   - No acute surgical intervention at this time

## 2024-03-17 NOTE — ED PROVIDER NOTE - OBJECTIVE STATEMENT
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 As per patient and wife at bedside, patient has had abdominal pain for 2 days as well as nausea and 1 episode of NBNB vomiting. Patient PEG tube was placed at [facility] 2 months ago by Dr. Bliss. Patient has had decreased appetite, decreased PO intake. Denies fevers, chills, dizziness, lightheadedness, dysphagia, dysarthria, diplopia, photophobia, syncope, cough, congestion, SOB, CP, neck pain, back pain, diarrhea, dysuria, hematuria, hematochezia, hematemesis, recent travel, sick contacts, leg swelling.

## 2024-03-17 NOTE — CONSULT NOTE ADULT - ATTENDING COMMENTS
Patient seen and examined in ED. Abd soft, mild TTP RUQ. hypotensive, mild tachycardia, imaging reviewed, Pt with stones on US, thickened GB wall, question sof contained perforation. Lactate 8 initially, down to 4. Pt medically not a suitable operative candidate, discussed with IR attending, will come in to place perc gemini tube. Rec MICU evaluation, medical admission. Please call with questions. Patient would need to f/u if medically able upon discharge 8 weeks post procedure to discuss removal.

## 2024-03-17 NOTE — ED PROVIDER NOTE - CLINICAL SUMMARY MEDICAL DECISION MAKING FREE TEXT BOX
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 VSS, belly soft ttp diffusely, no peritoneal signs.     Will hydrate, check labs r.o electrolyte abnormalities, UA to r.o UTI, control pain, CT A/P to r.o metastases vs. intraabdominal pathology including diverticulitis vs. colitis vs. obstruction, reassess. Patient is a 72 yo male with PMHx HTN, HLD, CAD s/p stents, DM, L eye cataract surgery, R jaw tumor resection with bone grafting at [facility] in  s/p chemo and radiation, PEG tube dependent presenting with abdominal pain. VSS, belly soft ttp diffusely, no peritoneal signs.     Will hydrate, check labs r.o electrolyte abnormalities, UA to r.o UTI, control pain, CT A/P to r.o metastases vs. intraabdominal pathology including diverticulitis vs. colitis vs. obstruction, reassess.    , attendin-year-old male history of hypertension, CAD, dyslipidemia, right jaw tumor resection, PEG tube dependent presents with diffuse lower abdominal pain.  No fevers at home, no nausea or vomiting.  Blood work showed mild leukocytosis with WBC 14,  patient has elevated liver enzyme.  Pending CT scan to assess for abdominal pathology.

## 2024-03-17 NOTE — ED PROVIDER NOTE - PHYSICAL EXAMINATION
Gen: no acute distress  Head: normocephalic, atraumatic  Lung: CTAB, no respiratory distress, no wheezing, rales, rhonchi  CV: normal s1/s2, rrr,   Abd: soft, ttp diffusely, no peritoneal signs, no rebound or guarding. PEG tube site c/d/i   MSK: No edema, no visible deformities, full range of motion in all 4 extremities  Neuro: No focal neurologic deficits  Skin: No rash

## 2024-03-17 NOTE — ED ADULT NURSE NOTE - CHIEF COMPLAINT QUOTE
BIBEMS c/o central abd pain for 2 days with nausea and 1 episode of vomiting.  pt had a peg tube placed 2 months ago at [facility].  denies diarrhea, fever, urinary symptoms, cp, sob.

## 2024-03-17 NOTE — ED ADULT NURSE NOTE - NS ED NURSE REPORT GIVEN TO FT
Report given to MICU RN Aries. Patient AA&Ox4, respirations even/unlabored, no apparent distress. Plan of care discussed with patient and spouse at bed side, all questions answered. Pt transported to MICU by 2 RN's on cardiac monitor and , without incident, in stable condition. VSS, recorded in EMR.

## 2024-03-17 NOTE — CHART NOTE - NSCHARTNOTEFT_GEN_A_CORE
Called by ER to admit pt for perforated GB. Informed that initially called Surgery who deferred intervention due to medical comorbidities. Subseuqtebly ER called MICU who then contacted IR and is now rportedly Called by ER to admit pt for perforated GB (contained). Case d/w ER and then collaboration with Surgical and Medical ICU teams. Imaging reviewed, (of note also concern for possible ascending cholangitis).   Agree with aggressive as needed +/- pressor support with Q1 vitals and broad spectrum  antibiotic coverage (including but not limited to Enterococcus)  Appreciate team effort / assistance

## 2024-03-17 NOTE — ED ADULT NURSE REASSESSMENT NOTE - NS ED NURSE REASSESS COMMENT FT1
Pt received from IR. RN's transported to back to ED. Pt now back in dept in B8L on CM in sinus tach, rate 112, SpO2 97% on room air, hemodynamically stable. RN's report pt tolerated procedure well with only use and need of local anesthesia. No further orders or interventions at this time. NAD, pt AA&Ox4, resp even/unlabored, spouse and son at bed side.

## 2024-03-17 NOTE — ED ADULT NURSE NOTE - ED STAT RN HANDOFF DETAILS
Report given to interventional radiology RN. Patient AA&Ox4, respirations even/unlabored, no apparent distress. Plan of care discussed with patient and spouse at bed side, all questions answered. Pt transported to IR by RN and hospital transporter on cardiac monitor, without incident, in stable condition. VSS, recorded in EMR. Peripheral IV's to R and L AC, both clean, dry, intact, patent. All personal belongings given to pt's spouse who takes full responsibility for belongings.

## 2024-03-18 ENCOUNTER — RESULT REVIEW (OUTPATIENT)
Age: 74
End: 2024-03-18

## 2024-03-18 LAB
-  CTX-M RESISTANCE MARKER: SIGNIFICANT CHANGE UP
-  ESBL: SIGNIFICANT CHANGE UP
ALBUMIN SERPL ELPH-MCNC: 2.2 G/DL — LOW (ref 3.3–5.2)
ALP SERPL-CCNC: 320 U/L — HIGH (ref 40–120)
ALT FLD-CCNC: 235 U/L — HIGH
ANION GAP SERPL CALC-SCNC: 10 MMOL/L — SIGNIFICANT CHANGE UP (ref 5–17)
ANION GAP SERPL CALC-SCNC: 16 MMOL/L — SIGNIFICANT CHANGE UP (ref 5–17)
AST SERPL-CCNC: 230 U/L — HIGH
BASE EXCESS BLDV CALC-SCNC: 5.3 MMOL/L — HIGH (ref -2–3)
BILIRUB SERPL-MCNC: 2.2 MG/DL — HIGH (ref 0.4–2)
BUN SERPL-MCNC: 12.2 MG/DL — SIGNIFICANT CHANGE UP (ref 8–20)
BUN SERPL-MCNC: 12.4 MG/DL — SIGNIFICANT CHANGE UP (ref 8–20)
CA-I SERPL-SCNC: 1.19 MMOL/L — SIGNIFICANT CHANGE UP (ref 1.15–1.33)
CALCIUM SERPL-MCNC: 8.4 MG/DL — SIGNIFICANT CHANGE UP (ref 8.4–10.5)
CALCIUM SERPL-MCNC: 8.7 MG/DL — SIGNIFICANT CHANGE UP (ref 8.4–10.5)
CHLORIDE BLDV-SCNC: 99 MMOL/L — SIGNIFICANT CHANGE UP (ref 96–108)
CHLORIDE SERPL-SCNC: 96 MMOL/L — SIGNIFICANT CHANGE UP (ref 96–108)
CHLORIDE SERPL-SCNC: 98 MMOL/L — SIGNIFICANT CHANGE UP (ref 96–108)
CO2 SERPL-SCNC: 20 MMOL/L — LOW (ref 22–29)
CO2 SERPL-SCNC: 28 MMOL/L — SIGNIFICANT CHANGE UP (ref 22–29)
CREAT SERPL-MCNC: 0.28 MG/DL — LOW (ref 0.5–1.3)
CREAT SERPL-MCNC: 0.33 MG/DL — LOW (ref 0.5–1.3)
CULTURE RESULTS: NO GROWTH — SIGNIFICANT CHANGE UP
E COLI DNA BLD POS QL NAA+NON-PROBE: SIGNIFICANT CHANGE UP
EGFR: 123 ML/MIN/1.73M2 — SIGNIFICANT CHANGE UP
EGFR: 129 ML/MIN/1.73M2 — SIGNIFICANT CHANGE UP
GAS PNL BLDV: 132 MMOL/L — LOW (ref 136–145)
GAS PNL BLDV: SIGNIFICANT CHANGE UP
GLUCOSE BLDC GLUCOMTR-MCNC: 132 MG/DL — HIGH (ref 70–99)
GLUCOSE BLDC GLUCOMTR-MCNC: 86 MG/DL — SIGNIFICANT CHANGE UP (ref 70–99)
GLUCOSE BLDC GLUCOMTR-MCNC: 97 MG/DL — SIGNIFICANT CHANGE UP (ref 70–99)
GLUCOSE BLDV-MCNC: 94 MG/DL — SIGNIFICANT CHANGE UP (ref 70–99)
GLUCOSE SERPL-MCNC: 100 MG/DL — HIGH (ref 70–99)
GLUCOSE SERPL-MCNC: 131 MG/DL — HIGH (ref 70–99)
GRAM STN FLD: ABNORMAL
GRAM STN FLD: ABNORMAL
GRAM STN FLD: SIGNIFICANT CHANGE UP
HCO3 BLDV-SCNC: 31 MMOL/L — HIGH (ref 22–29)
HCT VFR BLD CALC: 31 % — LOW (ref 39–50)
HCT VFR BLDA CALC: 33 % — SIGNIFICANT CHANGE UP
HCV AB S/CO SERPL IA: 0.07 S/CO — SIGNIFICANT CHANGE UP (ref 0–0.99)
HCV AB SERPL-IMP: SIGNIFICANT CHANGE UP
HGB BLD CALC-MCNC: 10.9 G/DL — LOW (ref 12.6–17.4)
HGB BLD-MCNC: 9.5 G/DL — LOW (ref 13–17)
LACTATE BLDV-MCNC: 1.5 MMOL/L — SIGNIFICANT CHANGE UP (ref 0.5–2)
LACTATE SERPL-SCNC: 1.3 MMOL/L — SIGNIFICANT CHANGE UP (ref 0.5–2)
MAGNESIUM SERPL-MCNC: 2 MG/DL — SIGNIFICANT CHANGE UP (ref 1.6–2.6)
MAGNESIUM SERPL-MCNC: 2.2 MG/DL — SIGNIFICANT CHANGE UP (ref 1.6–2.6)
MCHC RBC-ENTMCNC: 24.4 PG — LOW (ref 27–34)
MCHC RBC-ENTMCNC: 30.6 GM/DL — LOW (ref 32–36)
MCV RBC AUTO: 79.5 FL — LOW (ref 80–100)
METHOD TYPE: SIGNIFICANT CHANGE UP
MRSA PCR RESULT.: SIGNIFICANT CHANGE UP
PCO2 BLDV: 53 MMHG — SIGNIFICANT CHANGE UP (ref 42–55)
PH BLDV: 7.37 — SIGNIFICANT CHANGE UP (ref 7.32–7.43)
PHOSPHATE SERPL-MCNC: 1.9 MG/DL — LOW (ref 2.4–4.7)
PHOSPHATE SERPL-MCNC: 3.1 MG/DL — SIGNIFICANT CHANGE UP (ref 2.4–4.7)
PLATELET # BLD AUTO: 198 K/UL — SIGNIFICANT CHANGE UP (ref 150–400)
PO2 BLDV: <42 MMHG — SIGNIFICANT CHANGE UP (ref 25–45)
POTASSIUM BLDV-SCNC: 3.4 MMOL/L — LOW (ref 3.5–5.1)
POTASSIUM SERPL-MCNC: 3.6 MMOL/L — SIGNIFICANT CHANGE UP (ref 3.5–5.3)
POTASSIUM SERPL-MCNC: 5.6 MMOL/L — HIGH (ref 3.5–5.3)
POTASSIUM SERPL-SCNC: 3.6 MMOL/L — SIGNIFICANT CHANGE UP (ref 3.5–5.3)
POTASSIUM SERPL-SCNC: 5.6 MMOL/L — HIGH (ref 3.5–5.3)
PROCALCITONIN SERPL-MCNC: 3.55 NG/ML — HIGH (ref 0.02–0.1)
PROT SERPL-MCNC: 5.8 G/DL — LOW (ref 6.6–8.7)
RBC # BLD: 3.9 M/UL — LOW (ref 4.2–5.8)
RBC # FLD: 23.3 % — HIGH (ref 10.3–14.5)
S AUREUS DNA NOSE QL NAA+PROBE: DETECTED
SAO2 % BLDV: 50.7 % — SIGNIFICANT CHANGE UP
SODIUM SERPL-SCNC: 132 MMOL/L — LOW (ref 135–145)
SODIUM SERPL-SCNC: 136 MMOL/L — SIGNIFICANT CHANGE UP (ref 135–145)
SPECIMEN SOURCE: SIGNIFICANT CHANGE UP
SPECIMEN SOURCE: SIGNIFICANT CHANGE UP
TSH SERPL-MCNC: 1.13 UIU/ML — SIGNIFICANT CHANGE UP (ref 0.27–4.2)
WBC # BLD: 17.93 K/UL — HIGH (ref 3.8–10.5)
WBC # FLD AUTO: 17.93 K/UL — HIGH (ref 3.8–10.5)

## 2024-03-18 PROCEDURE — 99233 SBSQ HOSP IP/OBS HIGH 50: CPT | Mod: GC

## 2024-03-18 PROCEDURE — 93306 TTE W/DOPPLER COMPLETE: CPT | Mod: 26

## 2024-03-18 PROCEDURE — 93010 ELECTROCARDIOGRAM REPORT: CPT

## 2024-03-18 RX ORDER — ACETAMINOPHEN 500 MG
1000 TABLET ORAL ONCE
Refills: 0 | Status: COMPLETED | OUTPATIENT
Start: 2024-03-18 | End: 2024-03-18

## 2024-03-18 RX ORDER — ERTAPENEM SODIUM 1 G/1
1000 INJECTION, POWDER, LYOPHILIZED, FOR SOLUTION INTRAMUSCULAR; INTRAVENOUS EVERY 24 HOURS
Refills: 0 | Status: COMPLETED | OUTPATIENT
Start: 2024-03-18 | End: 2024-03-25

## 2024-03-18 RX ORDER — SODIUM,POTASSIUM PHOSPHATES 278-250MG
2 POWDER IN PACKET (EA) ORAL ONCE
Refills: 0 | Status: COMPLETED | OUTPATIENT
Start: 2024-03-18 | End: 2024-03-18

## 2024-03-18 RX ORDER — ASPIRIN/CALCIUM CARB/MAGNESIUM 324 MG
81 TABLET ORAL DAILY
Refills: 0 | Status: DISCONTINUED | OUTPATIENT
Start: 2024-03-18 | End: 2024-03-29

## 2024-03-18 RX ORDER — ENOXAPARIN SODIUM 100 MG/ML
40 INJECTION SUBCUTANEOUS EVERY 24 HOURS
Refills: 0 | Status: DISCONTINUED | OUTPATIENT
Start: 2024-03-18 | End: 2024-03-24

## 2024-03-18 RX ORDER — TAMSULOSIN HYDROCHLORIDE 0.4 MG/1
0.4 CAPSULE ORAL AT BEDTIME
Refills: 0 | Status: DISCONTINUED | OUTPATIENT
Start: 2024-03-18 | End: 2024-03-29

## 2024-03-18 RX ADMIN — MIDODRINE HYDROCHLORIDE 10 MILLIGRAM(S): 2.5 TABLET ORAL at 05:41

## 2024-03-18 RX ADMIN — Medication 100 MILLIEQUIVALENT(S): at 01:02

## 2024-03-18 RX ADMIN — SODIUM CHLORIDE 125 MILLILITER(S): 9 INJECTION, SOLUTION INTRAVENOUS at 01:46

## 2024-03-18 RX ADMIN — Medication 2 PACKET(S): at 13:44

## 2024-03-18 RX ADMIN — Medication 100 MILLIEQUIVALENT(S): at 01:46

## 2024-03-18 RX ADMIN — Medication 81 MILLIGRAM(S): at 11:36

## 2024-03-18 RX ADMIN — ENOXAPARIN SODIUM 40 MILLIGRAM(S): 100 INJECTION SUBCUTANEOUS at 05:42

## 2024-03-18 RX ADMIN — Medication 1000 MILLIGRAM(S): at 23:01

## 2024-03-18 RX ADMIN — Medication 25 GRAM(S): at 00:05

## 2024-03-18 RX ADMIN — Medication 400 MILLIGRAM(S): at 22:31

## 2024-03-18 RX ADMIN — MIDODRINE HYDROCHLORIDE 10 MILLIGRAM(S): 2.5 TABLET ORAL at 13:44

## 2024-03-18 RX ADMIN — Medication 100 MILLIEQUIVALENT(S): at 00:06

## 2024-03-18 RX ADMIN — ERTAPENEM SODIUM 120 MILLIGRAM(S): 1 INJECTION, POWDER, LYOPHILIZED, FOR SOLUTION INTRAMUSCULAR; INTRAVENOUS at 05:41

## 2024-03-18 RX ADMIN — CHLORHEXIDINE GLUCONATE 1 APPLICATION(S): 213 SOLUTION TOPICAL at 05:42

## 2024-03-18 RX ADMIN — TAMSULOSIN HYDROCHLORIDE 0.4 MILLIGRAM(S): 0.4 CAPSULE ORAL at 22:31

## 2024-03-18 NOTE — PROGRESS NOTE ADULT - ASSESSMENT
72 y/o M with a h/o HTN, HLD, CAD s/p PCI, DM2, SCC of face s/p resection with creation of free flap and chemo/RT, s/p PEG, with:    # Septic shock  # Acute cholecystitis, contained perforation  # ESBL e coli bacteremia  # Hypokalemia    - start norepinephrine infusion to maintain a MAP > 65  - add midodrine 10mg TID to help offload infusion requirement  - cholecystic fluid culture is pending  - discontinue Zosyn, start ertapenem, discussed with clinical pharmacology  - replete potassium  - start DVT prophylaxis with SC enoxaparin    Case discussed with the patient at the bedside. Diagnosis, prognosis, and management plan outlined. All questions answered and concerns addressed.    Case discussed with MICU physician, Dr. Lara.

## 2024-03-18 NOTE — PROGRESS NOTE ADULT - SUBJECTIVE AND OBJECTIVE BOX
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 Also endorsed nausea, one episode of emesis, and weakness. Labs significant for leukocytosis (WBC count 14k), transaminitis (AST//2730, and lactic acidosis (lactate 8.1). CT abd/pelvis revealed evidence of acute cholecystitis with contained perforation. ACS recommended against acute surgical intervention given the circumstances. Planned for percutaneous cholecystostomy.    Events last 24 hours: Received patient post-perc gemini drain placement. He developed progressive hypotension and was started on IV vasopressor support. Contacted by clinical pharmacist as ESBL e coli was detected in blood cultures.        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      Acoustic neuroma      Facial nerve disorder      Blindness of left eye      Unsteady gait      CAD (coronary artery disease)      Hearing loss, right      H/O neuropathy      S/P excision of acoustic neuroma      S/P cataract surgery      S/P coronary angioplasty          Review of Systems:  CONSTITUTIONAL: No fever, chills, or fatigue  EYES: No eye pain, visual disturbances, or discharge  ENMT:  No difficulty hearing, tinnitus, vertigo; No sinus or throat pain  NECK: No pain or stiffness  RESPIRATORY: No cough, wheezing, chills or hemoptysis; No shortness of breath  CARDIOVASCULAR: No chest pain, palpitations, dizziness, or leg swelling  GASTROINTESTINAL: No abdominal or epigastric pain. No nausea, vomiting, or hematemesis; No diarrhea or constipation. No melena or hematochezia.  GENITOURINARY: No dysuria, frequency, hematuria, or incontinence  NEUROLOGICAL: No headaches, memory loss, loss of strength, numbness, or tremors  SKIN: No itching, burning, rashes, or lesions   MUSCULOSKELETAL: No joint pain or swelling; No muscle, back, or extremity pain  PSYCHIATRIC: No depression, anxiety, mood swings, or difficulty sleeping      Medications:  ertapenem  IVPB 1000 milliGRAM(s) IV Intermittent every 24 hours    midodrine 10 milliGRAM(s) Oral every 8 hours  norepinephrine Infusion 0.05 MICROgram(s)/kG/Min IV Continuous <Continuous>                dextrose 50% Injectable 25 Gram(s) IV Push once  dextrose 50% Injectable 25 Gram(s) IV Push once  dextrose 50% Injectable 12.5 Gram(s) IV Push once  dextrose Oral Gel 15 Gram(s) Oral once PRN  glucagon  Injectable 1 milliGRAM(s) IntraMuscular once  insulin lispro (ADMELOG) corrective regimen sliding scale   SubCutaneous every 6 hours    dextrose 5%. 1000 milliLiter(s) IV Continuous <Continuous>  dextrose 5%. 1000 milliLiter(s) IV Continuous <Continuous>  lactated ringers. 1000 milliLiter(s) IV Continuous <Continuous>      chlorhexidine 2% Cloths 1 Application(s) Topical <User Schedule>            ICU Vital Signs Last 24 Hrs  T(C): 36.9 (17 Mar 2024 23:37), Max: 38.9 (17 Mar 2024 20:15)  T(F): 98.4 (17 Mar 2024 23:37), Max: 102 (17 Mar 2024 20:15)  HR: 72 (18 Mar 2024 02:30) (60 - 137)  BP: 99/58 (18 Mar 2024 02:15) (76/46 - 147/83)  BP(mean): 71 (18 Mar 2024 02:15) (55 - 78)  ABP: --  ABP(mean): --  RR: 12 (18 Mar 2024 02:30) (12 - 29)  SpO2: 99% (18 Mar 2024 02:30) (95% - 100%)    O2 Parameters below as of 17 Mar 2024 20:00  Patient On (Oxygen Delivery Method): room air                I&O's Detail    17 Mar 2024 07:01  -  18 Mar 2024 03:47  --------------------------------------------------------  IN:    Enteral Tube Flush: 30 mL    IV PiggyBack: 100 mL    IV PiggyBack: 300 mL    IV PiggyBack: 150 mL    IV PiggyBack: 50 mL    Lactated Ringers: 1125 mL    Lactated Ringers Bolus: 500 mL    Norepinephrine: 23.2 mL  Total IN: 2278.2 mL    OUT:    Voided (mL): 550 mL  Total OUT: 550 mL    Total NET: 1728.2 mL            LABS:                        12.0   14.67 )-----------( 282      ( 17 Mar 2024 03:50 )             39.3     03-17    136  |  97  |  13.6  ----------------------------<  140<H>  3.4<L>   |  26.0  |  0.41<L>    Ca    8.3<L>      17 Mar 2024 21:30  Phos  3.3     03-17  Mg     1.7     03-17    TPro  5.9<L>  /  Alb  2.9<L>  /  TBili  2.5<H>  /  DBili  x   /  AST  215<H>  /  ALT  252<H>  /  AlkPhos  329<H>  03-17          CAPILLARY BLOOD GLUCOSE      POCT Blood Glucose.: 139 mg/dL (17 Mar 2024 23:30)    PT/INR - ( 17 Mar 2024 03:50 )   PT: 12.9 sec;   INR: 1.17 ratio         PTT - ( 17 Mar 2024 03:50 )  PTT:35.1 sec  Urinalysis Basic - ( 17 Mar 2024 21:30 )    Color: x / Appearance: x / SG: x / pH: x  Gluc: 140 mg/dL / Ketone: x  / Bili: x / Urobili: x   Blood: x / Protein: x / Nitrite: x   Leuk Esterase: x / RBC: x / WBC x   Sq Epi: x / Non Sq Epi: x / Bacteria: x      CULTURES:  Culture Results:   Growth in aerobic and anaerobic bottles: Gram Negative Rods (03-17-24 @ 08:30)  Rapid RVP Result: NotDetec (03-17-24 @ 08:22)  Culture Results:   Growth in aerobic bottle: Gram Negative Rods  Growth in anaerobic bottle: Gram Negative Rods  Direct identification is available within approximately 3-5  hours either by Blood Panel Multiplexed PCR or Direct  MALDI-TOF. Details: [email or web address] (03-17-24 @ 08:15)        Physical Examination:    General: No acute distress.  Alert, oriented, interactive, nonfocal    HEENT: Pupils equal, reactive to light.  Symmetric.    PULM: Clear to auscultation bilaterally, no significant sputum production    CVS: Regular rate and rhythm, no murmurs, rubs, or gallops    ABD: Soft, nondistended, nontender, normoactive bowel sounds, (+)perc gemini draining purulent fluid    EXT: No edema, nontender    SKIN: Warm and well perfused, no rashes noted.    NEURO: A&Ox3, strength 5/5 all extremities, cranial nerves grossly intact, no focal deficits        RADIOLOGY:     < from: US Abdomen Upper Quadrant Right (03.17.24 @ 15:51) >  FINDINGS:  Liver: Within normal limits.  Bile ducts: Normal caliber. Common bile duct measures 6 mm.  Gallbladder: Multiple stones are noted within the gallbladder. There is   suggestion of focal perforation in the midportion of the gallbladder with   associated adjacent 1 cm fluid collection as noted on prior CT consistent   with walled off perforation.. Minimal gallbladder wall thickening   measuring up to 4 mm. There is sonographic Echeverria's sign.  Pancreas: Visualized portions are within normal limits.  Right kidney: 10.8 cm. No hydronephrosis.  Ascites: None.  IVC: Visualized portions are within normal limits.    IMPRESSION:  Findings consistent with acute cholecystitis and associated walled off   perforation as noted on the abdominal CT performed earlier in the day..          < from: CT Abdomen and Pelvis w/ IV Cont (03.17.24 @ 10:26) >  FINDINGS:  LOWER CHEST: Stable 5 mm nodule right lower lobe.    LIVER: 2.5 cm indeterminate hypodense lesion segment 8. Ill-defined   hypodensity in segment 5 and 4B adjacent to the gallbladder. Periportal   edema.  BILE DUCTS: Mildly dilated intrahepatic bile ducts. Normal size CBD.  GALLBLADDER: No radiopaque gallstones. Gallbladder wall thickening with a   small defect in the anterior aspect of the gallbladder body.   Multiloculated loculated rim-enhancing pericholecystic fluid with the   largest pocket measuring 1.7 cm. Infiltration of the fat in the right   upper quadrant.  SPLEEN: Within normal limits.  PANCREAS: Within normal limits.  ADRENALS: Within normal limits.  KIDNEYS/URETERS: No hydronephrosis or nephrolithiasis. Small ill-defined   hypodensities in both kidneys.    BLADDER: Within normal limits.  REPRODUCTIVE ORGANS: Enlarged prostate.    BOWEL: Percutaneous gastrostomy tube within the gastric antrum. No bowel   obstruction. Wall thickening of hepatic flexure of colon could be   reactive to gallbladder pathology. Normal appendix.  PERITONEUM: No ascites.  VESSELS: Advanced atherosclerotic disease of the abdominal aorta without   aneurysm.  RETROPERITONEUM/LYMPH NODES: No lymphadenopathy.  ABDOMINAL WALL: Within normal limits.  BONES: Mild degenerative changes.    IMPRESSION:    Evidence of cholecystitis with contained perforation. Mild intrahepatic   biliary dilatation. No radiopaque gallstones.    Indeterminate 2.5 cm hypodense hepatic lesion. Recommend follow-up for   further characterization.    Small hypodensities in both kidneys. Recommend correlation with   urinalysis to exclude polynephritis.            CRITICAL CARE TIME SPENT: 40 mins  Time spent evaluating/treating patient with medical issues that pose a high risk for life threatening deterioration and/or end-organ damage, reviewing data/labs/imaging, discussing case with multidisciplinary team, discussing plan/goals of care with patient/family. Non-inclusive of procedure time. The date of entry of this note reflects the date of services rendered.

## 2024-03-18 NOTE — PROGRESS NOTE ADULT - SUBJECTIVE AND OBJECTIVE BOX
SUBJECTIVE  BRIEF HOSPITAL COURSE SUMMARY: 74 y/o M with a h/o HTN, HLD, CAD s/p PCI, DM2, SCC of face s/p resection with creation of free flap and chemo/RT, s/p PEG, admitted on 3/17 with complaints of abdominal pain x 1 day. Also endorsed nausea, one episode of emesis, and weakness. Labs significant for leukocytosis (WBC count 14k), transaminitis (AST//2730, and lactic acidosis (lactate 8.1). CT abd/pelvis revealed evidence of acute cholecystitis with contained perforation. ACS recommended against acute surgical intervention given the circumstances. Now s/p percutaneous cholecystostomy. Cx grew ESBL. On ertapenem and improving.    LAST 24 HOURS: Switched to ertapenem for ESBL. Pressors weaned off this morning.  TODAY: Pt seen at bedside this AM. Pt states he is eating, sleeping, and voiding without complaint. States abd pain is much improved. Offers no other acute complaints & ROS otherwise negative.     OBJECTIVE  PHYSICAL EXAM:  GENERAL: no acute distress, comfortably in bed  HEENT: Atraumatic, normocephalic, non-icteric, no JVD  NEURO:  A&Ox3, no focal deficits, moving all extremities spontaneously, no dysarthria, CN II-XII grossly intact  PSYCH: Normal affect, calm, appropriate insight and judgment, fluent speech  LUNGS: CTAB, no wrr, non-labored breathing  HEART: RRR, no murmur appreciated  ABD: Percutaneous cholecystostomy tube draining serosanguinous fluid, no erythema or tenderness at tube insertion site, PEG tubed placed without leakage or erythema/tenderness at insertion site. Soft, non-tender, non-distended, no organomegaly, no appreciable masses, +bs all 4 quadrants  EXTREMITIES: Nontender, no clubbing, cyanosis, or edema  SKIN: No rashes or lesions     Vital Signs Last 24 Hrs  T(C): 36.7 (18 Mar 2024 12:44), Max: 38.9 (17 Mar 2024 20:15)  T(F): 98 (18 Mar 2024 12:44), Max: 102 (17 Mar 2024 20:15)  HR: 75 (18 Mar 2024 13:30) (69 - 120)  BP: 108/60 (18 Mar 2024 13:30) (76/46 - 127/65)  BP(mean): 76 (18 Mar 2024 13:30) (55 - 83)  RR: 14 (18 Mar 2024 13:30) (9 - 29)  SpO2: 100% (18 Mar 2024 13:30) (94% - 100%)    Parameters below as of 18 Mar 2024 12:00  Patient On (Oxygen Delivery Method): room air        MEDICATIONS  (STANDING):  aspirin  chewable 81 milliGRAM(s) Oral daily  chlorhexidine 2% Cloths 1 Application(s) Topical <User Schedule>  dextrose 5%. 1000 milliLiter(s) (50 mL/Hr) IV Continuous <Continuous>  dextrose 5%. 1000 milliLiter(s) (100 mL/Hr) IV Continuous <Continuous>  dextrose 50% Injectable 25 Gram(s) IV Push once  dextrose 50% Injectable 25 Gram(s) IV Push once  dextrose 50% Injectable 12.5 Gram(s) IV Push once  enoxaparin Injectable 40 milliGRAM(s) SubCutaneous every 24 hours  ertapenem  IVPB 1000 milliGRAM(s) IV Intermittent every 24 hours  glucagon  Injectable 1 milliGRAM(s) IntraMuscular once  insulin lispro (ADMELOG) corrective regimen sliding scale   SubCutaneous every 6 hours  lactated ringers. 1000 milliLiter(s) (125 mL/Hr) IV Continuous <Continuous>  midodrine 10 milliGRAM(s) Oral every 8 hours  norepinephrine Infusion 0.05 MICROgram(s)/kG/Min (5.45 mL/Hr) IV Continuous <Continuous>  tamsulosin 0.4 milliGRAM(s) Oral at bedtime    MEDICATIONS  (PRN):  dextrose Oral Gel 15 Gram(s) Oral once PRN Blood Glucose LESS THAN 70 milliGRAM(s)/deciliter    Allergies    No Known Allergies    Intolerances        LABS:                        9.5    17.93 )-----------( 198      ( 18 Mar 2024 05:00 )             31.0     03-18    136  |  98  |  12.2  ----------------------------<  100<H>  3.6   |  28.0  |  0.33<L>    Ca    8.7      18 Mar 2024 11:41  Phos  1.9     03-18  Mg     2.0     03-18    TPro  5.8<L>  /  Alb  2.2<L>  /  TBili  2.2<H>  /  DBili  x   /  AST  230<H>  /  ALT  235<H>  /  AlkPhos  320<H>  03-18    PT/INR - ( 17 Mar 2024 03:50 )   PT: 12.9 sec;   INR: 1.17 ratio         PTT - ( 17 Mar 2024 03:50 )  PTT:35.1 sec  Urinalysis Basic - ( 18 Mar 2024 11:41 )    Color: x / Appearance: x / SG: x / pH: x  Gluc: 100 mg/dL / Ketone: x  / Bili: x / Urobili: x   Blood: x / Protein: x / Nitrite: x   Leuk Esterase: x / RBC: x / WBC x   Sq Epi: x / Non Sq Epi: x / Bacteria: x      CAPILLARY BLOOD GLUCOSE      POCT Blood Glucose.: 97 mg/dL (18 Mar 2024 11:36)  POCT Blood Glucose.: 132 mg/dL (18 Mar 2024 05:38)  POCT Blood Glucose.: 139 mg/dL (17 Mar 2024 23:30)  POCT Blood Glucose.: 164 mg/dL (17 Mar 2024 19:32)      RADIOLOGY & ADDITIONAL TESTS:      Imaging Personally Reviewed:  [  ] YES  [  ] NO    Consultant(s) Notes Reviewed:  [  ] YES  [  ] NO    Care Discussed with Consultants/Other Providers [  ] YES  [  ] NO    Plan of Care discussed with Housestaff [ X ]YES [  ] NO

## 2024-03-18 NOTE — PROGRESS NOTE ADULT - ASSESSMENT
74 y/o M w/ PMH of HTN, HLD, CAD (s/p PCI), DM2, and SCC of mandible (s/p resection and chemoradiation) w/ recent PEG placement admitted on 3/17 for septic shock 2/2 ESBL bacteremia in setting of acute cholecystitis. Now, pt is s/p percutaneous cholecystostomy drain w/ vasopressors weaned off, and continues on antibiotics.    #Distributive shock 2/2 sepsis  #Acute cholecystitis w/ contained perforation, s/p percutaneous cholecystostomy  #ESBL bacteremia    Plan  -Pt had cholecystostomy tube placed, which initially drained purulent fluid, and is now draining serosanguinous fluid, for source control  -Pt not currently a surgical candidate  -Pt no longer requiring vasopressor support  -Continue midodrine 10mg TID for additional BP support  -Continue IV fluids, currently on LR  -Continue ertapenem  -ISS for DM2 coverage  -Liver enzymes improving    DVT PPx: Lovenox  Dispo: Stable for downgrade

## 2024-03-18 NOTE — PHARMACOTHERAPY INTERVENTION NOTE - COMMENTS
Recommended escalating from piperacillin-tazobactam to ertapenem 1g q24h hours in the setting of ESBL E. coli bacteremia based on a eGFR of 115.      Devin MauricioD   Clinical Pharmacy Specialist, Infectious Diseases  Tele-Antimicrobial Stewardship Program (Tele-ASP)  Tele-ASP Phone: [phone]

## 2024-03-18 NOTE — PROGRESS NOTE ADULT - NS PANP COMMENT GEN_ALL_CORE FT
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

## 2024-03-18 NOTE — PROGRESS NOTE ADULT - ATTENDING COMMENTS
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 coli transition to ertapenem  Complains of constipation this morning denies any other complaints    Physical Exam:  Vital Signs Last 24 Hrs  T(C): 36.4 (18 Mar 2024 16:00), Max: 38.9 (17 Mar 2024 20:15)  T(F): 97.5 (18 Mar 2024 16:00), Max: 102 (17 Mar 2024 20:15)  HR: 77 (18 Mar 2024 17:00) (69 - 120)  BP: 110/68 (18 Mar 2024 17:00) (76/46 - 119/66)  BP(mean): 80 (18 Mar 2024 17:00) (55 - 85)  RR: 14 (18 Mar 2024 17:00) (9 - 29)  SpO2: 100% (18 Mar 2024 17:00) (94% - 100%)    Parameters below as of 18 Mar 2024 16:00  Patient On (Oxygen Delivery Method): room air          ====================== NEUROLOGY=====================   no acute issue  ==================== RESPIRATORY======================   oxygenating ventilating fine on room air  ====================CARDIOVASCULAR==================  midodrine 10 milliGRAM(s) Oral every 8 hours   norepinephrine has been turned off since around 10:00 this morning, can be transitioned   out of medical ICU this evening patient is clinically stable with normalized lactate after source control with the resuscitation and antibiotics appropriation  TTE pending  ===================HEMATOLOGIC/ONC ===================  aspirin  chewable 81 milliGRAM(s) Oral daily  enoxaparin Injectable 40 milliGRAM(s) SubCutaneous every 24 hours    ===================== RENAL =========================  Continue monitoring urine output  tamsulosin 0.4 milliGRAM(s) Oral at bedtime   close monitoring of potassium, phosphorus, magnesium with target levels of more than 4, 3, 2 respectively  ==================== GASTROINTESTINAL===================  dextrose 5%. 1000 milliLiter(s) (50 mL/Hr) IV Continuous <Continuous>  dextrose 5%. 1000 milliLiter(s) (100 mL/Hr) IV Continuous <Continuous>  lactated ringers. 1000 milliLiter(s) (125 mL/Hr) IV Continuous <Continuous>  TLC diet  =======================    ENDOCRINE  =====================  dextrose 50% Injectable 25 Gram(s) IV Push once  dextrose 50% Injectable 25 Gram(s) IV Push once  dextrose 50% Injectable 12.5 Gram(s) IV Push once  dextrose Oral Gel 15 Gram(s) Oral once PRN Blood Glucose LESS THAN 70 milliGRAM(s)/deciliter  glucagon  Injectable 1 milliGRAM(s) IntraMuscular once  insulin lispro (ADMELOG) corrective regimen sliding scale   SubCutaneous every 6 hours    ========================INFECTIOUS DISEASE================  ertapenem  IVPB 1000 milliGRAM(s) IV Intermittent every 24 hours   s/p source control as above  Follow clinical course, will need at least 7 days of IV antibiotics   daily CBC with differential, renal function with electrolytes while inpatient and on antibiotics         Care plan discussed with ICU care team.

## 2024-03-19 LAB
-  AMOXICILLIN/CLAVULANIC ACID: SIGNIFICANT CHANGE UP
-  AMPICILLIN/SULBACTAM: SIGNIFICANT CHANGE UP
-  AMPICILLIN/SULBACTAM: SIGNIFICANT CHANGE UP
-  AMPICILLIN: SIGNIFICANT CHANGE UP
-  AMPICILLIN: SIGNIFICANT CHANGE UP
-  AZTREONAM: SIGNIFICANT CHANGE UP
-  AZTREONAM: SIGNIFICANT CHANGE UP
-  CEFAZOLIN: SIGNIFICANT CHANGE UP
-  CEFAZOLIN: SIGNIFICANT CHANGE UP
-  CEFEPIME: SIGNIFICANT CHANGE UP
-  CEFEPIME: SIGNIFICANT CHANGE UP
-  CEFOXITIN: SIGNIFICANT CHANGE UP
-  CEFOXITIN: SIGNIFICANT CHANGE UP
-  CEFTRIAXONE: SIGNIFICANT CHANGE UP
-  CEFTRIAXONE: SIGNIFICANT CHANGE UP
-  CIPROFLOXACIN: SIGNIFICANT CHANGE UP
-  CIPROFLOXACIN: SIGNIFICANT CHANGE UP
-  ERTAPENEM: SIGNIFICANT CHANGE UP
-  ERTAPENEM: SIGNIFICANT CHANGE UP
-  GENTAMICIN: SIGNIFICANT CHANGE UP
-  GENTAMICIN: SIGNIFICANT CHANGE UP
-  IMIPENEM: SIGNIFICANT CHANGE UP
-  IMIPENEM: SIGNIFICANT CHANGE UP
-  LEVOFLOXACIN: SIGNIFICANT CHANGE UP
-  LEVOFLOXACIN: SIGNIFICANT CHANGE UP
-  MEROPENEM: SIGNIFICANT CHANGE UP
-  MEROPENEM: SIGNIFICANT CHANGE UP
-  PIPERACILLIN/TAZOBACTAM: SIGNIFICANT CHANGE UP
-  PIPERACILLIN/TAZOBACTAM: SIGNIFICANT CHANGE UP
-  TOBRAMYCIN: SIGNIFICANT CHANGE UP
-  TOBRAMYCIN: SIGNIFICANT CHANGE UP
-  TRIMETHOPRIM/SULFAMETHOXAZOLE: SIGNIFICANT CHANGE UP
-  TRIMETHOPRIM/SULFAMETHOXAZOLE: SIGNIFICANT CHANGE UP
ALBUMIN SERPL ELPH-MCNC: 2.7 G/DL — LOW (ref 3.3–5.2)
ALP SERPL-CCNC: 313 U/L — HIGH (ref 40–120)
ALT FLD-CCNC: 191 U/L — HIGH
ANION GAP SERPL CALC-SCNC: 15 MMOL/L — SIGNIFICANT CHANGE UP (ref 5–17)
AST SERPL-CCNC: 106 U/L — HIGH
BASOPHILS # BLD AUTO: 0.03 K/UL — SIGNIFICANT CHANGE UP (ref 0–0.2)
BASOPHILS NFR BLD AUTO: 0.2 % — SIGNIFICANT CHANGE UP (ref 0–2)
BILIRUB SERPL-MCNC: 2.4 MG/DL — HIGH (ref 0.4–2)
BUN SERPL-MCNC: 16.7 MG/DL — SIGNIFICANT CHANGE UP (ref 8–20)
CALCIUM SERPL-MCNC: 8.2 MG/DL — LOW (ref 8.4–10.5)
CHLORIDE SERPL-SCNC: 97 MMOL/L — SIGNIFICANT CHANGE UP (ref 96–108)
CO2 SERPL-SCNC: 22 MMOL/L — SIGNIFICANT CHANGE UP (ref 22–29)
CREAT SERPL-MCNC: 0.27 MG/DL — LOW (ref 0.5–1.3)
EGFR: 131 ML/MIN/1.73M2 — SIGNIFICANT CHANGE UP
EOSINOPHIL # BLD AUTO: 0 K/UL — SIGNIFICANT CHANGE UP (ref 0–0.5)
EOSINOPHIL NFR BLD AUTO: 0 % — SIGNIFICANT CHANGE UP (ref 0–6)
GLUCOSE BLDC GLUCOMTR-MCNC: 103 MG/DL — HIGH (ref 70–99)
GLUCOSE BLDC GLUCOMTR-MCNC: 106 MG/DL — HIGH (ref 70–99)
GLUCOSE BLDC GLUCOMTR-MCNC: 106 MG/DL — HIGH (ref 70–99)
GLUCOSE BLDC GLUCOMTR-MCNC: 109 MG/DL — HIGH (ref 70–99)
GLUCOSE BLDC GLUCOMTR-MCNC: 113 MG/DL — HIGH (ref 70–99)
GLUCOSE SERPL-MCNC: 126 MG/DL — HIGH (ref 70–99)
HCT VFR BLD CALC: 31.4 % — LOW (ref 39–50)
HGB BLD-MCNC: 9.8 G/DL — LOW (ref 13–17)
IMM GRANULOCYTES NFR BLD AUTO: 0.5 % — SIGNIFICANT CHANGE UP (ref 0–0.9)
LACTATE SERPL-SCNC: 0.8 MMOL/L — SIGNIFICANT CHANGE UP (ref 0.5–2)
LYMPHOCYTES # BLD AUTO: 0.19 K/UL — LOW (ref 1–3.3)
LYMPHOCYTES # BLD AUTO: 1.4 % — LOW (ref 13–44)
MAGNESIUM SERPL-MCNC: 1.6 MG/DL — SIGNIFICANT CHANGE UP (ref 1.6–2.6)
MCHC RBC-ENTMCNC: 24.4 PG — LOW (ref 27–34)
MCHC RBC-ENTMCNC: 31.2 GM/DL — LOW (ref 32–36)
MCV RBC AUTO: 78.3 FL — LOW (ref 80–100)
METHOD TYPE: SIGNIFICANT CHANGE UP
METHOD TYPE: SIGNIFICANT CHANGE UP
MONOCYTES # BLD AUTO: 0.58 K/UL — SIGNIFICANT CHANGE UP (ref 0–0.9)
MONOCYTES NFR BLD AUTO: 4.4 % — SIGNIFICANT CHANGE UP (ref 2–14)
NEUTROPHILS # BLD AUTO: 12.36 K/UL — HIGH (ref 1.8–7.4)
NEUTROPHILS NFR BLD AUTO: 93.5 % — HIGH (ref 43–77)
PLATELET # BLD AUTO: 173 K/UL — SIGNIFICANT CHANGE UP (ref 150–400)
POTASSIUM SERPL-MCNC: 3.6 MMOL/L — SIGNIFICANT CHANGE UP (ref 3.5–5.3)
POTASSIUM SERPL-SCNC: 3.6 MMOL/L — SIGNIFICANT CHANGE UP (ref 3.5–5.3)
PROCALCITONIN SERPL-MCNC: 2.03 NG/ML — HIGH (ref 0.02–0.1)
PROT SERPL-MCNC: 5.7 G/DL — LOW (ref 6.6–8.7)
RBC # BLD: 4.01 M/UL — LOW (ref 4.2–5.8)
RBC # FLD: 23 % — HIGH (ref 10.3–14.5)
SODIUM SERPL-SCNC: 134 MMOL/L — LOW (ref 135–145)
WBC # BLD: 13.23 K/UL — HIGH (ref 3.8–10.5)
WBC # FLD AUTO: 13.23 K/UL — HIGH (ref 3.8–10.5)

## 2024-03-19 PROCEDURE — 99233 SBSQ HOSP IP/OBS HIGH 50: CPT | Mod: GC

## 2024-03-19 RX ORDER — TRAMADOL HYDROCHLORIDE 50 MG/1
25 TABLET ORAL EVERY 6 HOURS
Refills: 0 | Status: DISCONTINUED | OUTPATIENT
Start: 2024-03-19 | End: 2024-03-19

## 2024-03-19 RX ORDER — FAMOTIDINE 10 MG/ML
20 INJECTION INTRAVENOUS ONCE
Refills: 0 | Status: COMPLETED | OUTPATIENT
Start: 2024-03-19 | End: 2024-03-19

## 2024-03-19 RX ORDER — TRAMADOL HYDROCHLORIDE 50 MG/1
50 TABLET ORAL EVERY 6 HOURS
Refills: 0 | Status: DISCONTINUED | OUTPATIENT
Start: 2024-03-19 | End: 2024-03-23

## 2024-03-19 RX ADMIN — FAMOTIDINE 20 MILLIGRAM(S): 10 INJECTION INTRAVENOUS at 01:59

## 2024-03-19 RX ADMIN — ENOXAPARIN SODIUM 40 MILLIGRAM(S): 100 INJECTION SUBCUTANEOUS at 05:46

## 2024-03-19 RX ADMIN — Medication 81 MILLIGRAM(S): at 12:00

## 2024-03-19 RX ADMIN — TAMSULOSIN HYDROCHLORIDE 0.4 MILLIGRAM(S): 0.4 CAPSULE ORAL at 22:47

## 2024-03-19 RX ADMIN — ERTAPENEM SODIUM 120 MILLIGRAM(S): 1 INJECTION, POWDER, LYOPHILIZED, FOR SOLUTION INTRAMUSCULAR; INTRAVENOUS at 07:45

## 2024-03-19 RX ADMIN — MIDODRINE HYDROCHLORIDE 10 MILLIGRAM(S): 2.5 TABLET ORAL at 13:44

## 2024-03-19 RX ADMIN — CHLORHEXIDINE GLUCONATE 1 APPLICATION(S): 213 SOLUTION TOPICAL at 05:48

## 2024-03-19 NOTE — SWALLOW BEDSIDE ASSESSMENT ADULT - SLP PERTINENT HISTORY OF CURRENT PROBLEM
Pt seen at bedside at [facility] in November 2023 with RX for puree, thin fluids. As per pt, he was in rehab PTA (discharged home for a few days prior to this hospitalization) in which diet was reportedly changed to puree, mildly thick fluids (pr denied instrumetal assessment however DID endorse he can drink thin fluids with use of right head turn & chin down posture).

## 2024-03-19 NOTE — SWALLOW BEDSIDE ASSESSMENT ADULT - SWALLOW EVAL: DIAGNOSIS
Overall functional oral stag eofs wallow fo rpuree & mildly thicj fluids, mild oral dysphagia for dsoft/bite-sized solids, consistent with pt hx. Pharyngeal stage of swallow clinically unremarkable across administered PO trials Overall functional oral stage of swallow for puree & mildly thick fluids, mild oral dysphagia for soft/bite-sized solids, consistent with pt hx. Pharyngeal stage of swallow clinically unremarkable across administered PO trials

## 2024-03-19 NOTE — DIETITIAN INITIAL EVALUATION ADULT - PERTINENT LABORATORY DATA
03-19    134<L>  |  97  |  16.7  ----------------------------<  126<H>  3.6   |  22.0  |  0.27<L>    Ca    8.2<L>      19 Mar 2024 02:45  Phos  1.9     03-18  Mg     1.6     03-19    TPro  5.7<L>  /  Alb  2.7<L>  /  TBili  2.4<H>  /  DBili  x   /  AST  106<H>  /  ALT  191<H>  /  AlkPhos  313<H>  03-19  POCT Blood Glucose.: 106 mg/dL (03-19-24 @ 05:25)  A1C with Estimated Average Glucose Result: 6.5 % (11-17-23 @ 09:30)  A1C with Estimated Average Glucose Result: 7.4 % (11-10-23 @ 19:00)  A1C with Estimated Average Glucose Result: 7.0 % (11-03-23 @ 13:05)

## 2024-03-19 NOTE — SWALLOW BEDSIDE ASSESSMENT ADULT - COMMENTS
As per MD note: "73/M with PMHx HTN, HLD, CAD (s/p PCI), T2DM, SCC of mandible (s/p resection and chemoradiation) with recent PEG placement who presented for severe abdominal pain.  Initial CT abdomen showed acute cholecystitis with contained perforation.  In the ED, found to be in severe septic shock with leukocytosis (WBC 14), transaminitis, & lactic acidosis (8.1), fluid resuscitated and started on pressors (Levophed) and Zosyn.  Admitted to MICU for acute cholecystitis.  Surgery consulted but recommended against surgical intervention.  BCx grew ESBL E. coli, started on ertapenem.  Now off pressors, stable for downgrade to medicine."

## 2024-03-19 NOTE — DIETITIAN INITIAL EVALUATION ADULT - CALCULATED FROM (G/KG)
Brief Operative Note    Patient: Bronwyn Bryant 65 year old female    MRN: [id number]    Surgeon(s): Ruben Aburto MD  Phone Number: [phone]                       Surgeon(s) and Role:     * Ruben Aburto MD - Primary    Assistant(s): Eliecer Pinto PA-C    Pre-Op Diagnosis: Right shoulder pain, unspecified chronicity [M25.511]     Post-Op Diagnosis: As above     Procedure: Procedure(s):  Right Shoulder Scope with Superior Labral Debridement, Subacromial Bursectomy, and Biceps Tenotomy  Right Shoulder Scope with Superior Labral Debridement, Subacromial Bursectomy, and Biceps Tenotomy    Anesthesia Type: No value filed.                                   Complications: None    Description: As above    Findings: As above    Specimens Removed: No specimens collected     Estimated Blood Loss: Minimal    Assistant Tasks: Opening and closing and Implanting devices     Implants:   Implant Name Type Inv. Item Serial No.  Lot No. LRB No. Used Action   ANCHOR SUT 3.5MM 19.5MM [employer] BCMPS STRL DISP - SNA Elizabeth ANCHOR SUT 3.5MM 19.5MM PUSHLOCK BCMPS STRL DISP NA [employer] [id number] Right 1 Implanted         I was present for the key portions of the procedure and was immediately available for the non-key portions      
81.2

## 2024-03-19 NOTE — SWALLOW BEDSIDE ASSESSMENT ADULT - SLP GENERAL OBSERVATIONS
Pt received & seen seated upright in bed, awake/alert, oriented, reduced articulatory precision, consistent with HX however overall functional speech intelligibility, 4/10 back pain pre/post (RN Aries aware)

## 2024-03-19 NOTE — DIETITIAN INITIAL EVALUATION ADULT - PERTINENT MEDS FT
MEDICATIONS  (STANDING):  aspirin  chewable 81 milliGRAM(s) Oral daily  chlorhexidine 2% Cloths 1 Application(s) Topical <User Schedule>  dextrose 50% Injectable 25 Gram(s) IV Push once  enoxaparin Injectable 40 milliGRAM(s) SubCutaneous every 24 hours  ertapenem  IVPB 1000 milliGRAM(s) IV Intermittent every 24 hours  glucagon  Injectable 1 milliGRAM(s) IntraMuscular once  insulin lispro (ADMELOG) corrective regimen sliding scale   SubCutaneous every 6 hours  lactated ringers. 1000 milliLiter(s) (125 mL/Hr) IV Continuous <Continuous>  midodrine 10 milliGRAM(s) Oral every 8 hours  tamsulosin 0.4 milliGRAM(s) Oral at bedtime    MEDICATIONS  (PRN):  dextrose Oral Gel 15 Gram(s) Oral once PRN Blood Glucose LESS THAN 70 milliGRAM(s)/deciliter

## 2024-03-19 NOTE — SWALLOW BEDSIDE ASSESSMENT ADULT - SWALLOW EVAL: RECOMMENDED DIET
puree, mildly thick fluids as tolerated & as per medical team with supplemental PEG feedings as appropriate

## 2024-03-19 NOTE — PROGRESS NOTE ADULT - SUBJECTIVE AND OBJECTIVE BOX
PRATIMA VAZQUEZ    [id number]    73y      Male    CC: sepsis     INTERVAL HPI/OVERNIGHT EVENTS: Pt seen and examined. downgraded to floor o/n    REVIEW OF SYSTEMS:    RESPIRATORY: No cough, wheezing, hemoptysis; No shortness of breath  CARDIOVASCULAR: No chest pain, palpitations  GASTROINTESTINAL:  No nausea, vomiting  NEUROLOGICAL: No headaches    Vital Signs Last 24 Hrs  T(C): 36.7 (19 Mar 2024 12:34), Max: 36.7 (18 Mar 2024 23:00)  T(F): 98.1 (19 Mar 2024 12:34), Max: 98.1 (18 Mar 2024 23:00)  HR: 70 (19 Mar 2024 14:00) (70 - 87)  BP: 133/75 (19 Mar 2024 14:00) (101/68 - 142/79)  BP(mean): 94 (19 Mar 2024 14:00) (76 - 104)  RR: 28 (19 Mar 2024 14:00) (10 - 28)  SpO2: 98% (19 Mar 2024 13:00) (98% - 100%)    Parameters below as of 18 Mar 2024 16:00  Patient On (Oxygen Delivery Method): room air        PHYSICAL EXAM:    GENERAL: NAD  CHEST/LUNG: Clear to auscultation bilaterally; respirations unlabored   HEART: S1S2+, Regular rate and rhythm  ABDOMEN: Soft, Nondistended; +perc tube   SKIN: warm, dry  NEURO: awake, alert   PSYCH: calm, cooperative     LABS:                        9.8    13.23 )-----------( 173      ( 19 Mar 2024 02:45 )             31.4     03-19    134<L>  |  97  |  16.7  ----------------------------<  126<H>  3.6   |  22.0  |  0.27<L>    Ca    8.2<L>      19 Mar 2024 02:45  Phos  1.9     03-18  Mg     1.6     03-19    TPro  5.7<L>  /  Alb  2.7<L>  /  TBili  2.4<H>  /  DBili  x   /  AST  106<H>  /  ALT  191<H>  /  AlkPhos  313<H>  03-19      Urinalysis Basic - ( 19 Mar 2024 02:45 )    Color: x / Appearance: x / SG: x / pH: x  Gluc: 126 mg/dL / Ketone: x  / Bili: x / Urobili: x   Blood: x / Protein: x / Nitrite: x   Leuk Esterase: x / RBC: x / WBC x   Sq Epi: x / Non Sq Epi: x / Bacteria: x          MEDICATIONS  (STANDING):  aspirin  chewable 81 milliGRAM(s) Oral daily  chlorhexidine 2% Cloths 1 Application(s) Topical <User Schedule>  dextrose 5%. 1000 milliLiter(s) (50 mL/Hr) IV Continuous <Continuous>  dextrose 5%. 1000 milliLiter(s) (100 mL/Hr) IV Continuous <Continuous>  dextrose 50% Injectable 25 Gram(s) IV Push once  dextrose 50% Injectable 12.5 Gram(s) IV Push once  dextrose 50% Injectable 25 Gram(s) IV Push once  enoxaparin Injectable 40 milliGRAM(s) SubCutaneous every 24 hours  ertapenem  IVPB 1000 milliGRAM(s) IV Intermittent every 24 hours  glucagon  Injectable 1 milliGRAM(s) IntraMuscular once  insulin lispro (ADMELOG) corrective regimen sliding scale   SubCutaneous every 6 hours  lactated ringers. 1000 milliLiter(s) (125 mL/Hr) IV Continuous <Continuous>  midodrine 10 milliGRAM(s) Oral every 8 hours  tamsulosin 0.4 milliGRAM(s) Oral at bedtime    MEDICATIONS  (PRN):  dextrose Oral Gel 15 Gram(s) Oral once PRN Blood Glucose LESS THAN 70 milliGRAM(s)/deciliter  traMADol 25 milliGRAM(s) Oral every 6 hours PRN Moderate Pain (4 - 6)  traMADol 50 milliGRAM(s) Oral every 6 hours PRN Severe Pain (7 - 10)      RADIOLOGY & ADDITIONAL TESTS:   PRATIMA VAZQUEZ    [id number]    73y      Male    CC: sepsis     INTERVAL HPI/OVERNIGHT EVENTS: Pt seen and examined. downgraded to floor o/n    REVIEW OF SYSTEMS:    RESPIRATORY: No cough, wheezing, hemoptysis; No shortness of breath  CARDIOVASCULAR: No chest pain, palpitations  GASTROINTESTINAL:  No nausea, vomiting  NEUROLOGICAL: No headaches    Vital Signs Last 24 Hrs  T(C): 36.7 (19 Mar 2024 12:34), Max: 36.7 (18 Mar 2024 23:00)  T(F): 98.1 (19 Mar 2024 12:34), Max: 98.1 (18 Mar 2024 23:00)  HR: 70 (19 Mar 2024 14:00) (70 - 87)  BP: 133/75 (19 Mar 2024 14:00) (101/68 - 142/79)  BP(mean): 94 (19 Mar 2024 14:00) (76 - 104)  RR: 28 (19 Mar 2024 14:00) (10 - 28)  SpO2: 98% (19 Mar 2024 13:00) (98% - 100%)    Parameters below as of 18 Mar 2024 16:00  Patient On (Oxygen Delivery Method): room air        PHYSICAL EXAM:    GENERAL: NAD  CHEST/LUNG: Clear to auscultation bilaterally; respirations unlabored   HEART: S1S2+, Regular rate and rhythm  ABDOMEN: Soft, Nondistended; +perc tube ; +peg  SKIN: warm, dry  NEURO: awake, alert   PSYCH: calm, cooperative     LABS:                        9.8    13.23 )-----------( 173      ( 19 Mar 2024 02:45 )             31.4     03-19    134<L>  |  97  |  16.7  ----------------------------<  126<H>  3.6   |  22.0  |  0.27<L>    Ca    8.2<L>      19 Mar 2024 02:45  Phos  1.9     03-18  Mg     1.6     03-19    TPro  5.7<L>  /  Alb  2.7<L>  /  TBili  2.4<H>  /  DBili  x   /  AST  106<H>  /  ALT  191<H>  /  AlkPhos  313<H>  03-19      Urinalysis Basic - ( 19 Mar 2024 02:45 )    Color: x / Appearance: x / SG: x / pH: x  Gluc: 126 mg/dL / Ketone: x  / Bili: x / Urobili: x   Blood: x / Protein: x / Nitrite: x   Leuk Esterase: x / RBC: x / WBC x   Sq Epi: x / Non Sq Epi: x / Bacteria: x          MEDICATIONS  (STANDING):  aspirin  chewable 81 milliGRAM(s) Oral daily  chlorhexidine 2% Cloths 1 Application(s) Topical <User Schedule>  dextrose 5%. 1000 milliLiter(s) (50 mL/Hr) IV Continuous <Continuous>  dextrose 5%. 1000 milliLiter(s) (100 mL/Hr) IV Continuous <Continuous>  dextrose 50% Injectable 25 Gram(s) IV Push once  dextrose 50% Injectable 12.5 Gram(s) IV Push once  dextrose 50% Injectable 25 Gram(s) IV Push once  enoxaparin Injectable 40 milliGRAM(s) SubCutaneous every 24 hours  ertapenem  IVPB 1000 milliGRAM(s) IV Intermittent every 24 hours  glucagon  Injectable 1 milliGRAM(s) IntraMuscular once  insulin lispro (ADMELOG) corrective regimen sliding scale   SubCutaneous every 6 hours  lactated ringers. 1000 milliLiter(s) (125 mL/Hr) IV Continuous <Continuous>  midodrine 10 milliGRAM(s) Oral every 8 hours  tamsulosin 0.4 milliGRAM(s) Oral at bedtime    MEDICATIONS  (PRN):  dextrose Oral Gel 15 Gram(s) Oral once PRN Blood Glucose LESS THAN 70 milliGRAM(s)/deciliter  traMADol 25 milliGRAM(s) Oral every 6 hours PRN Moderate Pain (4 - 6)  traMADol 50 milliGRAM(s) Oral every 6 hours PRN Severe Pain (7 - 10)      RADIOLOGY & ADDITIONAL TESTS:

## 2024-03-19 NOTE — PROGRESS NOTE ADULT - ATTENDING COMMENTS
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  Initial CT abdomen showed acute cholecystitis with contained perforation.  In the ED, found to be in severe septic shock with leukocytosis (WBC 14), transaminitis, & lactic acidosis (8.1), fluid resuscitated and started on pressors (Levophed) and Zosyn.  Admitted to MICU for septic shock 2/2 acute cholecystitis.  Surgery consulted but recommended against surgical intervention.  BCx grew ESBL E. coli, started on ertapenem.  Now off pressors, stable for downgrade to medicine.      1. Septic shock 2/2 acute cholecystitis with contained perforation with ESBL bacteremia   -Continue Invanz  -Follow up culture sensitivities  -Will need long term antibiotics   -Perc gemini fluid initially purulent, now draining bile; monitor output    2. Transaminitis  -Improving post perc gemini placement  -Monitor for now    3. T2DM, CAD, HTN, HLD, BPH  -Plan as above    Attempted to reconcile meds overnight, unable to obtain doses   Please verify with family in am    Remainder of plan as above. Agree with physical exam, assessment and plan as illustrated by resident.

## 2024-03-19 NOTE — DIETITIAN INITIAL EVALUATION ADULT - ORAL INTAKE PTA/DIET HISTORY
Pt sleeping during visit; spoke with pt RN, who reports upon admission; wife reports pt ate small amounts PO on dysphagia diet. Pt has PEG and receives most of nutrition via PEG. Per previous admission note pt ~30lb weight loss over past 8 months.

## 2024-03-19 NOTE — PROGRESS NOTE ADULT - ASSESSMENT
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  Initial CT abdomen showed acute cholecystitis with contained perforation.  In the ED, found to be in severe septic shock with leukocytosis (WBC 14), transaminitis, & lactic acidosis (8.1), fluid resuscitated and started on pressors (Levophed) and Zosyn.  Admitted to MICU for acute cholecystitis.  Surgery consulted but recommended against surgical intervention.  BCx grew ESBL E. coli, started on ertapenem.  Now off pressors, stable for downgrade to medicine 3/19      Septic shock 2/2 acute cholecystitis with contained perforation  ESBL bacteremia  -CT a/p: Acute cholecystitis with contained perforation  -US ABD: Acute cholecystitis with walled off perforation.  -S/p IR percutaneous cholecystostomy (3/17)- Initially draining purulent fluid, now draining bilious fluid  -S/p pressors (Levophed), weanned off to midodrine; wean as tolerated   -cont IVF   -Surgery notes appreciated, not deemed surgical candidate  -BCx: ESBL E. coli; UCX: NGTD  -S/p Zosyn; cont Invanz    Elevated liver enzymes  -Improving  -Likely secondary to shock liver v/s cholestasis  -Hold statins, may restart once LFT has normalized    T2DM  -Hold home Farxiga & Janumet  -Premeal Accu-Cheks + ISS  -f/u A1c, FLP in AM    CAD  HTN, HLD  -cont aspirin 81 mg QD  -Hold Lipitor in the setting of elevated liver enzymes.    BPH  -cont Flomax 0.4mg HS    vte ppx: Lovenox 40 sq

## 2024-03-19 NOTE — PROGRESS NOTE ADULT - ASSESSMENT
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  Initial CT abdomen showed acute cholecystitis with contained perforation.  In the ED, found to be in severe septic shock with leukocytosis (WBC 14), transaminitis, & lactic acidosis (8.1), fluid resuscitated and started on pressors (Levophed) and Zosyn.  Admitted to MICU for acute cholecystitis.  Surgery consulted but recommended against surgical intervention.  BCx grew ESBL E. coli, started on ertapenem.  Now off pressors, stable for downgrade to medicine.    Plan:  # Septic shock 2/2 acute cholecystitis with contained perforation  # ESBL bacteremia  – CT ABD/pel: Acute cholecystitis with contained perforation  -USS ABD: Acute cholecystitis with walled off perforation.  – S/p IR percutaneous cholecystostomy (3/17)- Initially draining purulent fluid, now draining bilious fluid  - S/p pressors (Levophed), weanned off to midodrine  - C/w IVF LR@150/hr  - Surgery notes appreciated, not deemed surgical candidate  - BCx: ESBL E. coli; UCX: NGTD  - S/p Zosyn; C/w Invanz  - Trend CBC & fevers    # Elevated liver enzymes  – Improving  – Likely secondary to shock liver v/s cholestasis  - Hold statins, may restart once LFT has normalized    #T2DM  – Hold home Farxiga & Janumet  - Premeal Accu-Cheks + ISS  - Check A1c, FLP in AM    # CAD, HTN, HLD  -C/w aspirin 81 mg QD  -Hold Lipitor in the setting of elevated liver enzymes.    #BPH  - C/w Flomax 0.4mg HS    DVT PPx: Lovenox 40 daily  Diet: TLC  [place] [facility] [place]

## 2024-03-19 NOTE — DIETITIAN INITIAL EVALUATION ADULT - ENTERAL
As/when feasible; Glucerna 1.5 @ 30ml/hr; increase by 10ml very 6 hours as tolerate to goal rate of 60ml/hr (x18 hours) 1080ml/day; 1620kcal, 90gm protein, 810ml free water

## 2024-03-19 NOTE — SWALLOW BEDSIDE ASSESSMENT ADULT - ORAL PHASE
Within functional limits trace stasis/Decreased anterior-posterior movement of the bolus/Delayed oral transit time/Stasis in lateral sulci

## 2024-03-19 NOTE — DIETITIAN INITIAL EVALUATION ADULT - OTHER INFO
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 Also endorsed nausea, one episode of emesis, and weakness. Labs significant for leukocytosis (WBC count 14k), transaminitis (AST//2730, and lactic acidosis (lactate 8.1). CT abd/pelvis revealed evidence of acute cholecystitis with contained perforation. ACS recommended against acute surgical intervention given the circumstances. Now s/p percutaneous cholecystostomy. Cx grew ESBL. On ertapenem and improving.

## 2024-03-19 NOTE — SWALLOW BEDSIDE ASSESSMENT ADULT - ORAL PREPARATORY PHASE
Anterior loss of bolus Within functional limits slurping noted to assist with oral grading/Within functional limits

## 2024-03-19 NOTE — PROGRESS NOTE ADULT - SUBJECTIVE AND OBJECTIVE BOX
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 Also endorsed nausea, one episode of emesis, and weakness. Labs significant for leukocytosis (WBC count 14k), transaminitis (AST//2730, and lactic acidosis (lactate 8.1). CT abd/pelvis revealed evidence of acute cholecystitis with contained perforation. ACS recommended against acute surgical intervention given the circumstances. Now s/p percutaneous cholecystostomy. Cx grew ESBL. On ertapenem and improving. Now off pressors and weaned off to Midodrine. Stable for downgrade to medicine        MEDICATIONS  (STANDING):  aspirin  chewable 81 milliGRAM(s) Oral daily  chlorhexidine 2% Cloths 1 Application(s) Topical <User Schedule>  dextrose 5%. 1000 milliLiter(s) (50 mL/Hr) IV Continuous <Continuous>  dextrose 5%. 1000 milliLiter(s) (100 mL/Hr) IV Continuous <Continuous>  dextrose 50% Injectable 25 Gram(s) IV Push once  dextrose 50% Injectable 25 Gram(s) IV Push once  dextrose 50% Injectable 12.5 Gram(s) IV Push once  enoxaparin Injectable 40 milliGRAM(s) SubCutaneous every 24 hours  ertapenem  IVPB 1000 milliGRAM(s) IV Intermittent every 24 hours  glucagon  Injectable 1 milliGRAM(s) IntraMuscular once  insulin lispro (ADMELOG) corrective regimen sliding scale   SubCutaneous every 6 hours  lactated ringers. 1000 milliLiter(s) (125 mL/Hr) IV Continuous <Continuous>  midodrine 10 milliGRAM(s) Oral every 8 hours  tamsulosin 0.4 milliGRAM(s) Oral at bedtime    MEDICATIONS  (PRN):  dextrose Oral Gel 15 Gram(s) Oral once PRN Blood Glucose LESS THAN 70 milliGRAM(s)/deciliter      Allergies    No Known Allergies    Intolerances        REVIEW OF SYSTEMS:  All other review of systems negative, except as noted in HPI    OBJECTIVE:    Vital Signs Last 24 Hrs  T(C): 36.7 (18 Mar 2024 23:00), Max: 36.7 (18 Mar 2024 12:44)  T(F): 98.1 (18 Mar 2024 23:00), Max: 98.1 (18 Mar 2024 23:00)  HR: 85 (19 Mar 2024 00:00) (69 - 87)  BP: 141/83 (19 Mar 2024 00:00) (78/51 - 142/79)  BP(mean): 101 (19 Mar 2024 00:00) (58 - 104)  RR: 14 (19 Mar 2024 00:00) (9 - 22)  SpO2: 100% (18 Mar 2024 23:00) (94% - 100%)    Parameters below as of 18 Mar 2024 16:00  Patient On (Oxygen Delivery Method): room air        PHYSICAL EXAM:  GENERAL: NAD, lying in bed comfortably  HEAD:  Atraumatic, Normocephalic  EYES: EOMI, PERRLA, conjunctiva and sclera clear  ENT: Moist mucous membranes  NECK: Supple, No JVD  CHEST/LUNG: Clear to auscultation bilaterally; No rales, rhonchi, wheezing, or rubs. Unlabored respirations  HEART: Regular rate and rhythm  ABDOMEN: BSx4; Per-gemini tube (+) draining bile, no leakage around insertion site; PEG tube site dry & clean. RUQ tenderness (+), no guarding or rigidity  EXTREMITIES:  2+ Peripheral Pulses, brisk capillary refill. No clubbing, cyanosis, or edema  NERVOUS SYSTEM:  A&Ox3, moving all 4 limbs, NFND      Lab/ Imaging:    LABS:                        9.5    17.93 )-----------( 198      ( 18 Mar 2024 05:00 )             31.0     03-18    136  |  98  |  12.2  ----------------------------<  100<H>  3.6   |  28.0  |  0.33<L>    Ca    8.7      18 Mar 2024 11:41  Phos  1.9     03-18  Mg     2.0     03-18    TPro  5.8<L>  /  Alb  2.2<L>  /  TBili  2.2<H>  /  DBili  x   /  AST  230<H>  /  ALT  235<H>  /  AlkPhos  320<H>  03-18    PT/INR - ( 17 Mar 2024 03:50 )   PT: 12.9 sec;   INR: 1.17 ratio         PTT - ( 17 Mar 2024 03:50 )  PTT:35.1 sec  Urinalysis Basic - ( 18 Mar 2024 11:41 )    Color: x / Appearance: x / SG: x / pH: x  Gluc: 100 mg/dL / Ketone: x  / Bili: x / Urobili: x   Blood: x / Protein: x / Nitrite: x   Leuk Esterase: x / RBC: x / WBC x   Sq Epi: x / Non Sq Epi: x / Bacteria: x      CAPILLARY BLOOD GLUCOSE  POCT Blood Glucose.: 103 mg/dL (19 Mar 2024 00:17)  POCT Blood Glucose.: 86 mg/dL (18 Mar 2024 17:27)  POCT Blood Glucose.: 97 mg/dL (18 Mar 2024 11:36)  POCT Blood Glucose.: 132 mg/dL (18 Mar 2024 05:38)

## 2024-03-20 LAB
A1C WITH ESTIMATED AVERAGE GLUCOSE RESULT: 7.8 % — HIGH (ref 4–5.6)
ALBUMIN SERPL ELPH-MCNC: 2.5 G/DL — LOW (ref 3.3–5.2)
ALBUMIN SERPL ELPH-MCNC: 4.1 G/DL
ALP BLD-CCNC: 80 U/L
ALP SERPL-CCNC: 374 U/L — HIGH (ref 40–120)
ALT FLD-CCNC: 128 U/L — HIGH
ALT SERPL-CCNC: 9 U/L
ANION GAP SERPL CALC-SCNC: 13 MMOL/L
ANION GAP SERPL CALC-SCNC: 18 MMOL/L — HIGH (ref 5–17)
AST SERPL-CCNC: 12 U/L
AST SERPL-CCNC: 44 U/L — HIGH
BASOPHILS # BLD AUTO: 0.04 K/UL — SIGNIFICANT CHANGE UP (ref 0–0.2)
BASOPHILS NFR BLD AUTO: 0.4 % — SIGNIFICANT CHANGE UP (ref 0–2)
BILIRUB DIRECT SERPL-MCNC: 0.6 MG/DL — HIGH (ref 0–0.3)
BILIRUB INDIRECT FLD-MCNC: 0.6 MG/DL — SIGNIFICANT CHANGE UP (ref 0.2–1)
BILIRUB SERPL-MCNC: 0.2 MG/DL
BILIRUB SERPL-MCNC: 1.2 MG/DL — SIGNIFICANT CHANGE UP (ref 0.4–2)
BUN SERPL-MCNC: 34 MG/DL
BUN SERPL-MCNC: 9.4 MG/DL — SIGNIFICANT CHANGE UP (ref 8–20)
CALCIUM SERPL-MCNC: 8.4 MG/DL — SIGNIFICANT CHANGE UP (ref 8.4–10.5)
CALCIUM SERPL-MCNC: 9.9 MG/DL
CHLORIDE SERPL-SCNC: 93 MMOL/L — LOW (ref 96–108)
CHLORIDE SERPL-SCNC: 95 MMOL/L
CHOLEST SERPL-MCNC: 121 MG/DL — SIGNIFICANT CHANGE UP
CO2 SERPL-SCNC: 21 MMOL/L — LOW (ref 22–29)
CO2 SERPL-SCNC: 30 MMOL/L
CREAT SERPL-MCNC: 0.33 MG/DL — LOW (ref 0.5–1.3)
CREAT SERPL-MCNC: 0.47 MG/DL
EGFR: 110 ML/MIN/1.73M2
EGFR: 123 ML/MIN/1.73M2 — SIGNIFICANT CHANGE UP
EOSINOPHIL # BLD AUTO: 0.02 K/UL — SIGNIFICANT CHANGE UP (ref 0–0.5)
EOSINOPHIL NFR BLD AUTO: 0.2 % — SIGNIFICANT CHANGE UP (ref 0–6)
ESTIMATED AVERAGE GLUCOSE: 177 MG/DL — HIGH (ref 68–114)
GLUCOSE BLDC GLUCOMTR-MCNC: 108 MG/DL — HIGH (ref 70–99)
GLUCOSE BLDC GLUCOMTR-MCNC: 136 MG/DL — HIGH (ref 70–99)
GLUCOSE BLDC GLUCOMTR-MCNC: 151 MG/DL — HIGH (ref 70–99)
GLUCOSE BLDC GLUCOMTR-MCNC: 156 MG/DL — HIGH (ref 70–99)
GLUCOSE SERPL-MCNC: 112 MG/DL — HIGH (ref 70–99)
GLUCOSE SERPL-MCNC: 161 MG/DL
HCT VFR BLD CALC: 34.1 % — LOW (ref 39–50)
HDLC SERPL-MCNC: 29 MG/DL — LOW
HGB BLD-MCNC: 10.7 G/DL — LOW (ref 13–17)
IMM GRANULOCYTES NFR BLD AUTO: 0.7 % — SIGNIFICANT CHANGE UP (ref 0–0.9)
LIPID PNL WITH DIRECT LDL SERPL: 58 MG/DL — SIGNIFICANT CHANGE UP
LYMPHOCYTES # BLD AUTO: 0.28 K/UL — LOW (ref 1–3.3)
LYMPHOCYTES # BLD AUTO: 2.9 % — LOW (ref 13–44)
MAGNESIUM SERPL-MCNC: 1.5 MG/DL — LOW (ref 1.6–2.6)
MAGNESIUM SERPL-MCNC: 2 MG/DL
MCHC RBC-ENTMCNC: 24.5 PG — LOW (ref 27–34)
MCHC RBC-ENTMCNC: 31.4 GM/DL — LOW (ref 32–36)
MCV RBC AUTO: 78.2 FL — LOW (ref 80–100)
MONOCYTES # BLD AUTO: 0.61 K/UL — SIGNIFICANT CHANGE UP (ref 0–0.9)
MONOCYTES NFR BLD AUTO: 6.3 % — SIGNIFICANT CHANGE UP (ref 2–14)
NEUTROPHILS # BLD AUTO: 8.66 K/UL — HIGH (ref 1.8–7.4)
NEUTROPHILS NFR BLD AUTO: 89.5 % — HIGH (ref 43–77)
NON HDL CHOLESTEROL: 92 MG/DL — SIGNIFICANT CHANGE UP
PHOSPHATE SERPL-MCNC: 1.5 MG/DL — LOW (ref 2.4–4.7)
PLATELET # BLD AUTO: 170 K/UL — SIGNIFICANT CHANGE UP (ref 150–400)
POTASSIUM SERPL-MCNC: 3.6 MMOL/L — SIGNIFICANT CHANGE UP (ref 3.5–5.3)
POTASSIUM SERPL-SCNC: 3.6 MMOL/L — SIGNIFICANT CHANGE UP (ref 3.5–5.3)
POTASSIUM SERPL-SCNC: 3.9 MMOL/L
PROT SERPL-MCNC: 6.4 G/DL — LOW (ref 6.6–8.7)
PROT SERPL-MCNC: 7 G/DL
RBC # BLD: 4.36 M/UL — SIGNIFICANT CHANGE UP (ref 4.2–5.8)
RBC # FLD: 22.7 % — HIGH (ref 10.3–14.5)
SODIUM SERPL-SCNC: 132 MMOL/L — LOW (ref 135–145)
SODIUM SERPL-SCNC: 138 MMOL/L
TRIGL SERPL-MCNC: 171 MG/DL — HIGH
WBC # BLD: 9.68 K/UL — SIGNIFICANT CHANGE UP (ref 3.8–10.5)
WBC # FLD AUTO: 9.68 K/UL — SIGNIFICANT CHANGE UP (ref 3.8–10.5)

## 2024-03-20 PROCEDURE — 99233 SBSQ HOSP IP/OBS HIGH 50: CPT

## 2024-03-20 RX ORDER — SODIUM CHLORIDE 9 MG/ML
1000 INJECTION INTRAMUSCULAR; INTRAVENOUS; SUBCUTANEOUS
Refills: 0 | Status: DISCONTINUED | OUTPATIENT
Start: 2024-03-20 | End: 2024-03-21

## 2024-03-20 RX ORDER — POTASSIUM PHOSPHATE, MONOBASIC POTASSIUM PHOSPHATE, DIBASIC 236; 224 MG/ML; MG/ML
30 INJECTION, SOLUTION INTRAVENOUS ONCE
Refills: 0 | Status: COMPLETED | OUTPATIENT
Start: 2024-03-20 | End: 2024-03-20

## 2024-03-20 RX ORDER — MIDODRINE HYDROCHLORIDE 2.5 MG/1
5 TABLET ORAL EVERY 8 HOURS
Refills: 0 | Status: DISCONTINUED | OUTPATIENT
Start: 2024-03-20 | End: 2024-03-23

## 2024-03-20 RX ORDER — MAGNESIUM SULFATE 500 MG/ML
2 VIAL (ML) INJECTION ONCE
Refills: 0 | Status: COMPLETED | OUTPATIENT
Start: 2024-03-20 | End: 2024-03-20

## 2024-03-20 RX ADMIN — TAMSULOSIN HYDROCHLORIDE 0.4 MILLIGRAM(S): 0.4 CAPSULE ORAL at 21:36

## 2024-03-20 RX ADMIN — Medication 2: at 23:37

## 2024-03-20 RX ADMIN — CHLORHEXIDINE GLUCONATE 1 APPLICATION(S): 213 SOLUTION TOPICAL at 05:32

## 2024-03-20 RX ADMIN — Medication 2: at 15:38

## 2024-03-20 RX ADMIN — ENOXAPARIN SODIUM 40 MILLIGRAM(S): 100 INJECTION SUBCUTANEOUS at 05:31

## 2024-03-20 RX ADMIN — Medication 25 GRAM(S): at 17:07

## 2024-03-20 RX ADMIN — TRAMADOL HYDROCHLORIDE 50 MILLIGRAM(S): 50 TABLET ORAL at 11:19

## 2024-03-20 RX ADMIN — POTASSIUM PHOSPHATE, MONOBASIC POTASSIUM PHOSPHATE, DIBASIC 83.33 MILLIMOLE(S): 236; 224 INJECTION, SOLUTION INTRAVENOUS at 17:36

## 2024-03-20 RX ADMIN — ERTAPENEM SODIUM 120 MILLIGRAM(S): 1 INJECTION, POWDER, LYOPHILIZED, FOR SOLUTION INTRAMUSCULAR; INTRAVENOUS at 05:32

## 2024-03-20 RX ADMIN — Medication 81 MILLIGRAM(S): at 11:19

## 2024-03-20 RX ADMIN — Medication 30 MILLILITER(S): at 21:38

## 2024-03-20 RX ADMIN — SODIUM CHLORIDE 75 MILLILITER(S): 9 INJECTION INTRAMUSCULAR; INTRAVENOUS; SUBCUTANEOUS at 17:07

## 2024-03-20 RX ADMIN — TRAMADOL HYDROCHLORIDE 50 MILLIGRAM(S): 50 TABLET ORAL at 12:00

## 2024-03-20 NOTE — PROGRESS NOTE ADULT - ASSESSMENT
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  Initial CT abdomen showed acute cholecystitis with contained perforation.  In the ED, found to be in severe septic shock with leukocytosis (WBC 14), transaminitis, & lactic acidosis (8.1), fluid resuscitated and started on pressors (Levophed) and Zosyn.  Admitted to MICU for acute cholecystitis.  Surgery consulted but recommended against surgical intervention.  BCx grew ESBL E. coli, started on ertapenem.  Now off pressors, stable for downgrade to medicine 3/19      Septic shock 2/2 acute cholecystitis with contained perforation  ESBL bacteremia  -CT a/p: Acute cholecystitis with contained perforation  -US ABD: Acute cholecystitis with walled off perforation.  -S/p IR percutaneous cholecystostomy (3/17)- Initially draining purulent fluid, now draining bilious fluid  -S/p pressors (Levophed), weaned to midodrine; wean as tolerated   -cont IVF   -Surgery notes appreciated, not deemed surgical candidate  -BCx: ESBL E. coli; UCX: NGTD  -S/p Zosyn; cont Invanz    Hypomagnesemia   Hypophosphatemia   -repleted   -f/u repeat labs in am, ordered     Elevated liver enzymes  -Improving  -Likely secondary to shock liver v/s cholestasis  -Hold statins, may restart once LFT has normalized    T2DM  -Hold home Farxiga & Janumet  -Premeal Accu-Cheks + ISS  -A1c 7.8    CAD  HTN, HLD  -cont aspirin 81 mg QD  -Hold Lipitor in the setting of elevated liver enzymes.    BPH  -cont Flomax 0.4mg HS    vte ppx: Lovenox 40 sq

## 2024-03-20 NOTE — PROGRESS NOTE ADULT - SUBJECTIVE AND OBJECTIVE BOX
PRATIMA VAZQUEZ    [id number]    73y      Male    CC: Sepsis     INTERVAL HPI/OVERNIGHT EVENTS: Pt seen and examined. no acute events reported o/n     REVIEW OF SYSTEMS:    RESPIRATORY: No cough, wheezing, hemoptysis; No shortness of breath  CARDIOVASCULAR: No chest pain, palpitations  GASTROINTESTINAL: No nausea, vomiting  NEUROLOGICAL: No headaches    Vital Signs Last 24 Hrs  T(C): 36.6 (20 Mar 2024 16:21), Max: 37 (19 Mar 2024 19:25)  T(F): 97.8 (20 Mar 2024 16:21), Max: 98.6 (19 Mar 2024 19:25)  HR: 81 (20 Mar 2024 15:00) (69 - 90)  BP: 142/83 (20 Mar 2024 15:00) (106/68 - 152/85)  BP(mean): 102 (20 Mar 2024 15:00) (80 - 105)  RR: 11 (20 Mar 2024 15:00) (9 - 28)  SpO2: 98% (20 Mar 2024 15:00) (97% - 100%)    Parameters below as of 20 Mar 2024 12:00  Patient On (Oxygen Delivery Method): room air        PHYSICAL EXAM:    GENERAL: NAD  CHEST/LUNG: Clear to auscultation bilaterally; respirations unlabored   HEART: S1S2+, Regular rate and rhythm  ABDOMEN: Soft, Nondistended; +perc tube ; +peg  SKIN: warm, dry  NEURO: awake, alert   PSYCH: calm, cooperative     LABS:                        10.7   9.68  )-----------( 170      ( 20 Mar 2024 02:45 )             34.1     03-20    132<L>  |  93<L>  |  9.4  ----------------------------<  112<H>  3.6   |  21.0<L>  |  0.33<L>    Ca    8.4      20 Mar 2024 02:45  Phos  1.5     03-20  Mg     1.5     03-20    TPro  6.4<L>  /  Alb  2.5<L>  /  TBili  1.2  /  DBili  0.6<H>  /  AST  44<H>  /  ALT  128<H>  /  AlkPhos  374<H>  03-20      Urinalysis Basic - ( 20 Mar 2024 02:45 )    Color: x / Appearance: x / SG: x / pH: x  Gluc: 112 mg/dL / Ketone: x  / Bili: x / Urobili: x   Blood: x / Protein: x / Nitrite: x   Leuk Esterase: x / RBC: x / WBC x   Sq Epi: x / Non Sq Epi: x / Bacteria: x          MEDICATIONS  (STANDING):  aspirin  chewable 81 milliGRAM(s) Oral daily  chlorhexidine 2% Cloths 1 Application(s) Topical <User Schedule>  dextrose 5%. 1000 milliLiter(s) (50 mL/Hr) IV Continuous <Continuous>  dextrose 5%. 1000 milliLiter(s) (100 mL/Hr) IV Continuous <Continuous>  dextrose 50% Injectable 25 Gram(s) IV Push once  dextrose 50% Injectable 12.5 Gram(s) IV Push once  dextrose 50% Injectable 25 Gram(s) IV Push once  enoxaparin Injectable 40 milliGRAM(s) SubCutaneous every 24 hours  ertapenem  IVPB 1000 milliGRAM(s) IV Intermittent every 24 hours  glucagon  Injectable 1 milliGRAM(s) IntraMuscular once  insulin lispro (ADMELOG) corrective regimen sliding scale   SubCutaneous every 6 hours  lactated ringers. 1000 milliLiter(s) (125 mL/Hr) IV Continuous <Continuous>  midodrine 10 milliGRAM(s) Oral every 8 hours  tamsulosin 0.4 milliGRAM(s) Oral at bedtime    MEDICATIONS  (PRN):  dextrose Oral Gel 15 Gram(s) Oral once PRN Blood Glucose LESS THAN 70 milliGRAM(s)/deciliter  traMADol 25 milliGRAM(s) Oral every 6 hours PRN Moderate Pain (4 - 6)  traMADol 50 milliGRAM(s) Oral every 6 hours PRN Severe Pain (7 - 10)      RADIOLOGY & ADDITIONAL TESTS:

## 2024-03-21 LAB
-  AMPICILLIN/SULBACTAM: SIGNIFICANT CHANGE UP
-  AMPICILLIN: SIGNIFICANT CHANGE UP
-  AZTREONAM: SIGNIFICANT CHANGE UP
-  CEFAZOLIN: SIGNIFICANT CHANGE UP
-  CEFEPIME: SIGNIFICANT CHANGE UP
-  CEFTRIAXONE: SIGNIFICANT CHANGE UP
-  CIPROFLOXACIN: SIGNIFICANT CHANGE UP
-  ERTAPENEM: SIGNIFICANT CHANGE UP
-  GENTAMICIN: SIGNIFICANT CHANGE UP
-  IMIPENEM: SIGNIFICANT CHANGE UP
-  LEVOFLOXACIN: SIGNIFICANT CHANGE UP
-  MEROPENEM: SIGNIFICANT CHANGE UP
-  PIPERACILLIN/TAZOBACTAM: SIGNIFICANT CHANGE UP
-  TOBRAMYCIN: SIGNIFICANT CHANGE UP
-  TRIMETHOPRIM/SULFAMETHOXAZOLE: SIGNIFICANT CHANGE UP
ALBUMIN SERPL ELPH-MCNC: 2.8 G/DL — LOW (ref 3.3–5.2)
ALP SERPL-CCNC: 371 U/L — HIGH (ref 40–120)
ALT FLD-CCNC: 79 U/L — HIGH
ANION GAP SERPL CALC-SCNC: 8 MMOL/L — SIGNIFICANT CHANGE UP (ref 5–17)
AST SERPL-CCNC: 19 U/L — SIGNIFICANT CHANGE UP
BILIRUB SERPL-MCNC: 0.7 MG/DL — SIGNIFICANT CHANGE UP (ref 0.4–2)
BUN SERPL-MCNC: 5.1 MG/DL — LOW (ref 8–20)
CALCIUM SERPL-MCNC: 8.3 MG/DL — LOW (ref 8.4–10.5)
CHLORIDE SERPL-SCNC: 96 MMOL/L — SIGNIFICANT CHANGE UP (ref 96–108)
CO2 SERPL-SCNC: 29 MMOL/L — SIGNIFICANT CHANGE UP (ref 22–29)
CREAT SERPL-MCNC: 0.26 MG/DL — LOW (ref 0.5–1.3)
CULTURE RESULTS: ABNORMAL
EGFR: 132 ML/MIN/1.73M2 — SIGNIFICANT CHANGE UP
GLUCOSE BLDC GLUCOMTR-MCNC: 179 MG/DL — HIGH (ref 70–99)
GLUCOSE BLDC GLUCOMTR-MCNC: 182 MG/DL — HIGH (ref 70–99)
GLUCOSE BLDC GLUCOMTR-MCNC: 195 MG/DL — HIGH (ref 70–99)
GLUCOSE BLDC GLUCOMTR-MCNC: 217 MG/DL — HIGH (ref 70–99)
GLUCOSE SERPL-MCNC: 177 MG/DL — HIGH (ref 70–99)
HCT VFR BLD CALC: 29.7 % — LOW (ref 39–50)
HGB BLD-MCNC: 9.7 G/DL — LOW (ref 13–17)
MAGNESIUM SERPL-MCNC: 1.7 MG/DL — SIGNIFICANT CHANGE UP (ref 1.6–2.6)
MCHC RBC-ENTMCNC: 24.7 PG — LOW (ref 27–34)
MCHC RBC-ENTMCNC: 32.7 GM/DL — SIGNIFICANT CHANGE UP (ref 32–36)
MCV RBC AUTO: 75.6 FL — LOW (ref 80–100)
METHOD TYPE: SIGNIFICANT CHANGE UP
ORGANISM # SPEC MICROSCOPIC CNT: ABNORMAL
ORGANISM # SPEC MICROSCOPIC CNT: SIGNIFICANT CHANGE UP
PHOSPHATE SERPL-MCNC: 2 MG/DL — LOW (ref 2.4–4.7)
PLATELET # BLD AUTO: 186 K/UL — SIGNIFICANT CHANGE UP (ref 150–400)
POTASSIUM SERPL-MCNC: 3.3 MMOL/L — LOW (ref 3.5–5.3)
POTASSIUM SERPL-SCNC: 3.3 MMOL/L — LOW (ref 3.5–5.3)
PROT SERPL-MCNC: 6 G/DL — LOW (ref 6.6–8.7)
RBC # BLD: 3.93 M/UL — LOW (ref 4.2–5.8)
RBC # FLD: 22.4 % — HIGH (ref 10.3–14.5)
SODIUM SERPL-SCNC: 132 MMOL/L — LOW (ref 135–145)
SPECIMEN SOURCE: SIGNIFICANT CHANGE UP
WBC # BLD: 5.11 K/UL — SIGNIFICANT CHANGE UP (ref 3.8–10.5)
WBC # FLD AUTO: 5.11 K/UL — SIGNIFICANT CHANGE UP (ref 3.8–10.5)

## 2024-03-21 PROCEDURE — 99233 SBSQ HOSP IP/OBS HIGH 50: CPT

## 2024-03-21 PROCEDURE — 99223 1ST HOSP IP/OBS HIGH 75: CPT

## 2024-03-21 PROCEDURE — 71045 X-RAY EXAM CHEST 1 VIEW: CPT | Mod: 26

## 2024-03-21 RX ORDER — POTASSIUM PHOSPHATE, MONOBASIC POTASSIUM PHOSPHATE, DIBASIC 236; 224 MG/ML; MG/ML
15 INJECTION, SOLUTION INTRAVENOUS ONCE
Refills: 0 | Status: COMPLETED | OUTPATIENT
Start: 2024-03-21 | End: 2024-03-21

## 2024-03-21 RX ORDER — ONDANSETRON 8 MG/1
4 TABLET, FILM COATED ORAL ONCE
Refills: 0 | Status: DISCONTINUED | OUTPATIENT
Start: 2024-03-21 | End: 2024-03-29

## 2024-03-21 RX ADMIN — MIDODRINE HYDROCHLORIDE 5 MILLIGRAM(S): 2.5 TABLET ORAL at 21:24

## 2024-03-21 RX ADMIN — Medication 2: at 06:13

## 2024-03-21 RX ADMIN — MIDODRINE HYDROCHLORIDE 5 MILLIGRAM(S): 2.5 TABLET ORAL at 12:43

## 2024-03-21 RX ADMIN — MIDODRINE HYDROCHLORIDE 5 MILLIGRAM(S): 2.5 TABLET ORAL at 05:04

## 2024-03-21 RX ADMIN — SODIUM CHLORIDE 75 MILLILITER(S): 9 INJECTION INTRAMUSCULAR; INTRAVENOUS; SUBCUTANEOUS at 07:48

## 2024-03-21 RX ADMIN — Medication 4: at 16:00

## 2024-03-21 RX ADMIN — Medication 2: at 11:16

## 2024-03-21 RX ADMIN — Medication 2: at 23:16

## 2024-03-21 RX ADMIN — CHLORHEXIDINE GLUCONATE 1 APPLICATION(S): 213 SOLUTION TOPICAL at 05:06

## 2024-03-21 RX ADMIN — ENOXAPARIN SODIUM 40 MILLIGRAM(S): 100 INJECTION SUBCUTANEOUS at 05:04

## 2024-03-21 RX ADMIN — Medication 81 MILLIGRAM(S): at 11:12

## 2024-03-21 RX ADMIN — ERTAPENEM SODIUM 120 MILLIGRAM(S): 1 INJECTION, POWDER, LYOPHILIZED, FOR SOLUTION INTRAMUSCULAR; INTRAVENOUS at 05:05

## 2024-03-21 RX ADMIN — POTASSIUM PHOSPHATE, MONOBASIC POTASSIUM PHOSPHATE, DIBASIC 62.5 MILLIMOLE(S): 236; 224 INJECTION, SOLUTION INTRAVENOUS at 10:19

## 2024-03-21 RX ADMIN — TAMSULOSIN HYDROCHLORIDE 0.4 MILLIGRAM(S): 0.4 CAPSULE ORAL at 21:23

## 2024-03-21 NOTE — CONSULT NOTE ADULT - SUBJECTIVE AND OBJECTIVE BOX
[facility]  INFECTIOUS DISEASES at [place] / [place] / [place]  =======================================================                               Ronen Thomas MD#   Popeye Boyce MD*                             Cassy Madera MD*   Inez Caceres MD*                              Professor Emeritus:  Dr Rohit Elmore MD^            Diplomates American Board of Internal Medicine & Infectious Diseases                # [place] Office - Appt - Tel  [phone] Fax [phone]                * [place] Office - Appt - Tel [phone] Fax [phone]               ^ [place] Office - Appt - Tel  [phone] Fax [phone]                                  Hospital Consult line:  [phone]  =======================================================      [id number]  PRATIMA ALI    CC: Patient is a 73y old  Male who presents with a chief complaint of Severe sepsis (20 Mar 2024 16:24)      73y  Male with h/o HTN, HLD, CAD (s/p PCI), DM type 2, 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 Patient presented for severe abdominal pain.  Initial CT abdomen showed acute cholecystitis with contained perforation.  In the ED, found to be in severe septic shock with leukocytosis (WBC 14), transaminitis, & lactic acidosis (8.1), fluid resuscitated and started on pressors (Levophed) and Zosyn.  Admitted to MICU for acute cholecystitis.  Surgery consulted but recommended against surgical intervention.  Blood culture grew ESBL E. coli, started on ertapenem.  Now off pressors and transferred out of MICU 3/19. Continued on Ertapenem. ID input requested 3/21/24.       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  No earache, sore throat or runny nose.  CARDIOVASCULAR:  No chest pain  RESPIRATORY:  No cough, shortness of breath  GASTROINTESTINAL:  No nausea, vomiting or diarrhea.  GENITOURINARY:  No dysuria, frequency or urgency. No Blood in urine  MUSCULOSKELETAL:  no joint aches, no muscle pain  SKIN:  No change in skin, hair or nails.  NEUROLOGIC:  No Headaches      Physical Exam:  GEN: NAD  HEENT: normocephalic and atraumatic. EOMI. PERRL.    NECK: Supple.   LUNGS: CTA B/L.  HEART: RRR  ABDOMEN: Soft, NT, ND.  +BS.    : No CVA tenderness  EXTREMITIES: Without  edema.  MSK: No joint swelling  NEUROLOGIC: No Focal Deficits   SKIN: No rash      Vitals:  T(F): 98.1 (21 Mar 2024 04:00), Max: 99 (21 Mar 2024 00:00)  HR: 87 (21 Mar 2024 04:00)  BP: 115/70 (21 Mar 2024 04:00)  RR: 16 (21 Mar 2024 04:00)  SpO2: 98% (21 Mar 2024 04:00) (97% - 100%)  temp max in last 48H T(F): , Max: 99 (03-21-24 @ 00:00)      Current Antibiotics:  ertapenem  IVPB 1000 milliGRAM(s) IV Intermittent every 24 hours    Other medications:  aspirin  chewable 81 milliGRAM(s) Oral daily  chlorhexidine 2% Cloths 1 Application(s) Topical <User Schedule>  dextrose 5%. 1000 milliLiter(s) IV Continuous <Continuous>  dextrose 5%. 1000 milliLiter(s) IV Continuous <Continuous>  dextrose 50% Injectable 25 Gram(s) IV Push once  dextrose 50% Injectable 12.5 Gram(s) IV Push once  dextrose 50% Injectable 25 Gram(s) IV Push once  enoxaparin Injectable 40 milliGRAM(s) SubCutaneous every 24 hours  glucagon  Injectable 1 milliGRAM(s) IntraMuscular once  insulin lispro (ADMELOG) corrective regimen sliding scale   SubCutaneous every 6 hours  midodrine 5 milliGRAM(s) Oral every 8 hours  sodium chloride 0.9%. 1000 milliLiter(s) IV Continuous <Continuous>  tamsulosin 0.4 milliGRAM(s) Oral at bedtime                            9.7    5.11  )-----------( 186      ( 21 Mar 2024 05:30 )             29.7     03-21    132<L>  |  96  |  5.1<L>  ----------------------------<  177<H>  3.3<L>   |  29.0  |  0.26<L>    Ca    8.3<L>      21 Mar 2024 05:30  Phos  2.0     03-21  Mg     1.7     03-21    TPro  6.0<L>  /  Alb  2.8<L>  /  TBili  0.7  /  DBili  x   /  AST  19  /  ALT  79<H>  /  AlkPhos  371<H>  03-21    RECENT CULTURES:  03-17 @ 18:22 Bile Bile Fluid Escherichia coli    Rare Escherichia coli  No polymorphonuclear cells seen  No organisms seen  by cytocentrifuge    03-17 @ 13:13 Clean Catch Clean Catch (Midstream)     No growth    03-17 @ 08:30 .Blood Blood-Peripheral     Growth in aerobic and anaerobic bottles: Escherichia coli  See previous culture [id number]  Growth in aerobic and anaerobic bottles: Gram Negative Rods    03-17 @ 08:22    [facility]    03-17 @ 08:15 .Blood Blood-Peripheral Blood Culture PCR  Escherichia coli    Growth in aerobic and anaerobic bottles: Escherichia coli  Direct identification is available within approximately 3-5  hours either by Blood Panel Multiplexed PCR or Direct  MALDI-TOF. Details: [email or web address]  Growth in aerobic bottle: Gram Negative Rods  Growth in anaerobic bottle: Gram Negative Rods      WBC Count: 5.11 K/uL (03-21-24 @ 05:30)  WBC Count: 9.68 K/uL (03-20-24 @ 02:45)  WBC Count: 13.23 K/uL (03-19-24 @ 02:45)  WBC Count: 17.93 K/uL (03-18-24 @ 05:00)  WBC Count: 14.67 K/uL (03-17-24 @ 03:50)    Creatinine: 0.26 mg/dL (03-21-24 @ 05:30)  Creatinine: 0.33 mg/dL (03-20-24 @ 02:45)  Creatinine: 0.27 mg/dL (03-19-24 @ 02:45)  Creatinine: 0.33 mg/dL (03-18-24 @ 11:41)  Creatinine: 0.28 mg/dL (03-18-24 @ 05:00)  Creatinine: 0.41 mg/dL (03-17-24 @ 21:30)  Creatinine: 0.51 mg/dL (03-17-24 @ 15:56)  Creatinine: 0.44 mg/dL (03-17-24 @ 03:50)    Procalcitonin, Serum: 2.03 ng/mL (03-19-24 @ 02:45)  Procalcitonin, Serum: 3.55 ng/mL (03-18-24 @ 05:00)     SARS-CoV-2: NotDetec (03-17-24 @ 08:22)    Urinalysis (03.17.24 @ 13:13)    Glucose Qualitative, Urine: 500 mg/dL   Blood, Urine: Negative   pH Urine: 8.0   Color: Dark Yellow   Urine Appearance: Clear   Bilirubin: Moderate   Ketone - Urine: 15 mg/dL   Specific Gravity: >1.030   Protein, Urine: 30 mg/dL   Urobilinogen: 1.0 mg/dL   Nitrite: Negative   Leukocyte Esterase Concentration: Negative  Urine Microscopic-Add On (NC) (03.17.24 @ 13:13)    Red Blood Cell - Urine: 4 /HPF   White Blood Cell - Urine: 1 /HPF   Bacteria: Negative /HPF   Epithelial Cells: 1 /HPF

## 2024-03-21 NOTE — CONSULT NOTE ADULT - ASSESSMENT
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 Patient presented for severe abdominal pain.  Initial CT abdomen showed acute cholecystitis with contained perforation.  In the ED, found to be in severe septic shock with leukocytosis (WBC 14), transaminitis, & lactic acidosis (8.1), fluid resuscitated and started on pressors (Levophed) and Zosyn.  Admitted to MICU for acute cholecystitis.  Surgery consulted but recommended against surgical intervention.  Blood culture grew ESBL E. coli, started on ertapenem.  Now off pressors and transferred out of MICU 3/19. Continued on Ertapenem.      ESBL Escherichia coli bacteremia   acute cholecystitis with contained perforation  s/p Septic shock   Transaminitis       - Blood cultures  3/17 reporting Escherichia coli  - Repeat blood cultures 3/21 ordered   - Bile fluid culture 3/17 reporting Escherichia coli  - RVP/COVID 19 PCR 3/17 negative   - Urine Cx no growth   - CT A/P reporting Acute cholecystitis with contained perforation  - US ABD reporting Acute cholecystitis with walled off perforation.  - S/p IR percutaneous cholecystostomy (3/17)  - UA 3/17 negative for UTI   - Procalcitonin level ordered   - Continue Ertapenem 1gm IV q 24hours   - Hold off on PICC/Midline for now unless needed for reasons other than infectious diseases  - Follow up cultures  - Trend Fever  - Trend WBC      Thank you for allowing me to participate in the care of your patient.   Will Follow    Discussed treatment plan with: Dr Campbell    Scc Well Differentiated Histology Text: There were aggregates of well-differentiated squamous cells.

## 2024-03-21 NOTE — PHYSICAL THERAPY INITIAL EVALUATION ADULT - PERTINENT HX OF CURRENT PROBLEM, REHAB EVAL
Dx: cholecystitis, severe sepsis, s/p percutaneous cholescystostomy    PMH: HTN, HLD< CAD s/p stents, T2DM, squamous cell carcinoma of face s/p resection with free flap construction and chemo/radiation and PEG.

## 2024-03-21 NOTE — PHYSICAL THERAPY INITIAL EVALUATION ADULT - IMPAIRED TRANSFERS: SIT/STAND, REHAB EVAL
[FreeTextEntry1] : Here in followup s/p right heel reconstruction after excision of melanoma w Viv Dec 2017\par \par graft healed--small area epidermal cracking approx 3mm\par getting diabetic shoes w Mura later today\par \par suggest medihoney to small opening rigth heel\par no acute plastic surgery issues\par f/u in 6 months\par \par as above\par right heel fine, well healed STSG over Interga 2 yrs postop\par has firmness and tenderness over distal lateral rigth calf\par no popliteal or inguinal LAD\par \par Antwan ordered CT scan yesterday\par recently saw Viv\par \par pt is to f/u w Antwan re CT results\par \par \par 6/10/2021\par as above\par pt s/p excision of right heel melanoma 2.5mm thick in Dec 2017 by Viv w recon w Integra and skin graft by me\par \par Pt has a pigmented lesion in center of skin graft that is raised, approx 3mm in sieze, and has two areas og pigmentation suspicious for recurrent melanoma\par no popliteal LAD appreciated\par \par D/W Dr Lucero while pt is in my office--Jazmine plannign on punch biopsy of lesion later today--I agree wi this plan as I am highly suspicious for recurrence.\par \par Explained to pt and his daughter via --all ?s asnwered\par \par Due to COVID 19, pre-visit patient instructions were explained to the patient and their symptoms were checked upon arrival.  \par Masks were used by the health care providers and staff and the examination room was cleaned after the patient visit was completed.\par \par Photos taken with patient permission.\par  impaired balance/decreased strength

## 2024-03-21 NOTE — PHYSICAL THERAPY INITIAL EVALUATION ADULT - ADDITIONAL COMMENTS
Pt reports he lives with his wife in a private home with 4 JONY with 0 HR, and a first floor set up inside. Pt reports that prior to this hospitalization he required assistance for bathing and dressing ADLs and that he was private hiring someone to assist him with this. Pt also reports that he was ambulating independently without use of assistive devices.  DME: pt owns a RW

## 2024-03-21 NOTE — PHYSICAL THERAPY INITIAL EVALUATION ADULT - AMBULATION SKILLS, REHAB EVAL
Discharge Instructions:  Robotic Assisted Recurrent Ventral Hernia Repair with mesh    Dr. Giuliana Lim    Call for appointment for follow up in 3 weeks [phone]    Activity:    Walk regularly. No lifting more than 5 to 10 pounds for 6 weeks. Light aerobic activity is okay when you feel up to it. You may resume driving in five days unless still requiring narcotics for pain. Work:    You may return to work in 2 or 3 weeks to light activity. No lifting more than 5 pounds for four weeks and no more than 10 pounds for an additional 2 weeks. Diet:    You may resume normal diet after 24 hours. Anesthesia and narcotics may cause nausea and vomiting. If persistent please call the office. Wound Care: You have a special dressing called Dermabond. It is okay to shower and let the water run over the incisions but do not scrub the area or soak in a tub. If you have a small amount of drainage you may place a dry bandage over the wound and change it daily. If you experience a lot of drainage, develop redness around the wound, or a fever over 101 F occurs please call the office. May use ice over incision for comfort. Wear abdominal binder at all times for 8 weeks (except when showering). Medications:    Resume home medications as indicated on the Medical Reconciliation form. Aspirin and Coumadin can be restarted immediately if you were taking them preoperatively. If taking Plavix do not restart it until post operative day 2. Pain medications:  Non steroidal antiinflammatories seem to work best for post surgical pain. Try these first as prescribed. A narcotic prescription will also be given for breakthrough pain. Over the counter stool softeners and laxatives may be used if needed. Do not hesitate to call with questions or concerns.
independent

## 2024-03-21 NOTE — PHYSICAL THERAPY INITIAL EVALUATION ADULT - DID THE PATIENT HAVE SURGERY?
Conjunctivae and eyelids appear normal,  Sclerae : White without injection 
percutaneous cholecystostomy/yes

## 2024-03-21 NOTE — PHYSICAL THERAPY INITIAL EVALUATION ADULT - CRITERIA FOR SKILLED THERAPEUTIC INTERVENTIONS
Home with Home PT, RW and supervision from family for all OOB mobility. If pts family is unable to provide this level of assistance, pt must DC to [facility]./impairments found/functional limitations in following categories/anticipated discharge recommendation

## 2024-03-21 NOTE — PHYSICAL THERAPY INITIAL EVALUATION ADULT - IMPAIRMENTS FOUND, PT EVAL
cognitive impairment/gait, locomotion, and balance/gross motor/muscle strength/poor safety awareness

## 2024-03-21 NOTE — PROGRESS NOTE ADULT - ASSESSMENT
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  Initial CT abdomen showed acute cholecystitis with contained perforation.  In the ED, found to be in severe septic shock with leukocytosis (WBC 14), transaminitis, & lactic acidosis (8.1), fluid resuscitated and started on pressors (Levophed) and Zosyn.  Admitted to MICU for acute cholecystitis.  Surgery consulted but recommended against surgical intervention.  BCx grew ESBL E. coli, started on ertapenem.  Now off pressors, stable for downgrade to medicine 3/19    ESBL E.Coli Bacteremia due to Acute Cholecystitis with contained perforation s/p choly tube  -septic shock resolved; BP stable off pressors  -CT a/p: Acute cholecystitis with contained perforation  -US ABD: Acute cholecystitis with walled off perforation.  -S/p IR percutaneous cholecystostomy (3/17)- Initially draining purulent fluid, now draining bilious fluid  -BP stable; wean midodrine as tolerated  -Surgery notes appreciated, not deemed surgical candidate. Reassess for tube removal as outpatient in 8 weeks.  -Blood cultures - ESBL E. coli   -repeat blood cultures x 2  -continue Invanz  -analgesia as needed  -surgery recs on admission reviewed  -ID consult appreciated    Hypokalemia and Hypophosphatemia   -supplemented   -monitor labs closely    Elevated liver enzymes - improving  -Likely secondary to shock liver v/s cholestasis  -Hold statins, may restart once LFT has normalized  -trend lFTS    T2DM  -A1c 7.8  -Hold home Farxiga & Janumet  -ISS  -ADA diet    CAD  -denies anginal symptoms  -continue aspirin 81 mg QD  -Hold Lipitor in the setting of elevated liver enzymes    BPH  -continue Flomax     DVT ppx: Lovenox SC    Dispo- Remains acute; repeat blood cultures x 2. ID evaluation. PT evaluation.    Plan discussed with patient, wife at bedside, RN, CCM, Dr. Boyce

## 2024-03-21 NOTE — PHYSICAL THERAPY INITIAL EVALUATION ADULT - GENERAL OBSERVATIONS, REHAB EVAL
Pt received reclining in bed, bedrails x 4, CBWR, bed alarm on, PEG, , tele, and cholecystostomy collection bag intact. Pt agreeable to PT evaluation.

## 2024-03-21 NOTE — PROGRESS NOTE ADULT - SUBJECTIVE AND OBJECTIVE BOX
CHIEF COMPLAINT/INTERVAL HISTORY:    Patient is a 73y old  Male who presents with a chief complaint of Severe sepsis (21 Mar 2024 11:15)      HPI:  74 y/o M with PMHx of HTN, HLD, CAD s/p stents, DM type 2, SCC of face s/p resection with creation of free flap and chemo/rad, and PEG who presented to the ER complaining of abdominal pain since 13:00 yesterday. Also endorsed nausea, one episode of emesis, and weakness. Labs significant for leukocytosis (WBC count 14k), transaminitis (AST//2730, and lactic acidosis (lactate 8.1). Since presentation, pt has received zosyn, 3 L IVF, ofirmev, tordal, pepcid, and morphine x 3. CT abd/pelvis revealed evidence of acute cholecystitis with contained perforation. Evaluated by general surgery who stated not acute surgical intervention will be offered right now, and recommended obtaining RUQ ultrasound. ICU consulted for severe sepsis. (17 Mar 2024 17:53)      SUBJECTIVE & OBJECTIVE: Pt seen and examined at bedside.     ICU Vital Signs Last 24 Hrs  T(C): 37.1 (21 Mar 2024 17:00), Max: 37.2 (21 Mar 2024 00:00)  T(F): 98.7 (21 Mar 2024 17:00), Max: 99 (21 Mar 2024 00:00)  HR: 84 (21 Mar 2024 17:00) (73 - 89)  BP: 112/78 (21 Mar 2024 17:00) (94/52 - 140/77)  BP(mean): 96 (20 Mar 2024 17:34) (96 - 96)  ABP: --  ABP(mean): --  RR: 17 (21 Mar 2024 17:00) (12 - 17)  SpO2: 98% (21 Mar 2024 17:00) (97% - 99%)    O2 Parameters below as of 21 Mar 2024 13:00  Patient On (Oxygen Delivery Method): room air              MEDICATIONS  (STANDING):  aspirin  chewable 81 milliGRAM(s) Oral daily  chlorhexidine 2% Cloths 1 Application(s) Topical <User Schedule>  dextrose 5%. 1000 milliLiter(s) (50 mL/Hr) IV Continuous <Continuous>  dextrose 5%. 1000 milliLiter(s) (100 mL/Hr) IV Continuous <Continuous>  dextrose 50% Injectable 25 Gram(s) IV Push once  dextrose 50% Injectable 12.5 Gram(s) IV Push once  dextrose 50% Injectable 25 Gram(s) IV Push once  enoxaparin Injectable 40 milliGRAM(s) SubCutaneous every 24 hours  ertapenem  IVPB 1000 milliGRAM(s) IV Intermittent every 24 hours  glucagon  Injectable 1 milliGRAM(s) IntraMuscular once  insulin lispro (ADMELOG) corrective regimen sliding scale   SubCutaneous every 6 hours  midodrine 5 milliGRAM(s) Oral every 8 hours  sodium chloride 0.9%. 1000 milliLiter(s) (75 mL/Hr) IV Continuous <Continuous>  tamsulosin 0.4 milliGRAM(s) Oral at bedtime    MEDICATIONS  (PRN):  dextrose Oral Gel 15 Gram(s) Oral once PRN Blood Glucose LESS THAN 70 milliGRAM(s)/deciliter  traMADol 25 milliGRAM(s) Oral every 6 hours PRN Moderate Pain (4 - 6)  traMADol 50 milliGRAM(s) Oral every 6 hours PRN Severe Pain (7 - 10)      LABS:                        9.7    5.11  )-----------( 186      ( 21 Mar 2024 05:30 )             29.7     03-21    132<L>  |  96  |  5.1<L>  ----------------------------<  177<H>  3.3<L>   |  29.0  |  0.26<L>    Ca    8.3<L>      21 Mar 2024 05:30  Phos  2.0     03-21  Mg     1.7     03-21    TPro  6.0<L>  /  Alb  2.8<L>  /  TBili  0.7  /  DBili  x   /  AST  19  /  ALT  79<H>  /  AlkPhos  371<H>  03-21      Urinalysis Basic - ( 21 Mar 2024 05:30 )    Color: x / Appearance: x / SG: x / pH: x  Gluc: 177 mg/dL / Ketone: x  / Bili: x / Urobili: x   Blood: x / Protein: x / Nitrite: x   Leuk Esterase: x / RBC: x / WBC x   Sq Epi: x / Non Sq Epi: x / Bacteria: x        CAPILLARY BLOOD GLUCOSE      POCT Blood Glucose.: 217 mg/dL (21 Mar 2024 15:53)  POCT Blood Glucose.: 195 mg/dL (21 Mar 2024 11:10)  POCT Blood Glucose.: 182 mg/dL (21 Mar 2024 06:10)  POCT Blood Glucose.: 156 mg/dL (20 Mar 2024 23:09)      PHYSICAL EXAM:    GENERAL: elderly male, sitting in chair, NAD  HEAD:  Atraumatic, Normocephalic, right facial graft  EYES: conjunctiva and sclera clear  ENMT: Moist mucous membranes  NECK: mandibular reconstruction  NERVOUS SYSTEM:  Alert & Oriented X3, facial asymmetry due to surgery  CHEST/LUNG: Clear to auscultation bilaterally    HEART: Regular rate and rhythm; + S1/S2  ABDOMEN: Soft, Nontender, + PEG, + choley tube  EXTREMITIES:  no pedal edema

## 2024-03-21 NOTE — PHYSICAL THERAPY INITIAL EVALUATION ADULT - DIAGNOSIS, PT EVAL
Pt demonstrates functional limitations in transfers and ambulation due to decreased balance and strength.

## 2024-03-22 LAB
ALBUMIN SERPL ELPH-MCNC: 2.8 G/DL — LOW (ref 3.3–5.2)
ALP SERPL-CCNC: 973 U/L — HIGH (ref 40–120)
ALT FLD-CCNC: 92 U/L — HIGH
ANION GAP SERPL CALC-SCNC: 11 MMOL/L — SIGNIFICANT CHANGE UP (ref 5–17)
ANISOCYTOSIS BLD QL: SLIGHT — SIGNIFICANT CHANGE UP
AST SERPL-CCNC: 91 U/L — HIGH
BASOPHILS # BLD AUTO: 0.13 K/UL — SIGNIFICANT CHANGE UP (ref 0–0.2)
BASOPHILS NFR BLD AUTO: 2.6 % — HIGH (ref 0–2)
BILIRUB SERPL-MCNC: 0.9 MG/DL — SIGNIFICANT CHANGE UP (ref 0.4–2)
BUN SERPL-MCNC: 11 MG/DL — SIGNIFICANT CHANGE UP (ref 8–20)
CALCIUM SERPL-MCNC: 9.1 MG/DL — SIGNIFICANT CHANGE UP (ref 8.4–10.5)
CHLORIDE SERPL-SCNC: 97 MMOL/L — SIGNIFICANT CHANGE UP (ref 96–108)
CO2 SERPL-SCNC: 31 MMOL/L — HIGH (ref 22–29)
CREAT SERPL-MCNC: 0.36 MG/DL — LOW (ref 0.5–1.3)
CULTURE RESULTS: ABNORMAL
EGFR: 120 ML/MIN/1.73M2 — SIGNIFICANT CHANGE UP
EOSINOPHIL # BLD AUTO: 0.09 K/UL — SIGNIFICANT CHANGE UP (ref 0–0.5)
EOSINOPHIL NFR BLD AUTO: 1.8 % — SIGNIFICANT CHANGE UP (ref 0–6)
FERRITIN SERPL-MCNC: 92 NG/ML — SIGNIFICANT CHANGE UP (ref 30–400)
GIANT PLATELETS BLD QL SMEAR: PRESENT — SIGNIFICANT CHANGE UP
GLUCOSE BLDC GLUCOMTR-MCNC: 124 MG/DL — HIGH (ref 70–99)
GLUCOSE BLDC GLUCOMTR-MCNC: 178 MG/DL — HIGH (ref 70–99)
GLUCOSE BLDC GLUCOMTR-MCNC: 178 MG/DL — HIGH (ref 70–99)
GLUCOSE BLDC GLUCOMTR-MCNC: 207 MG/DL — HIGH (ref 70–99)
GLUCOSE SERPL-MCNC: 176 MG/DL — HIGH (ref 70–99)
HCT VFR BLD CALC: 31.9 % — LOW (ref 39–50)
HGB BLD-MCNC: 10.1 G/DL — LOW (ref 13–17)
IRON SATN MFR SERPL: 10 % — LOW (ref 16–55)
IRON SATN MFR SERPL: 26 UG/DL — LOW (ref 59–158)
LYMPHOCYTES # BLD AUTO: 0.17 K/UL — LOW (ref 1–3.3)
LYMPHOCYTES # BLD AUTO: 3.5 % — LOW (ref 13–44)
MAGNESIUM SERPL-MCNC: 1.8 MG/DL — SIGNIFICANT CHANGE UP (ref 1.6–2.6)
MANUAL SMEAR VERIFICATION: SIGNIFICANT CHANGE UP
MCHC RBC-ENTMCNC: 24.6 PG — LOW (ref 27–34)
MCHC RBC-ENTMCNC: 31.7 GM/DL — LOW (ref 32–36)
MCV RBC AUTO: 77.6 FL — LOW (ref 80–100)
MICROCYTES BLD QL: SLIGHT — SIGNIFICANT CHANGE UP
MONOCYTES # BLD AUTO: 0.35 K/UL — SIGNIFICANT CHANGE UP (ref 0–0.9)
MONOCYTES NFR BLD AUTO: 7 % — SIGNIFICANT CHANGE UP (ref 2–14)
NEUTROPHILS # BLD AUTO: 4.12 K/UL — SIGNIFICANT CHANGE UP (ref 1.8–7.4)
NEUTROPHILS NFR BLD AUTO: 83.3 % — HIGH (ref 43–77)
ORGANISM # SPEC MICROSCOPIC CNT: ABNORMAL
ORGANISM # SPEC MICROSCOPIC CNT: SIGNIFICANT CHANGE UP
PHOSPHATE SERPL-MCNC: 3.2 MG/DL — SIGNIFICANT CHANGE UP (ref 2.4–4.7)
PLAT MORPH BLD: NORMAL — SIGNIFICANT CHANGE UP
PLATELET # BLD AUTO: 183 K/UL — SIGNIFICANT CHANGE UP (ref 150–400)
POIKILOCYTOSIS BLD QL AUTO: SLIGHT — SIGNIFICANT CHANGE UP
POLYCHROMASIA BLD QL SMEAR: SLIGHT — SIGNIFICANT CHANGE UP
POTASSIUM SERPL-MCNC: 3.6 MMOL/L — SIGNIFICANT CHANGE UP (ref 3.5–5.3)
POTASSIUM SERPL-SCNC: 3.6 MMOL/L — SIGNIFICANT CHANGE UP (ref 3.5–5.3)
PROCALCITONIN SERPL-MCNC: 0.28 NG/ML — HIGH (ref 0.02–0.1)
PROT SERPL-MCNC: 6.3 G/DL — LOW (ref 6.6–8.7)
RBC # BLD: 4.11 M/UL — LOW (ref 4.2–5.8)
RBC # FLD: 22.6 % — HIGH (ref 10.3–14.5)
RBC BLD AUTO: ABNORMAL
SODIUM SERPL-SCNC: 139 MMOL/L — SIGNIFICANT CHANGE UP (ref 135–145)
SPECIMEN SOURCE: SIGNIFICANT CHANGE UP
TIBC SERPL-MCNC: 265 UG/DL — SIGNIFICANT CHANGE UP (ref 220–430)
TRANSFERRIN SERPL-MCNC: 185 MG/DL — SIGNIFICANT CHANGE UP (ref 180–329)
VARIANT LYMPHS # BLD: 1.8 % — SIGNIFICANT CHANGE UP (ref 0–6)
WBC # BLD: 4.95 K/UL — SIGNIFICANT CHANGE UP (ref 3.8–10.5)
WBC # FLD AUTO: 4.95 K/UL — SIGNIFICANT CHANGE UP (ref 3.8–10.5)

## 2024-03-22 PROCEDURE — 99232 SBSQ HOSP IP/OBS MODERATE 35: CPT

## 2024-03-22 PROCEDURE — 74183 MRI ABD W/O CNTR FLWD CNTR: CPT | Mod: 26

## 2024-03-22 PROCEDURE — 99233 SBSQ HOSP IP/OBS HIGH 50: CPT

## 2024-03-22 RX ORDER — SODIUM CHLORIDE 9 MG/ML
1000 INJECTION INTRAMUSCULAR; INTRAVENOUS; SUBCUTANEOUS
Refills: 0 | Status: COMPLETED | OUTPATIENT
Start: 2024-03-22 | End: 2024-03-22

## 2024-03-22 RX ORDER — FERROUS SULFATE 325(65) MG
325 TABLET ORAL DAILY
Refills: 0 | Status: DISCONTINUED | OUTPATIENT
Start: 2024-03-22 | End: 2024-03-29

## 2024-03-22 RX ADMIN — MIDODRINE HYDROCHLORIDE 5 MILLIGRAM(S): 2.5 TABLET ORAL at 14:30

## 2024-03-22 RX ADMIN — Medication 2: at 05:37

## 2024-03-22 RX ADMIN — TAMSULOSIN HYDROCHLORIDE 0.4 MILLIGRAM(S): 0.4 CAPSULE ORAL at 21:49

## 2024-03-22 RX ADMIN — MIDODRINE HYDROCHLORIDE 5 MILLIGRAM(S): 2.5 TABLET ORAL at 21:49

## 2024-03-22 RX ADMIN — Medication 81 MILLIGRAM(S): at 11:33

## 2024-03-22 RX ADMIN — SODIUM CHLORIDE 60 MILLILITER(S): 9 INJECTION INTRAMUSCULAR; INTRAVENOUS; SUBCUTANEOUS at 09:03

## 2024-03-22 RX ADMIN — ENOXAPARIN SODIUM 40 MILLIGRAM(S): 100 INJECTION SUBCUTANEOUS at 05:35

## 2024-03-22 RX ADMIN — CHLORHEXIDINE GLUCONATE 1 APPLICATION(S): 213 SOLUTION TOPICAL at 05:36

## 2024-03-22 RX ADMIN — TRAMADOL HYDROCHLORIDE 50 MILLIGRAM(S): 50 TABLET ORAL at 07:20

## 2024-03-22 RX ADMIN — ERTAPENEM SODIUM 120 MILLIGRAM(S): 1 INJECTION, POWDER, LYOPHILIZED, FOR SOLUTION INTRAMUSCULAR; INTRAVENOUS at 05:34

## 2024-03-22 RX ADMIN — Medication 325 MILLIGRAM(S): at 21:54

## 2024-03-22 RX ADMIN — TRAMADOL HYDROCHLORIDE 50 MILLIGRAM(S): 50 TABLET ORAL at 06:39

## 2024-03-22 RX ADMIN — Medication 4: at 23:40

## 2024-03-22 RX ADMIN — Medication 2: at 11:33

## 2024-03-22 NOTE — PROGRESS NOTE ADULT - SUBJECTIVE AND OBJECTIVE BOX
CHIEF COMPLAINT/INTERVAL HISTORY:    Patient is a 73y old  Male who presents with a chief complaint of Severe sepsis (21 Mar 2024 17:22)    SUBJECTIVE & OBJECTIVE: Pt seen and examined at bedside. No overnight events. No overnight events. LFTs significantly worsening today but patient denies any abdominal complaints.     ROS: No chest pain, palpitations, SOB, light headedness, dizziness, headache, nausea/vomiting, fevers/chills, abdominal pain, dysuria.    ICU Vital Signs Last 24 Hrs  T(C): 36.3 (22 Mar 2024 15:24), Max: 37.1 (21 Mar 2024 17:00)  T(F): 97.4 (22 Mar 2024 15:24), Max: 98.7 (21 Mar 2024 17:00)  HR: 72 (22 Mar 2024 15:24) (72 - 88)  BP: 117/76 (22 Mar 2024 15:24) (90/54 - 134/79)  RR: 18 (22 Mar 2024 15:24) (16 - 18)  SpO2: 98% (22 Mar 2024 15:24) (96% - 100%)    O2 Parameters below as of 22 Mar 2024 15:24  Patient On (Oxygen Delivery Method): room air    MEDICATIONS  (STANDING):  aspirin  chewable 81 milliGRAM(s) Oral daily  chlorhexidine 2% Cloths 1 Application(s) Topical <User Schedule>  dextrose 5%. 1000 milliLiter(s) (50 mL/Hr) IV Continuous <Continuous>  dextrose 5%. 1000 milliLiter(s) (100 mL/Hr) IV Continuous <Continuous>  dextrose 50% Injectable 25 Gram(s) IV Push once  dextrose 50% Injectable 12.5 Gram(s) IV Push once  dextrose 50% Injectable 25 Gram(s) IV Push once  enoxaparin Injectable 40 milliGRAM(s) SubCutaneous every 24 hours  ertapenem  IVPB 1000 milliGRAM(s) IV Intermittent every 24 hours  ferrous    sulfate 325 milliGRAM(s) Oral daily  glucagon  Injectable 1 milliGRAM(s) IntraMuscular once  insulin lispro (ADMELOG) corrective regimen sliding scale   SubCutaneous every 6 hours  midodrine 5 milliGRAM(s) Oral every 8 hours  ondansetron Injectable 4 milliGRAM(s) IV Push once  tamsulosin 0.4 milliGRAM(s) Oral at bedtime    MEDICATIONS  (PRN):  dextrose Oral Gel 15 Gram(s) Oral once PRN Blood Glucose LESS THAN 70 milliGRAM(s)/deciliter  traMADol 25 milliGRAM(s) Oral every 6 hours PRN Moderate Pain (4 - 6)  traMADol 50 milliGRAM(s) Oral every 6 hours PRN Severe Pain (7 - 10)      LABS:                        10.1   4.95  )-----------( 183      ( 22 Mar 2024 04:55 )             31.9     03-22    139  |  97  |  11.0  ----------------------------<  176<H>  3.6   |  31.0<H>  |  0.36<L>    Ca    9.1      22 Mar 2024 04:55  Phos  3.2     03-22  Mg     1.8     03-22    TPro  6.3<L>  /  Alb  2.8<L>  /  TBili  0.9  /  DBili  x   /  AST  91<H>  /  ALT  92<H>  /  AlkPhos  973<H>  03-22      Urinalysis Basic - ( 22 Mar 2024 04:55 )    Color: x / Appearance: x / SG: x / pH: x  Gluc: 176 mg/dL / Ketone: x  / Bili: x / Urobili: x   Blood: x / Protein: x / Nitrite: x   Leuk Esterase: x / RBC: x / WBC x   Sq Epi: x / Non Sq Epi: x / Bacteria: x        CAPILLARY BLOOD GLUCOSE      POCT Blood Glucose.: 178 mg/dL (22 Mar 2024 11:32)  POCT Blood Glucose.: 178 mg/dL (22 Mar 2024 05:31)  POCT Blood Glucose.: 179 mg/dL (21 Mar 2024 23:03)      RECENT CULTURES:      RADIOLOGY & ADDITIONAL TESTS:      PHYSICAL EXAM:    GENERAL: elderly male, sitting in chair, NAD  HEAD:  Atraumatic, Normocephalic, right facial asymmetry   EYES: conjunctiva and sclera clear  ENMT: Moist mucous membranes  NECK: mandibular reconstruction  NERVOUS SYSTEM:  Alert & Oriented X3, follows all commands  CHEST/LUNG: Clear to auscultation bilaterally    HEART: Regular rate and rhythm; + S1/S2  ABDOMEN: Soft, Nontender, + PEG, + choley tube  EXTREMITIES:  no pedal edema

## 2024-03-22 NOTE — PROGRESS NOTE ADULT - SUBJECTIVE AND OBJECTIVE BOX
[facility]  INFECTIOUS DISEASES at [place] / [place] / [place]  =======================================================                               Ronen Thomas MD#   Popeye Boyce MD*                             Cassy Madera MD*   Inez Caceres MD*                              Professor Emeritus:  Dr Rohit Elmore MD^            Diplomates American Board of Internal Medicine & Infectious Diseases                # [place] Office - Appt - Tel  [phone] Fax [phone]                * [place] Office - Appt - Tel [phone] Fax [phone]                      ^[place] Office - Tel  [phone] Fax [phone]                                  Hospital Consult line:  [phone]  =======================================================    GEORGE PRATIMA [id number]    Follow up:      Allergies:  No Known Allergies       REVIEW OF SYSTEMS:  CONSTITUTIONAL:  No Fever or chills  HEENT:  No diplopia or blurred vision.  No earache, sore throat or runny nose.  CARDIOVASCULAR:  No chest pain  RESPIRATORY:  No cough, shortness of breath  GASTROINTESTINAL:  No nausea, vomiting or diarrhea.  GENITOURINARY:  No dysuria, frequency or urgency. No Blood in urine  MUSCULOSKELETAL:  no joint aches, no muscle pain  SKIN:  No change in skin, hair or nails.  NEUROLOGIC:  No Headaches      Physical Exam:  GEN: NAD  HEENT: normocephalic and atraumatic. EOMI. PERRL.    NECK: Supple.   LUNGS: CTA B/L.  HEART: RRR  ABDOMEN: Soft, NT, ND.  +BS.    : No CVA tenderness  EXTREMITIES: Without  edema.  MSK: No joint swelling  NEUROLOGIC: No Focal Deficits   SKIN: No rash      Vitals:  T(F): 97.8 (22 Mar 2024 10:53), Max: 98.7 (21 Mar 2024 17:00)  HR: 81 (22 Mar 2024 10:53)  BP: 134/79 (22 Mar 2024 04:00)  RR: 18 (22 Mar 2024 10:53)  SpO2: 98% (22 Mar 2024 10:53) (98% - 100%)  temp max in last 48H T(F): , Max: 99 (03-21-24 @ 00:00)      Current Antibiotics:  ertapenem  IVPB 1000 milliGRAM(s) IV Intermittent every 24 hours    Other medications:  aspirin  chewable 81 milliGRAM(s) Oral daily  chlorhexidine 2% Cloths 1 Application(s) Topical <User Schedule>  dextrose 5%. 1000 milliLiter(s) IV Continuous <Continuous>  dextrose 5%. 1000 milliLiter(s) IV Continuous <Continuous>  dextrose 50% Injectable 25 Gram(s) IV Push once  dextrose 50% Injectable 12.5 Gram(s) IV Push once  dextrose 50% Injectable 25 Gram(s) IV Push once  enoxaparin Injectable 40 milliGRAM(s) SubCutaneous every 24 hours  glucagon  Injectable 1 milliGRAM(s) IntraMuscular once  insulin lispro (ADMELOG) corrective regimen sliding scale   SubCutaneous every 6 hours  midodrine 5 milliGRAM(s) Oral every 8 hours  ondansetron Injectable 4 milliGRAM(s) IV Push once  tamsulosin 0.4 milliGRAM(s) Oral at bedtime                            10.1   4.95  )-----------( 183      ( 22 Mar 2024 04:55 )             31.9     03-22    139  |  97  |  11.0  ----------------------------<  176<H>  3.6   |  31.0<H>  |  0.36<L>    Ca    9.1      22 Mar 2024 04:55  Phos  3.2     03-22  Mg     1.8     03-22    TPro  6.3<L>  /  Alb  2.8<L>  /  TBili  0.9  /  DBili  x   /  AST  91<H>  /  ALT  92<H>  /  AlkPhos  973<H>  03-22    RECENT CULTURES:  03-21 @ 08:26 .Blood Blood-Peripheral     No growth at 24 hours    03-21 @ 08:19 .Blood Blood-Peripheral     No growth at 24 hours    03-17 @ 18:22 Bile Bile Fluid Escherichia coli    Rare Escherichia coli  No polymorphonuclear cells seen  No organisms seen  by cytocentrifuge    03-17 @ 13:13 Clean Catch Clean Catch (Midstream)     No growth    03-17 @ 08:30 .Blood Blood-Peripheral     Growth in aerobic and anaerobic bottles: Escherichia coli  See previous culture [id number]  Growth in aerobic and anaerobic bottles: Gram Negative Rods    03-17 @ 08:22    RVP  NotDetec    03-17 @ 08:15 .Blood Blood-Peripheral Blood Culture PCR  Escherichia coli  Escherichia coli ESBL    Growth in aerobic and anaerobic bottles: Escherichia coli  Growth in aerobic bottle: Escherichia coli ESBL  Direct identification is available within approximately 3-5  hours either by Blood Panel Multiplexed PCR or Direct  MALDI-TOF. Details: [email or web address]  Growth in aerobic bottle: Gram Negative Rods  Growth in anaerobic bottle: Gram Negative Rods      WBC Count: 4.95 K/uL (03-22-24 @ 04:55)  WBC Count: 5.11 K/uL (03-21-24 @ 05:30)  WBC Count: 9.68 K/uL (03-20-24 @ 02:45)  WBC Count: 13.23 K/uL (03-19-24 @ 02:45)  WBC Count: 17.93 K/uL (03-18-24 @ 05:00)    Creatinine: 0.36 mg/dL (03-22-24 @ 04:55)  Creatinine: 0.26 mg/dL (03-21-24 @ 05:30)  Creatinine: 0.33 mg/dL (03-20-24 @ 02:45)  Creatinine: 0.27 mg/dL (03-19-24 @ 02:45)  Creatinine: 0.33 mg/dL (03-18-24 @ 11:41)  Creatinine: 0.28 mg/dL (03-18-24 @ 05:00)  Creatinine: 0.41 mg/dL (03-17-24 @ 21:30)  Creatinine: 0.51 mg/dL (03-17-24 @ 15:56)    Procalcitonin, Serum: 0.28 ng/mL (03-22-24 @ 04:55)  Procalcitonin, Serum: 2.03 ng/mL (03-19-24 @ 02:45)  Procalcitonin, Serum: 3.55 ng/mL (03-18-24 @ 05:00)     SARS-CoV-2: NotDetec (03-17-24 @ 08:22)

## 2024-03-22 NOTE — CHART NOTE - NSCHARTNOTEFT_GEN_A_CORE
Source: Patient [x ]  Family [ ]   other [ x] EMR, staff and ID rounds      Current Diet:   Diet, DASH/TLC:   Sodium & Cholesterol Restricted  Pureed (PUREED)  Mildly Thick Liquids (MILDTHICKLIQS)  Halal  Tube Feeding Modality: Gastrostomy  Glucerna 1.5 Nando (GLUCERNA1.5)  Total Volume for 24 Hours (mL): 1080  Continuous  Starting Tube Feed Rate {mL per Hour}: 30  Increase Tube Feed Rate by (mL): 10     Every 6 hours  Until Goal Tube Feed Rate (mL per Hour): 60  Tube Feed Duration (in Hours): 18  Tube Feed Start Time: 16:00 (03-19-24 @ 18:00)    Patient reports [ ] nausea  [ ] vomiting [ ] diarrhea [ ] constipation  [x]chewing problems [ x] swallowing issues  [ ] other:     PO intake:  < 50% [x ]   50-75%  [ ]   %  [ ]  other :    Source for PO intake [ ] Patient [ ] family [ x] chart [x ] staff [ ] other    Enteral /Parenteral Nutrition: Current diet order reads for Glucerna 1.5 running @ goal rate of 60 ml/hr (x18 hrs) to provide 1080 ml, 1620 kcal, 89g protein, 820 ml free water, and >100% of RDIs for vitamins/minerals.     Current Weight:   (3/20) 142.1 lbs  (3/19) 141.7 lbs  (3/17) 128 lbs    % Weight Change: Unclear accuracy of weight 2/2 inconsistency, will continue to monitor     Pertinent Medications: MEDICATIONS  (STANDING):  aspirin  chewable 81 milliGRAM(s) Oral daily  chlorhexidine 2% Cloths 1 Application(s) Topical <User Schedule>  dextrose 5%. 1000 milliLiter(s) (50 mL/Hr) IV Continuous <Continuous>  dextrose 5%. 1000 milliLiter(s) (100 mL/Hr) IV Continuous <Continuous>  dextrose 50% Injectable 25 Gram(s) IV Push once  dextrose 50% Injectable 12.5 Gram(s) IV Push once  dextrose 50% Injectable 25 Gram(s) IV Push once  enoxaparin Injectable 40 milliGRAM(s) SubCutaneous every 24 hours  ertapenem  IVPB 1000 milliGRAM(s) IV Intermittent every 24 hours  glucagon  Injectable 1 milliGRAM(s) IntraMuscular once  insulin lispro (ADMELOG) corrective regimen sliding scale   SubCutaneous every 6 hours  midodrine 5 milliGRAM(s) Oral every 8 hours  ondansetron Injectable 4 milliGRAM(s) IV Push once  tamsulosin 0.4 milliGRAM(s) Oral at bedtime    MEDICATIONS  (PRN):  dextrose Oral Gel 15 Gram(s) Oral once PRN Blood Glucose LESS THAN 70 milliGRAM(s)/deciliter  traMADol 25 milliGRAM(s) Oral every 6 hours PRN Moderate Pain (4 - 6)  traMADol 50 milliGRAM(s) Oral every 6 hours PRN Severe Pain (7 - 10)    Pertinent Labs: CBC Full  -  ( 22 Mar 2024 04:55 )  WBC Count : 4.95 K/uL  RBC Count : 4.11 M/uL  Hemoglobin : 10.1 g/dL  Hematocrit : 31.9 %  Platelet Count - Automated : 183 K/uL  Mean Cell Volume : 77.6 fl  Mean Cell Hemoglobin : 24.6 pg  Mean Cell Hemoglobin Concentration : 31.7 gm/dL  Auto Neutrophil # : 4.12 K/uL  Auto Lymphocyte # : 0.17 K/uL  Auto Monocyte # : 0.35 K/uL  Auto Eosinophil # : 0.09 K/uL  Auto Basophil # : 0.13 K/uL  Auto Neutrophil % : 83.3 %  Auto Lymphocyte % : 3.5 %  Auto Monocyte % : 7.0 %Auto Eosinophil % : 1.8 %  Auto Basophil % : 2.6 %  03-22 Na139 mmol/L Glu 176 mg/dL<H> K+ 3.6 mmol/L Cr  0.36 mg/dL<L> BUN 11.0 mg/dL Phos 3.2 mg/dL Alb 2.8 g/dL<L> PAB n/a       Skin: sx incision R abdomen    Nutrition focused physical exam conducted - found signs of malnutrition [ ]absent [x ]present    Subcutaneous fat loss: [ x] Orbital fat pads region, [x ]Buccal fat region, [x ]Triceps region,  [ x]Ribs region    Muscle wasting: [ x]Temples region, [x]Clavicle region, [x ]Shoulder region, [ ]Scapula region, [x ]Interosseous region,  [ ]thigh region, [ x]Calf region    Estimated Needs:   [ ] no change since previous assessment  [x] recalculated:   Based on stated weight PTA 128lbs (58.2kg)  Energy: 1746-2037kcal (30-35kcal/kg)    Current Nutrition Diagnosis: Pt remains at high nutrition risk secondary to malnutrition (severe chronic) 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 Pt reports eating small amount in house 2/2 limited menu options due to halal restriction and altered consistency. Pt appears content with limited PO intake stating that the TF provides his nutrition. Pt tolerating TF well, states that he follows a bolus regimen at home with 4-5 feeds/day, unsure volume amount per feed. Pt reports having normal BMs, most recent 3/21.     Recommendations:   1) Consider bolus regimen: Continue Glucerna 1.5cal, provide 270mL 4x/day for daily total 1080mL, 1620kcal, 87g protein, 820mL free water.   2) Upon D/c recommend 4.5cans of Glucerna daily provides the same nutrition, cans are similar for patients to administer     Monitoring and Evaluation:   [ ] PO intake [ ] Tolerance to diet prescription [X] Weights  [X] Follow up per protocol [X] Labs: Source: Patient [x ]  Family [ ]   other [ x] EMR, staff and ID rounds      Current Diet:   Diet, DASH/TLC:   Sodium & Cholesterol Restricted  Pureed (PUREED)  Mildly Thick Liquids (MILDTHICKLIQS)  Halal  Tube Feeding Modality: Gastrostomy  Glucerna 1.5 Nando (GLUCERNA1.5)  Total Volume for 24 Hours (mL): 1080  Continuous  Starting Tube Feed Rate {mL per Hour}: 30  Increase Tube Feed Rate by (mL): 10     Every 6 hours  Until Goal Tube Feed Rate (mL per Hour): 60  Tube Feed Duration (in Hours): 18  Tube Feed Start Time: 16:00 (03-19-24 @ 18:00)    Patient reports [ ] nausea  [ ] vomiting [ ] diarrhea [ ] constipation  [x]chewing problems [ x] swallowing issues  [ ] other:     PO intake:  < 50% [x ]   50-75%  [ ]   %  [ ]  other :    Source for PO intake [ ] Patient [ ] family [ x] chart [x ] staff [ ] other    Enteral /Parenteral Nutrition: Current diet order reads for Glucerna 1.5 running @ goal rate of 60 ml/hr (x18 hrs) to provide 1080 ml, 1620 kcal, 89g protein, 820 ml free water, and >100% of RDIs for vitamins/minerals.     Current Weight:   (3/20) 142.1 lbs  (3/19) 141.7 lbs  (3/17) 128 lbs    % Weight Change: Unclear accuracy of weight 2/2 inconsistency, will continue to monitor     Pertinent Medications: MEDICATIONS  (STANDING):  aspirin  chewable 81 milliGRAM(s) Oral daily  chlorhexidine 2% Cloths 1 Application(s) Topical <User Schedule>  dextrose 5%. 1000 milliLiter(s) (50 mL/Hr) IV Continuous <Continuous>  dextrose 5%. 1000 milliLiter(s) (100 mL/Hr) IV Continuous <Continuous>  dextrose 50% Injectable 25 Gram(s) IV Push once  dextrose 50% Injectable 12.5 Gram(s) IV Push once  dextrose 50% Injectable 25 Gram(s) IV Push once  enoxaparin Injectable 40 milliGRAM(s) SubCutaneous every 24 hours  ertapenem  IVPB 1000 milliGRAM(s) IV Intermittent every 24 hours  glucagon  Injectable 1 milliGRAM(s) IntraMuscular once  insulin lispro (ADMELOG) corrective regimen sliding scale   SubCutaneous every 6 hours  midodrine 5 milliGRAM(s) Oral every 8 hours  ondansetron Injectable 4 milliGRAM(s) IV Push once  tamsulosin 0.4 milliGRAM(s) Oral at bedtime    MEDICATIONS  (PRN):  dextrose Oral Gel 15 Gram(s) Oral once PRN Blood Glucose LESS THAN 70 milliGRAM(s)/deciliter  traMADol 25 milliGRAM(s) Oral every 6 hours PRN Moderate Pain (4 - 6)  traMADol 50 milliGRAM(s) Oral every 6 hours PRN Severe Pain (7 - 10)    Pertinent Labs: CBC Full  -  ( 22 Mar 2024 04:55 )  WBC Count : 4.95 K/uL  RBC Count : 4.11 M/uL  Hemoglobin : 10.1 g/dL  Hematocrit : 31.9 %  Platelet Count - Automated : 183 K/uL  Mean Cell Volume : 77.6 fl  Mean Cell Hemoglobin : 24.6 pg  Mean Cell Hemoglobin Concentration : 31.7 gm/dL  Auto Neutrophil # : 4.12 K/uL  Auto Lymphocyte # : 0.17 K/uL  Auto Monocyte # : 0.35 K/uL  Auto Eosinophil # : 0.09 K/uL  Auto Basophil # : 0.13 K/uL  Auto Neutrophil % : 83.3 %  Auto Lymphocyte % : 3.5 %  Auto Monocyte % : 7.0 %Auto Eosinophil % : 1.8 %  Auto Basophil % : 2.6 %  03-22 Na139 mmol/L Glu 176 mg/dL<H> K+ 3.6 mmol/L Cr  0.36 mg/dL<L> BUN 11.0 mg/dL Phos 3.2 mg/dL Alb 2.8 g/dL<L> PAB n/a       Skin: sx incision R abdomen    Nutrition focused physical exam conducted - found signs of malnutrition [ ]absent [x ]present    Subcutaneous fat loss: [ x] Orbital fat pads region, [x ]Buccal fat region, [x ]Triceps region,  [ x]Ribs region    Muscle wasting: [ x]Temples region, [x]Clavicle region, [x ]Shoulder region, [ ]Scapula region, [x ]Interosseous region,  [ ]thigh region, [ x]Calf region    Estimated Needs:   [ ] no change since previous assessment  [x] recalculated:   Based on stated weight PTA 128lbs (58.2kg)  Energy: 1746-2037kcal (30-35kcal/kg)    Current Nutrition Diagnosis: Pt remains at high nutrition risk secondary to malnutrition (severe chronic) 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 Pt reports eating small amount in house 2/2 limited menu options due to halal restriction and altered consistency. Pt appears content with limited PO intake stating that the TF provides his nutrition. Pt tolerating TF well, states that he follows a bolus regimen at home with 4-5 feeds/day, unsure volume amount per feed. Pt reports having normal BMs, most recent 3/21.     Recommendations:   1) Consider bolus regimen: Continue Glucerna 1.5cal, provide 270mL 4x/day for daily total 1080mL, 1620kcal, 87g protein, 820mL free water. This regimen provides 4.5cans daily.  2) Consider free water flush 200mL q6 hrs, 100mL provided pre-bolus feed, 100mL provided post-bolus  3) Continue PO intake as tolerated per preference   4) Rx MVI daily via tolerated route   5) Monitor weights daily for trend/accuracy     Monitoring and Evaluation:   [ x] PO intake [ x] Tolerance to diet prescription [X] Weights  [X] Follow up per protocol [X] Labs:

## 2024-03-22 NOTE — PROGRESS NOTE ADULT - ASSESSMENT
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 Patient presented for severe abdominal pain.  Initial CT abdomen showed acute cholecystitis with contained perforation.  In the ED, found to be in severe septic shock with leukocytosis (WBC 14), transaminitis, & lactic acidosis (8.1), fluid resuscitated and started on pressors (Levophed) and Zosyn.  Admitted to MICU for acute cholecystitis.  Surgery consulted but recommended against surgical intervention.  Blood culture grew ESBL E. coli, started on ertapenem.  Now off pressors and transferred out of MICU 3/19. Continued on Ertapenem.      ESBL Escherichia coli bacteremia   acute cholecystitis with contained perforation  s/p Septic shock   Transaminitis, worsening       - Blood cultures  3/17 reporting Escherichia coli  - Repeat blood cultures 3/21 no growth   - Bile fluid culture 3/17 reporting Escherichia coli  - RVP/COVID 19 PCR 3/17 negative   - Urine Cx no growth   - CT A/P reporting Acute cholecystitis with contained perforation  - US ABD reporting Acute cholecystitis with walled off perforation.  - S/p IR percutaneous cholecystostomy (3/17)  - UA 3/17 negative for UTI   - Procalcitonin level 0.28  - Worsening LFTs, would have GI consult or surgical re-eval   - Will order LFTs for AM   - Continue Ertapenem 1gm IV q 24hours   - Hold off on PICC/Midline for now unless needed for reasons other than infectious diseases  - Follow up cultures  - Trend Fever  - Trend WBC      Will Follow    Discussed treatment plan with: Dr Campbell

## 2024-03-22 NOTE — PROGRESS NOTE ADULT - ASSESSMENT
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  Initial CT abdomen showed acute cholecystitis with contained perforation.  In the ED, found to be in severe septic shock with leukocytosis (WBC 14), transaminitis, & lactic acidosis (8.1), fluid resuscitated and started on pressors (Levophed) and Zosyn.  Admitted to MICU for acute cholecystitis.  Surgery consulted but recommended against surgical intervention.  BCx grew ESBL E. coli, started on ertapenem.  Now off pressors, stable for downgrade to medicine 3/19    ESBL E.Coli Bacteremia due to Acute Cholecystitis with contained perforation s/p choly tube  -septic shock resolved; on midodrine  -CT a/p: Acute cholecystitis with contained perforation  -US ABD: Acute cholecystitis with walled off perforation.  -S/p IR percutaneous cholecystostomy (3/17)- Initially draining purulent fluid, now draining bilious fluid  -Blood cultures - ESBL E. coli   -repeat blood cultures negative  -continue Invanz  -analgesia as needed  -discussed with GI (Dr. Godfrey) recommended MRCP and to formally consult if abnormal or LFTS continue to uptrend  -surgery recs on admission reviewed; recalled given uptrending LFTs and also agreed with MRCP (per Dr. Thomas)  -ID consult appreciated    Hypokalemia and Hypophosphatemia   -supplemented   -monitor labs closely    Elevated liver enzymes - worsening  -Likely secondary to shock liver and cholestasis  -acutely worsening today  -Held statin   -check MRCP as above  -trend LFTs    T2DM  -A1c 7.8  -Hold home Farxiga & Janumet  -ISS  -ADA diet    CAD  -denies anginal symptoms  -continue aspirin 81 mg QD  -Hold Lipitor in the setting of elevated liver enzymes    BPH  -continue Flomax     DVT ppx: Lovenox SC    Dispo- Remains acute; check MRCP and trend LFTs. Will likely need midline upon discharge. PT- home with PT vs TYLER (wife unable to provide level of care patient requires at home).     Plan discussed with patient, GI/surgery team, Dr. Boyce, RN, CCM

## 2024-03-23 LAB
ALBUMIN SERPL ELPH-MCNC: 3.1 G/DL — LOW (ref 3.3–5.2)
ALP SERPL-CCNC: 861 U/L — HIGH (ref 40–120)
ALT FLD-CCNC: 98 U/L — HIGH
ANION GAP SERPL CALC-SCNC: 12 MMOL/L — SIGNIFICANT CHANGE UP (ref 5–17)
AST SERPL-CCNC: 43 U/L — HIGH
BASOPHILS # BLD AUTO: 0.05 K/UL — SIGNIFICANT CHANGE UP (ref 0–0.2)
BASOPHILS NFR BLD AUTO: 0.9 % — SIGNIFICANT CHANGE UP (ref 0–2)
BILIRUB DIRECT SERPL-MCNC: 0.3 MG/DL — SIGNIFICANT CHANGE UP (ref 0–0.3)
BILIRUB INDIRECT FLD-MCNC: 0.2 MG/DL — SIGNIFICANT CHANGE UP (ref 0.2–1)
BILIRUB SERPL-MCNC: 0.5 MG/DL — SIGNIFICANT CHANGE UP (ref 0.4–2)
BUN SERPL-MCNC: 18 MG/DL — SIGNIFICANT CHANGE UP (ref 8–20)
CALCIUM SERPL-MCNC: 9.1 MG/DL — SIGNIFICANT CHANGE UP (ref 8.4–10.5)
CHLORIDE SERPL-SCNC: 97 MMOL/L — SIGNIFICANT CHANGE UP (ref 96–108)
CO2 SERPL-SCNC: 30 MMOL/L — HIGH (ref 22–29)
CREAT SERPL-MCNC: 0.34 MG/DL — LOW (ref 0.5–1.3)
CULTURE RESULTS: ABNORMAL
EGFR: 122 ML/MIN/1.73M2 — SIGNIFICANT CHANGE UP
EOSINOPHIL # BLD AUTO: 0.07 K/UL — SIGNIFICANT CHANGE UP (ref 0–0.5)
EOSINOPHIL NFR BLD AUTO: 1.3 % — SIGNIFICANT CHANGE UP (ref 0–6)
GLUCOSE BLDC GLUCOMTR-MCNC: 160 MG/DL — HIGH (ref 70–99)
GLUCOSE BLDC GLUCOMTR-MCNC: 183 MG/DL — HIGH (ref 70–99)
GLUCOSE BLDC GLUCOMTR-MCNC: 184 MG/DL — HIGH (ref 70–99)
GLUCOSE BLDC GLUCOMTR-MCNC: 200 MG/DL — HIGH (ref 70–99)
GLUCOSE SERPL-MCNC: 147 MG/DL — HIGH (ref 70–99)
HCT VFR BLD CALC: 31.7 % — LOW (ref 39–50)
HGB BLD-MCNC: 9.7 G/DL — LOW (ref 13–17)
IMM GRANULOCYTES NFR BLD AUTO: 1.4 % — HIGH (ref 0–0.9)
LYMPHOCYTES # BLD AUTO: 0.47 K/UL — LOW (ref 1–3.3)
LYMPHOCYTES # BLD AUTO: 8.5 % — LOW (ref 13–44)
MAGNESIUM SERPL-MCNC: 1.8 MG/DL — SIGNIFICANT CHANGE UP (ref 1.6–2.6)
MCHC RBC-ENTMCNC: 24 PG — LOW (ref 27–34)
MCHC RBC-ENTMCNC: 30.6 GM/DL — LOW (ref 32–36)
MCV RBC AUTO: 78.3 FL — LOW (ref 80–100)
MONOCYTES # BLD AUTO: 0.64 K/UL — SIGNIFICANT CHANGE UP (ref 0–0.9)
MONOCYTES NFR BLD AUTO: 11.6 % — SIGNIFICANT CHANGE UP (ref 2–14)
NEUTROPHILS # BLD AUTO: 4.22 K/UL — SIGNIFICANT CHANGE UP (ref 1.8–7.4)
NEUTROPHILS NFR BLD AUTO: 76.3 % — SIGNIFICANT CHANGE UP (ref 43–77)
PHOSPHATE SERPL-MCNC: 3.4 MG/DL — SIGNIFICANT CHANGE UP (ref 2.4–4.7)
PLATELET # BLD AUTO: 203 K/UL — SIGNIFICANT CHANGE UP (ref 150–400)
POTASSIUM SERPL-MCNC: 3.7 MMOL/L — SIGNIFICANT CHANGE UP (ref 3.5–5.3)
POTASSIUM SERPL-SCNC: 3.7 MMOL/L — SIGNIFICANT CHANGE UP (ref 3.5–5.3)
PROT SERPL-MCNC: 6.5 G/DL — LOW (ref 6.6–8.7)
RBC # BLD: 4.05 M/UL — LOW (ref 4.2–5.8)
RBC # FLD: 23 % — HIGH (ref 10.3–14.5)
SODIUM SERPL-SCNC: 139 MMOL/L — SIGNIFICANT CHANGE UP (ref 135–145)
SPECIMEN SOURCE: SIGNIFICANT CHANGE UP
WBC # BLD: 5.53 K/UL — SIGNIFICANT CHANGE UP (ref 3.8–10.5)
WBC # FLD AUTO: 5.53 K/UL — SIGNIFICANT CHANGE UP (ref 3.8–10.5)

## 2024-03-23 PROCEDURE — 99233 SBSQ HOSP IP/OBS HIGH 50: CPT

## 2024-03-23 PROCEDURE — 99239 HOSP IP/OBS DSCHRG MGMT >30: CPT

## 2024-03-23 RX ORDER — MIDODRINE HYDROCHLORIDE 2.5 MG/1
5 TABLET ORAL THREE TIMES A DAY
Refills: 0 | Status: DISCONTINUED | OUTPATIENT
Start: 2024-03-23 | End: 2024-03-29

## 2024-03-23 RX ORDER — MIDODRINE HYDROCHLORIDE 2.5 MG/1
5 TABLET ORAL
Refills: 0 | Status: DISCONTINUED | OUTPATIENT
Start: 2024-03-23 | End: 2024-03-23

## 2024-03-23 RX ORDER — INSULIN LISPRO 100/ML
VIAL (ML) SUBCUTANEOUS
Refills: 0 | Status: DISCONTINUED | OUTPATIENT
Start: 2024-03-23 | End: 2024-03-29

## 2024-03-23 RX ADMIN — MIDODRINE HYDROCHLORIDE 5 MILLIGRAM(S): 2.5 TABLET ORAL at 17:38

## 2024-03-23 RX ADMIN — Medication 325 MILLIGRAM(S): at 11:35

## 2024-03-23 RX ADMIN — Medication 2: at 05:34

## 2024-03-23 RX ADMIN — ERTAPENEM SODIUM 120 MILLIGRAM(S): 1 INJECTION, POWDER, LYOPHILIZED, FOR SOLUTION INTRAMUSCULAR; INTRAVENOUS at 05:34

## 2024-03-23 RX ADMIN — MIDODRINE HYDROCHLORIDE 5 MILLIGRAM(S): 2.5 TABLET ORAL at 11:36

## 2024-03-23 RX ADMIN — Medication 2: at 17:38

## 2024-03-23 RX ADMIN — ENOXAPARIN SODIUM 40 MILLIGRAM(S): 100 INJECTION SUBCUTANEOUS at 05:34

## 2024-03-23 RX ADMIN — TAMSULOSIN HYDROCHLORIDE 0.4 MILLIGRAM(S): 0.4 CAPSULE ORAL at 22:01

## 2024-03-23 RX ADMIN — Medication 81 MILLIGRAM(S): at 11:35

## 2024-03-23 RX ADMIN — Medication 2: at 22:01

## 2024-03-23 RX ADMIN — CHLORHEXIDINE GLUCONATE 1 APPLICATION(S): 213 SOLUTION TOPICAL at 05:36

## 2024-03-23 RX ADMIN — TRAMADOL HYDROCHLORIDE 50 MILLIGRAM(S): 50 TABLET ORAL at 23:48

## 2024-03-23 RX ADMIN — Medication 2: at 11:36

## 2024-03-23 NOTE — CONSULT NOTE ADULT - SUBJECTIVE AND OBJECTIVE BOX
HISTORY OF PRESENT ILLNESS: This is a 73y old man with a past medical history significant for SCC s/p resection with dysphagia s/p PEG here with perforated cholecystitis s/p perc gemini with persistent elevated LAES with smooth wall thickening of CBD on imaging.   Currently, he denies abdominal  pain, nausea, vomiting, fevers or chills.  He was originally admitted with septic shock.  His wife is at the bedside to provide collateral history.  Currently he is tolerating a puréed diet.  He is sitting up in the chair at the time of exam.  He states that the dizziness and weakness that he had on admission has resolved.    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    Acoustic neuroma    Facial nerve disorder    Blindness of left eye    Unsteady gait    CAD (coronary artery disease)    Hearing loss, right    H/O neuropathy    S/P excision of acoustic neuroma    S/P cataract surgery    S/P coronary angioplasty      SOCIAL HISTORY:  - TOBACCO: Denies  - ALCOHOL: Denies  - ILLICIT DRUG USE: Denies    FAMILY HISTORY:  No known history of gastrointestinal or liver disease;  Family history of CABG (Sibling)    Family history of diabetes mellitus (DM) (Sibling, Mother, Sibling)        HOME MEDICATIONS:  Artificial Tears ophthalmic ointment: 1 application in each eye 4 times a day (13 Feb 2024 13:13)  aspirin 81 mg oral capsule: 1 cap(s) orally once a day (13 Feb 2024 13:13)  chlorhexidine 0.12% mucous membrane liquid: 15 milliliter(s) orally 2 times a day (13 Feb 2024 13:13)  docusate sodium 100 mg oral capsule: 1 cap(s) orally once a day (13 Feb 2024 13:13)  epoetin remedios 4000 units/mL injectable solution: injectable once a week (13 Feb 2024 13:13)  ergocalciferol 1.25 mg (50,000 intl units) oral capsule: 1 cap(s) orally once a week (13 Feb 2024 13:13)  famotidine 20 mg oral tablet: 1 tab(s) orally once a day (13 Feb 2024 13:13)  Farxiga 10 mg oral tablet: 1 tab(s) orally once a day (13 Feb 2024 13:13)  ferrous sulfate 325 mg (65 mg elemental iron) oral delayed release tablet: 1 tab(s) orally once a day (13 Feb 2024 13:13)  finasteride 5 mg oral tablet: 1 tab(s) orally once a day (13 Feb 2024 13:13)  Flomax 0.4 mg oral capsule: 1 cap(s) orally once a day (13 Feb 2024 13:13)  Flomax 0.4 mg oral capsule: 1 cap(s) orally once a day (13 Feb 2024 13:13)  Janumet 50 mg-1000 mg oral tablet: 1 tab(s) orally 2 times a day (resume on 3/27) (13 Feb 2024 13:13)  Lexapro 10 mg oral tablet: 1 tab(s) orally once a day (13 Feb 2024 13:13)  Lipitor 20 mg oral tablet: 1 tab(s) orally once a day (13 Feb 2024 13:13)  Mapap 325 mg oral tablet: 2 tab(s) orally once a day (13 Feb 2024 13:13)  metFORMIN 500 mg oral tablet: 1 tab(s) orally once a day (13 Feb 2024 13:13)  midodrine 5 mg oral tablet: 2 tab(s) orally 3 times a day (13 Feb 2024 13:13)  MiraLax oral powder for reconstitution: 17 gram(s) orally once a day (13 Feb 2024 13:13)  mirtazapine 15 mg oral tablet: 1 tab(s) orally once a day (13 Feb 2024 13:13)  Multiple Vitamins oral tablet: 1 tab(s) orally once a day (13 Feb 2024 13:13)  Prevident Dental Rinse 0.02% topical solution: Apply topically to affected area 2 times a day (13 Feb 2024 13:13)  senna (sennosides) 8.6 mg oral tablet: 1 tab(s) orally once a day (13 Feb 2024 13:13)    INPATIENT MEDICATIONS:  MEDICATIONS  (STANDING):  aspirin  chewable 81 milliGRAM(s) Oral daily  chlorhexidine 2% Cloths 1 Application(s) Topical <User Schedule>  dextrose 5%. 1000 milliLiter(s) (50 mL/Hr) IV Continuous <Continuous>  dextrose 5%. 1000 milliLiter(s) (100 mL/Hr) IV Continuous <Continuous>  dextrose 50% Injectable 25 Gram(s) IV Push once  dextrose 50% Injectable 12.5 Gram(s) IV Push once  dextrose 50% Injectable 25 Gram(s) IV Push once  enoxaparin Injectable 40 milliGRAM(s) SubCutaneous every 24 hours  ertapenem  IVPB 1000 milliGRAM(s) IV Intermittent every 24 hours  ferrous    sulfate 325 milliGRAM(s) Oral daily  glucagon  Injectable 1 milliGRAM(s) IntraMuscular once  insulin lispro (ADMELOG) corrective regimen sliding scale   SubCutaneous every 6 hours  midodrine. 5 milliGRAM(s) Oral three times a day  ondansetron Injectable 4 milliGRAM(s) IV Push once  tamsulosin 0.4 milliGRAM(s) Oral at bedtime    MEDICATIONS  (PRN):  dextrose Oral Gel 15 Gram(s) Oral once PRN Blood Glucose LESS THAN 70 milliGRAM(s)/deciliter  traMADol 25 milliGRAM(s) Oral every 6 hours PRN Moderate Pain (4 - 6)  traMADol 50 milliGRAM(s) Oral every 6 hours PRN Severe Pain (7 - 10)    ALLERGIES:  No Known Allergies    T(C): 36.5 (03-23-24 @ 10:41), Max: 36.5 (03-22-24 @ 14:30)  HR: 74 (03-23-24 @ 10:41) (72 - 82)  BP: 115/65 (03-23-24 @ 10:41) (90/54 - 136/76)  RR: 18 (03-23-24 @ 10:41) (16 - 18)  SpO2: 98% (03-23-24 @ 10:41) (96% - 100%)    03-22-24 @ 07:01  -  03-23-24 @ 07:00  --------------------------------------------------------  IN: 1680 mL / OUT: 700 mL / NET: 980 mL    03-23-24 @ 07:01  -  03-23-24 @ 11:50  --------------------------------------------------------  IN: 330 mL / OUT: 0 mL / NET: 330 mL        PHYSICAL EXAM:  Constitutional:  in no apparent distress  Eyes: Sclerae anicteric, conjunctivae normal  ENMT: Mucus membranes moist, no oropharyngeal thrush noted  Respiratory: Breathing nonlabored; clear to auscultation  Cardiovascular: Regular rate and rhythm  Gastrointestinal: Soft, nontender, nondistended, normoactive bowel sounds; PTC in place with bile, G tube in place tube feeds running no rebound tenderness or involuntary guarding  Extremities: No  edema  Neurological: Alert and oriented to person, place and time; no asterixis  Skin: No jaundice  Musculoskeletal: No significant peripheral atrophy  Psychiatric: Affect and mood appropriate      LABS:             9.7    5.53  )-----------( 203      ( 03-23 @ 06:12 )             31.7                10.1   4.95  )-----------( 183      ( 03-22 @ 04:55 )             31.9                9.7    5.11  )-----------( 186      ( 03-21 @ 05:30 )             29.7         03-23    139  |  97  |  18.0  ----------------------------<  147<H>  3.7   |  30.0<H>  |  0.34<L>    Ca    9.1      23 Mar 2024 06:12  Phos  3.4     03-23  Mg     1.8     03-23    TPro  6.5<L>  /  Alb  3.1<L>  /  TBili  0.5  /  DBili  0.3  /  AST  43<H>  /  ALT  98<H>  /  AlkPhos  861<H>  03-23    LIVER FUNCTIONS - ( 23 Mar 2024 06:12 )  Alb: 3.1 g/dL / Pro: 6.5 g/dL / ALK PHOS: 861 U/L / ALT: 98 U/L / AST: 43 U/L / GGT: x               Ferritin: 92 ng/mL (03-22-24 @ 04:55)  Iron Total: 26 ug/dL *L* (03-22-24 @ 04:55)  % Saturation, Iron: 10 % *L* (03-22-24 @ 04:55)  Iron - Total Binding Capacity.: 265 ug/dL (03-22-24 @ 04:55)    MELD-Na  Dialysis at least twice in past week: Y/N?  Creatinine:  0.34  Total Bili:  0.5  INR: --  Sodium: 139    Urinalysis Basic - ( 23 Mar 2024 06:12 )    Color: x / Appearance: x / SG: x / pH: x  Gluc: 147 mg/dL / Ketone: x  / Bili: x / Urobili: x   Blood: x / Protein: x / Nitrite: x   Leuk Esterase: x / RBC: x / WBC x   Sq Epi: x / Non Sq Epi: x / Bacteria: x        Culture - Blood (collected 21 Mar 2024 08:26)  Source: .Blood Blood-Peripheral  Preliminary Report (22 Mar 2024 13:02):    No growth at 24 hours    Culture - Blood (collected 21 Mar 2024 08:19)  Source: .Blood Blood-Peripheral  Preliminary Report (22 Mar 2024 13:02):    No growth at 24 hours      IMAGING: I personally reviewed the MRCP, and I agree with the radiologist's interpretation as described below: 7mm cbd intrahepatic duct dilation

## 2024-03-23 NOTE — PROVIDER CONTACT NOTE (CRITICAL VALUE NOTIFICATION) - ACTION/TREATMENT ORDERED:
As per MD Morales, no further orders or interventions at this time
continue with abx as previously prescribed

## 2024-03-23 NOTE — PROGRESS NOTE ADULT - SUBJECTIVE AND OBJECTIVE BOX
CC Follow up     INTERVAL HPI/OVERNIGHT EVENTS: Patient seen and examined,       Vital Signs Last 24 Hrs  T(C): 36.5 (23 Mar 2024 10:41), Max: 36.5 (22 Mar 2024 14:30)  T(F): 97.7 (23 Mar 2024 10:41), Max: 97.7 (22 Mar 2024 14:30)  HR: 74 (23 Mar 2024 10:41) (72 - 82)  BP: 115/65 (23 Mar 2024 10:41) (90/54 - 136/76)  BP(mean): --  RR: 18 (23 Mar 2024 10:41) (16 - 18)  SpO2: 98% (23 Mar 2024 10:41) (96% - 100%)    Parameters below as of 23 Mar 2024 04:00  Patient On (Oxygen Delivery Method): room air        PHYSICAL EXAM:    GENERAL: NAD  HEAD:  Atraumatic, Normocephalic  EYES:  conjunctiva and sclera clear  ENMT: Moist mucous membranes  CHEST/LUNG: Clear to auscultation bilaterally; No rales, rhonchi, wheezing, or rubs  HEART: Regular rate and rhythm; No murmurs, rubs, or gallops  ABDOMEN: Soft, Nontender, Nondistended; Bowel sounds present  +PEG + Shu tube   EXTREMITIES:  2+ Peripheral Pulses, No clubbing, cyanosis, or edema        MEDICATIONS  (STANDING):  aspirin  chewable 81 milliGRAM(s) Oral daily  chlorhexidine 2% Cloths 1 Application(s) Topical <User Schedule>  dextrose 5%. 1000 milliLiter(s) (50 mL/Hr) IV Continuous <Continuous>  dextrose 5%. 1000 milliLiter(s) (100 mL/Hr) IV Continuous <Continuous>  dextrose 50% Injectable 25 Gram(s) IV Push once  dextrose 50% Injectable 12.5 Gram(s) IV Push once  dextrose 50% Injectable 25 Gram(s) IV Push once  enoxaparin Injectable 40 milliGRAM(s) SubCutaneous every 24 hours  ertapenem  IVPB 1000 milliGRAM(s) IV Intermittent every 24 hours  ferrous    sulfate 325 milliGRAM(s) Oral daily  glucagon  Injectable 1 milliGRAM(s) IntraMuscular once  insulin lispro (ADMELOG) corrective regimen sliding scale   SubCutaneous every 6 hours  midodrine. 5 milliGRAM(s) Oral three times a day  ondansetron Injectable 4 milliGRAM(s) IV Push once  tamsulosin 0.4 milliGRAM(s) Oral at bedtime    MEDICATIONS  (PRN):  dextrose Oral Gel 15 Gram(s) Oral once PRN Blood Glucose LESS THAN 70 milliGRAM(s)/deciliter  traMADol 25 milliGRAM(s) Oral every 6 hours PRN Moderate Pain (4 - 6)  traMADol 50 milliGRAM(s) Oral every 6 hours PRN Severe Pain (7 - 10)      Allergies    No Known Allergies    Intolerances          LABS:                          9.7    5.53  )-----------( 203      ( 23 Mar 2024 06:12 )             31.7     03-23    139  |  97  |  18.0  ----------------------------<  147<H>  3.7   |  30.0<H>  |  0.34<L>    Ca    9.1      23 Mar 2024 06:12  Phos  3.4     03-23  Mg     1.8     03-23    TPro  6.5<L>  /  Alb  3.1<L>  /  TBili  0.5  /  DBili  0.3  /  AST  43<H>  /  ALT  98<H>  /  AlkPhos  861<H>  03-23      Urinalysis Basic - ( 23 Mar 2024 06:12 )    Color: x / Appearance: x / SG: x / pH: x  Gluc: 147 mg/dL / Ketone: x  / Bili: x / Urobili: x   Blood: x / Protein: x / Nitrite: x   Leuk Esterase: x / RBC: x / WBC x   Sq Epi: x / Non Sq Epi: x / Bacteria: x        RADIOLOGY & ADDITIONAL TESTS:   CC Follow up     INTERVAL HPI/OVERNIGHT EVENTS: Patient seen and examined, sitting up in a chair. denies nausea vomiting or abdominal pain. Afebrile.       Vital Signs Last 24 Hrs  T(C): 36.5 (23 Mar 2024 10:41), Max: 36.5 (22 Mar 2024 14:30)  T(F): 97.7 (23 Mar 2024 10:41), Max: 97.7 (22 Mar 2024 14:30)  HR: 74 (23 Mar 2024 10:41) (72 - 82)  BP: 115/65 (23 Mar 2024 10:41) (90/54 - 136/76)  BP(mean): --  RR: 18 (23 Mar 2024 10:41) (16 - 18)  SpO2: 98% (23 Mar 2024 10:41) (96% - 100%)    Parameters below as of 23 Mar 2024 04:00  Patient On (Oxygen Delivery Method): room air        PHYSICAL EXAM:    GENERAL: NAD, AOX3  HEAD:  Atraumatic, Normocephalic  EYES:  conjunctiva and sclera clear  ENMT: Moist mucous membranes  CHEST/LUNG: Clear to auscultation bilaterally; No rales, rhonchi, wheezing, or rubs  HEART: Regular rate and rhythm; No murmurs, rubs, or gallops  ABDOMEN: Soft, Nontender, Nondistended; Bowel sounds present  +PEG + Shu tube   EXTREMITIES:  2+ Peripheral Pulses, No clubbing, cyanosis, or edema        MEDICATIONS  (STANDING):  aspirin  chewable 81 milliGRAM(s) Oral daily  chlorhexidine 2% Cloths 1 Application(s) Topical <User Schedule>  dextrose 5%. 1000 milliLiter(s) (50 mL/Hr) IV Continuous <Continuous>  dextrose 5%. 1000 milliLiter(s) (100 mL/Hr) IV Continuous <Continuous>  dextrose 50% Injectable 25 Gram(s) IV Push once  dextrose 50% Injectable 12.5 Gram(s) IV Push once  dextrose 50% Injectable 25 Gram(s) IV Push once  enoxaparin Injectable 40 milliGRAM(s) SubCutaneous every 24 hours  ertapenem  IVPB 1000 milliGRAM(s) IV Intermittent every 24 hours  ferrous    sulfate 325 milliGRAM(s) Oral daily  glucagon  Injectable 1 milliGRAM(s) IntraMuscular once  insulin lispro (ADMELOG) corrective regimen sliding scale   SubCutaneous every 6 hours  midodrine. 5 milliGRAM(s) Oral three times a day  ondansetron Injectable 4 milliGRAM(s) IV Push once  tamsulosin 0.4 milliGRAM(s) Oral at bedtime    MEDICATIONS  (PRN):  dextrose Oral Gel 15 Gram(s) Oral once PRN Blood Glucose LESS THAN 70 milliGRAM(s)/deciliter  traMADol 25 milliGRAM(s) Oral every 6 hours PRN Moderate Pain (4 - 6)  traMADol 50 milliGRAM(s) Oral every 6 hours PRN Severe Pain (7 - 10)      Allergies    No Known Allergies    Intolerances          LABS:                          9.7    5.53  )-----------( 203      ( 23 Mar 2024 06:12 )             31.7     03-23    139  |  97  |  18.0  ----------------------------<  147<H>  3.7   |  30.0<H>  |  0.34<L>    Ca    9.1      23 Mar 2024 06:12  Phos  3.4     03-23  Mg     1.8     03-23    TPro  6.5<L>  /  Alb  3.1<L>  /  TBili  0.5  /  DBili  0.3  /  AST  43<H>  /  ALT  98<H>  /  AlkPhos  861<H>  03-23      Urinalysis Basic - ( 23 Mar 2024 06:12 )    Color: x / Appearance: x / SG: x / pH: x  Gluc: 147 mg/dL / Ketone: x  / Bili: x / Urobili: x   Blood: x / Protein: x / Nitrite: x   Leuk Esterase: x / RBC: x / WBC x   Sq Epi: x / Non Sq Epi: x / Bacteria: x        RADIOLOGY & ADDITIONAL TESTS:

## 2024-03-23 NOTE — CONSULT NOTE ADULT - ASSESSMENT
73-year-old male with a history of squamous cell cancer status post resection, chemoradiation and G-tube placement, CAD, diabetes and hypertension who presented with septic shock with perforated cholecystitis status post percutaneous cholecystostomy tube placement now with persistent elevation in liver associated enzymes with MRI findings suggestive of cholangitis      Elevated liver enzymes  Perforated cholecystitis status post percutaneous cholecystostomy  MRI concerning for cholangitis  Clinically, he does not have cholangitis.  He no longer has right upper quadrant pain, there is no leukocytosis or change in mental status and his sepsis is improving.  Currently, with the percutaneous cholecystostomy tube he is  decompressed, there is no urgency for ERCP at this time  He was evaluated by surgery and not deemed to be a surgical candidate, therefore he would benefit from an ERCP with sphincterotomy in the future.  Continue antibiotics  Continue to monitor liver associated enzymes fortunately, he is not obstructed although the alk phos is elevated the T. bili is normal there is no intra ductal defect noted on MRCP    Recommendations discussed with patient and wife at bedside

## 2024-03-23 NOTE — PROGRESS NOTE ADULT - ASSESSMENT
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  Initial CT abdomen showed acute cholecystitis with contained perforation.  In the ED, found to be in severe septic shock with leukocytosis (WBC 14), transaminitis, & lactic acidosis (8.1), fluid resuscitated and started on pressors (Levophed) and Zosyn.  Admitted to MICU for acute cholecystitis.  Surgery consulted but recommended against surgical intervention.  BCx grew ESBL E. coli, started on ertapenem.  Now off pressors, stable for downgrade to medicine 3/19    Assessment/Plan:    1. ESBL E.Coli Bacteremia due to Acute Cholecystitis with contained perforation s/p choly tube  -septic shock resolved; on midodrine- wean as tolerated   -CT a/p: Acute cholecystitis with contained perforation  -US ABD: Acute cholecystitis with walled off perforation.  -S/p IR percutaneous cholecystostomy (3/17)- Initially draining purulent fluid, now draining bilious fluid  -Blood cultures - ESBL E. coli   -repeat blood cultures negative  -continue Invanz  -analgesia as needed  - LFTS stable this morning; MRCP reviewed with no CBD dilatation, contained GB perforation; Evidence of cholangitis. GI consulted for recommendations    2. Hypokalemia and Hypophosphatemia   -supplemented   -monitor labs closely    3. T2DM  -A1c 7.8  -Hold home Farxiga & Janumet  -ISS  -ADA diet    4. CAD  -denies anginal symptoms  -continue aspirin 81 mg QD  -Hold Lipitor in the setting of elevated liver enzymes    5. BPH  -continue Flomax     DVT ppx: Lovenox SC    Dispo- Remains acute;  trend LFTs. Will likely need midline upon discharge. PT- home with PT vs TYLER (wife unable to provide level of care patient requires at home).  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  Initial CT abdomen showed acute cholecystitis with contained perforation.  In the ED, found to be in severe septic shock with leukocytosis (WBC 14), transaminitis, & lactic acidosis (8.1), fluid resuscitated and started on pressors (Levophed) and Zosyn.  Admitted to MICU for acute cholecystitis.  Surgery consulted but recommended against surgical intervention.  BCx grew ESBL E. coli, started on ertapenem.  Now off pressors, stable for downgrade to medicine 3/19    Assessment/Plan:    1. ESBL E.Coli Bacteremia due to Acute Cholecystitis with contained perforation s/p choly tube  -septic shock resolved; on midodrine- wean as tolerated   -CT a/p: Acute cholecystitis with contained perforation  -US ABD: Acute cholecystitis with walled off perforation.  -S/p IR percutaneous cholecystostomy (3/17)- Initially draining purulent fluid, now draining bilious fluid  -Blood cultures - ESBL E. coli   -repeat blood cultures negative  -continue Invanz  -analgesia as needed  - LFTS stable this morning; MRCP reviewed with no CBD dilatation, contained GB perforation; Evidence of cholangitis. GI consulted for recommendations    2. Hypokalemia and Hypophosphatemia   -supplemented   -monitor labs closely    3. T2DM  -A1c 7.8  -Hold home Farxiga & Janumet  -ISS  -ADA diet    4. CAD  -denies anginal symptoms  -continue aspirin 81 mg QD  -Hold Lipitor in the setting of elevated liver enzymes    5. BPH  -continue Flomax     DVT ppx: Lovenox SC    Dispo- Remains acute;  trend LFTs. Will likely need midline upon discharge. PT- home with PT vs TYLER (wife unable to provide level of care patient requires at home).     Plan discussed with patient and wife at bedside

## 2024-03-24 LAB
ALBUMIN SERPL ELPH-MCNC: 3 G/DL — LOW (ref 3.3–5.2)
ALP SERPL-CCNC: 1486 U/L — HIGH (ref 40–120)
ALT FLD-CCNC: 174 U/L — HIGH
ANION GAP SERPL CALC-SCNC: 11 MMOL/L — SIGNIFICANT CHANGE UP (ref 5–17)
AST SERPL-CCNC: 243 U/L — HIGH
BASOPHILS # BLD AUTO: 0.04 K/UL — SIGNIFICANT CHANGE UP (ref 0–0.2)
BASOPHILS NFR BLD AUTO: 0.9 % — SIGNIFICANT CHANGE UP (ref 0–2)
BILIRUB SERPL-MCNC: 1 MG/DL — SIGNIFICANT CHANGE UP (ref 0.4–2)
BUN SERPL-MCNC: 18.4 MG/DL — SIGNIFICANT CHANGE UP (ref 8–20)
CALCIUM SERPL-MCNC: 9 MG/DL — SIGNIFICANT CHANGE UP (ref 8.4–10.5)
CHLORIDE SERPL-SCNC: 98 MMOL/L — SIGNIFICANT CHANGE UP (ref 96–108)
CO2 SERPL-SCNC: 29 MMOL/L — SIGNIFICANT CHANGE UP (ref 22–29)
CREAT SERPL-MCNC: 0.31 MG/DL — LOW (ref 0.5–1.3)
EGFR: 125 ML/MIN/1.73M2 — SIGNIFICANT CHANGE UP
EOSINOPHIL # BLD AUTO: 0.02 K/UL — SIGNIFICANT CHANGE UP (ref 0–0.5)
EOSINOPHIL NFR BLD AUTO: 0.5 % — SIGNIFICANT CHANGE UP (ref 0–6)
GLUCOSE BLDC GLUCOMTR-MCNC: 162 MG/DL — HIGH (ref 70–99)
GLUCOSE BLDC GLUCOMTR-MCNC: 231 MG/DL — HIGH (ref 70–99)
GLUCOSE BLDC GLUCOMTR-MCNC: 239 MG/DL — HIGH (ref 70–99)
GLUCOSE BLDC GLUCOMTR-MCNC: 93 MG/DL — SIGNIFICANT CHANGE UP (ref 70–99)
GLUCOSE SERPL-MCNC: 219 MG/DL — HIGH (ref 70–99)
HCT VFR BLD CALC: 29.2 % — LOW (ref 39–50)
HGB BLD-MCNC: 9.1 G/DL — LOW (ref 13–17)
IMM GRANULOCYTES NFR BLD AUTO: 0.9 % — SIGNIFICANT CHANGE UP (ref 0–0.9)
LYMPHOCYTES # BLD AUTO: 0.38 K/UL — LOW (ref 1–3.3)
LYMPHOCYTES # BLD AUTO: 8.9 % — LOW (ref 13–44)
MCHC RBC-ENTMCNC: 24.3 PG — LOW (ref 27–34)
MCHC RBC-ENTMCNC: 31.2 GM/DL — LOW (ref 32–36)
MCV RBC AUTO: 77.9 FL — LOW (ref 80–100)
MONOCYTES # BLD AUTO: 0.54 K/UL — SIGNIFICANT CHANGE UP (ref 0–0.9)
MONOCYTES NFR BLD AUTO: 12.6 % — SIGNIFICANT CHANGE UP (ref 2–14)
NEUTROPHILS # BLD AUTO: 3.26 K/UL — SIGNIFICANT CHANGE UP (ref 1.8–7.4)
NEUTROPHILS NFR BLD AUTO: 76.2 % — SIGNIFICANT CHANGE UP (ref 43–77)
PLATELET # BLD AUTO: 184 K/UL — SIGNIFICANT CHANGE UP (ref 150–400)
POTASSIUM SERPL-MCNC: 3.8 MMOL/L — SIGNIFICANT CHANGE UP (ref 3.5–5.3)
POTASSIUM SERPL-SCNC: 3.8 MMOL/L — SIGNIFICANT CHANGE UP (ref 3.5–5.3)
PROT SERPL-MCNC: 6.4 G/DL — LOW (ref 6.6–8.7)
RBC # BLD: 3.75 M/UL — LOW (ref 4.2–5.8)
RBC # FLD: 22.7 % — HIGH (ref 10.3–14.5)
SODIUM SERPL-SCNC: 137 MMOL/L — SIGNIFICANT CHANGE UP (ref 135–145)
WBC # BLD: 4.28 K/UL — SIGNIFICANT CHANGE UP (ref 3.8–10.5)
WBC # FLD AUTO: 4.28 K/UL — SIGNIFICANT CHANGE UP (ref 3.8–10.5)

## 2024-03-24 PROCEDURE — 99233 SBSQ HOSP IP/OBS HIGH 50: CPT

## 2024-03-24 RX ORDER — INDOMETHACIN 50 MG
100 CAPSULE ORAL ONCE
Refills: 0 | Status: COMPLETED | OUTPATIENT
Start: 2024-03-25 | End: 2024-03-25

## 2024-03-24 RX ADMIN — ERTAPENEM SODIUM 120 MILLIGRAM(S): 1 INJECTION, POWDER, LYOPHILIZED, FOR SOLUTION INTRAMUSCULAR; INTRAVENOUS at 05:22

## 2024-03-24 RX ADMIN — MIDODRINE HYDROCHLORIDE 5 MILLIGRAM(S): 2.5 TABLET ORAL at 17:07

## 2024-03-24 RX ADMIN — MIDODRINE HYDROCHLORIDE 5 MILLIGRAM(S): 2.5 TABLET ORAL at 11:59

## 2024-03-24 RX ADMIN — Medication 4: at 08:13

## 2024-03-24 RX ADMIN — Medication 325 MILLIGRAM(S): at 11:57

## 2024-03-24 RX ADMIN — TAMSULOSIN HYDROCHLORIDE 0.4 MILLIGRAM(S): 0.4 CAPSULE ORAL at 22:03

## 2024-03-24 RX ADMIN — ENOXAPARIN SODIUM 40 MILLIGRAM(S): 100 INJECTION SUBCUTANEOUS at 05:23

## 2024-03-24 RX ADMIN — MIDODRINE HYDROCHLORIDE 5 MILLIGRAM(S): 2.5 TABLET ORAL at 05:23

## 2024-03-24 RX ADMIN — Medication 81 MILLIGRAM(S): at 11:56

## 2024-03-24 RX ADMIN — TRAMADOL HYDROCHLORIDE 50 MILLIGRAM(S): 50 TABLET ORAL at 00:48

## 2024-03-24 RX ADMIN — CHLORHEXIDINE GLUCONATE 1 APPLICATION(S): 213 SOLUTION TOPICAL at 05:23

## 2024-03-24 RX ADMIN — Medication 4: at 21:49

## 2024-03-24 RX ADMIN — Medication 2: at 17:09

## 2024-03-24 NOTE — PROGRESS NOTE ADULT - SUBJECTIVE AND OBJECTIVE BOX
CC: Follow up     INTERVAL HPI/OVERNIGHT EVENTS: Patient seen and examined, states he has had intermittent RUQ abdominal pain overnight. Denies nausea or vomiting.       Vital Signs Last 24 Hrs  T(C): 36.5 (24 Mar 2024 04:45), Max: 36.6 (23 Mar 2024 16:43)  T(F): 97.7 (24 Mar 2024 04:45), Max: 97.9 (23 Mar 2024 16:43)  HR: 87 (24 Mar 2024 12:00) (75 - 97)  BP: 97/58 (24 Mar 2024 12:00) (97/58 - 116/70)  BP(mean): --  RR: 18 (23 Mar 2024 21:30) (18 - 18)  SpO2: 96% (24 Mar 2024 04:45) (94% - 96%)    Parameters below as of 24 Mar 2024 04:45  Patient On (Oxygen Delivery Method): room air        PHYSICAL EXAM:    GENERAL: NAD, AOX3  HEAD:  Atraumatic, Normocephalic  EYES: conjunctiva and sclera clear  ENMT: Moist mucous membranes  NECK: Supple  CHEST/LUNG: Clear to auscultation bilaterally; No rales, rhonchi, wheezing, or rubs  HEART: Regular rate and rhythm; No murmurs, rubs, or gallops  ABDOMEN: Soft, Nontender, Nondistended; Bowel sounds present  + PEG  + Shu tube   EXTREMITIES:  2+ Peripheral Pulses, No clubbing, cyanosis, or edema        MEDICATIONS  (STANDING):  aspirin  chewable 81 milliGRAM(s) Oral daily  chlorhexidine 2% Cloths 1 Application(s) Topical <User Schedule>  dextrose 5%. 1000 milliLiter(s) (50 mL/Hr) IV Continuous <Continuous>  dextrose 5%. 1000 milliLiter(s) (100 mL/Hr) IV Continuous <Continuous>  dextrose 50% Injectable 25 Gram(s) IV Push once  dextrose 50% Injectable 12.5 Gram(s) IV Push once  dextrose 50% Injectable 25 Gram(s) IV Push once  ertapenem  IVPB 1000 milliGRAM(s) IV Intermittent every 24 hours  ferrous    sulfate 325 milliGRAM(s) Oral daily  glucagon  Injectable 1 milliGRAM(s) IntraMuscular once  insulin lispro (ADMELOG) corrective regimen sliding scale   SubCutaneous Before meals and at bedtime  midodrine. 5 milliGRAM(s) Oral three times a day  ondansetron Injectable 4 milliGRAM(s) IV Push once  tamsulosin 0.4 milliGRAM(s) Oral at bedtime    MEDICATIONS  (PRN):  dextrose Oral Gel 15 Gram(s) Oral once PRN Blood Glucose LESS THAN 70 milliGRAM(s)/deciliter  traMADol 25 milliGRAM(s) Oral every 6 hours PRN Moderate Pain (4 - 6)  traMADol 50 milliGRAM(s) Oral every 6 hours PRN Severe Pain (7 - 10)      Allergies    No Known Allergies    Intolerances          LABS:                          9.1    4.28  )-----------( 184      ( 24 Mar 2024 05:17 )             29.2     03-24    137  |  98  |  18.4  ----------------------------<  219<H>  3.8   |  29.0  |  0.31<L>    Ca    9.0      24 Mar 2024 05:17  Phos  3.4     03-23  Mg     1.8     03-23    TPro  6.4<L>  /  Alb  3.0<L>  /  TBili  1.0  /  DBili  x   /  AST  243<H>  /  ALT  174<H>  /  AlkPhos  1486<H>  03-24      Urinalysis Basic - ( 24 Mar 2024 05:17 )    Color: x / Appearance: x / SG: x / pH: x  Gluc: 219 mg/dL / Ketone: x  / Bili: x / Urobili: x   Blood: x / Protein: x / Nitrite: x   Leuk Esterase: x / RBC: x / WBC x   Sq Epi: x / Non Sq Epi: x / Bacteria: x        RADIOLOGY & ADDITIONAL TESTS:

## 2024-03-24 NOTE — PROGRESS NOTE ADULT - ASSESSMENT
3-year-old male with a history of squamous cell cancer status post resection, chemoradiation and G-tube placement, CAD, diabetes and hypertension who presented with septic shock with perforated cholecystitis status post percutaneous cholecystostomy tube placement now with persistent elevation in liver associated enzymes with MRI findings suggestive of cholangitis    Elevated LAES  Perforated Cholecystitis with perc gemini  Cholangitis   Plan for ercp tomorrow with sphincterotomy  Procedure reviewed with patient  NPO after midnight, hold tube feeds after midnight  Hold AM lovenox  INR<1.5, HGB >7-8, PLT >50  Discussed with hospitalist

## 2024-03-24 NOTE — PROGRESS NOTE ADULT - ASSESSMENT
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  Initial CT abdomen showed acute cholecystitis with contained perforation.  In the ED, found to be in severe septic shock with leukocytosis (WBC 14), transaminitis, & lactic acidosis (8.1), fluid resuscitated and started on pressors (Levophed) and Zosyn.  Admitted to MICU for acute cholecystitis.  Surgery consulted but recommended against surgical intervention.  BCx grew ESBL E. coli, started on ertapenem.  Now off pressors, stable for downgrade to medicine 3/19    Assessment/Plan:    1. ESBL E.Coli Bacteremia due to Acute Cholecystitis with contained perforation s/p choly tube  -septic shock resolved; on midodrine  -CT a/p: Acute cholecystitis with contained perforation  -US ABD: Acute cholecystitis with walled off perforation.  -S/p IR percutaneous cholecystostomy (3/17)- Initially draining purulent fluid, now draining bilious fluid  -Blood cultures - ESBL E. coli   -repeat blood cultures negative  -continue Invanz  -analgesia as needed  - Worsening LFTS, discussed with GI Npo after midnight for MRCP In AM. Hold lovenox and tube feeds     2. Hypokalemia and Hypophosphatemia   -supplemented   -monitor labs closely    3. T2DM  -A1c 7.8  -Hold home Farxiga & Janumet  -ISS  -ADA diet    4. CAD  -denies anginal symptoms  -continue aspirin 81 mg QD  -Hold Lipitor in the setting of elevated liver enzymes    5. BPH  -continue Flomax     DVT ppx: Held for MRCP

## 2024-03-24 NOTE — PROGRESS NOTE ADULT - SUBJECTIVE AND OBJECTIVE BOX
Chief Complaint:  Patient is a 73y old  Male who presents with a chief complaint of Severe sepsis (23 Mar 2024 11:49)      Interval Events / Subjective: Patient seen and examined at in chair. He had epigastric pain overnight that has resolved in the morning. He is tolerating a pureed diet. He denies fever, chills, nausea or vomiting.   LAES rising       MEDICATIONS:   MEDICATIONS  (STANDING):  aspirin  chewable 81 milliGRAM(s) Oral daily  chlorhexidine 2% Cloths 1 Application(s) Topical <User Schedule>  dextrose 5%. 1000 milliLiter(s) (50 mL/Hr) IV Continuous <Continuous>  dextrose 5%. 1000 milliLiter(s) (100 mL/Hr) IV Continuous <Continuous>  dextrose 50% Injectable 25 Gram(s) IV Push once  dextrose 50% Injectable 12.5 Gram(s) IV Push once  dextrose 50% Injectable 25 Gram(s) IV Push once  ertapenem  IVPB 1000 milliGRAM(s) IV Intermittent every 24 hours  ferrous    sulfate 325 milliGRAM(s) Oral daily  glucagon  Injectable 1 milliGRAM(s) IntraMuscular once  insulin lispro (ADMELOG) corrective regimen sliding scale   SubCutaneous Before meals and at bedtime  midodrine. 5 milliGRAM(s) Oral three times a day  ondansetron Injectable 4 milliGRAM(s) IV Push once  tamsulosin 0.4 milliGRAM(s) Oral at bedtime    MEDICATIONS  (PRN):  dextrose Oral Gel 15 Gram(s) Oral once PRN Blood Glucose LESS THAN 70 milliGRAM(s)/deciliter  traMADol 25 milliGRAM(s) Oral every 6 hours PRN Moderate Pain (4 - 6)  traMADol 50 milliGRAM(s) Oral every 6 hours PRN Severe Pain (7 - 10)      ALLERGIES:   Allergies    No Known Allergies    Intolerances        VITAL SIGNS:   Vital Signs Last 24 Hrs  T(C): 36.5 (24 Mar 2024 04:45), Max: 36.6 (23 Mar 2024 16:43)  T(F): 97.7 (24 Mar 2024 04:45), Max: 97.9 (23 Mar 2024 16:43)  HR: 80 (24 Mar 2024 04:45) (74 - 97)  BP: 115/65 (24 Mar 2024 04:45) (106/63 - 116/70)  BP(mean): --  RR: 18 (23 Mar 2024 21:30) (18 - 18)  SpO2: 96% (24 Mar 2024 04:45) (94% - 98%)    Parameters below as of 24 Mar 2024 04:45  Patient On (Oxygen Delivery Method): room air      I&O's Summary    23 Mar 2024 07:01  -  24 Mar 2024 07:00  --------------------------------------------------------  IN: 1930 mL / OUT: 775 mL / NET: 1155 mL        PHYSICAL EXAM:   GENERAL:  No acute distress  HEENT:  NC/AT,  conjunctivae clear, sclera -anicteric  CHEST:  Full & symmetric excursion, no increased effort, breath sounds clear  HEART:  Regular rate  ABDOMEN:  Soft, non-tender, non-distended, normoactive bowel sounds,  no rebound or guarding biliary drain in place clear bile, PEG tube in place  EXTREMITIES: No edema  SKIN:  warm/dry  NEURO:  calm, cooperative    LABS:  CBC Full  -  ( 24 Mar 2024 05:17 )  WBC Count : 4.28 K/uL  RBC Count : 3.75 M/uL  Hemoglobin : 9.1 g/dL  Hematocrit : 29.2 %  Platelet Count - Automated : 184 K/uL  Mean Cell Volume : 77.9 fl  Mean Cell Hemoglobin : 24.3 pg  Mean Cell Hemoglobin Concentration : 31.2 gm/dL  Auto Neutrophil # : 3.26 K/uL  Auto Lymphocyte # : 0.38 K/uL  Auto Monocyte # : 0.54 K/uL  Auto Eosinophil # : 0.02 K/uL  Auto Basophil # : 0.04 K/uL  Auto Neutrophil % : 76.2 %  Auto Lymphocyte % : 8.9 %  Auto Monocyte % : 12.6 %  Auto Eosinophil % : 0.5 %  Auto Basophil % : 0.9 %    03-24    137  |  98  |  18.4  ----------------------------<  219<H>  3.8   |  29.0  |  0.31<L>    Ca    9.0      24 Mar 2024 05:17  Phos  3.4     03-23  Mg     1.8     03-23    TPro  6.4<L>  /  Alb  3.0<L>  /  TBili  1.0  /  DBili  x   /  AST  243<H>  /  ALT  174<H>  /  AlkPhos  1486<H>  03-24    LIVER FUNCTIONS - ( 24 Mar 2024 05:17 )  Alb: 3.0 g/dL / Pro: 6.4 g/dL / ALK PHOS: 1486 U/L / ALT: 174 U/L / AST: 243 U/L / GGT: x             Urinalysis Basic - ( 24 Mar 2024 05:17 )    Color: x / Appearance: x / SG: x / pH: x  Gluc: 219 mg/dL / Ketone: x  / Bili: x / Urobili: x   Blood: x / Protein: x / Nitrite: x   Leuk Esterase: x / RBC: x / WBC x   Sq Epi: x / Non Sq Epi: x / Bacteria: x

## 2024-03-25 ENCOUNTER — TRANSCRIPTION ENCOUNTER (OUTPATIENT)
Age: 74
End: 2024-03-25

## 2024-03-25 ENCOUNTER — RESULT REVIEW (OUTPATIENT)
Age: 74
End: 2024-03-25

## 2024-03-25 ENCOUNTER — APPOINTMENT (OUTPATIENT)
Age: 74
End: 2024-03-25

## 2024-03-25 LAB
ALBUMIN SERPL ELPH-MCNC: 3 G/DL — LOW (ref 3.3–5.2)
ALP SERPL-CCNC: 1456 U/L — HIGH (ref 40–120)
ALT FLD-CCNC: 201 U/L — HIGH
ANION GAP SERPL CALC-SCNC: 12 MMOL/L — SIGNIFICANT CHANGE UP (ref 5–17)
AST SERPL-CCNC: 129 U/L — HIGH
BASOPHILS # BLD AUTO: 0.04 K/UL — SIGNIFICANT CHANGE UP (ref 0–0.2)
BASOPHILS NFR BLD AUTO: 0.8 % — SIGNIFICANT CHANGE UP (ref 0–2)
BILIRUB SERPL-MCNC: 0.9 MG/DL — SIGNIFICANT CHANGE UP (ref 0.4–2)
BLD GP AB SCN SERPL QL: SIGNIFICANT CHANGE UP
BUN SERPL-MCNC: 18.1 MG/DL — SIGNIFICANT CHANGE UP (ref 8–20)
CALCIUM SERPL-MCNC: 8.8 MG/DL — SIGNIFICANT CHANGE UP (ref 8.4–10.5)
CHLORIDE SERPL-SCNC: 99 MMOL/L — SIGNIFICANT CHANGE UP (ref 96–108)
CO2 SERPL-SCNC: 27 MMOL/L — SIGNIFICANT CHANGE UP (ref 22–29)
CREAT SERPL-MCNC: 0.35 MG/DL — LOW (ref 0.5–1.3)
EGFR: 121 ML/MIN/1.73M2 — SIGNIFICANT CHANGE UP
EOSINOPHIL # BLD AUTO: 0.05 K/UL — SIGNIFICANT CHANGE UP (ref 0–0.5)
EOSINOPHIL NFR BLD AUTO: 1 % — SIGNIFICANT CHANGE UP (ref 0–6)
GLUCOSE BLDC GLUCOMTR-MCNC: 145 MG/DL — HIGH (ref 70–99)
GLUCOSE BLDC GLUCOMTR-MCNC: 163 MG/DL — HIGH (ref 70–99)
GLUCOSE BLDC GLUCOMTR-MCNC: 169 MG/DL — HIGH (ref 70–99)
GLUCOSE BLDC GLUCOMTR-MCNC: 297 MG/DL — HIGH (ref 70–99)
GLUCOSE SERPL-MCNC: 138 MG/DL — HIGH (ref 70–99)
HCT VFR BLD CALC: 30.2 % — LOW (ref 39–50)
HGB BLD-MCNC: 9.4 G/DL — LOW (ref 13–17)
IMM GRANULOCYTES NFR BLD AUTO: 0.6 % — SIGNIFICANT CHANGE UP (ref 0–0.9)
INR BLD: 1.11 RATIO — SIGNIFICANT CHANGE UP (ref 0.85–1.18)
LYMPHOCYTES # BLD AUTO: 0.37 K/UL — LOW (ref 1–3.3)
LYMPHOCYTES # BLD AUTO: 7.4 % — LOW (ref 13–44)
MCHC RBC-ENTMCNC: 24.2 PG — LOW (ref 27–34)
MCHC RBC-ENTMCNC: 31.1 GM/DL — LOW (ref 32–36)
MCV RBC AUTO: 77.8 FL — LOW (ref 80–100)
MONOCYTES # BLD AUTO: 0.41 K/UL — SIGNIFICANT CHANGE UP (ref 0–0.9)
MONOCYTES NFR BLD AUTO: 8.2 % — SIGNIFICANT CHANGE UP (ref 2–14)
NEUTROPHILS # BLD AUTO: 4.12 K/UL — SIGNIFICANT CHANGE UP (ref 1.8–7.4)
NEUTROPHILS NFR BLD AUTO: 82 % — HIGH (ref 43–77)
PLATELET # BLD AUTO: 193 K/UL — SIGNIFICANT CHANGE UP (ref 150–400)
POTASSIUM SERPL-MCNC: 3.8 MMOL/L — SIGNIFICANT CHANGE UP (ref 3.5–5.3)
POTASSIUM SERPL-SCNC: 3.8 MMOL/L — SIGNIFICANT CHANGE UP (ref 3.5–5.3)
PROT SERPL-MCNC: 6.7 G/DL — SIGNIFICANT CHANGE UP (ref 6.6–8.7)
PROTHROM AB SERPL-ACNC: 12.3 SEC — SIGNIFICANT CHANGE UP (ref 9.5–13)
RBC # BLD: 3.88 M/UL — LOW (ref 4.2–5.8)
RBC # FLD: 23 % — HIGH (ref 10.3–14.5)
SODIUM SERPL-SCNC: 138 MMOL/L — SIGNIFICANT CHANGE UP (ref 135–145)
WBC # BLD: 5.02 K/UL — SIGNIFICANT CHANGE UP (ref 3.8–10.5)
WBC # FLD AUTO: 5.02 K/UL — SIGNIFICANT CHANGE UP (ref 3.8–10.5)

## 2024-03-25 PROCEDURE — 88342 IMHCHEM/IMCYTCHM 1ST ANTB: CPT | Mod: 26

## 2024-03-25 PROCEDURE — 43262 ENDO CHOLANGIOPANCREATOGRAPH: CPT

## 2024-03-25 PROCEDURE — 99233 SBSQ HOSP IP/OBS HIGH 50: CPT

## 2024-03-25 PROCEDURE — 99232 SBSQ HOSP IP/OBS MODERATE 35: CPT

## 2024-03-25 PROCEDURE — 43239 EGD BIOPSY SINGLE/MULTIPLE: CPT | Mod: 59

## 2024-03-25 PROCEDURE — 88305 TISSUE EXAM BY PATHOLOGIST: CPT | Mod: 26

## 2024-03-25 DEVICE — GWIRE JAGTOME REVOLUTION RX 260CM/0.025IN
Type: IMPLANTABLE DEVICE | Status: NON-FUNCTIONAL
Removed: 2024-03-25

## 2024-03-25 DEVICE — CATH BLLN BIL RX 9-12CM
Type: IMPLANTABLE DEVICE | Status: NON-FUNCTIONAL
Removed: 2024-03-25

## 2024-03-25 RX ORDER — PANTOPRAZOLE SODIUM 20 MG/1
40 TABLET, DELAYED RELEASE ORAL
Refills: 0 | Status: DISCONTINUED | OUTPATIENT
Start: 2024-03-25 | End: 2024-03-26

## 2024-03-25 RX ORDER — ERTAPENEM SODIUM 1 G/1
1000 INJECTION, POWDER, LYOPHILIZED, FOR SOLUTION INTRAMUSCULAR; INTRAVENOUS EVERY 24 HOURS
Refills: 0 | Status: DISCONTINUED | OUTPATIENT
Start: 2024-03-25 | End: 2024-03-29

## 2024-03-25 RX ADMIN — Medication 325 MILLIGRAM(S): at 17:30

## 2024-03-25 RX ADMIN — MIDODRINE HYDROCHLORIDE 5 MILLIGRAM(S): 2.5 TABLET ORAL at 05:03

## 2024-03-25 RX ADMIN — Medication 6: at 21:30

## 2024-03-25 RX ADMIN — CHLORHEXIDINE GLUCONATE 1 APPLICATION(S): 213 SOLUTION TOPICAL at 05:02

## 2024-03-25 RX ADMIN — Medication 2: at 17:30

## 2024-03-25 RX ADMIN — Medication 100 MILLIGRAM(S): at 13:11

## 2024-03-25 RX ADMIN — MIDODRINE HYDROCHLORIDE 5 MILLIGRAM(S): 2.5 TABLET ORAL at 17:30

## 2024-03-25 RX ADMIN — ERTAPENEM SODIUM 120 MILLIGRAM(S): 1 INJECTION, POWDER, LYOPHILIZED, FOR SOLUTION INTRAMUSCULAR; INTRAVENOUS at 05:02

## 2024-03-25 RX ADMIN — TAMSULOSIN HYDROCHLORIDE 0.4 MILLIGRAM(S): 0.4 CAPSULE ORAL at 21:22

## 2024-03-25 RX ADMIN — Medication 2: at 07:52

## 2024-03-25 RX ADMIN — Medication 81 MILLIGRAM(S): at 17:30

## 2024-03-25 NOTE — PROGRESS NOTE ADULT - ASSESSMENT
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  Initial CT abdomen showed acute cholecystitis with contained perforation.  In the ED, found to be in severe septic shock with leukocytosis (WBC 14), transaminitis, & lactic acidosis (8.1), fluid resuscitated and started on pressors (Levophed) and Zosyn.  Admitted to MICU for acute cholecystitis.  Surgery consulted but recommended against surgical intervention.  BCx grew ESBL E. coli, started on ertapenem.  Now off pressors, stable for downgrade to medicine 3/19. Worsening LFTS noted, MRCP with no obstruction. Seen by GI, plan for ERCP     Assessment/Plan:    1. ESBL E.Coli Bacteremia due to Acute Cholecystitis with contained perforation s/p choly tube  -septic shock resolved; on midodrine  -CT a/p: Acute cholecystitis with contained perforation  -US ABD: Acute cholecystitis with walled off perforation.  -S/p IR percutaneous cholecystostomy (3/17)- Initially draining purulent fluid, now draining bilious fluid  -Blood cultures - ESBL E. coli   -repeat blood cultures negative  -continue Invanz day 5 from negative blood culture today   -analgesia as needed  - Worsening LFTS, discussed with GI Npo for MRCP today     2. Hypokalemia and Hypophosphatemia   - resolved   -monitor labs closely    3. T2DM  -A1c 7.8  -Hold home Farxiga & Janumet  -ISS  -ADA diet    4. CAD  -denies anginal symptoms  -continue aspirin 81 mg QD  -Hold Lipitor in the setting of elevated liver enzymes    5. BPH  -continue Flomax     DVT ppx: Held for MRCP

## 2024-03-25 NOTE — PROGRESS NOTE ADULT - SUBJECTIVE AND OBJECTIVE BOX
CC: Follow up     INTERVAL HPI/OVERNIGHT EVENTS: Patient seen and examined, NPO for ERCP today. No further episodes of abdominal pain nausea or vomiting since yesterday. Afebrile.       Vital Signs Last 24 Hrs  T(C): 36.6 (25 Mar 2024 04:26), Max: 36.8 (24 Mar 2024 16:57)  T(F): 97.8 (25 Mar 2024 04:26), Max: 98.3 (24 Mar 2024 16:57)  HR: 78 (25 Mar 2024 04:26) (76 - 87)  BP: 127/69 (25 Mar 2024 04:26) (94/55 - 127/69)  BP(mean): --  RR: 18 (24 Mar 2024 20:00) (18 - 18)  SpO2: 100% (25 Mar 2024 04:26) (99% - 100%)    Parameters below as of 25 Mar 2024 04:26  Patient On (Oxygen Delivery Method): room air        PHYSICAL EXAM:    GENERAL: NAD, AOX3  HEAD:  Atraumatic, Normocephalic  EYES:  conjunctiva and sclera clear  ENMT: Moist mucous membranes  NECK: Supple  CHEST/LUNG: Clear to auscultation bilaterally; No rales, rhonchi, wheezing, or rubs  HEART: Regular rate and rhythm; No murmurs, rubs, or gallops  ABDOMEN: Soft, Nontender, Nondistended; Bowel sounds present  + Shu tube  + PEG tube   EXTREMITIES:  2+ Peripheral Pulses, No clubbing, cyanosis, or edema        MEDICATIONS  (STANDING):  aspirin  chewable 81 milliGRAM(s) Oral daily  chlorhexidine 2% Cloths 1 Application(s) Topical <User Schedule>  dextrose 5%. 1000 milliLiter(s) (50 mL/Hr) IV Continuous <Continuous>  dextrose 5%. 1000 milliLiter(s) (100 mL/Hr) IV Continuous <Continuous>  dextrose 50% Injectable 25 Gram(s) IV Push once  dextrose 50% Injectable 12.5 Gram(s) IV Push once  dextrose 50% Injectable 25 Gram(s) IV Push once  ertapenem  IVPB 1000 milliGRAM(s) IV Intermittent every 24 hours  ferrous    sulfate 325 milliGRAM(s) Oral daily  glucagon  Injectable 1 milliGRAM(s) IntraMuscular once  indomethacin Suppository 100 milliGRAM(s) Rectal once  insulin lispro (ADMELOG) corrective regimen sliding scale   SubCutaneous Before meals and at bedtime  midodrine. 5 milliGRAM(s) Oral three times a day  ondansetron Injectable 4 milliGRAM(s) IV Push once  tamsulosin 0.4 milliGRAM(s) Oral at bedtime    MEDICATIONS  (PRN):  dextrose Oral Gel 15 Gram(s) Oral once PRN Blood Glucose LESS THAN 70 milliGRAM(s)/deciliter  traMADol 25 milliGRAM(s) Oral every 6 hours PRN Moderate Pain (4 - 6)  traMADol 50 milliGRAM(s) Oral every 6 hours PRN Severe Pain (7 - 10)      Allergies    No Known Allergies    Intolerances          LABS:                          9.4    5.02  )-----------( 193      ( 25 Mar 2024 05:12 )             30.2     03-25    138  |  99  |  18.1  ----------------------------<  138<H>  3.8   |  27.0  |  0.35<L>    Ca    8.8      25 Mar 2024 05:12    TPro  6.7  /  Alb  3.0<L>  /  TBili  0.9  /  DBili  x   /  AST  129<H>  /  ALT  201<H>  /  AlkPhos  1456<H>  03-25    PT/INR - ( 25 Mar 2024 05:12 )   PT: 12.3 sec;   INR: 1.11 ratio           Urinalysis Basic - ( 25 Mar 2024 05:12 )    Color: x / Appearance: x / SG: x / pH: x  Gluc: 138 mg/dL / Ketone: x  / Bili: x / Urobili: x   Blood: x / Protein: x / Nitrite: x   Leuk Esterase: x / RBC: x / WBC x   Sq Epi: x / Non Sq Epi: x / Bacteria: x        RADIOLOGY & ADDITIONAL TESTS:

## 2024-03-25 NOTE — PROGRESS NOTE ADULT - SUBJECTIVE AND OBJECTIVE BOX
[facility]  INFECTIOUS DISEASES at [place] / [place] / [place]  =======================================================                               Ronen Thomas MD#   Popeye Boyce MD*                             Cassy Madera MD*   Inez Caceres MD*                              Professor Emeritus:  Dr Rohit Elmore MD^            Diplomates American Board of Internal Medicine & Infectious Diseases                # [place] Office - Appt - Tel  [phone] Fax [phone]                * [place] Office - Appt - Tel [phone] Fax [phone]                      ^[place] Office - Tel  [phone] Fax [phone]                                  Hospital Consult line:  [phone]  =======================================================    GEORGE, PRATIMA [id number]    Follow up:  ESBL Escherichia coli bacteremia     no fevers         Allergies:  No Known Allergies       REVIEW OF SYSTEMS:  CONSTITUTIONAL:  No Fever or chills  HEENT:  No diplopia or blurred vision.  No earache, sore throat or runny nose.  CARDIOVASCULAR:  No chest pain  RESPIRATORY:  No cough, shortness of breath  GASTROINTESTINAL:  No nausea, vomiting or diarrhea.  GENITOURINARY:  No dysuria, frequency or urgency. No Blood in urine  MUSCULOSKELETAL:  no joint aches, no muscle pain  SKIN:  No change in skin, hair or nails.  NEUROLOGIC:  No Headaches      Physical Exam:  GEN: NAD  HEENT: normocephalic and atraumatic. EOMI. PERRL.    NECK: Supple.   LUNGS: CTA B/L.  HEART: RRR  ABDOMEN: Soft, NT, ND.  +BS.    : No CVA tenderness  EXTREMITIES: Without  edema.  MSK: No joint swelling  NEUROLOGIC: No Focal Deficits   SKIN: No rash        Vitals:  T(F): 97.8 (25 Mar 2024 04:26), Max: 98.3 (24 Mar 2024 16:57)  HR: 78 (25 Mar 2024 04:26)  BP: 127/69 (25 Mar 2024 04:26)  RR: 18 (24 Mar 2024 20:00)  SpO2: 100% (25 Mar 2024 04:26) (99% - 100%)  temp max in last 48H T(F): , Max: 98.3 (03-24-24 @ 16:57)    Current Antibiotics:  ertapenem  IVPB 1000 milliGRAM(s) IV Intermittent every 24 hours    Other medications:  aspirin  chewable 81 milliGRAM(s) Oral daily  chlorhexidine 2% Cloths 1 Application(s) Topical <User Schedule>  dextrose 5%. 1000 milliLiter(s) IV Continuous <Continuous>  dextrose 5%. 1000 milliLiter(s) IV Continuous <Continuous>  dextrose 50% Injectable 25 Gram(s) IV Push once  dextrose 50% Injectable 12.5 Gram(s) IV Push once  dextrose 50% Injectable 25 Gram(s) IV Push once  ferrous    sulfate 325 milliGRAM(s) Oral daily  glucagon  Injectable 1 milliGRAM(s) IntraMuscular once  indomethacin Suppository 100 milliGRAM(s) Rectal once  insulin lispro (ADMELOG) corrective regimen sliding scale   SubCutaneous Before meals and at bedtime  midodrine. 5 milliGRAM(s) Oral three times a day  ondansetron Injectable 4 milliGRAM(s) IV Push once  tamsulosin 0.4 milliGRAM(s) Oral at bedtime                            9.4    5.02  )-----------( 193      ( 25 Mar 2024 05:12 )             30.2     03-25    138  |  99  |  18.1  ----------------------------<  138<H>  3.8   |  27.0  |  0.35<L>    Ca    8.8      25 Mar 2024 05:12    TPro  6.7  /  Alb  3.0<L>  /  TBili  0.9  /  DBili  x   /  AST  129<H>  /  ALT  201<H>  /  AlkPhos  1456<H>  03-25    RECENT CULTURES:  03-21 @ 08:26 .Blood Blood-Peripheral     No growth at 72 Hours    03-21 @ 08:19 .Blood Blood-Peripheral     No growth at 72 Hours    03-17 @ 18:22 Bile Bile Fluid Escherichia coli    Rare Escherichia coli  No polymorphonuclear cells seen  No organisms seen  by cytocentrifuge    03-17 @ 13:13 Clean Catch Clean Catch (Midstream)     No growth    03-17 @ 08:30 .Blood Blood-Peripheral     Growth in aerobic and anaerobic bottles: Escherichia coli  Growth in aerobic and anaerobic bottles: Escherichia coli ESBL  See previous culture [id number]  Growth in aerobic and anaerobic bottles: Gram Negative Rods    03-17 @ 08:22    RVP  NotDete    03-17 @ 08:15 .Blood Blood-Peripheral Blood Culture PCR  Escherichia coli  Escherichia coli ESBL    Growth in aerobic and anaerobic bottles: Escherichia coli  Growth in aerobic bottle: Escherichia coli ESBL  Direct identification is available within approximately 3-5  hours either by Blood Panel Multiplexed PCR or Direct  MALDI-TOF. Details: [email or web address]  Growth in aerobic bottle: Gram Negative Rods  Growth in anaerobic bottle: Gram Negative Rods      WBC Count: 5.02 K/uL (03-25-24 @ 05:12)  WBC Count: 4.28 K/uL (03-24-24 @ 05:17)  WBC Count: 5.53 K/uL (03-23-24 @ 06:12)  WBC Count: 4.95 K/uL (03-22-24 @ 04:55)  WBC Count: 5.11 K/uL (03-21-24 @ 05:30)    Creatinine: 0.35 mg/dL (03-25-24 @ 05:12)  Creatinine: 0.31 mg/dL (03-24-24 @ 05:17)  Creatinine: 0.34 mg/dL (03-23-24 @ 06:12)  Creatinine: 0.36 mg/dL (03-22-24 @ 04:55)  Creatinine: 0.26 mg/dL (03-21-24 @ 05:30)    Procalcitonin, Serum: 0.28 ng/mL (03-22-24 @ 04:55)  Procalcitonin, Serum: 2.03 ng/mL (03-19-24 @ 02:45)  Procalcitonin, Serum: 3.55 ng/mL (03-18-24 @ 05:00)     SARS-CoV-2: NotDetec (03-17-24 @ 08:22)

## 2024-03-25 NOTE — PROGRESS NOTE ADULT - ASSESSMENT
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 Patient presented for severe abdominal pain.  Initial CT abdomen showed acute cholecystitis with contained perforation.  In the ED, found to be in severe septic shock with leukocytosis (WBC 14), transaminitis, & lactic acidosis (8.1), fluid resuscitated and started on pressors (Levophed) and Zosyn.  Admitted to MICU for acute cholecystitis.  Surgery consulted but recommended against surgical intervention.  Blood culture grew ESBL E. coli, started on ertapenem.  Now off pressors and transferred out of MICU 3/19. Continued on Ertapenem.      ESBL Escherichia coli bacteremia   acute cholecystitis with contained perforation  s/p Septic shock   Transaminitis, worsening       - Blood cultures  3/17 reporting Escherichia coli  - Repeat blood cultures 3/21 no growth   - Bile fluid culture 3/17 reporting Escherichia coli  - RVP/COVID 19 PCR 3/17 negative   - Urine Cx no growth   - CT A/P reporting Acute cholecystitis with contained perforation  - US ABD reporting Acute cholecystitis with walled off perforation.  - S/p IR percutaneous cholecystostomy (3/17)  - UA 3/17 negative for UTI   - Procalcitonin level 0.28  - Worsening LFTs, GI consult noned plan for ERCP today   - Monitor LFTs  - Continue Ertapenem 1gm IV q 24hours   - Hold off on PICC/Midline for now unless needed for reasons other than infectious diseases  - Follow up cultures  - Trend Fever  - Trend WBC      Will Follow    Discussed treatment plan with: Dr Ingram

## 2024-03-26 ENCOUNTER — TRANSCRIPTION ENCOUNTER (OUTPATIENT)
Age: 74
End: 2024-03-26

## 2024-03-26 LAB
ALBUMIN SERPL ELPH-MCNC: 2.9 G/DL — LOW (ref 3.3–5.2)
ALP SERPL-CCNC: 906 U/L — HIGH (ref 40–120)
ALT FLD-CCNC: 132 U/L — HIGH
ANION GAP SERPL CALC-SCNC: 10 MMOL/L — SIGNIFICANT CHANGE UP (ref 5–17)
AST SERPL-CCNC: 45 U/L — HIGH
BASOPHILS # BLD AUTO: 0.01 K/UL — SIGNIFICANT CHANGE UP (ref 0–0.2)
BASOPHILS # BLD AUTO: 0.02 K/UL — SIGNIFICANT CHANGE UP (ref 0–0.2)
BASOPHILS NFR BLD AUTO: 0.2 % — SIGNIFICANT CHANGE UP (ref 0–2)
BASOPHILS NFR BLD AUTO: 0.3 % — SIGNIFICANT CHANGE UP (ref 0–2)
BILIRUB SERPL-MCNC: 0.6 MG/DL — SIGNIFICANT CHANGE UP (ref 0.4–2)
BUN SERPL-MCNC: 37.4 MG/DL — HIGH (ref 8–20)
CALCIUM SERPL-MCNC: 8.6 MG/DL — SIGNIFICANT CHANGE UP (ref 8.4–10.5)
CHLORIDE SERPL-SCNC: 98 MMOL/L — SIGNIFICANT CHANGE UP (ref 96–108)
CO2 SERPL-SCNC: 28 MMOL/L — SIGNIFICANT CHANGE UP (ref 22–29)
CREAT SERPL-MCNC: 0.36 MG/DL — LOW (ref 0.5–1.3)
CULTURE RESULTS: SIGNIFICANT CHANGE UP
CULTURE RESULTS: SIGNIFICANT CHANGE UP
EGFR: 120 ML/MIN/1.73M2 — SIGNIFICANT CHANGE UP
EOSINOPHIL # BLD AUTO: 0.01 K/UL — SIGNIFICANT CHANGE UP (ref 0–0.5)
EOSINOPHIL # BLD AUTO: 0.02 K/UL — SIGNIFICANT CHANGE UP (ref 0–0.5)
EOSINOPHIL NFR BLD AUTO: 0.2 % — SIGNIFICANT CHANGE UP (ref 0–6)
EOSINOPHIL NFR BLD AUTO: 0.3 % — SIGNIFICANT CHANGE UP (ref 0–6)
GLUCOSE BLDC GLUCOMTR-MCNC: 200 MG/DL — HIGH (ref 70–99)
GLUCOSE BLDC GLUCOMTR-MCNC: 210 MG/DL — HIGH (ref 70–99)
GLUCOSE BLDC GLUCOMTR-MCNC: 284 MG/DL — HIGH (ref 70–99)
GLUCOSE BLDC GLUCOMTR-MCNC: 299 MG/DL — HIGH (ref 70–99)
GLUCOSE SERPL-MCNC: 274 MG/DL — HIGH (ref 70–99)
HCT VFR BLD CALC: 26.5 % — LOW (ref 39–50)
HCT VFR BLD CALC: 27.1 % — LOW (ref 39–50)
HGB BLD-MCNC: 8.3 G/DL — LOW (ref 13–17)
HGB BLD-MCNC: 8.5 G/DL — LOW (ref 13–17)
IMM GRANULOCYTES NFR BLD AUTO: 0.5 % — SIGNIFICANT CHANGE UP (ref 0–0.9)
IMM GRANULOCYTES NFR BLD AUTO: 0.6 % — SIGNIFICANT CHANGE UP (ref 0–0.9)
LYMPHOCYTES # BLD AUTO: 0.33 K/UL — LOW (ref 1–3.3)
LYMPHOCYTES # BLD AUTO: 0.53 K/UL — LOW (ref 1–3.3)
LYMPHOCYTES # BLD AUTO: 5.9 % — LOW (ref 13–44)
LYMPHOCYTES # BLD AUTO: 8.5 % — LOW (ref 13–44)
MCHC RBC-ENTMCNC: 24.9 PG — LOW (ref 27–34)
MCHC RBC-ENTMCNC: 24.9 PG — LOW (ref 27–34)
MCHC RBC-ENTMCNC: 31.3 GM/DL — LOW (ref 32–36)
MCHC RBC-ENTMCNC: 31.4 GM/DL — LOW (ref 32–36)
MCV RBC AUTO: 79.2 FL — LOW (ref 80–100)
MCV RBC AUTO: 79.6 FL — LOW (ref 80–100)
MONOCYTES # BLD AUTO: 0.45 K/UL — SIGNIFICANT CHANGE UP (ref 0–0.9)
MONOCYTES # BLD AUTO: 0.5 K/UL — SIGNIFICANT CHANGE UP (ref 0–0.9)
MONOCYTES NFR BLD AUTO: 8 % — SIGNIFICANT CHANGE UP (ref 2–14)
MONOCYTES NFR BLD AUTO: 8.1 % — SIGNIFICANT CHANGE UP (ref 2–14)
NEUTROPHILS # BLD AUTO: 4.79 K/UL — SIGNIFICANT CHANGE UP (ref 1.8–7.4)
NEUTROPHILS # BLD AUTO: 5.1 K/UL — SIGNIFICANT CHANGE UP (ref 1.8–7.4)
NEUTROPHILS NFR BLD AUTO: 82.2 % — HIGH (ref 43–77)
NEUTROPHILS NFR BLD AUTO: 85.2 % — HIGH (ref 43–77)
PLATELET # BLD AUTO: 190 K/UL — SIGNIFICANT CHANGE UP (ref 150–400)
PLATELET # BLD AUTO: 215 K/UL — SIGNIFICANT CHANGE UP (ref 150–400)
POTASSIUM SERPL-MCNC: 4.4 MMOL/L — SIGNIFICANT CHANGE UP (ref 3.5–5.3)
POTASSIUM SERPL-SCNC: 4.4 MMOL/L — SIGNIFICANT CHANGE UP (ref 3.5–5.3)
PROT SERPL-MCNC: 6 G/DL — LOW (ref 6.6–8.7)
RBC # BLD: 3.33 M/UL — LOW (ref 4.2–5.8)
RBC # BLD: 3.42 M/UL — LOW (ref 4.2–5.8)
RBC # FLD: 22.9 % — HIGH (ref 10.3–14.5)
RBC # FLD: 23 % — HIGH (ref 10.3–14.5)
SODIUM SERPL-SCNC: 136 MMOL/L — SIGNIFICANT CHANGE UP (ref 135–145)
SPECIMEN SOURCE: SIGNIFICANT CHANGE UP
SPECIMEN SOURCE: SIGNIFICANT CHANGE UP
WBC # BLD: 5.62 K/UL — SIGNIFICANT CHANGE UP (ref 3.8–10.5)
WBC # BLD: 6.32 K/UL — SIGNIFICANT CHANGE UP (ref 3.8–10.5)
WBC # FLD AUTO: 5.62 K/UL — SIGNIFICANT CHANGE UP (ref 3.8–10.5)
WBC # FLD AUTO: 6.32 K/UL — SIGNIFICANT CHANGE UP (ref 3.8–10.5)

## 2024-03-26 PROCEDURE — 99233 SBSQ HOSP IP/OBS HIGH 50: CPT

## 2024-03-26 PROCEDURE — 99231 SBSQ HOSP IP/OBS SF/LOW 25: CPT | Mod: FS

## 2024-03-26 PROCEDURE — 99232 SBSQ HOSP IP/OBS MODERATE 35: CPT

## 2024-03-26 PROCEDURE — 74177 CT ABD & PELVIS W/CONTRAST: CPT | Mod: 26

## 2024-03-26 RX ORDER — INSULIN GLARGINE 100 [IU]/ML
5 INJECTION, SOLUTION SUBCUTANEOUS AT BEDTIME
Refills: 0 | Status: DISCONTINUED | OUTPATIENT
Start: 2024-03-26 | End: 2024-03-29

## 2024-03-26 RX ORDER — SODIUM CHLORIDE 9 MG/ML
1000 INJECTION INTRAMUSCULAR; INTRAVENOUS; SUBCUTANEOUS
Refills: 0 | Status: COMPLETED | OUTPATIENT
Start: 2024-03-26 | End: 2024-03-26

## 2024-03-26 RX ORDER — PANTOPRAZOLE SODIUM 20 MG/1
40 TABLET, DELAYED RELEASE ORAL EVERY 12 HOURS
Refills: 0 | Status: DISCONTINUED | OUTPATIENT
Start: 2024-03-26 | End: 2024-03-29

## 2024-03-26 RX ADMIN — PANTOPRAZOLE SODIUM 40 MILLIGRAM(S): 20 TABLET, DELAYED RELEASE ORAL at 17:34

## 2024-03-26 RX ADMIN — Medication 2: at 16:03

## 2024-03-26 RX ADMIN — MIDODRINE HYDROCHLORIDE 5 MILLIGRAM(S): 2.5 TABLET ORAL at 11:43

## 2024-03-26 RX ADMIN — MIDODRINE HYDROCHLORIDE 5 MILLIGRAM(S): 2.5 TABLET ORAL at 05:40

## 2024-03-26 RX ADMIN — PANTOPRAZOLE SODIUM 40 MILLIGRAM(S): 20 TABLET, DELAYED RELEASE ORAL at 05:39

## 2024-03-26 RX ADMIN — CHLORHEXIDINE GLUCONATE 1 APPLICATION(S): 213 SOLUTION TOPICAL at 05:41

## 2024-03-26 RX ADMIN — Medication 325 MILLIGRAM(S): at 11:43

## 2024-03-26 RX ADMIN — SODIUM CHLORIDE 75 MILLILITER(S): 9 INJECTION INTRAMUSCULAR; INTRAVENOUS; SUBCUTANEOUS at 08:50

## 2024-03-26 RX ADMIN — Medication 81 MILLIGRAM(S): at 11:42

## 2024-03-26 RX ADMIN — TAMSULOSIN HYDROCHLORIDE 0.4 MILLIGRAM(S): 0.4 CAPSULE ORAL at 21:02

## 2024-03-26 RX ADMIN — Medication 6: at 11:43

## 2024-03-26 RX ADMIN — INSULIN GLARGINE 5 UNIT(S): 100 INJECTION, SOLUTION SUBCUTANEOUS at 21:02

## 2024-03-26 RX ADMIN — Medication 6: at 07:43

## 2024-03-26 RX ADMIN — Medication 4: at 21:02

## 2024-03-26 RX ADMIN — MIDODRINE HYDROCHLORIDE 5 MILLIGRAM(S): 2.5 TABLET ORAL at 17:34

## 2024-03-26 RX ADMIN — ERTAPENEM SODIUM 120 MILLIGRAM(S): 1 INJECTION, POWDER, LYOPHILIZED, FOR SOLUTION INTRAMUSCULAR; INTRAVENOUS at 05:40

## 2024-03-26 NOTE — PROGRESS NOTE ADULT - SUBJECTIVE AND OBJECTIVE BOX
[facility]  INFECTIOUS DISEASES at [place] / [place] / [place]  =======================================================                               Ronen Thomas MD#   Popeye Boyce MD*                             Cassy Madera MD*   Inez Caceres MD*                              Professor Emeritus:  Dr Rohit Elmore MD^            Diplomates American Board of Internal Medicine & Infectious Diseases                # [place] Office - Appt - Tel  [phone] Fax [phone]                * [place] Office - Appt - Tel [phone] Fax [phone]                      ^[place] Office - Tel  [phone] Fax [phone]                                  Hospital Consult line:  [phone]  =======================================================    GEORGE, PRATIMA [id number]    Follow up:  ESBL Escherichia coli bacteremia     no fevers         Allergies:  No Known Allergies       REVIEW OF SYSTEMS:  CONSTITUTIONAL:  No Fever or chills  HEENT:  No diplopia or blurred vision.  No earache, sore throat or runny nose.  CARDIOVASCULAR:  No chest pain  RESPIRATORY:  No cough, shortness of breath  GASTROINTESTINAL:  No nausea, vomiting or diarrhea.  GENITOURINARY:  No dysuria, frequency or urgency. No Blood in urine  MUSCULOSKELETAL:  no joint aches, no muscle pain  SKIN:  No change in skin, hair or nails.  NEUROLOGIC:  No Headaches      Physical Exam:  GEN: NAD  HEENT: normocephalic and atraumatic. EOMI. PERRL.    NECK: Supple.   LUNGS: CTA B/L.  HEART: RRR  ABDOMEN: Soft, NT, ND.  +BS.    : No CVA tenderness  EXTREMITIES: Without  edema.  MSK: No joint swelling  NEUROLOGIC: No Focal Deficits   SKIN: No rash        Vitals:  T(F): 97.8 (26 Mar 2024 05:14), Max: 99.3 (25 Mar 2024 23:23)  HR: 86 (26 Mar 2024 05:14)  BP: 92/55 (26 Mar 2024 05:14)  RR: 18 (26 Mar 2024 05:14)  SpO2: 96% (26 Mar 2024 05:14) (96% - 99%)  temp max in last 48H T(F): , Max: 99.3 (03-25-24 @ 23:23)    Current Antibiotics:  ertapenem  IVPB 1000 milliGRAM(s) IV Intermittent every 24 hours    Other medications:  aspirin  chewable 81 milliGRAM(s) Oral daily  chlorhexidine 2% Cloths 1 Application(s) Topical <User Schedule>  dextrose 5%. 1000 milliLiter(s) IV Continuous <Continuous>  dextrose 5%. 1000 milliLiter(s) IV Continuous <Continuous>  dextrose 50% Injectable 25 Gram(s) IV Push once  dextrose 50% Injectable 12.5 Gram(s) IV Push once  dextrose 50% Injectable 25 Gram(s) IV Push once  ferrous    sulfate 325 milliGRAM(s) Oral daily  glucagon  Injectable 1 milliGRAM(s) IntraMuscular once  insulin lispro (ADMELOG) corrective regimen sliding scale   SubCutaneous Before meals and at bedtime  midodrine. 5 milliGRAM(s) Oral three times a day  ondansetron Injectable 4 milliGRAM(s) IV Push once  pantoprazole    Tablet 40 milliGRAM(s) Oral before breakfast  tamsulosin 0.4 milliGRAM(s) Oral at bedtime                            8.5    5.62  )-----------( 190      ( 26 Mar 2024 05:37 )             27.1     03-26    136  |  98  |  37.4<H>  ----------------------------<  274<H>  4.4   |  28.0  |  0.36<L>    Ca    8.6      26 Mar 2024 05:37    TPro  6.0<L>  /  Alb  2.9<L>  /  TBili  0.6  /  DBili  x   /  AST  45<H>  /  ALT  132<H>  /  AlkPhos  906<H>  03-26    RECENT CULTURES:  03-21 @ 08:26 .Blood Blood-Peripheral     No growth at 4 days    03-21 @ 08:19 .Blood Blood-Peripheral     No growth at 4 days    03-17 @ 18:22 Bile Bile Fluid Escherichia coli    Rare Escherichia coli  No polymorphonuclear cells seen  No organisms seen  by cytocentrifuge    03-17 @ 13:13 Clean Catch Clean Catch (Midstream)     No growth    03-17 @ 08:30 .Blood Blood-Peripheral     Growth in aerobic and anaerobic bottles: Escherichia coli  Growth in aerobic and anaerobic bottles: Escherichia coli ESBL  See previous culture [id number]  Growth in aerobic and anaerobic bottles: Gram Negative Rods    03-17 @ 08:22    RVP  NotDetec    03-17 @ 08:15 .Blood Blood-Peripheral Blood Culture PCR  Escherichia coli  Escherichia coli ESBL    Growth in aerobic and anaerobic bottles: Escherichia coli  Growth in aerobic bottle: Escherichia coli ESBL  Direct identification is available within approximately 3-5  hours either by Blood Panel Multiplexed PCR or Direct  MALDI-TOF. Details: [email or web address]  Growth in aerobic bottle: Gram Negative Rods  Growth in anaerobic bottle: Gram Negative Rods      WBC Count: 5.62 K/uL (03-26-24 @ 05:37)  WBC Count: 5.02 K/uL (03-25-24 @ 05:12)  WBC Count: 4.28 K/uL (03-24-24 @ 05:17)  WBC Count: 5.53 K/uL (03-23-24 @ 06:12)  WBC Count: 4.95 K/uL (03-22-24 @ 04:55)    Creatinine: 0.36 mg/dL (03-26-24 @ 05:37)  Creatinine: 0.35 mg/dL (03-25-24 @ 05:12)  Creatinine: 0.31 mg/dL (03-24-24 @ 05:17)  Creatinine: 0.34 mg/dL (03-23-24 @ 06:12)  Creatinine: 0.36 mg/dL (03-22-24 @ 04:55)    Procalcitonin, Serum: 0.28 ng/mL (03-22-24 @ 04:55)  Procalcitonin, Serum: 2.03 ng/mL (03-19-24 @ 02:45)  Procalcitonin, Serum: 3.55 ng/mL (03-18-24 @ 05:00)     SARS-CoV-2: NotDetec (03-17-24 @ 08:22)

## 2024-03-26 NOTE — PROGRESS NOTE ADULT - ASSESSMENT
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  Initial CT abdomen showed acute cholecystitis with contained perforation.  In the ED, found to be in severe septic shock with leukocytosis (WBC 14), transaminitis, & lactic acidosis (8.1), fluid resuscitated and started on pressors (Levophed) and Zosyn.  Admitted to MICU for acute cholecystitis.  Surgery consulted but recommended against surgical intervention.  BCx grew ESBL E. coli, started on ertapenem.  Now off pressors, stable for downgrade to medicine 3/19. Worsening LFTS noted, MRCP with no obstruction. Seen by GI, status post ERCP with sphincterotomy     Assessment/Plan:    1. ESBL E.Coli Bacteremia due to Acute Cholecystitis with contained perforation s/p choly tube  -septic shock resolved; on midodrine  -CT a/p: Acute cholecystitis with contained perforation  -US ABD: Acute cholecystitis with walled off perforation.  -S/p IR percutaneous cholecystostomy (3/17)- Initially draining purulent fluid, now draining bilious fluid  -Blood cultures - ESBL E. coli   -repeat blood cultures negative  -continue Invanz day 6 from negative blood culture today; ID following awaiting recommendations regarding duration  - Status post ERCP after worsening LFTS status post sphincterotomy. LFTs downtrending this morning   - Repeat CBC this afternoon, drop in hb this morning post procedure       2. Hypokalemia and Hypophosphatemia   - resolved   -monitor labs closely    3. T2DM  - BSL elevated this morning; monitor closely   -A1c 7.8  -Hold home Farxiga & Janumet  -ISS  -ADA diet    4. CAD  -denies anginal symptoms  -continue aspirin 81 mg QD  -Hold Lipitor in the setting of elevated liver enzymes    5. BPH  -continue Flomax     DVT ppx: Hold for anemia, repeat CBC> Resume lovenox in AM if hb.hct stable     Discharge disposition: Unclear, patient with no remaining TYLER days. Case management working with family to make arrangements for home with home care.

## 2024-03-26 NOTE — PROGRESS NOTE ADULT - SUBJECTIVE AND OBJECTIVE BOX
Chief Complaint:  Patient is a 73y old  Male who presents with a chief complaint of Severe sepsis       Follow up: s/p ERCP with sphincterotomy 3/25/24    HPI/ 24 hr events: Patient seen and examined at bedside. Pt feeling well this morning. Tolerating pureed/mildly thickened liquid diet. Passing minimal flatus and one BM overnight, soft and brown. Denies nausea, vomiting, diarrhea, abdominal pain, hematemesis, hematochezia, melena. Vitals are overall stable, no leukocytosis and Hgb stable.     Elevated liver chemistry with improvement  AlkPhos: 1456 --> today 906  AST: 129 --> today 45  ALT: 201 --> today 132      REVIEW OF SYSTEMS:   General: Negative  HEENT: Negative  CV: Negative  Respiratory: Negative  GI: See HPI  : Negative  MSK: Negative  Hematologic: Negative  Skin: Negative    MEDICATIONS:   MEDICATIONS  (STANDING):  aspirin  chewable 81 milliGRAM(s) Oral daily  chlorhexidine 2% Cloths 1 Application(s) Topical <User Schedule>  dextrose 5%. 1000 milliLiter(s) (50 mL/Hr) IV Continuous <Continuous>  dextrose 5%. 1000 milliLiter(s) (100 mL/Hr) IV Continuous <Continuous>  dextrose 50% Injectable 25 Gram(s) IV Push once  dextrose 50% Injectable 12.5 Gram(s) IV Push once  dextrose 50% Injectable 25 Gram(s) IV Push once  ertapenem  IVPB 1000 milliGRAM(s) IV Intermittent every 24 hours  ferrous    sulfate 325 milliGRAM(s) Oral daily  glucagon  Injectable 1 milliGRAM(s) IntraMuscular once  insulin lispro (ADMELOG) corrective regimen sliding scale   SubCutaneous Before meals and at bedtime  midodrine. 5 milliGRAM(s) Oral three times a day  ondansetron Injectable 4 milliGRAM(s) IV Push once  pantoprazole    Tablet 40 milliGRAM(s) Oral before breakfast  tamsulosin 0.4 milliGRAM(s) Oral at bedtime    MEDICATIONS  (PRN):  dextrose Oral Gel 15 Gram(s) Oral once PRN Blood Glucose LESS THAN 70 milliGRAM(s)/deciliter  traMADol 25 milliGRAM(s) Oral every 6 hours PRN Moderate Pain (4 - 6)  traMADol 50 milliGRAM(s) Oral every 6 hours PRN Severe Pain (7 - 10)            DIET:  Diet, Pureed:   DASH/TLC Sodium & Cholesterol Restricted (DASH)  Mildly Thick Liquids (MILDTHICKLIQS)  Halal  Tube Feeding Modality: Gastrostomy  Glucerna 1.5 Nando (GLUCERNA1.5)  Total Volume for 24 Hours (mL): 1080  Bolus  Total Volume of Bolus (mL):  270  Total # of Feeds: 4  Tube Feed Frequency: Every 6 hours   Tube Feed Start Time: 12:00  Bolus Feed Rate (mL per Hour): 270   Bolus Feed Duration (in Hours): 0.5  Bolus Feed Instructions:  Please give 100 ml free water flush before feeding bolus and 100 ml free water flusch after feeding bolus  Free Water Flush  Bolus   Total Volume per Flush (mL): 200   Frequency: Every 6 Hours (24 @ 09:49) [Active]          ALLERGIES:   Allergies    No Known Allergies    Intolerances        VITAL SIGNS:   Vital Signs Last 24 Hrs  T(C): 36.6 (26 Mar 2024 05:14), Max: 37.4 (25 Mar 2024 23:23)  T(F): 97.8 (26 Mar 2024 05:14), Max: 99.3 (25 Mar 2024 23:23)  HR: 86 (26 Mar 2024 05:14) (85 - 86)  BP: 92/55 (26 Mar 2024 05:14) (92/55 - 131/81)  BP(mean): --  RR: 18 (26 Mar 2024 05:14) (16 - 18)  SpO2: 96% (26 Mar 2024 05:14) (96% - 99%)    Parameters below as of 26 Mar 2024 05:14  Patient On (Oxygen Delivery Method): room air      I&O's Summary    25 Mar 2024 07:01  -  26 Mar 2024 07:00  --------------------------------------------------------  IN: 940 mL / OUT: 160 mL / NET: 780 mL        PHYSICAL EXAM:   GENERAL:  No acute distress  HEENT:  NC/AT, conjunctiva clear, sclera anicteric.   CHEST:  No increased effort  HEART:  Regular rate  ABDOMEN:  Soft, non-tender, non-distended, positive bowel sounds, no rebound or guarding. PEG tube. Right perc gemini tube.  EXTREMITIES: No edema  SKIN:  Warm, dry  NEURO:  Calm, cooperative    LABS:                        8.5    5.62  )-----------( 190      ( 26 Mar 2024 05:37 )             27.1     Hemoglobin: 8.5 g/dL (24 @ 05:37)  Hemoglobin: 9.4 g/dL (24 @ 05:12)  Hemoglobin: 9.1 g/dL (24 @ 05:17)        136  |  98  |  37.4<H>  ----------------------------<  274<H>  4.4   |  28.0  |  0.36<L>    Ca    8.6      26 Mar 2024 05:37    TPro  6.0<L>  /  Alb  2.9<L>  /  TBili  0.6  /  DBili  x   /  AST  45<H>  /  ALT  132<H>  /  AlkPhos  906<H>      LIVER FUNCTIONS - ( 26 Mar 2024 05:37 )  Alb: 2.9 g/dL / Pro: 6.0 g/dL / ALK PHOS: 906 U/L / ALT: 132 U/L / AST: 45 U/L / GGT: x             PT/INR - ( 25 Mar 2024 05:12 )   PT: 12.3 sec;   INR: 1.11 ratio                                                 RADIOLOGY & ADDITIONAL STUDIES:          EGD w/ ERCP Report        Date: 3/25/2024 1:45 PM        Patient Name: PRATIMA VAZQUEZ        MRN: [id number]        Account Number:        [id number]        Gender: Male         (age): 1950 (73)        Instrument(s):        SANTIAGO [id number]        [facility] [id number]        Attending/Fellow:        Cedrick Calabrese MD                Procedure Room #:        PROCEDURE ROOM 1                        ASA Class:        P3 - 3/25/2024 3:55 PM Cedrick Calabrese MD        History of Present Illness:        History of perforated gallbladder presenting for ERCP with biliary    sphincterotomy        Administered Medications:        As per Anesthesiology Record        Indications:        Perforated gallbladder - K82.2        Cholangitis: 576.1        Procedure:        The procedure, indications, preparation and potential complications were    explained to the patient, who indicated understanding and signed the    corresponding consent forms. MAC was administered by anesthesiologist.    Continuous pulse oximetry and blood pressure monitoring were used throughout the    procedure. Supplemental oxygen was used. Patient was placed in the left lateral    decubitus position. The endoscope was introduced through the mouth and advanced    under direct visualization until the second part of the duodenum was reached.    Patient tolerance to the procedure was good. The procedure was not difficult.    Blood loss was minimal.        Limitations/Complications:        There were no apparent limitations or complications        ERCP Findings:         radiograph was taken. Limited views of the esophagus, stomach and duodenum    were unremarkable. The major papilla was noted in the second portion of duodenum    and appeared normal. The major papilla was cannulated, using wire-guided    cannulation, using a Jagtome preloaded with a 0.035 inch Jagwire, the common    bile duct was cannulated, the wire was seen in the CBD, cholangiogram was    performed confirming location. There was also evidence of drain, percutaneous    cholestostomy. Cholangiogram without evidence of filling defect in the CBD.    There was evidence of a small gallstone retained within the gallbladder on    Fluoroscopy. The CBD was unremarkable. Sphincterotomy was performed without    bleeding. The bile duct was swept with a 9 mm biliary extraction balloon. An    occlusion cholangiogram was then performed opacifying the bile duct and patent    cystic duct. The scope was withdrawn and procedure terminated. Post-procedure    xray did not show air under the diaphragm. Total fluoroscopy time was 47    seconds.        Findings:        Esophagus Additional esophagus findings - There was significant esophagitis (LA    grade D), approximately 10 cm in length in the distal esophagus..        Stomach Additional stomach findings - There was evidence of a retained PEG tube    bumper seen in the gastric body. There was an area of nodular irregular    appearing mucosa in the gastric antrum/incisura, Cold forceps biopsies obtained,    pathology pending. May represent intestinal metaplasia..        Duodenum Additional duodenum findings - The visualized portion of the duodenum    from the bulb through to the second portion of the duodenum appeared normal. The    papilla appeared normal. There was notably an extended intra-duodenal segment    which may represent the underlying cause of biliary hypokinesia..        Impressions:  LA grade D esophagitis.    S/p PEG tube.    Nodular gastric mucosa, cold forceps biopsies, potentially may represent  gastritis / intestinal metaplasia, path pending.  Elongated intra duodenal segment.  ERCP with biliary sphincterotomy performed.        Plan:  Await pathology results.  Advance diet as tolerated  Continue current medical regimen.  Return to floor for further management  Interval cholecystectomy for perforated gallbladder      Specimens:    Jar # 1 :  Biopsy in the Gastric mucosa  Test(s) requested: Histology    Pathology:  Pathology was sent to lab, waiting for results  Cedrick Calabrese MD  Version 1, Electronically signed on 3/25/2024 4:12:09 PM by Cedrick Calabrese MD

## 2024-03-26 NOTE — PROGRESS NOTE ADULT - SUBJECTIVE AND OBJECTIVE BOX
Interventional Radiology Follow-Up Note    This is a 73y Male s/p cholecystostomy tube placement on 3/17/24 in Interventional Radiology with Dr. Harrington.      Medication:  MEDICATIONS  (STANDING):  aspirin  chewable 81 milliGRAM(s) Oral daily  chlorhexidine 2% Cloths 1 Application(s) Topical <User Schedule>  dextrose 5%. 1000 milliLiter(s) (50 mL/Hr) IV Continuous <Continuous>  dextrose 5%. 1000 milliLiter(s) (100 mL/Hr) IV Continuous <Continuous>  dextrose 50% Injectable 25 Gram(s) IV Push once  dextrose 50% Injectable 12.5 Gram(s) IV Push once  dextrose 50% Injectable 25 Gram(s) IV Push once  ertapenem  IVPB 1000 milliGRAM(s) IV Intermittent every 24 hours  ferrous    sulfate 325 milliGRAM(s) Oral daily  glucagon  Injectable 1 milliGRAM(s) IntraMuscular once  insulin lispro (ADMELOG) corrective regimen sliding scale   SubCutaneous Before meals and at bedtime  midodrine. 5 milliGRAM(s) Oral three times a day  ondansetron Injectable 4 milliGRAM(s) IV Push once  pantoprazole    Tablet 40 milliGRAM(s) Oral before breakfast  tamsulosin 0.4 milliGRAM(s) Oral at bedtime    MEDICATIONS  (PRN):  dextrose Oral Gel 15 Gram(s) Oral once PRN Blood Glucose LESS THAN 70 milliGRAM(s)/deciliter  traMADol 25 milliGRAM(s) Oral every 6 hours PRN Moderate Pain (4 - 6)  traMADol 50 milliGRAM(s) Oral every 6 hours PRN Severe Pain (7 - 10)      Vitals:  ICU Vital Signs Last 24 Hrs  T(C): 36.6 (26 Mar 2024 11:42), Max: 37.4 (25 Mar 2024 23:23)  T(F): 97.9 (26 Mar 2024 11:42), Max: 99.3 (25 Mar 2024 23:23)  HR: 92 (26 Mar 2024 11:42) (85 - 92)  BP: 93/60 (26 Mar 2024 11:42) (92/55 - 131/81)  BP(mean): --  ABP: --  ABP(mean): --  RR: 18 (26 Mar 2024 11:42) (16 - 18)  SpO2: 98% (26 Mar 2024 11:42) (96% - 99%)    O2 Parameters below as of 26 Mar 2024 05:14  Patient On (Oxygen Delivery Method): room air    I&O's Detail    25 Mar 2024 07:01  -  26 Mar 2024 07:00  --------------------------------------------------------  IN:    Enteral Tube Flush: 400 mL    Glucerna 1.5: 540 mL  Total IN: 940 mL    OUT:    Drain (mL): 160 mL  Total OUT: 160 mL    Total NET: 780 mL          LABS:                        8.3    6.32  )-----------( 215      ( 26 Mar 2024 13:00 )             26.5     03-26    136  |  98  |  37.4<H>  ----------------------------<  274<H>  4.4   |  28.0  |  0.36<L>    Ca    8.6      26 Mar 2024 05:37    TPro  6.0<L>  /  Alb  2.9<L>  /  TBili  0.6  /  DBili  x   /  AST  45<H>  /  ALT  132<H>  /  AlkPhos  906<H>  03-26    PT/INR - ( 25 Mar 2024 05:12 )   PT: 12.3 sec;   INR: 1.11 ratio           Urinalysis Basic - ( 26 Mar 2024 05:37 )    Color: x / Appearance: x / SG: x / pH: x  Gluc: 274 mg/dL / Ketone: x  / Bili: x / Urobili: x   Blood: x / Protein: x / Nitrite: x   Leuk Esterase: x / RBC: x / WBC x   Sq Epi: x / Non Sq Epi: x / Bacteria: x

## 2024-03-26 NOTE — PROGRESS NOTE ADULT - ASSESSMENT
73 year old male with a history of squamous cell cancer s/p resection, chemoradiation and G-tube placement, CAD, DM and HTN who presented with septic shock with perforated cholecystitis, s/p percutaneous cholecystostomy tube placement (3/17/24) now with persistent elevation in liver associated enzymes with MRI findings suggestive of cholangitis      Elevated LAES  Perforated Cholecystitis with perc gemini  Cholangitis     - CT A/P & RUQ US (3/17): Evidence of cholecystitis with contained perforation. Mild intrahepatic biliary dilatation. No radiopaque gallstones. Indeterminate 2.5 cm hypodense hepatic lesion. Recommend follow-up for further characterization.  - s/p IR guided percutaneous cholecystostomy tube (3/17)  - MRCP (3/22): Decreased caliber gallbladder status post decompressive percutaneous cholecystostomy tube placement. Gallstones. No significant change defect in gallbladder wall compatible with contained perforation. No choledocholithiasis. Unchanged mild intrahepatic duct dilatation. Biliary wall enhancement suggesting cholangitis. Common bile duct caliber is within normal limits.  - ERCP w/ sphincterotomy (3/25): LA grade D esophagitis. Nodular gastric mucosa, cold forceps biopsies, potentially may represent gastritis / intestinal metaplasia, path pending. Elongated intra duodenal segment    - Trend CBC daily, transfuse PRN for Hgb <7.0. Monitor for signs of bleeding  - Trend CMP to trend elevated liver chemistry  - Continue diet as recommended by speech & swallow eval: pureed & thin liquid diet  - Continue with PPI for GI mucosal protection    -------------------------------------------------------------------------  Note is incomplete without final attending attestation 73 year old male with a history of squamous cell cancer s/p resection, chemoradiation and G-tube placement, CAD, DM and HTN who presented with septic shock with perforated cholecystitis, s/p percutaneous cholecystostomy tube placement (3/17/24) now with persistent elevation in liver associated enzymes with MRI findings suggestive of cholangitis      Elevated liver chemistry  Perforated Cholecystitis with perc gemini  Cholangitis     - CT A/P & RUQ US (3/17): Evidence of cholecystitis with contained perforation. Mild intrahepatic biliary dilatation. No radiopaque gallstones. Indeterminate 2.5 cm hypodense hepatic lesion. Recommend follow-up for further characterization.  - s/p IR guided percutaneous cholecystostomy tube (3/17)  - MRCP (3/22): Decreased caliber gallbladder status post decompressive percutaneous cholecystostomy tube placement. Gallstones. No significant change defect in gallbladder wall compatible with contained perforation. No choledocholithiasis. Unchanged mild intrahepatic duct dilatation. Biliary wall enhancement suggesting cholangitis. Common bile duct caliber is within normal limits.  - ERCP w/ sphincterotomy (3/25): LA grade D esophagitis. Nodular gastric mucosa, cold forceps biopsies, potentially may represent gastritis / intestinal metaplasia, path pending. Elongated intra duodenal segment    - Trend CBC daily, transfuse PRN for Hgb <7.0. Monitor for signs of bleeding  - Trend CMP to trend elevated liver chemistry  - Continue diet as recommended by speech & swallow eval: pureed & thin liquid diet  - Continue with PPI for GI mucosal protection    -------------------------------------------------------------------------  Note is incomplete without final attending attestation 73 year old male with a history of squamous cell cancer s/p resection, chemoradiation and G-tube placement, CAD, DM and HTN who presented with septic shock with perforated cholecystitis, s/p percutaneous cholecystostomy tube placement (3/17/24) now with persistent elevation in liver associated enzymes with MRI findings suggestive of cholangitis      Elevated liver chemistry  Perforated Cholecystitis with perc gemini  Cholangitis     - CT A/P & RUQ US (3/17): Evidence of cholecystitis with contained perforation. Mild intrahepatic biliary dilatation. No radiopaque gallstones. Indeterminate 2.5 cm hypodense hepatic lesion. Recommend follow-up for further characterization.  - s/p IR guided percutaneous cholecystostomy tube (3/17)  - MRCP (3/22): Decreased caliber gallbladder status post decompressive percutaneous cholecystostomy tube placement. Gallstones. No significant change defect in gallbladder wall compatible with contained perforation. No choledocholithiasis. Unchanged mild intrahepatic duct dilatation. Biliary wall enhancement suggesting cholangitis. Common bile duct caliber is within normal limits.  - ERCP w/ sphincterotomy (3/25): LA grade D esophagitis. Nodular gastric mucosa, cold forceps biopsies, potentially may represent gastritis / intestinal metaplasia, path pending. Elongated intra duodenal segment    - Hgb 8.5 this AM. No overt signs of GI bleeding.  - Trend CBC daily, transfuse PRN for Hgb <7.0. Monitor for signs of bleeding  - Trend CMP to trend elevated liver chemistry  - Continue diet as recommended by speech & swallow eval: pureed & thin liquid diet  - Continue with PPI for GI mucosal protection    -------------------------------------------------------------------------  Note is incomplete without final attending attestation

## 2024-03-26 NOTE — DISCHARGE NOTE NURSING/CASE MANAGEMENT/SOCIAL WORK - PATIENT PORTAL LINK FT
You can access the FollowMyHealth Patient Portal offered by [facility] by registering at the following website: [email or web address]. By joining [facility]’s FollowMyHealth portal, you will also be able to view your health information using other applications (apps) compatible with our system.

## 2024-03-26 NOTE — PROGRESS NOTE ADULT - ASSESSMENT
73y Male admitted with Cholecystitis s/p gemini tube placement.     s/p Gemini tube  - The tube will need to remain in place for 4-6 weeks to allow for a tract from the skin to the GB to form and prevent bile leak  - Tube may be flush with 10cc normal saline daily  - Change dressing every 3 days or when soiled.   - Patient to follow with surgery as an outpatient to determine cholecystectomy candidacy  - No immediate IR follow required at this time   - For outpatient follow-up/questions and scheduling please call [phone]  - They will benefit from home care services to help with drainage catheter care; they should continue same drainage catheter care as an outpatient.     Please call IR at extension [phone] with any questions, concerns, or issues regarding above.

## 2024-03-26 NOTE — PROGRESS NOTE ADULT - ASSESSMENT
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 Patient presented for severe abdominal pain.  Initial CT abdomen showed acute cholecystitis with contained perforation.  In the ED, found to be in severe septic shock with leukocytosis (WBC 14), transaminitis, & lactic acidosis (8.1), fluid resuscitated and started on pressors (Levophed) and Zosyn.  Admitted to MICU for acute cholecystitis.  Surgery consulted but recommended against surgical intervention.  Blood culture grew ESBL E. coli, started on ertapenem.  Now off pressors and transferred out of MICU 3/19. Continued on Ertapenem.      ESBL Escherichia coli bacteremia   acute cholecystitis with contained perforation  s/p Septic shock   Transaminitis, worsening   S/p IR percutaneous cholecystostomy (3/17)  s/p ERCP 3/25/24    - Blood cultures  3/17 reporting Escherichia coli  - Repeat blood cultures 3/21 no growth   - Bile fluid culture 3/17 reporting Escherichia coli  - RVP/COVID 19 PCR 3/17 negative   - Urine Cx no growth   - CT A/P reporting Acute cholecystitis with contained perforation  - US ABD reporting Acute cholecystitis with walled off perforation.  - S/p IR percutaneous cholecystostomy (3/17)  - UA 3/17 negative for UTI   - Procalcitonin level 0.28  - LFTs improving  - s/p ERCP 3/25/24  - Monitor LFTs  - Continue Ertapenem 1gm IV q 24hours   - Hold off on PICC/Midline for now unless needed for reasons other than infectious diseases  - Follow up cultures  - Trend Fever  - Trend WBC      Will Follow    Discussed treatment plan with: Dr Ingram

## 2024-03-26 NOTE — PROGRESS NOTE ADULT - SUBJECTIVE AND OBJECTIVE BOX
CC Follow up     INTERVAL HPI/OVERNIGHT EVENTS: Patient seen and examined, sitting up comfortably in a chair. Denies abdominal pain nausea or vomiting. Afebrile. overnight. SBP in the 90s this morning asymptomatic sitting up.     Vital Signs Last 24 Hrs  T(C): 36.6 (26 Mar 2024 05:14), Max: 37.4 (25 Mar 2024 23:23)  T(F): 97.8 (26 Mar 2024 05:14), Max: 99.3 (25 Mar 2024 23:23)  HR: 86 (26 Mar 2024 05:14) (85 - 86)  BP: 92/55 (26 Mar 2024 05:14) (92/55 - 131/81)  BP(mean): --  RR: 18 (26 Mar 2024 05:14) (16 - 18)  SpO2: 96% (26 Mar 2024 05:14) (96% - 99%)    Parameters below as of 26 Mar 2024 05:14  Patient On (Oxygen Delivery Method): room air        PHYSICAL EXAM:    GENERAL: NAD, AOX3  HEAD:  Atraumatic, Normocephalic  EYES: conjunctiva and sclera clear  ENMT: Moist mucous membranes  CHEST/LUNG: Clear to auscultation bilaterally; No rales, rhonchi, wheezing, or rubs  HEART: Regular rate and rhythm; No murmurs, rubs, or gallops  ABDOMEN: Soft, Nontender, Nondistended; Bowel sounds present  + Shu tube   + PEG tube   EXTREMITIES:  2+ Peripheral Pulses, No clubbing, cyanosis, or edema        MEDICATIONS  (STANDING):  aspirin  chewable 81 milliGRAM(s) Oral daily  chlorhexidine 2% Cloths 1 Application(s) Topical <User Schedule>  dextrose 5%. 1000 milliLiter(s) (50 mL/Hr) IV Continuous <Continuous>  dextrose 5%. 1000 milliLiter(s) (100 mL/Hr) IV Continuous <Continuous>  dextrose 50% Injectable 25 Gram(s) IV Push once  dextrose 50% Injectable 12.5 Gram(s) IV Push once  dextrose 50% Injectable 25 Gram(s) IV Push once  ertapenem  IVPB 1000 milliGRAM(s) IV Intermittent every 24 hours  ferrous    sulfate 325 milliGRAM(s) Oral daily  glucagon  Injectable 1 milliGRAM(s) IntraMuscular once  insulin lispro (ADMELOG) corrective regimen sliding scale   SubCutaneous Before meals and at bedtime  midodrine. 5 milliGRAM(s) Oral three times a day  ondansetron Injectable 4 milliGRAM(s) IV Push once  pantoprazole    Tablet 40 milliGRAM(s) Oral before breakfast  tamsulosin 0.4 milliGRAM(s) Oral at bedtime    MEDICATIONS  (PRN):  dextrose Oral Gel 15 Gram(s) Oral once PRN Blood Glucose LESS THAN 70 milliGRAM(s)/deciliter  traMADol 25 milliGRAM(s) Oral every 6 hours PRN Moderate Pain (4 - 6)  traMADol 50 milliGRAM(s) Oral every 6 hours PRN Severe Pain (7 - 10)      Allergies    No Known Allergies    Intolerances          LABS:                          8.5    5.62  )-----------( 190      ( 26 Mar 2024 05:37 )             27.1     03-26    136  |  98  |  37.4<H>  ----------------------------<  274<H>  4.4   |  28.0  |  0.36<L>    Ca    8.6      26 Mar 2024 05:37    TPro  6.0<L>  /  Alb  2.9<L>  /  TBili  0.6  /  DBili  x   /  AST  45<H>  /  ALT  132<H>  /  AlkPhos  906<H>  03-26    PT/INR - ( 25 Mar 2024 05:12 )   PT: 12.3 sec;   INR: 1.11 ratio           Urinalysis Basic - ( 26 Mar 2024 05:37 )    Color: x / Appearance: x / SG: x / pH: x  Gluc: 274 mg/dL / Ketone: x  / Bili: x / Urobili: x   Blood: x / Protein: x / Nitrite: x   Leuk Esterase: x / RBC: x / WBC x   Sq Epi: x / Non Sq Epi: x / Bacteria: x        RADIOLOGY & ADDITIONAL TESTS:

## 2024-03-26 NOTE — DISCHARGE NOTE NURSING/CASE MANAGEMENT/SOCIAL WORK - NSDCPEFALRISK_GEN_ALL_CORE
For information on Fall & Injury Prevention, visit: [email or web address] OR  [email or web address] [place]  https://www.cdc.gov/steadi/patient.html

## 2024-03-27 ENCOUNTER — TRANSCRIPTION ENCOUNTER (OUTPATIENT)
Age: 74
End: 2024-03-27

## 2024-03-27 LAB
ABO RH CONFIRMATION: SIGNIFICANT CHANGE UP
ALBUMIN SERPL ELPH-MCNC: 2.9 G/DL — LOW (ref 3.3–5.2)
ALP SERPL-CCNC: 752 U/L — HIGH (ref 40–120)
ALT FLD-CCNC: 97 U/L — HIGH
ANION GAP SERPL CALC-SCNC: 10 MMOL/L — SIGNIFICANT CHANGE UP (ref 5–17)
AST SERPL-CCNC: 28 U/L — SIGNIFICANT CHANGE UP
BASOPHILS # BLD AUTO: 0.07 K/UL — SIGNIFICANT CHANGE UP (ref 0–0.2)
BASOPHILS NFR BLD AUTO: 1.6 % — SIGNIFICANT CHANGE UP (ref 0–2)
BILIRUB SERPL-MCNC: 0.5 MG/DL — SIGNIFICANT CHANGE UP (ref 0.4–2)
BUN SERPL-MCNC: 19.6 MG/DL — SIGNIFICANT CHANGE UP (ref 8–20)
CALCIUM SERPL-MCNC: 8.6 MG/DL — SIGNIFICANT CHANGE UP (ref 8.4–10.5)
CHLORIDE SERPL-SCNC: 101 MMOL/L — SIGNIFICANT CHANGE UP (ref 96–108)
CO2 SERPL-SCNC: 28 MMOL/L — SIGNIFICANT CHANGE UP (ref 22–29)
CREAT SERPL-MCNC: 0.37 MG/DL — LOW (ref 0.5–1.3)
EGFR: 119 ML/MIN/1.73M2 — SIGNIFICANT CHANGE UP
EOSINOPHIL # BLD AUTO: 0.05 K/UL — SIGNIFICANT CHANGE UP (ref 0–0.5)
EOSINOPHIL NFR BLD AUTO: 1.1 % — SIGNIFICANT CHANGE UP (ref 0–6)
GLUCOSE BLDC GLUCOMTR-MCNC: 146 MG/DL — HIGH (ref 70–99)
GLUCOSE BLDC GLUCOMTR-MCNC: 162 MG/DL — HIGH (ref 70–99)
GLUCOSE BLDC GLUCOMTR-MCNC: 245 MG/DL — HIGH (ref 70–99)
GLUCOSE BLDC GLUCOMTR-MCNC: 270 MG/DL — HIGH (ref 70–99)
GLUCOSE SERPL-MCNC: 125 MG/DL — HIGH (ref 70–99)
HCT VFR BLD CALC: 25 % — LOW (ref 39–50)
HGB BLD-MCNC: 7.7 G/DL — LOW (ref 13–17)
IMM GRANULOCYTES NFR BLD AUTO: 0.7 % — SIGNIFICANT CHANGE UP (ref 0–0.9)
LYMPHOCYTES # BLD AUTO: 0.47 K/UL — LOW (ref 1–3.3)
LYMPHOCYTES # BLD AUTO: 10.6 % — LOW (ref 13–44)
MCHC RBC-ENTMCNC: 24.5 PG — LOW (ref 27–34)
MCHC RBC-ENTMCNC: 30.8 GM/DL — LOW (ref 32–36)
MCV RBC AUTO: 79.6 FL — LOW (ref 80–100)
MONOCYTES # BLD AUTO: 0.34 K/UL — SIGNIFICANT CHANGE UP (ref 0–0.9)
MONOCYTES NFR BLD AUTO: 7.7 % — SIGNIFICANT CHANGE UP (ref 2–14)
NEUTROPHILS # BLD AUTO: 3.48 K/UL — SIGNIFICANT CHANGE UP (ref 1.8–7.4)
NEUTROPHILS NFR BLD AUTO: 78.3 % — HIGH (ref 43–77)
OB PNL STL: POSITIVE
PLATELET # BLD AUTO: 213 K/UL — SIGNIFICANT CHANGE UP (ref 150–400)
POTASSIUM SERPL-MCNC: 4.2 MMOL/L — SIGNIFICANT CHANGE UP (ref 3.5–5.3)
POTASSIUM SERPL-SCNC: 4.2 MMOL/L — SIGNIFICANT CHANGE UP (ref 3.5–5.3)
PROT SERPL-MCNC: 6.2 G/DL — LOW (ref 6.6–8.7)
RBC # BLD: 3.14 M/UL — LOW (ref 4.2–5.8)
RBC # FLD: 23.6 % — HIGH (ref 10.3–14.5)
SODIUM SERPL-SCNC: 139 MMOL/L — SIGNIFICANT CHANGE UP (ref 135–145)
WBC # BLD: 4.44 K/UL — SIGNIFICANT CHANGE UP (ref 3.8–10.5)
WBC # FLD AUTO: 4.44 K/UL — SIGNIFICANT CHANGE UP (ref 3.8–10.5)

## 2024-03-27 PROCEDURE — 99233 SBSQ HOSP IP/OBS HIGH 50: CPT

## 2024-03-27 RX ADMIN — PANTOPRAZOLE SODIUM 40 MILLIGRAM(S): 20 TABLET, DELAYED RELEASE ORAL at 05:19

## 2024-03-27 RX ADMIN — Medication 2: at 21:40

## 2024-03-27 RX ADMIN — ERTAPENEM SODIUM 120 MILLIGRAM(S): 1 INJECTION, POWDER, LYOPHILIZED, FOR SOLUTION INTRAMUSCULAR; INTRAVENOUS at 05:19

## 2024-03-27 RX ADMIN — TAMSULOSIN HYDROCHLORIDE 0.4 MILLIGRAM(S): 0.4 CAPSULE ORAL at 21:40

## 2024-03-27 RX ADMIN — PANTOPRAZOLE SODIUM 40 MILLIGRAM(S): 20 TABLET, DELAYED RELEASE ORAL at 16:08

## 2024-03-27 RX ADMIN — MIDODRINE HYDROCHLORIDE 5 MILLIGRAM(S): 2.5 TABLET ORAL at 16:08

## 2024-03-27 RX ADMIN — Medication 325 MILLIGRAM(S): at 11:02

## 2024-03-27 RX ADMIN — INSULIN GLARGINE 5 UNIT(S): 100 INJECTION, SOLUTION SUBCUTANEOUS at 21:41

## 2024-03-27 RX ADMIN — Medication 6: at 11:06

## 2024-03-27 RX ADMIN — Medication 4: at 16:05

## 2024-03-27 RX ADMIN — MIDODRINE HYDROCHLORIDE 5 MILLIGRAM(S): 2.5 TABLET ORAL at 11:10

## 2024-03-27 RX ADMIN — Medication 81 MILLIGRAM(S): at 11:02

## 2024-03-27 RX ADMIN — MIDODRINE HYDROCHLORIDE 5 MILLIGRAM(S): 2.5 TABLET ORAL at 05:19

## 2024-03-27 RX ADMIN — CHLORHEXIDINE GLUCONATE 1 APPLICATION(S): 213 SOLUTION TOPICAL at 05:19

## 2024-03-27 NOTE — PROGRESS NOTE ADULT - ASSESSMENT
73 year old male with a history of squamous cell cancer s/p resection, chemoradiation and G-tube placement, CAD, DM and HTN who presented with septic shock with perforated cholecystitis, s/p percutaneous cholecystostomy tube placement (3/17/24) now with persistent elevation in liver associated enzymes with MRI findings suggestive of cholangitis.      Elevated liver chemistry  Perforated Cholecystitis with perc gemini  Cholangitis     - CT A/P & RUQ US (3/17): Evidence of cholecystitis with contained perforation. Mild intrahepatic biliary dilatation. No radiopaque gallstones. Indeterminate 2.5 cm hypodense hepatic lesion. Recommend follow-up for further characterization.  - s/p IR guided percutaneous cholecystostomy tube (3/17)  - MRCP (3/22): Decreased caliber gallbladder status post decompressive percutaneous cholecystostomy tube placement. Gallstones. No significant change defect in gallbladder wall compatible with contained perforation. No choledocholithiasis. Unchanged mild intrahepatic duct dilatation. Biliary wall enhancement suggesting cholangitis. Common bile duct caliber is within normal limits.  - ERCP w/ sphincterotomy (3/25): LA grade D esophagitis. Nodular gastric mucosa, cold forceps biopsies, potentially may represent gastritis / intestinal metaplasia, path pending. Elongated intra duodenal segment    - Hgb dropped from 9.4 gm on 03/25 to 7.7 this morning (9.4-->8.5-->8.7-->7.7). PEG tube with no active bleed, No hematochezia or melena. CT abd yesterday with no evidence of active GI bleed, hemobilia, retroperitoneal bleeding/ hematoma.   Cont to monitor CBC, transfuse as needed  Continue with PPI for GI mucosal protection  There is no evidence of active GI bleed and therefore no indication for any endoscopic evaluation at this time. Please call us back with any concern that requires inpatient intervention  Cont further care per primary team

## 2024-03-27 NOTE — DISCHARGE NOTE PROVIDER - NSDCHHNEEDSERVICE_GEN_ALL_CORE
Observation and assessment/Teaching and training Medication teaching and assessment/Observation and assessment/Teaching and training

## 2024-03-27 NOTE — DISCHARGE NOTE PROVIDER - INSTRUCTIONS
Glucerna 1.5 raina  270 cc bolus every 6 hours  100 cc free water flush before feeding bolus and 100 cc free water flush after bolus Glucerna 1.5 raina  270 cc bolus every 6 hours  100 cc free water flush before feeding bolus and 100 cc free water flush after bolus    Easy to chew solids w/thin liquids (VIA TSP ONLY NO CUP SIP)  RIGHT HEAD TURN WITH ALL PO NO EXCEPTIONS  allow for swallow between intakes; alternate food with liquid; crush medication (when feasible); hard swallow w/ each bite or sip; maintain upright posture during/after eating for 30 mins; oral hygiene; position upright (90 degrees); provide rest periods between swallows; turn head right; THIN LIQUIDS VIA TSP ONLY; DO NO ALLOW CUP SIPS  Assist w/set-up & intermittent supervision

## 2024-03-27 NOTE — PROGRESS NOTE ADULT - SUBJECTIVE AND OBJECTIVE BOX
CC: Follow up     INTERVAL HPI/OVERNIGHT EVENTS: Patient seen and examined, denies abdominal pain nausea or vomiting. Does not know if his stool is dark.       Vital Signs Last 24 Hrs  T(C): 36.6 (27 Mar 2024 11:00), Max: 36.8 (26 Mar 2024 16:47)  T(F): 97.8 (27 Mar 2024 11:00), Max: 98.2 (26 Mar 2024 16:47)  HR: 90 (27 Mar 2024 11:00) (84 - 92)  BP: 95/58 (27 Mar 2024 11:00) (93/60 - 96/60)  BP(mean): --  RR: 18 (27 Mar 2024 11:00) (18 - 18)  SpO2: 96% (27 Mar 2024 11:00) (96% - 99%)    Parameters below as of 26 Mar 2024 20:00  Patient On (Oxygen Delivery Method): room air        PHYSICAL EXAM:    GENERAL: NAD, AOX3  HEAD:  Atraumatic, Normocephalic  EYES:  conjunctiva and sclera clear  ENMT: Moist mucous membranes  CHEST/LUNG: Clear to auscultation bilaterally; No rales, rhonchi, wheezing, or rubs  HEART: Regular rate and rhythm; No murmurs, rubs, or gallops  ABDOMEN: Soft, Nontender, Nondistended; Bowel sounds present  = Kalin tube  + PEg tube  EXTREMITIES:  2+ Peripheral Pulses, No clubbing, cyanosis, or edema        MEDICATIONS  (STANDING):  aspirin  chewable 81 milliGRAM(s) Oral daily  chlorhexidine 2% Cloths 1 Application(s) Topical <User Schedule>  dextrose 5%. 1000 milliLiter(s) (50 mL/Hr) IV Continuous <Continuous>  dextrose 5%. 1000 milliLiter(s) (100 mL/Hr) IV Continuous <Continuous>  dextrose 50% Injectable 25 Gram(s) IV Push once  dextrose 50% Injectable 12.5 Gram(s) IV Push once  dextrose 50% Injectable 25 Gram(s) IV Push once  ertapenem  IVPB 1000 milliGRAM(s) IV Intermittent every 24 hours  ferrous    sulfate 325 milliGRAM(s) Oral daily  glucagon  Injectable 1 milliGRAM(s) IntraMuscular once  insulin glargine Injectable (LANTUS) 5 Unit(s) SubCutaneous at bedtime  insulin lispro (ADMELOG) corrective regimen sliding scale   SubCutaneous Before meals and at bedtime  midodrine. 5 milliGRAM(s) Oral three times a day  ondansetron Injectable 4 milliGRAM(s) IV Push once  pantoprazole  Injectable 40 milliGRAM(s) IV Push every 12 hours  tamsulosin 0.4 milliGRAM(s) Oral at bedtime    MEDICATIONS  (PRN):  dextrose Oral Gel 15 Gram(s) Oral once PRN Blood Glucose LESS THAN 70 milliGRAM(s)/deciliter      Allergies    No Known Allergies    Intolerances          LABS:                          7.7    4.44  )-----------( 213      ( 27 Mar 2024 05:05 )             25.0     03-27    139  |  101  |  19.6  ----------------------------<  125<H>  4.2   |  28.0  |  0.37<L>    Ca    8.6      27 Mar 2024 05:05    TPro  6.2<L>  /  Alb  2.9<L>  /  TBili  0.5  /  DBili  x   /  AST  28  /  ALT  97<H>  /  AlkPhos  752<H>  03-27      Urinalysis Basic - ( 27 Mar 2024 05:05 )    Color: x / Appearance: x / SG: x / pH: x  Gluc: 125 mg/dL / Ketone: x  / Bili: x / Urobili: x   Blood: x / Protein: x / Nitrite: x   Leuk Esterase: x / RBC: x / WBC x   Sq Epi: x / Non Sq Epi: x / Bacteria: x        RADIOLOGY & ADDITIONAL TESTS:

## 2024-03-27 NOTE — DISCHARGE NOTE PROVIDER - CARE PROVIDER_API CALL
Matheus Mina  Gastroenterology  [address] [place] [address]  Phone: [phone]  Fax: [phone]  Follow Up Time:     Popeye Boyce  Infectious Disease  [address] [place] [address]  Phone: [phone]  Fax: [phone]  Follow Up Time:     Lyssa Contreras  Surgery  [address] [place] [address]  Phone: [phone]  Fax: [phone]  Follow Up Time:    Popeye Boyce  Infectious Disease  [address] [place] [address]  Phone: [phone]  Fax: [phone]  Follow Up Time: 1 week    Lyssa Contreras  Surgery  [address] [place] [address]  Phone: [phone]  Fax: [phone]  Follow Up Time: 1 week    Caryl Cobian  Gastroenterology  [address], [address]  [place] [address]  Phone: [phone]  Fax: [phone]  Follow Up Time: 2 weeks

## 2024-03-27 NOTE — CHART NOTE - NSCHARTNOTEFT_GEN_A_CORE
Source: Patient [ x]  Family [ ]   other [ x] EMR, staff and ID rounds     Current Diet: Minced/Moist, mild thick; DASH/TLC, Halal; Glucerna bolus feeds 270mL/feed 4x/day    Patient reports [ ] nausea  [ ] vomiting [ ] diarrhea [ ] constipation  [ x]chewing problems [x ] swallowing issues  [ ] other:     PO intake:  < 50% [ ]   50-75%  [ x]   %  [ ]  other :    Source for PO intake [ x] Patient [ ] family [x ] chart [ x] staff [ ] other    Enteral /Parenteral Nutrition: Current bolus regimen reads for Glucerna 1.5cal 270mL q6 hrs to provide 1080mL, 1620kcal, 87g protein    Current Weight:   (3/17) 128 lbs    % Weight Change: Pt OOB to chair, unable to obtain new weight     Pertinent Medications: MEDICATIONS  (STANDING):  aspirin  chewable 81 milliGRAM(s) Oral daily  chlorhexidine 2% Cloths 1 Application(s) Topical <User Schedule>  dextrose 5%. 1000 milliLiter(s) (50 mL/Hr) IV Continuous <Continuous>  dextrose 5%. 1000 milliLiter(s) (100 mL/Hr) IV Continuous <Continuous>  dextrose 50% Injectable 25 Gram(s) IV Push once  dextrose 50% Injectable 12.5 Gram(s) IV Push once  dextrose 50% Injectable 25 Gram(s) IV Push once  ertapenem  IVPB 1000 milliGRAM(s) IV Intermittent every 24 hours  ferrous    sulfate 325 milliGRAM(s) Oral daily  glucagon  Injectable 1 milliGRAM(s) IntraMuscular once  insulin glargine Injectable (LANTUS) 5 Unit(s) SubCutaneous at bedtime  insulin lispro (ADMELOG) corrective regimen sliding scale   SubCutaneous Before meals and at bedtime  midodrine. 5 milliGRAM(s) Oral three times a day  ondansetron Injectable 4 milliGRAM(s) IV Push once  pantoprazole  Injectable 40 milliGRAM(s) IV Push every 12 hours  tamsulosin 0.4 milliGRAM(s) Oral at bedtime    MEDICATIONS  (PRN):  dextrose Oral Gel 15 Gram(s) Oral once PRN Blood Glucose LESS THAN 70 milliGRAM(s)/deciliter    Pertinent Labs: CBC Full  -  ( 27 Mar 2024 05:05 )  WBC Count : 4.44 K/uL  RBC Count : 3.14 M/uL  Hemoglobin : 7.7 g/dL  Hematocrit : 25.0 %  Platelet Count - Automated : 213 K/uL  Mean Cell Volume : 79.6 fl  Mean Cell Hemoglobin : 24.5 pg  Mean Cell Hemoglobin Concentration : 30.8 gm/dL  Auto Neutrophil # : 3.48 K/uL  Auto Lymphocyte # : 0.47 K/uL  Auto Monocyte # : 0.34 K/uL  Auto Eosinophil # : 0.05 K/uL  Auto Basophil # : 0.07 K/uL  Auto Neutrophil % : 78.3 %  Auto Lymphocyte % : 10.6 %  Auto Monocyte % : 7.7 %  Auto Eosinophil % : 1.1 %  Auto Basophil % : 1.6 %    03-27 Na139 mmol/L Glu 125 mg/dL<H> K+ 4.2 mmol/L Cr  0.37 mg/dL<L> BUN 19.6 mg/dL Phos n/a   Alb 2.9 g/dL<L> PAB n/a       Skin: sx incision R abdomen    Nutrition focused physical exam conducted - found signs of malnutrition [ ]absent [x ]present    Subcutaneous fat loss: [x ] Orbital fat pads region, [x ]Buccal fat region, [x ]Triceps region,  [ ]Ribs region    Muscle wasting: [ x]Temples region, [x ]Clavicle region, [ x]Shoulder region, [ ]Scapula region, [x ]Interosseous region,  [ ]thigh region, [ ]Calf region    Estimated Needs:   [ x] no change since previous assessment  [ ] recalculated:     Current Nutrition Diagnosis: Pt remains at high nutrition risk secondary to malnutrition (severe chronic) 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   Pt seen at bedside reports having improved appetite/PO intake, completing ~75% of meals. Pt requesting hard boiled eggs, reiterated pureed diet as ordered to which Pt expresses frustration becoming agitated. Discussed with PA to request SLP eval, completed today with diet upgraded to minced/moist. Aware BG elevated ~200's, will continue to monitor and as needed, adjust TF regimen with PO intake though Pt continues to state his reliance on tube feeds, PO intake inconsistent PTA.     Recommendations:   1) Continue current regimen as tolerated; If Pt is to remain in house consider calorie count  2) If Pt continues with good PO intake, consider conditional/nocturnal feeds  3) Encourage HBV protein sources   4) Monitor weights daily for trend/accuracy   5) Obtain/provide food preferences daily to inc PO     Monitoring and Evaluation:   [x ] PO intake [x ] Tolerance to diet prescription [X] Weights  [X] Follow up per protocol [X] Labs: Source: Patient [ x]  Family [ ]   other [ x] EMR, staff and ID rounds     Current Diet: Minced/Moist, mild thick; DASH/TLC, Halal; Glucerna bolus feeds 270mL/feed 4x/day    Patient reports [ ] nausea  [ ] vomiting [ ] diarrhea [ ] constipation  [ x]chewing problems [x ] swallowing issues  [ ] other:     PO intake:  < 50% [ ]   50-75%  [ x]   %  [ ]  other :    Source for PO intake [ x] Patient [ ] family [x ] chart [ x] staff [ ] other    Enteral /Parenteral Nutrition: Current bolus regimen reads for Glucerna 1.5cal 270mL q6 hrs to provide 1080mL, 1620kcal, 87g protein    Current Weight:   (3/17) 128 lbs    % Weight Change: Pt OOB to chair, unable to obtain new weight     Pertinent Medications: MEDICATIONS  (STANDING):  aspirin  chewable 81 milliGRAM(s) Oral daily  chlorhexidine 2% Cloths 1 Application(s) Topical <User Schedule>  dextrose 5%. 1000 milliLiter(s) (50 mL/Hr) IV Continuous <Continuous>  dextrose 5%. 1000 milliLiter(s) (100 mL/Hr) IV Continuous <Continuous>  dextrose 50% Injectable 25 Gram(s) IV Push once  dextrose 50% Injectable 12.5 Gram(s) IV Push once  dextrose 50% Injectable 25 Gram(s) IV Push once  ertapenem  IVPB 1000 milliGRAM(s) IV Intermittent every 24 hours  ferrous    sulfate 325 milliGRAM(s) Oral daily  glucagon  Injectable 1 milliGRAM(s) IntraMuscular once  insulin glargine Injectable (LANTUS) 5 Unit(s) SubCutaneous at bedtime  insulin lispro (ADMELOG) corrective regimen sliding scale   SubCutaneous Before meals and at bedtime  midodrine. 5 milliGRAM(s) Oral three times a day  ondansetron Injectable 4 milliGRAM(s) IV Push once  pantoprazole  Injectable 40 milliGRAM(s) IV Push every 12 hours  tamsulosin 0.4 milliGRAM(s) Oral at bedtime    MEDICATIONS  (PRN):  dextrose Oral Gel 15 Gram(s) Oral once PRN Blood Glucose LESS THAN 70 milliGRAM(s)/deciliter    Pertinent Labs: CBC Full  -  ( 27 Mar 2024 05:05 )  WBC Count : 4.44 K/uL  RBC Count : 3.14 M/uL  Hemoglobin : 7.7 g/dL  Hematocrit : 25.0 %  Platelet Count - Automated : 213 K/uL  Mean Cell Volume : 79.6 fl  Mean Cell Hemoglobin : 24.5 pg  Mean Cell Hemoglobin Concentration : 30.8 gm/dL  Auto Neutrophil # : 3.48 K/uL  Auto Lymphocyte # : 0.47 K/uL  Auto Monocyte # : 0.34 K/uL  Auto Eosinophil # : 0.05 K/uL  Auto Basophil # : 0.07 K/uL  Auto Neutrophil % : 78.3 %  Auto Lymphocyte % : 10.6 %  Auto Monocyte % : 7.7 %  Auto Eosinophil % : 1.1 %  Auto Basophil % : 1.6 %    03-27 Na139 mmol/L Glu 125 mg/dL<H> K+ 4.2 mmol/L Cr  0.37 mg/dL<L> BUN 19.6 mg/dL Phos n/a   Alb 2.9 g/dL<L> PAB n/a       Skin: sx incision R abdomen    Nutrition focused physical exam conducted - found signs of malnutrition [ ]absent [x ]present    Subcutaneous fat loss: [x ] Orbital fat pads region, [x ]Buccal fat region, [x ]Triceps region,  [ ]Ribs region    Muscle wasting: [ x]Temples region, [x ]Clavicle region, [ x]Shoulder region, [ ]Scapula region, [x ]Interosseous region,  [ ]thigh region, [ ]Calf region    Estimated Needs:   [ x] no change since previous assessment  [ ] recalculated:     Current Nutrition Diagnosis: Pt remains at high nutrition risk secondary to malnutrition (severe chronic) 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   Pt seen at bedside reports having improved appetite/PO intake, completing ~75% of meals. Pt requesting hard boiled eggs, reiterated pureed diet as ordered to which Pt expresses frustration becoming agitated. Discussed with PA to request SLP eval, completed today with diet upgraded to minced/moist. Aware BG elevated ~200's, will continue to monitor and as needed adjust TF regimen. Pts wife at bedside reports PO intake is inconsistent, Pt continues to state his reliance on tube feeds for nutrition optimization.     Recommendations:   1) Continue current regimen as tolerated  2) If Pt continues with good PO intake, consider conditional/nocturnal feeds  3) Encourage HBV protein sources   4) Monitor weights daily for trend/accuracy   5) Obtain/provide food preferences daily to inc PO     Monitoring and Evaluation:   [x ] PO intake [x ] Tolerance to diet prescription [X] Weights  [X] Follow up per protocol [X] Labs:

## 2024-03-27 NOTE — PROGRESS NOTE ADULT - SUBJECTIVE AND OBJECTIVE BOX
Chief Complaint:  Patient is a 73y old  Male who presents with a chief complaint of Severe sepsis.      Follow up: s/p ERCP with sphincterotomy 3/25/24. Now with drop in hemoglobin. His hemoglobin on admission was 9.5 gm, (baseline 10.7 to 12 on February this year).     HPI/ 24 hr events: Patient seen and examined at bedside. Pt feeling well this morning. Reports no complaints. Denies abd pain, nausea, vomiting, hematochezia or melena. No bleeding via PEG tube. Reported brown color BM this morning.  CT abd performed yesterday with no evidence of active GI bleed, hemobilia, retroperitoneal bleeding/ hematoma.     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 1000 milliLiter(s) (50 mL/Hr) IV Continuous <Continuous>  dextrose 5%. 1000 milliLiter(s) (100 mL/Hr) IV Continuous <Continuous>  dextrose 50% Injectable 25 Gram(s) IV Push once  dextrose 50% Injectable 25 Gram(s) IV Push once  dextrose 50% Injectable 12.5 Gram(s) IV Push once  ertapenem  IVPB 1000 milliGRAM(s) IV Intermittent every 24 hours  ferrous    sulfate 325 milliGRAM(s) Oral daily  glucagon  Injectable 1 milliGRAM(s) IntraMuscular once  insulin glargine Injectable (LANTUS) 5 Unit(s) SubCutaneous at bedtime  insulin lispro (ADMELOG) corrective regimen sliding scale   SubCutaneous Before meals and at bedtime  midodrine. 5 milliGRAM(s) Oral three times a day  ondansetron Injectable 4 milliGRAM(s) IV Push once  pantoprazole  Injectable 40 milliGRAM(s) IV Push every 12 hours  tamsulosin 0.4 milliGRAM(s) Oral at bedtime    MEDICATIONS  (PRN):  dextrose Oral Gel 15 Gram(s) Oral once PRN Blood Glucose LESS THAN 70 milliGRAM(s)/deciliter    No Known Allergies    T(C): 36.6 (03-27-24 @ 12:18), Max: 36.8 (03-26-24 @ 16:47)  HR: 83 (03-27-24 @ 12:18) (83 - 90)  BP: 109/60 (03-27-24 @ 12:18) (94/54 - 109/60)  RR: 18 (03-27-24 @ 12:18) (18 - 18)  SpO2: 98% (03-27-24 @ 12:18) (96% - 99%)    I&O's Summary    26 Mar 2024 07:01  -  27 Mar 2024 07:00  --------------------------------------------------------  IN: 2780 mL / OUT: 25 mL / NET: 2755 mL    27 Mar 2024 07:01  -  27 Mar 2024 13:43  --------------------------------------------------------  IN: 1250 mL / OUT: 650 mL / NET: 600 mL      PHYSICAL EXAM:    Constitutional: No acute distress  Neuro: Awake alert, oriented  HEENT: anicteric sclerae  CV: regular rate, regular rhythm  Pulm/chest: lung sounds diminished bilaterally, no accessory muscle use noted  Abd: soft, nontender, nonditended, +bowel sounds. No rigidity, rebound tenderness, or guarding. Cholecystostomy tube with no blood. PEG tube site clean dry and intact, no hematoma noted.   Ext: no edema  Skin: warm, no jaundice   Psych: calm, cooperative      LABS:               7.7    4.44  )-----------( 213      ( 03-27 @ 05:05 )             25.0                8.3    6.32  )-----------( 215      ( 03-26 @ 13:00 )             26.5                8.5    5.62  )-----------( 190      ( 03-26 @ 05:37 )             27.1                9.4    5.02  )-----------( 193      ( 03-25 @ 05:12 )             30.2       03-27    139  |  101  |  19.6  ----------------------------<  125<H>  4.2   |  28.0  |  0.37<L>    Ca    8.6      27 Mar 2024 05:05    TPro  6.2<L>  /  Alb  2.9<L>  /  TBili  0.5  /  DBili  x   /  AST  28  /  ALT  97<H>  /  AlkPhos  752<H>  03-27    LIVER FUNCTIONS - ( 27 Mar 2024 05:05 )  Alb: 2.9 g/dL / Pro: 6.2 g/dL / ALK PHOS: 752 U/L / ALT: 97 U/L / AST: 28 U/L / GGT: x               < from: EGD w/ ERCP (03.25.24 @ 13:45) >    Findings:      Esophagus Additional esophagus findings - There was significant esophagitis (LA    grade D), approximately 10 cm in length in the distal esophagus..      Stomach Additional stomach findings - There was evidence of a retained PEG tube    bumper seen in the gastric body. There was an area of nodular irregular    appearing mucosa in the gastric antrum/incisura, Cold forceps biopsies obtained,    pathology pending. May represent intestinal metaplasia..      Duodenum Additional duodenum findings - The visualized portion of the duodenum    from the bulb through to the second portion of the duodenum appeared normal. The    papilla appeared normal. There was notably an extended intra-duodenal segment    which may represent the underlying cause of biliary hypokinesia..      Impressions:      LA grade D esophagitis.      S/p PEG tube.    Nodular gastric mucosa, cold forceps biopsies, potentially may represent    gastritis / intestinal metaplasia, path pending.    Elongated intra duodenal segment.      ERCP with biliary sphincterotomy performed.        Plan:        Await pathology results.        Advance diet as tolerated        Continue current medical regimen.        Return to floor for further management        Interval cholecystectomy for perforated gallbladder    < end of copied text >      < from: CT Abdomen and Pelvis w/ IV Cont (03.26.24 @ 17:06) >    CONTRAST/COMPLICATIONS:  IV Contrast: Omnipaque 350  90 cc administered   10 cc discarded  Oral Contrast: NONE  Complications: None reported at time of study completion    PROCEDURE:  CT of the Abdomen and Pelvis was performed.  Sagittal and coronalreformats were performed.    FINDINGS:  LOWER CHEST: Within normal limits.    LIVER: Subtle wedge-shaped hypoattenuating areas of the right hepatic   lobe, likely representing parenchymal inflammation.  BILE DUCTS: Normal caliber.  GALLBLADDER: Status post cholecystostomy tube placement. Decompression of   the gallbladder with persistent mural thickening and fat stranding.  SPLEEN: Within normal limits.  PANCREAS: Within normal limits.  ADRENALS: Within normal limits.  KIDNEYS/URETERS: Within normal limits.    BLADDER: Within normal limits.  REPRODUCTIVE ORGANS: Mild prostate enlargement. Unremarkable seminal   vesicles.    BOWEL: No bowel obstruction. Gastric tube in position. Normal appendix.  PERITONEUM: No ascites.  VESSELS: Stable hepatic segment 5 portal venous thrombosis (3/37).  RETROPERITONEUM/LYMPH NODES: No lymphadenopathy.  ABDOMINAL WALL: Within normal limits.  BONES: Degenerative changes.    IMPRESSION:  Stable position of the cholecystostomy tube with persistent   cholecystitis. Associated hepatic parenchymal inflammation.  Stable segment 5 portal venous thrombosis.  No evidence of hematoma or active hemorrhage    < end of copied text >

## 2024-03-27 NOTE — DISCHARGE NOTE PROVIDER - NSDCCPCAREPLAN_GEN_ALL_CORE_FT
PRINCIPAL DISCHARGE DIAGNOSIS  Diagnosis: Cholecystitis  Assessment and Plan of Treatment:       SECONDARY DISCHARGE DIAGNOSES  Diagnosis: Sepsis  Assessment and Plan of Treatment:     Diagnosis: Bacteremia  Assessment and Plan of Treatment:      PRINCIPAL DISCHARGE DIAGNOSIS  Diagnosis: Septic shock  Assessment and Plan of Treatment:       SECONDARY DISCHARGE DIAGNOSES  Diagnosis: Bacteremia  Assessment and Plan of Treatment:     Diagnosis: Infection due to extended spectrum beta lactamase (ESBL) producing bacteria  Assessment and Plan of Treatment:     Diagnosis: Acute cholecystitis  Assessment and Plan of Treatment:     Diagnosis: GERD without esophagitis  Assessment and Plan of Treatment:     Diagnosis: Dysphagia  Assessment and Plan of Treatment:     Diagnosis: Anemia due to acute blood loss  Assessment and Plan of Treatment:      PRINCIPAL DISCHARGE DIAGNOSIS  Diagnosis: Septic shock  Assessment and Plan of Treatment: SEcondary to bacteremia and acute cholecystitis now resolved  Continue PO Midodrine  Continue IV antibiotics until 04/03/2024   Follow up with Dr Boyce, infectious disease in 1 week      SECONDARY DISCHARGE DIAGNOSES  Diagnosis: Infection due to extended spectrum beta lactamase (ESBL) producing bacteria  Assessment and Plan of Treatment: Continue IV antibiotics until 04/03/2024   Follow up with Dr Boyce, infectious disease in 1 week    Diagnosis: Acute cholecystitis  Assessment and Plan of Treatment: Status post cholecystostomy tube placement  Follow up with surgery in 1 week for evaluation for cholecystectomy and eventual drain removal    Diagnosis: GERD without esophagitis  Assessment and Plan of Treatment: PO protonix 40mg twice a day  PO carafate four times per day  Follow up with GI Dr Roberts in 2 weeks    Diagnosis: Dysphagia  Assessment and Plan of Treatment: Easy to chew diet with thin liquis    Diagnosis: Anemia due to acute blood loss  Assessment and Plan of Treatment: SEcondary to esophagitis now improved

## 2024-03-27 NOTE — PROGRESS NOTE ADULT - ASSESSMENT
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  Initial CT abdomen showed acute cholecystitis with contained perforation.  In the ED, found to be in severe septic shock with leukocytosis (WBC 14), transaminitis, & lactic acidosis (8.1), fluid resuscitated and started on pressors (Levophed) and Zosyn.  Admitted to MICU for acute cholecystitis.  Surgery consulted but recommended against surgical intervention.  BCx grew ESBL E. coli, started on ertapenem.  Now off pressors, stable for downgrade to medicine 3/19. Worsening LFTS noted, MRCP with no obstruction. Seen by GI, status post ERCP with sphincterotomy     Assessment/Plan:    1. Anemia  - Acute drop in hemoglobin/hct over 48 hours  - CT abdomen and pelvis with IV COntrast negetaive   1. ESBL E.Coli Bacteremia due to Acute Cholecystitis with contained perforation s/p choly tube  -septic shock resolved; on midodrine  -CT a/p: Acute cholecystitis with contained perforation  -US ABD: Acute cholecystitis with walled off perforation.  -S/p IR percutaneous cholecystostomy (3/17)- Initially draining purulent fluid, now draining bilious fluid  -Blood cultures - ESBL E. coli   -repeat blood cultures negative  -continue Invanz day 6 from negative blood culture today; ID following awaiting recommendations regarding duration  - Status post ERCP after worsening LFTS status post sphincterotomy. LFTs downtrending this morning   - Repeat CBC this afternoon, drop in hb this morning post procedure       2. Hypokalemia and Hypophosphatemia   - resolved   -monitor labs closely    3. T2DM  - BSL elevated this morning; monitor closely   -A1c 7.8  -Hold home Farxiga & Janumet  -ISS  -ADA diet    4. CAD  -denies anginal symptoms  -continue aspirin 81 mg QD  -Hold Lipitor in the setting of elevated liver enzymes    5. BPH  -continue Flomax     DVT ppx: Hold for anemia, repeat CBC> Resume lovenox in AM if hb.hct stable     Discharge disposition: Unclear, patient with no remaining TYLER days. Case management working with family to make arrangements for home with home care.    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  Initial CT abdomen showed acute cholecystitis with contained perforation.  In the ED, found to be in severe septic shock with leukocytosis (WBC 14), transaminitis, & lactic acidosis (8.1), fluid resuscitated and started on pressors (Levophed) and Zosyn.  Admitted to MICU for acute cholecystitis.  Surgery consulted but recommended against surgical intervention.  BCx grew ESBL E. coli, started on ertapenem.  Now off pressors, stable for downgrade to medicine 3/19. Worsening LFTS noted, MRCP with no obstruction. Seen by GI, status post ERCP with sphincterotomy     Assessment/Plan:    1. Anemia  - Acute drop in hemoglobin/hct over 48 hours  - CT abdomen and pelvis with IV Contrast negative   _ Transfuse 1 unit PRBC  - Esophagitis noted on EUS/ERCP; was started on PPI BID; Check FOBT  - GI evaluation requested post procedure    2 ESBL E.Coli Bacteremia due to Acute Cholecystitis with contained perforation s/p choly tube  -septic shock resolved; on midodrine  -CT a/p: Acute cholecystitis with contained perforation  -US ABD: Acute cholecystitis with walled off perforation.  -S/p IR percutaneous cholecystostomy (3/17)- Initially draining purulent fluid, now draining bilious fluid  -Blood cultures - ESBL E. coli   -repeat blood cultures negative  -continue Invanz day 7 from negative blood culture today; ID following awaiting recommendations regarding duration  - Status post ERCP after worsening LFTS status post sphincterotomy. LFTs downtrending     3. T2DM  -A1c 7.8  - Lantus 5 units QHS  -Hold home Farxiga & Janumet  -ISS  -ADA diet    4. CAD  -denies anginal symptoms  -continue aspirin 81 mg QD  -Hold Lipitor in the setting of elevated liver enzymes    5. BPH  -continue Flomax     DVT ppx: Hold for anemia    Discharge disposition: Unclear, patient with no remaining TYLER days. Case management working with family to make arrangements for home with home care.

## 2024-03-27 NOTE — DISCHARGE NOTE PROVIDER - HOSPITAL COURSE
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  Initial CT abdomen showed acute cholecystitis with contained perforation.  In the ED, found to be in severe septic shock with leukocytosis (WBC 14), transaminitis, & lactic acidosis (8.1), fluid resuscitated and started on pressors (Levophed) and Zosyn.  Admitted to MICU for acute cholecystitis.  CT a/p and sono showed acute cholecystitis with contained perforation. Surgery consulted but recommended against surgical intervention. IR performed percutaneous cholecystostomy on 3/17, initially draining purulent fluid and now draining bilious fluid. BCx grew ESBL E. coli, started on ertapenem.  Worsening LFTS noted, MRCP with no obstruction. Seen by GI, status post ERCP with sphincterotomy. Repeat blood cultures negative. LFTS downtrending. Drop in Hg noted after ERCP, monitored.           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  Initial CT abdomen showed acute cholecystitis with contained perforation.  In the ED, found to be in severe septic shock with leukocytosis (WBC 14), transaminitis, & lactic acidosis (8.1), fluid resuscitated and started on pressors (Levophed) and Zosyn.  Admitted to MICU for acute cholecystitis.  CT a/p and sono showed acute cholecystitis with contained perforation. Surgery consulted but recommended against surgical intervention. IR performed percutaneous cholecystostomy on 3/17, initially draining purulent fluid and now draining bilious fluid. BCx grew ESBL E. coli, started on ertapenem.  Worsening LFTS noted, MRCP with no obstruction. Seen by GI, status post ERCP with sphincterotomy. Repeat blood cultures negative. LFTS downtrending. Drop in Hg noted after ERCP, transfused 1 unit PRBC> CT abdomen and pelvis negative. Noted to have esophagitis on ERCP. Started on PPI bID with carafate. Seen by GI, hb/hct stable. 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  Initial CT abdomen showed acute cholecystitis with contained perforation.  In the ED, found to be in severe septic shock with leukocytosis (WBC 14), transaminitis, & lactic acidosis (8.1), fluid resuscitated and started on pressors (Levophed) and Zosyn.  Admitted to MICU for acute cholecystitis.  CT a/p and sono showed acute cholecystitis with contained perforation. Surgery consulted but recommended against surgical intervention. IR performed percutaneous cholecystostomy on 3/17, initially draining purulent fluid and now draining bilious fluid. BCx grew ESBL E. coli, started on ertapenem.  Worsening LFTS noted, MRCP with no obstruction. Seen by GI, status post ERCP with sphincterotomy. Repeat blood cultures negative. LFTS downtrending. Drop in Hg noted after ERCP, transfused 1 unit PRBC> CT abdomen and pelvis negative. Noted to have esophagitis on ERCP. Started on PPI bID with carafate. Seen by GI, hb/hct stable. Discharge home to complete IV Invanz on 4/3/24 and follow up with surgery as outpatient     Discharge plan was discussed in detail with patient and wife at bedside. All questions and concerns were addressed

## 2024-03-27 NOTE — DISCHARGE NOTE PROVIDER - CARE PROVIDERS DIRECT ADDRESSES
,DirectAddress_Unknown,[email or web address] ,[email or web address]

## 2024-03-27 NOTE — DISCHARGE NOTE PROVIDER - DETAILS OF MALNUTRITION DIAGNOSIS/DIAGNOSES
This patient has been assessed with a concern for Malnutrition and was treated during this hospitalization for the following Nutrition diagnosis/diagnoses:     -  03/19/2024: Severe protein-calorie malnutrition   -  03/19/2024: Underweight (BMI < 19)

## 2024-03-27 NOTE — DISCHARGE NOTE PROVIDER - NSDCFUSCHEDAPPT_GEN_ALL_CORE_FT
[facility]  [facility] [address]  Scheduled Appointment: 04/03/2024

## 2024-03-27 NOTE — DISCHARGE NOTE PROVIDER - PROVIDER TOKENS
PROVIDER:[TOKEN:[[id number]]],PROVIDER:[TOKEN:[[id number]]],PROVIDER:[TOKEN:[[id number]]] PROVIDER:[TOKEN:[[id number]],FOLLOWUP:[1 week]],PROVIDER:[TOKEN:[[id number]],FOLLOWUP:[1 week]],PROVIDER:[TOKEN:[[id number]],FOLLOWUP:[2 weeks]]

## 2024-03-27 NOTE — DISCHARGE NOTE PROVIDER - NSDCFUADDINST_GEN_ALL_CORE_FT
- The gemini tube will need to remain in place for 4-6 weeks to allow for a tract from the skin to the gallbladder to form and prevent bile leak  - Tube may be flush with 10cc normal saline daily  - Change dressing every 3 days or when soiled.   - Patient to follow with surgery as an outpatient to determine cholecystectomy candidacy  - For outpatient follow-up/questions and scheduling please call [phone]

## 2024-03-27 NOTE — PROGRESS NOTE ADULT - ASSESSMENT
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 Patient presented for severe abdominal pain.  Initial CT abdomen showed acute cholecystitis with contained perforation.  In the ED, found to be in severe septic shock with leukocytosis (WBC 14), transaminitis, & lactic acidosis (8.1), fluid resuscitated and started on pressors (Levophed) and Zosyn.  Admitted to MICU for acute cholecystitis.  Surgery consulted but recommended against surgical intervention.  Blood culture grew ESBL E. coli, started on ertapenem.  Now off pressors and transferred out of MICU 3/19. Continued on Ertapenem.      ESBL Escherichia coli bacteremia   acute cholecystitis with contained perforation  s/p Septic shock   Transaminitis, worsening   S/p IR percutaneous cholecystostomy (3/17)  s/p ERCP 3/25/24    - Blood cultures  3/17 reporting Escherichia coli  - Repeat blood cultures 3/21 no growth   - Bile fluid culture 3/17 reporting Escherichia coli  - RVP/COVID 19 PCR 3/17 negative   - Urine Cx no growth   - CT A/P reporting Acute cholecystitis with contained perforation  - US ABD reporting Acute cholecystitis with walled off perforation.  - S/p IR percutaneous cholecystostomy (3/17)  - UA 3/17 negative for UTI   - Procalcitonin level 0.28  - LFTs improving  - Drop in H/H   - s/p ERCP 3/25/24  - Monitor LFTs  - Continue Ertapenem 1gm IV q 24hours   - patient educated about home intravenous antibiotics and antibiotic administrations as well as Mid/PICC line procedure with the patient and he will discuss with his wife   - Hold off on PICC/Midline for now unless needed for reasons other than infectious diseases  - Follow up cultures  - Trend Fever  - Trend WBC      Will Follow    Discussed treatment plan with: Dr Ingram

## 2024-03-27 NOTE — DISCHARGE NOTE PROVIDER - DISCHARGE DIET
DASH Diet/Enteral, NPO with Tube Feeds/Mildly Thick Liquids Enteral, NPO with Tube Feeds/Easy to Chew Diet

## 2024-03-27 NOTE — PROGRESS NOTE ADULT - SUBJECTIVE AND OBJECTIVE BOX
[facility]  INFECTIOUS DISEASES at [place] / [place] / [place]  =======================================================                               Ronen Thomas MD#   Popeye Boyce MD*                             Cassy Madera MD*   Inez Caceres MD*                              Professor Emeritus:  Dr Rohit Elmore MD^            Diplomates American Board of Internal Medicine & Infectious Diseases                # [place] Office - Appt - Tel  [phone] Fax [phone]                * [place] Office - Appt - Tel [phone] Fax [phone]                      ^[place] Office - Tel  [phone] Fax [phone]                                  Hospital Consult line:  [phone]  =======================================================    GEORGE, PRATIMA [id number]    Follow up:  ESBL Escherichia coli bacteremia     no fevers         Allergies:  No Known Allergies       REVIEW OF SYSTEMS:  CONSTITUTIONAL:  No Fever or chills  HEENT:  No diplopia or blurred vision.  No earache, sore throat or runny nose.  CARDIOVASCULAR:  No chest pain  RESPIRATORY:  No cough, shortness of breath  GASTROINTESTINAL:  No nausea, vomiting or diarrhea.  GENITOURINARY:  No dysuria, frequency or urgency. No Blood in urine  MUSCULOSKELETAL:  no joint aches, no muscle pain  SKIN:  No change in skin, hair or nails.  NEUROLOGIC:  No Headaches      Physical Exam:  GEN: NAD  HEENT: normocephalic and atraumatic. EOMI. PERRL.    NECK: Supple.   LUNGS: CTA B/L.  HEART: RRR  ABDOMEN: Soft, NT, ND.  +BS.    : No CVA tenderness  EXTREMITIES: Without  edema.  MSK: No joint swelling  NEUROLOGIC: No Focal Deficits   SKIN: No rash      Vitals:  T(F): 97.9 (27 Mar 2024 05:05), Max: 98.2 (26 Mar 2024 16:47)  HR: 84 (27 Mar 2024 05:05)  BP: 96/60 (27 Mar 2024 05:05)  RR: 18 (27 Mar 2024 05:05)  SpO2: 97% (27 Mar 2024 05:05) (97% - 99%)  temp max in last 48H T(F): , Max: 99.3 (03-25-24 @ 23:23)    Current Antibiotics:  ertapenem  IVPB 1000 milliGRAM(s) IV Intermittent every 24 hours    Other medications:  aspirin  chewable 81 milliGRAM(s) Oral daily  chlorhexidine 2% Cloths 1 Application(s) Topical <User Schedule>  dextrose 5%. 1000 milliLiter(s) IV Continuous <Continuous>  dextrose 5%. 1000 milliLiter(s) IV Continuous <Continuous>  dextrose 50% Injectable 25 Gram(s) IV Push once  dextrose 50% Injectable 25 Gram(s) IV Push once  dextrose 50% Injectable 12.5 Gram(s) IV Push once  ferrous    sulfate 325 milliGRAM(s) Oral daily  glucagon  Injectable 1 milliGRAM(s) IntraMuscular once  insulin glargine Injectable (LANTUS) 5 Unit(s) SubCutaneous at bedtime  insulin lispro (ADMELOG) corrective regimen sliding scale   SubCutaneous Before meals and at bedtime  midodrine. 5 milliGRAM(s) Oral three times a day  ondansetron Injectable 4 milliGRAM(s) IV Push once  pantoprazole  Injectable 40 milliGRAM(s) IV Push every 12 hours  tamsulosin 0.4 milliGRAM(s) Oral at bedtime                            7.7    4.44  )-----------( 213      ( 27 Mar 2024 05:05 )             25.0     03-27    139  |  101  |  19.6  ----------------------------<  125<H>  4.2   |  28.0  |  0.37<L>    Ca    8.6      27 Mar 2024 05:05    TPro  6.2<L>  /  Alb  2.9<L>  /  TBili  0.5  /  DBili  x   /  AST  28  /  ALT  97<H>  /  AlkPhos  752<H>  03-27    RECENT CULTURES:  03-21 @ 08:26 .Blood Blood-Peripheral     No growth at 5 days    03-21 @ 08:19 .Blood Blood-Peripheral     No growth at 5 days    03-17 @ 18:22 Bile Bile Fluid Escherichia coli    Rare Escherichia coli  No polymorphonuclear cells seen  No organisms seen  by cytocentrifuge    03-17 @ 13:13 Clean Catch Clean Catch (Midstream)     No growth    03-17 @ 08:30 .Blood Blood-Peripheral     Growth in aerobic and anaerobic bottles: Escherichia coli  Growth in aerobic and anaerobic bottles: Escherichia coli ESBL  See previous culture [id number]  Growth in aerobic and anaerobic bottles: Gram Negative Rods    03-17 @ 08:22    RVP  NotDetec    03-17 @ 08:15 .Blood Blood-Peripheral Blood Culture PCR  Escherichia coli  Escherichia coli ESBL    Growth in aerobic and anaerobic bottles: Escherichia coli  Growth in aerobic bottle: Escherichia coli ESBL  Direct identification is available within approximately 3-5  hours either by Blood Panel Multiplexed PCR or Direct  MALDI-TOF. Details: [email or web address]  Growth in aerobic bottle: Gram Negative Rods  Growth in anaerobic bottle: Gram Negative Rods      WBC Count: 4.44 K/uL (03-27-24 @ 05:05)  WBC Count: 6.32 K/uL (03-26-24 @ 13:00)  WBC Count: 5.62 K/uL (03-26-24 @ 05:37)  WBC Count: 5.02 K/uL (03-25-24 @ 05:12)  WBC Count: 4.28 K/uL (03-24-24 @ 05:17)  WBC Count: 5.53 K/uL (03-23-24 @ 06:12)    Creatinine: 0.37 mg/dL (03-27-24 @ 05:05)  Creatinine: 0.36 mg/dL (03-26-24 @ 05:37)  Creatinine: 0.35 mg/dL (03-25-24 @ 05:12)  Creatinine: 0.31 mg/dL (03-24-24 @ 05:17)  Creatinine: 0.34 mg/dL (03-23-24 @ 06:12)    Procalcitonin, Serum: 0.28 ng/mL (03-22-24 @ 04:55)  Procalcitonin, Serum: 2.03 ng/mL (03-19-24 @ 02:45)  Procalcitonin, Serum: 3.55 ng/mL (03-18-24 @ 05:00)     SARS-CoV-2: NotDetec (03-17-24 @ 08:22)

## 2024-03-27 NOTE — DISCHARGE NOTE PROVIDER - NSDCMRMEDTOKEN_GEN_ALL_CORE_FT
acetaminophen 650 mg/20.3 mL oral liquid: 20.3 milliliter(s) orally every 6 hours  Artificial Tears ophthalmic ointment: 1 application in each eye 4 times a day  aspirin 81 mg oral capsule: 1 cap(s) orally once a day  chlorhexidine 0.12% mucous membrane liquid: 15 milliliter(s) orally 2 times a day  docusate sodium 100 mg oral capsule: 1 cap(s) orally once a day  epoetin remedios 4000 units/mL injectable solution: injectable once a week  ergocalciferol 1.25 mg (50,000 intl units) oral capsule: 1 cap(s) orally once a week  famotidine 20 mg oral tablet: 1 tab(s) orally once a day  Farxiga 10 mg oral tablet: 1 tab(s) orally once a day  ferrous sulfate 325 mg (65 mg elemental iron) oral delayed release tablet: 1 tab(s) orally once a day  finasteride 5 mg oral tablet: 1 tab(s) orally once a day  Flomax 0.4 mg oral capsule: 1 cap(s) orally once a day  Flomax 0.4 mg oral capsule: 1 cap(s) orally once a day  Janumet 50 mg-1000 mg oral tablet: 1 tab(s) orally 2 times a day (resume on 3/27)  Lexapro 10 mg oral tablet: 1 tab(s) orally once a day  Lipitor 20 mg oral tablet: 1 tab(s) orally once a day  Mapap 325 mg oral tablet: 2 tab(s) orally once a day  metFORMIN 500 mg oral tablet: 1 tab(s) orally once a day  midodrine 5 mg oral tablet: 2 tab(s) orally 3 times a day  MiraLax oral powder for reconstitution: 17 gram(s) orally once a day  mirtazapine 15 mg oral tablet: 1 tab(s) orally once a day  Multiple Vitamins oral tablet: 1 tab(s) orally once a day  oxyCODONE 5 mg/5 mL oral solution: 5 milliliter(s) orally every 6 hours as needed for  severe pain MDD: 20  Peridex 0.12% mucous membrane liquid: 15 milliliter(s) orally 2 times a day Swish and spit, do not swallow  Prevident Dental Rinse 0.02% topical solution: Apply topically to affected area 2 times a day  Rolling Walker: Mona Artis  senna (sennosides) 8.6 mg oral tablet: 1 tab(s) orally once a day   Artificial Tears ophthalmic ointment: 1 application in each eye 4 times a day  aspirin 81 mg oral capsule: 1 cap(s) orally once a day  chlorhexidine 0.12% mucous membrane liquid: 15 milliliter(s) orally 2 times a day  docusate sodium 100 mg oral capsule: 1 cap(s) orally once a day  epoetin remedios 4000 units/mL injectable solution: injectable once a week  ergocalciferol 1.25 mg (50,000 intl units) oral capsule: 1 cap(s) orally once a week  famotidine 20 mg oral tablet: 1 tab(s) orally once a day  Farxiga 10 mg oral tablet: 1 tab(s) orally once a day  ferrous sulfate 325 mg (65 mg elemental iron) oral delayed release tablet: 1 tab(s) orally once a day  finasteride 5 mg oral tablet: 1 tab(s) orally once a day  Flomax 0.4 mg oral capsule: 1 cap(s) orally once a day  Flomax 0.4 mg oral capsule: 1 cap(s) orally once a day  Janumet 50 mg-1000 mg oral tablet: 1 tab(s) orally 2 times a day (resume on 3/27)  Lexapro 10 mg oral tablet: 1 tab(s) orally once a day  Lipitor 20 mg oral tablet: 1 tab(s) orally once a day  Mapap 325 mg oral tablet: 2 tab(s) orally once a day  metFORMIN 500 mg oral tablet: 1 tab(s) orally once a day  midodrine 5 mg oral tablet: 2 tab(s) orally 3 times a day  MiraLax oral powder for reconstitution: 17 gram(s) orally once a day  mirtazapine 15 mg oral tablet: 1 tab(s) orally once a day  Multiple Vitamins oral tablet: 1 tab(s) orally once a day  oxyCODONE 5 mg/5 mL oral solution: 5 milliliter(s) orally every 6 hours as needed for  severe pain MDD: 20  Peridex 0.12% mucous membrane liquid: 15 milliliter(s) orally 2 times a day Swish and spit, do not swallow  Prevident Dental Rinse 0.02% topical solution: Apply topically to affected area 2 times a day  Rolling Walker: Rolling Walker  senna (sennosides) 8.6 mg oral tablet: 1 tab(s) orally once a day   acetaminophen 650 mg/20.3 mL oral liquid: 20.3 milliliter(s) orally every 6 hours as needed for  fever  Artificial Tears ophthalmic ointment: 1 application in each eye 4 times a day  aspirin 81 mg oral capsule: 1 cap(s) orally once a day  docusate sodium 100 mg oral capsule: 1 cap(s) orally once a day  ergocalciferol 1.25 mg (50,000 intl units) oral capsule: 1 cap(s) orally once a week  ertapenem 1 g injection: 1 gram(s) injectable once a day Until 04/03/24  Farxiga 10 mg oral tablet: 1 tab(s) orally once a day  ferrous sulfate 325 mg (65 mg elemental iron) oral delayed release tablet: 1 tab(s) orally once a day  finasteride 5 mg oral tablet: 1 tab(s) orally once a day  Flomax 0.4 mg oral capsule: 1 cap(s) orally once a day  Janumet 50 mg-1000 mg oral tablet: 1 tab(s) orally 2 times a day (resume on 3/27)  Lexapro 10 mg oral tablet: 1 tab(s) orally once a day  Lipitor 20 mg oral tablet: 1 tab(s) orally once a day REsume on 4/1/20204  metFORMIN 500 mg oral tablet: 1 tab(s) orally once a day  midodrine 5 mg oral tablet: 2 tab(s) orally 3 times a day  MiraLax oral powder for reconstitution: 17 gram(s) orally once a day  mirtazapine 15 mg oral tablet: 1 tab(s) orally once a day  Multiple Vitamins oral tablet: 1 tab(s) orally once a day  oxyCODONE 5 mg/5 mL oral solution: 5 milliliter(s) orally every 6 hours as needed for  severe pain MDD: 20  pantoprazole 40 mg oral delayed release tablet: 1 tab(s) orally 2 times a day  Peridex 0.12% mucous membrane liquid: 15 milliliter(s) orally 2 times a day Swish and spit, do not swallow  Prevident Dental Rinse 0.02% topical solution: Apply topically to affected area 2 times a day  senna (sennosides) 8.6 mg oral tablet: 1 tab(s) orally once a day  sucralfate 1 g oral tablet: 1 tab(s) orally 4 times a day

## 2024-03-28 DIAGNOSIS — R71.0 PRECIPITOUS DROP IN HEMATOCRIT: ICD-10-CM

## 2024-03-28 DIAGNOSIS — K82.2 PERFORATION OF GALLBLADDER: ICD-10-CM

## 2024-03-28 LAB
ALBUMIN SERPL ELPH-MCNC: 3.4 G/DL — SIGNIFICANT CHANGE UP (ref 3.3–5.2)
ALP SERPL-CCNC: 729 U/L — HIGH (ref 40–120)
ALT FLD-CCNC: 81 U/L — HIGH
ANION GAP SERPL CALC-SCNC: 13 MMOL/L — SIGNIFICANT CHANGE UP (ref 5–17)
AST SERPL-CCNC: 22 U/L — SIGNIFICANT CHANGE UP
BASOPHILS # BLD AUTO: 0.08 K/UL — SIGNIFICANT CHANGE UP (ref 0–0.2)
BASOPHILS NFR BLD AUTO: 1.8 % — SIGNIFICANT CHANGE UP (ref 0–2)
BILIRUB SERPL-MCNC: 0.6 MG/DL — SIGNIFICANT CHANGE UP (ref 0.4–2)
BUN SERPL-MCNC: 15.2 MG/DL — SIGNIFICANT CHANGE UP (ref 8–20)
CALCIUM SERPL-MCNC: 9.3 MG/DL — SIGNIFICANT CHANGE UP (ref 8.4–10.5)
CHLORIDE SERPL-SCNC: 99 MMOL/L — SIGNIFICANT CHANGE UP (ref 96–108)
CO2 SERPL-SCNC: 27 MMOL/L — SIGNIFICANT CHANGE UP (ref 22–29)
CREAT SERPL-MCNC: 0.39 MG/DL — LOW (ref 0.5–1.3)
EGFR: 117 ML/MIN/1.73M2 — SIGNIFICANT CHANGE UP
EOSINOPHIL # BLD AUTO: 0.05 K/UL — SIGNIFICANT CHANGE UP (ref 0–0.5)
EOSINOPHIL NFR BLD AUTO: 1.1 % — SIGNIFICANT CHANGE UP (ref 0–6)
GLUCOSE BLDC GLUCOMTR-MCNC: 182 MG/DL — HIGH (ref 70–99)
GLUCOSE BLDC GLUCOMTR-MCNC: 187 MG/DL — HIGH (ref 70–99)
GLUCOSE BLDC GLUCOMTR-MCNC: 190 MG/DL — HIGH (ref 70–99)
GLUCOSE BLDC GLUCOMTR-MCNC: 271 MG/DL — HIGH (ref 70–99)
GLUCOSE SERPL-MCNC: 165 MG/DL — HIGH (ref 70–99)
HCT VFR BLD CALC: 32.6 % — LOW (ref 39–50)
HGB BLD-MCNC: 10.2 G/DL — LOW (ref 13–17)
IMM GRANULOCYTES NFR BLD AUTO: 0.7 % — SIGNIFICANT CHANGE UP (ref 0–0.9)
LYMPHOCYTES # BLD AUTO: 0.66 K/UL — LOW (ref 1–3.3)
LYMPHOCYTES # BLD AUTO: 15.1 % — SIGNIFICANT CHANGE UP (ref 13–44)
MCHC RBC-ENTMCNC: 24.8 PG — LOW (ref 27–34)
MCHC RBC-ENTMCNC: 31.3 GM/DL — LOW (ref 32–36)
MCV RBC AUTO: 79.3 FL — LOW (ref 80–100)
MONOCYTES # BLD AUTO: 0.39 K/UL — SIGNIFICANT CHANGE UP (ref 0–0.9)
MONOCYTES NFR BLD AUTO: 8.9 % — SIGNIFICANT CHANGE UP (ref 2–14)
NEUTROPHILS # BLD AUTO: 3.16 K/UL — SIGNIFICANT CHANGE UP (ref 1.8–7.4)
NEUTROPHILS NFR BLD AUTO: 72.4 % — SIGNIFICANT CHANGE UP (ref 43–77)
PLATELET # BLD AUTO: 242 K/UL — SIGNIFICANT CHANGE UP (ref 150–400)
POTASSIUM SERPL-MCNC: 4.3 MMOL/L — SIGNIFICANT CHANGE UP (ref 3.5–5.3)
POTASSIUM SERPL-SCNC: 4.3 MMOL/L — SIGNIFICANT CHANGE UP (ref 3.5–5.3)
PROT SERPL-MCNC: 7.1 G/DL — SIGNIFICANT CHANGE UP (ref 6.6–8.7)
RBC # BLD: 4.11 M/UL — LOW (ref 4.2–5.8)
RBC # FLD: 22.5 % — HIGH (ref 10.3–14.5)
SODIUM SERPL-SCNC: 139 MMOL/L — SIGNIFICANT CHANGE UP (ref 135–145)
WBC # BLD: 4.37 K/UL — SIGNIFICANT CHANGE UP (ref 3.8–10.5)
WBC # FLD AUTO: 4.37 K/UL — SIGNIFICANT CHANGE UP (ref 3.8–10.5)

## 2024-03-28 PROCEDURE — 99232 SBSQ HOSP IP/OBS MODERATE 35: CPT

## 2024-03-28 PROCEDURE — 99232 SBSQ HOSP IP/OBS MODERATE 35: CPT | Mod: FS

## 2024-03-28 RX ORDER — SUCRALFATE 1 G
1 TABLET ORAL
Refills: 0 | Status: DISCONTINUED | OUTPATIENT
Start: 2024-03-28 | End: 2024-03-29

## 2024-03-28 RX ADMIN — MIDODRINE HYDROCHLORIDE 5 MILLIGRAM(S): 2.5 TABLET ORAL at 11:07

## 2024-03-28 RX ADMIN — Medication 1 GRAM(S): at 16:07

## 2024-03-28 RX ADMIN — MIDODRINE HYDROCHLORIDE 5 MILLIGRAM(S): 2.5 TABLET ORAL at 16:06

## 2024-03-28 RX ADMIN — Medication 81 MILLIGRAM(S): at 11:07

## 2024-03-28 RX ADMIN — MIDODRINE HYDROCHLORIDE 5 MILLIGRAM(S): 2.5 TABLET ORAL at 05:43

## 2024-03-28 RX ADMIN — Medication 2: at 07:21

## 2024-03-28 RX ADMIN — Medication 325 MILLIGRAM(S): at 11:07

## 2024-03-28 RX ADMIN — ERTAPENEM SODIUM 120 MILLIGRAM(S): 1 INJECTION, POWDER, LYOPHILIZED, FOR SOLUTION INTRAMUSCULAR; INTRAVENOUS at 05:43

## 2024-03-28 RX ADMIN — INSULIN GLARGINE 5 UNIT(S): 100 INJECTION, SOLUTION SUBCUTANEOUS at 22:03

## 2024-03-28 RX ADMIN — Medication 2: at 16:06

## 2024-03-28 RX ADMIN — Medication 6: at 11:06

## 2024-03-28 RX ADMIN — Medication 2: at 22:02

## 2024-03-28 RX ADMIN — PANTOPRAZOLE SODIUM 40 MILLIGRAM(S): 20 TABLET, DELAYED RELEASE ORAL at 16:07

## 2024-03-28 RX ADMIN — TAMSULOSIN HYDROCHLORIDE 0.4 MILLIGRAM(S): 0.4 CAPSULE ORAL at 22:02

## 2024-03-28 RX ADMIN — PANTOPRAZOLE SODIUM 40 MILLIGRAM(S): 20 TABLET, DELAYED RELEASE ORAL at 05:43

## 2024-03-28 NOTE — PROGRESS NOTE ADULT - NS ATTEST RISK PROBLEM GEN_ALL_CORE FT
Patient states he had dark stool after ERCP, drop in hg to 7.7. Hgb 10 after transfusion ( drop in hemoglobin )   hemoglobin ordered for tomorrow   grade D esphagitis   perforated gallbadder    I reviewed ID note- antibiotics until 4/3          drop in hemoglobin - grade D esophagitis  hemoglobin stable after transfusion  PPI BID  will sign off tomorrow if hemoglobin stable  Discussed above plan with Dr Umana

## 2024-03-28 NOTE — PROGRESS NOTE ADULT - SUBJECTIVE AND OBJECTIVE BOX
CC: Follow up     INTERVAL HPI/OVERNIGHT EVENTS: Patient seen and examined, black tarry stool reported by RN. Denies abdominal pain nausea or vomiting.       Vital Signs Last 24 Hrs  T(C): 36.4 (28 Mar 2024 11:06), Max: 36.9 (27 Mar 2024 20:00)  T(F): 97.6 (28 Mar 2024 11:06), Max: 98.4 (27 Mar 2024 20:00)  HR: 87 (28 Mar 2024 11:06) (70 - 88)  BP: 100/65 (28 Mar 2024 11:06) (95/55 - 114/74)  BP(mean): --  RR: 18 (28 Mar 2024 11:06) (18 - 19)  SpO2: 100% (28 Mar 2024 11:06) (96% - 100%)    Parameters below as of 27 Mar 2024 20:00  Patient On (Oxygen Delivery Method): room air        PHYSICAL EXAM:    GENERAL: NAD, AOX3  ENMT: Moist mucous membranes  NECK: Supple  CHEST/LUNG: Clear to auscultation bilaterally; No rales, rhonchi, wheezing, or rubs  HEART: Regular rate and rhythm; No murmurs, rubs, or gallops  ABDOMEN: Soft, Nontender, Nondistended; Bowel sounds present  + Shu  + PEG   EXTREMITIES:  2+ Peripheral Pulses, No clubbing, cyanosis, or edema        MEDICATIONS  (STANDING):  aspirin  chewable 81 milliGRAM(s) Oral daily  chlorhexidine 2% Cloths 1 Application(s) Topical <User Schedule>  dextrose 5%. 1000 milliLiter(s) (50 mL/Hr) IV Continuous <Continuous>  dextrose 5%. 1000 milliLiter(s) (100 mL/Hr) IV Continuous <Continuous>  dextrose 50% Injectable 25 Gram(s) IV Push once  dextrose 50% Injectable 12.5 Gram(s) IV Push once  dextrose 50% Injectable 25 Gram(s) IV Push once  ertapenem  IVPB 1000 milliGRAM(s) IV Intermittent every 24 hours  ferrous    sulfate 325 milliGRAM(s) Oral daily  glucagon  Injectable 1 milliGRAM(s) IntraMuscular once  insulin glargine Injectable (LANTUS) 5 Unit(s) SubCutaneous at bedtime  insulin lispro (ADMELOG) corrective regimen sliding scale   SubCutaneous Before meals and at bedtime  midodrine. 5 milliGRAM(s) Oral three times a day  ondansetron Injectable 4 milliGRAM(s) IV Push once  pantoprazole  Injectable 40 milliGRAM(s) IV Push every 12 hours  tamsulosin 0.4 milliGRAM(s) Oral at bedtime    MEDICATIONS  (PRN):  dextrose Oral Gel 15 Gram(s) Oral once PRN Blood Glucose LESS THAN 70 milliGRAM(s)/deciliter      Allergies    No Known Allergies    Intolerances          LABS:                          10.2   4.37  )-----------( 242      ( 28 Mar 2024 05:48 )             32.6     03-28    139  |  99  |  15.2  ----------------------------<  165<H>  4.3   |  27.0  |  0.39<L>    Ca    9.3      28 Mar 2024 05:48    TPro  7.1  /  Alb  3.4  /  TBili  0.6  /  DBili  x   /  AST  22  /  ALT  81<H>  /  AlkPhos  729<H>  03-28      Urinalysis Basic - ( 28 Mar 2024 05:48 )    Color: x / Appearance: x / SG: x / pH: x  Gluc: 165 mg/dL / Ketone: x  / Bili: x / Urobili: x   Blood: x / Protein: x / Nitrite: x   Leuk Esterase: x / RBC: x / WBC x   Sq Epi: x / Non Sq Epi: x / Bacteria: x        RADIOLOGY & ADDITIONAL TESTS:

## 2024-03-28 NOTE — PROGRESS NOTE ADULT - PROBLEM SELECTOR PLAN 1
Patient states he had dark stool after ERCP, drop in hg to 7.7. Hgb 10 after transfusion  hemoglobin ordered for tomorrow     ERCP- showed severe grade D esophagitis, Spincterotomy performed with balloon sweep  PPI BID   rectal exam today - dark brown stool no melena  hemoglobin responded to transfusion  I hg stable, we will sign off tomorrow

## 2024-03-28 NOTE — PROGRESS NOTE ADULT - ASSESSMENT
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  Initial CT abdomen showed acute cholecystitis with contained perforation.  In the ED, found to be in severe septic shock with leukocytosis (WBC 14), transaminitis, & lactic acidosis (8.1), fluid resuscitated and started on pressors (Levophed) and Zosyn.  Admitted to MICU for acute cholecystitis.  Surgery consulted but recommended against surgical intervention.  BCx grew ESBL E. coli, started on ertapenem.  Now off pressors, stable for downgrade to medicine 3/19. Worsening LFTS noted, MRCP with no obstruction. Seen by GI, status post ERCP with sphincterotomy. Post procedure noted to have worsening anemia. CT abdomen and pelvis with IV contrast negative. ERCP/EUS with reported grade D Esophagitis. Started on  PPI BID. Transfused 1 unit PRBC.     Assessment/Plan:    1. Acute blood loss anemia suspected secondary to UGIB  - Grade D Esophagitis noted on ERCP   - FOBT positive   - PPI BID, Start Carafate  - Case discussed with Dr Cobian in Detail, REepat CBC IN Am  - CT abdomen and pelvis with IV Contrast negative   _ Transfused 1 unit PRBC    2 ESBL E.Coli Bacteremia due to Acute Cholecystitis with contained perforation s/p choly tube  -septic shock resolved; on midodrine  -CT a/p: Acute cholecystitis with contained perforation  -US ABD: Acute cholecystitis with walled off perforation.  -S/p IR percutaneous cholecystostomy (3/17)- Initially draining purulent fluid, now draining bilious fluid  -Blood cultures - ESBL E. coli   -repeat blood cultures negative  - Status post ERCP after worsening LFTS status post sphincterotomy. LFTs downtrending   -continue Invanz until 04/03     3. T2DM  -A1c 7.8  - Lantus 5 units QHS  -Hold home Farxiga & Janumet  -ISS  -ADA diet    4. CAD  -denies anginal symptoms  -continue aspirin 81 mg QD  -Hold Lipitor in the setting of elevated liver enzymes    5. BPH  -continue Flomax    6. 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  Initial CT abdomen showed acute cholecystitis with contained perforation.  In the ED, found to be in severe septic shock with leukocytosis (WBC 14), transaminitis, & lactic acidosis (8.1), fluid resuscitated and started on pressors (Levophed) and Zosyn.  Admitted to MICU for acute cholecystitis.  Surgery consulted but recommended against surgical intervention.  BCx grew ESBL E. coli, started on ertapenem.  Now off pressors, stable for downgrade to medicine 3/19. Worsening LFTS noted, MRCP with no obstruction. Seen by GI, status post ERCP with sphincterotomy. Post procedure noted to have worsening anemia. CT abdomen and pelvis with IV contrast negative. ERCP/EUS with reported grade D Esophagitis. Started on  PPI BID. Transfused 1 unit PRBC.     Assessment/Plan:    1. Acute blood loss anemia suspected secondary to UGIB  - Grade D Esophagitis noted on ERCP   - FOBT positive   - PPI BID, Start Carafate  - Case discussed with Dr Cobian in Detail, REepat CBC IN Am  - CT abdomen and pelvis with IV Contrast negative   _ Transfused 1 unit PRBC    2 ESBL E.Coli Bacteremia due to Acute Cholecystitis with contained perforation s/p choly tube  -septic shock resolved; on midodrine  -CT a/p: Acute cholecystitis with contained perforation  -US ABD: Acute cholecystitis with walled off perforation.  -S/p IR percutaneous cholecystostomy (3/17)- Initially draining purulent fluid, now draining bilious fluid  -Blood cultures - ESBL E. coli   -repeat blood cultures negative  - Status post ERCP after worsening LFTS status post sphincterotomy. LFTs downtrending   -continue Invanz until 04/03     3. T2DM  -A1c 7.8  - Lantus 5 units QHS  -Hold home Farxiga & Janumet  -ISS  -ADA diet    4. CAD  -denies anginal symptoms  -continue aspirin 81 mg QD  -Hold Lipitor in the setting of elevated liver enzymes    5. BPH  -continue Flomax    6. Dysphagia  =- MBS In AM     DVT ppx: Hold for anemia    Discharge disposition: Home with home care in 24-48 hours if stable     Attempted to call patient's wife to update on plan of care with no answer

## 2024-03-28 NOTE — PROGRESS NOTE ADULT - SUBJECTIVE AND OBJECTIVE BOX
Chief Complaint:  Patient is a 73y old  Male who presents with a chief complaint of Severe sepsis (28 Mar 2024 11:20)      HPI/ 24 hr events: Patient seen and examined at bedside. He is sitting up in chair. Reports dark BM yesterday. Denies nausea, vomiting, abdominal pain. Vitals are overall stable, Hgb 7.7 -> 10.2 after 1 u pRBC.       REVIEW OF SYSTEMS:   General: Negative  HEENT: Negative  CV: Negative  Respiratory: Negative  GI: See HPI  : Negative  MSK: Negative  Hematologic: Negative  Skin: Negative    MEDICATIONS:   MEDICATIONS  (STANDING):  aspirin  chewable 81 milliGRAM(s) Oral daily  chlorhexidine 2% Cloths 1 Application(s) Topical <User Schedule>  dextrose 5%. 1000 milliLiter(s) (50 mL/Hr) IV Continuous <Continuous>  dextrose 5%. 1000 milliLiter(s) (100 mL/Hr) IV Continuous <Continuous>  dextrose 50% Injectable 25 Gram(s) IV Push once  dextrose 50% Injectable 25 Gram(s) IV Push once  dextrose 50% Injectable 12.5 Gram(s) IV Push once  ertapenem  IVPB 1000 milliGRAM(s) IV Intermittent every 24 hours  ferrous    sulfate 325 milliGRAM(s) Oral daily  glucagon  Injectable 1 milliGRAM(s) IntraMuscular once  insulin glargine Injectable (LANTUS) 5 Unit(s) SubCutaneous at bedtime  insulin lispro (ADMELOG) corrective regimen sliding scale   SubCutaneous Before meals and at bedtime  midodrine. 5 milliGRAM(s) Oral three times a day  ondansetron Injectable 4 milliGRAM(s) IV Push once  pantoprazole  Injectable 40 milliGRAM(s) IV Push every 12 hours  sucralfate 1 Gram(s) Oral four times a day  tamsulosin 0.4 milliGRAM(s) Oral at bedtime    MEDICATIONS  (PRN):  dextrose Oral Gel 15 Gram(s) Oral once PRN Blood Glucose LESS THAN 70 milliGRAM(s)/deciliter      ALLERGIES:   Allergies    No Known Allergies    Intolerances        VITAL SIGNS:   Vital Signs Last 24 Hrs  T(C): 36.4 (28 Mar 2024 11:06), Max: 36.9 (27 Mar 2024 20:00)  T(F): 97.6 (28 Mar 2024 11:06), Max: 98.4 (27 Mar 2024 20:00)  HR: 87 (28 Mar 2024 11:06) (70 - 88)  BP: 100/65 (28 Mar 2024 11:06) (95/55 - 114/74)  BP(mean): --  RR: 18 (28 Mar 2024 11:06) (18 - 19)  SpO2: 100% (28 Mar 2024 11:06) (96% - 100%)    Parameters below as of 27 Mar 2024 20:00  Patient On (Oxygen Delivery Method): room air      I&O's Summary    27 Mar 2024 07:01  -  28 Mar 2024 07:00  --------------------------------------------------------  IN: 2220 mL / OUT: 650 mL / NET: 1570 mL    28 Mar 2024 07:01  -  28 Mar 2024 11:45  --------------------------------------------------------  IN: 870 mL / OUT: 0 mL / NET: 870 mL        PHYSICAL EXAM:   GENERAL:  No acute distress  HEENT:  NC/AT, conjunctiva clear, sclera anicteric  CHEST:  No increased effort, breath sounds clear  HEART:  Regular rate  ABDOMEN:  Soft, non-tender, non-distended, cholecystostomy tube and PEG tube noted, no rebound or guarding  RECTAL: dark brown stool (performed by Dr Cobian)   SKIN:  Warm, dry  NEURO:  Calm, cooperative      LABS:                        10.2   4.37  )-----------( 242      ( 28 Mar 2024 05:48 )             32.6     03-28    139  |  99  |  15.2  ----------------------------<  165<H>  4.3   |  27.0  |  0.39<L>    Ca    9.3      28 Mar 2024 05:48    TPro  7.1  /  Alb  3.4  /  TBili  0.6  /  DBili  x   /  AST  22  /  ALT  81<H>  /  AlkPhos  729<H>  03-28    LIVER FUNCTIONS - ( 28 Mar 2024 05:48 )  Alb: 3.4 g/dL / Pro: 7.1 g/dL / ALK PHOS: 729 U/L / ALT: 81 U/L / AST: 22 U/L / GGT: x                                             RADIOLOGY & ADDITIONAL STUDIES:    < from: EGD w/ ERCP (03.25.24 @ 13:45) >      ERCP Findings:     radiograph was taken. Limited views of the esophagus, stomach and duodenum    were unremarkable. The major papilla was noted in the second portion of duodenum    and appeared normal. The major papilla was cannulated, using wire-guided    cannulation, using a Jagtome preloaded with a 0.035 inch Jagwire, the common    bile duct was cannulated, the wire was seen in the CBD, cholangiogram was    performed confirming location. There was also evidence of drain, percutaneous    cholestostomy. Cholangiogram without evidence of filling defect in the CBD.    There was evidence of a small gallstone retained within the gallbladder on    Fluoroscopy. The CBD was unremarkable. Sphincterotomy was performed without    bleeding. The bile duct was swept with a 9 mm biliary extraction balloon. An    occlusion cholangiogram was then performed opacifying the bile duct and patent    cystic duct. The scope was withdrawn and procedure terminated. Post-procedure    xray did not show air under the diaphragm. Total fluoroscopy time was 47    seconds.        Findings:    Esophagus Additional esophagus findings - There was significant esophagitis (LA    grade D), approximately 10 cm in length in the distal esophagus..    Stomach Additional stomach findings - There was evidence of a retained PEG tube    bumper seen in the gastric body. There was an area of nodular irregular    appearing mucosa in the gastric antrum/incisura, Cold forceps biopsies obtained,    pathology pending. May represent intestinal metaplasia..    Duodenum Additional duodenum findings - The visualized portion of the duodenum    from the bulb through to the second portion of the duodenum appeared normal. The    papilla appeared normal. There was notably an extended intra-duodenal segment    which may represent the underlying cause of biliary hypokinesia..        Impressions:    LA grade D esophagitis.    S/p PEG tube.    Nodular gastric mucosa, cold forceps biopsies, potentially may represent    gastritis / intestinal metaplasia, path pending.    Elongated intra duodenal segment.    ERCP with biliary sphincterotomy performed.        Plan:    Await pathology results.    Advance diet as tolerated    Continue current medical regimen.    Return to floor for further management    Interval cholecystectomy for perforated gallbladder    Cedrick Calabrese MD        Version 1, Electronically signed on 3/25/2024 4:12:09 PM by Cedrick Calabrese MD    < end of copied text >

## 2024-03-28 NOTE — PROGRESS NOTE ADULT - ASSESSMENT
74 y/o M with PMHX squamous cell cancer s/p resection, chemoradiation and G-tube placement, CAD, DM and HTN who presented with septic shock with perforated cholecystitis, s/p percutaneous cholecystostomy tube placement (3/17/24) now with persistent elevation in liver associated enzymes with MRI findings suggestive of cholangitis. Now with drop in H/H     #Elevated liver chemistry  #Perforated Cholecystitis with cholecystostomy tube  #Cholangitis   #Drop in H/H   EGD w/ ERCP (03.25.24)- Sphincterotomy was performed, bile duct was swept, esophagitis (LA grade D) approximately 10 cm in length, area of nodular irregular appearing mucosa in the gastric antrum/incisura (biopsies obtained), may represent intestinal metaplasia, notably an extended intra-duodenal segment which may represent the underlying cause of biliary hypokinesia.    - Trend CBC, transfuse as needed. Monitor for signs of bleeding. Avoid NSAIDs  - IV PPI BID for GI mucosal cytoprotection, Carafate QID   - Continue current medical regimen  - Interval cholecystectomy for perforated gallbladder  - Continue to trend liver enzymes   _________________________________________________________________  Assessment and recommendations are final when note is signed by the attending physician.

## 2024-03-28 NOTE — PROGRESS NOTE ADULT - SUBJECTIVE AND OBJECTIVE BOX
[facility]  INFECTIOUS DISEASES at [place] / [place] / [place]  =======================================================                               Ronen Thomas MD#   Popeye Boyce MD*                             Cassy Madera MD*   Inez Caceres MD*                              Professor Emeritus:  Dr Rohit Elmore MD^            Diplomates American Board of Internal Medicine & Infectious Diseases                # [place] Office - Appt - Tel  [phone] Fax [phone]                * [place] Office - Appt - Tel [phone] Fax [phone]                      ^[place] Office - Tel  [phone] Fax [phone]                                  Hospital Consult line:  [phone]  =======================================================    GEORGE, PRATIMA [id number]    Follow up:  ESBL Escherichia coli bacteremia     no fevers       Allergies:  No Known Allergies       REVIEW OF SYSTEMS:  CONSTITUTIONAL:  No Fever or chills  HEENT:  No diplopia or blurred vision.  No earache, sore throat or runny nose.  CARDIOVASCULAR:  No chest pain  RESPIRATORY:  No cough, shortness of breath  GASTROINTESTINAL:  No nausea, vomiting or diarrhea.  GENITOURINARY:  No dysuria, frequency or urgency. No Blood in urine  MUSCULOSKELETAL:  no joint aches, no muscle pain  SKIN:  No change in skin, hair or nails.  NEUROLOGIC:  No Headaches      Physical Exam:  GEN: NAD  HEENT: normocephalic and atraumatic. EOMI. PERRL.    NECK: Supple.   LUNGS: CTA B/L.  HEART: RRR  ABDOMEN: Soft, NT, ND.  +BS.    : No CVA tenderness  EXTREMITIES: Without  edema.  MSK: No joint swelling  NEUROLOGIC: No Focal Deficits   SKIN: No rash      Vitals:  T(F): 97.6 (28 Mar 2024 11:06), Max: 98.4 (27 Mar 2024 20:00)  HR: 87 (28 Mar 2024 11:06)  BP: 100/65 (28 Mar 2024 11:06)  RR: 18 (28 Mar 2024 11:06)  SpO2: 100% (28 Mar 2024 11:06) (96% - 100%)  temp max in last 48H T(F): , Max: 98.4 (03-27-24 @ 20:00)    Current Antibiotics:  ertapenem  IVPB 1000 milliGRAM(s) IV Intermittent every 24 hours    Other medications:  aspirin  chewable 81 milliGRAM(s) Oral daily  chlorhexidine 2% Cloths 1 Application(s) Topical <User Schedule>  dextrose 5%. 1000 milliLiter(s) IV Continuous <Continuous>  dextrose 5%. 1000 milliLiter(s) IV Continuous <Continuous>  dextrose 50% Injectable 25 Gram(s) IV Push once  dextrose 50% Injectable 25 Gram(s) IV Push once  dextrose 50% Injectable 12.5 Gram(s) IV Push once  ferrous    sulfate 325 milliGRAM(s) Oral daily  glucagon  Injectable 1 milliGRAM(s) IntraMuscular once  insulin glargine Injectable (LANTUS) 5 Unit(s) SubCutaneous at bedtime  insulin lispro (ADMELOG) corrective regimen sliding scale   SubCutaneous Before meals and at bedtime  midodrine. 5 milliGRAM(s) Oral three times a day  ondansetron Injectable 4 milliGRAM(s) IV Push once  pantoprazole  Injectable 40 milliGRAM(s) IV Push every 12 hours  sucralfate 1 Gram(s) Oral four times a day  tamsulosin 0.4 milliGRAM(s) Oral at bedtime                            10.2   4.37  )-----------( 242      ( 28 Mar 2024 05:48 )             32.6     03-28    139  |  99  |  15.2  ----------------------------<  165<H>  4.3   |  27.0  |  0.39<L>    Ca    9.3      28 Mar 2024 05:48    TPro  7.1  /  Alb  3.4  /  TBili  0.6  /  DBili  x   /  AST  22  /  ALT  81<H>  /  AlkPhos  729<H>  03-28    RECENT CULTURES:  03-21 @ 08:26 .Blood Blood-Peripheral     No growth at 5 days    03-21 @ 08:19 .Blood Blood-Peripheral     No growth at 5 days    03-17 @ 18:22 Bile Bile Fluid Escherichia coli    Rare Escherichia coli  No polymorphonuclear cells seen  No organisms seen  by cytocentrifuge    03-17 @ 13:13 Clean Catch Clean Catch (Midstream)     No growth    03-17 @ 08:30 .Blood Blood-Peripheral     Growth in aerobic and anaerobic bottles: Escherichia coli  Growth in aerobic and anaerobic bottles: Escherichia coli ESBL  See previous culture [id number]  Growth in aerobic and anaerobic bottles: Gram Negative Rods    03-17 @ 08:22    RVP  NotDetec    03-17 @ 08:15 .Blood Blood-Peripheral Blood Culture PCR  Escherichia coli  Escherichia coli ESBL    Growth in aerobic and anaerobic bottles: Escherichia coli  Growth in aerobic bottle: Escherichia coli ESBL  Direct identification is available within approximately 3-5  hours either by Blood Panel Multiplexed PCR or Direct  MALDI-TOF. Details: [email or web address]  Growth in aerobic bottle: Gram Negative Rods  Growth in anaerobic bottle: Gram Negative Rods      WBC Count: 4.37 K/uL (03-28-24 @ 05:48)  WBC Count: 4.44 K/uL (03-27-24 @ 05:05)  WBC Count: 6.32 K/uL (03-26-24 @ 13:00)  WBC Count: 5.62 K/uL (03-26-24 @ 05:37)  WBC Count: 5.02 K/uL (03-25-24 @ 05:12)  WBC Count: 4.28 K/uL (03-24-24 @ 05:17)    Creatinine: 0.39 mg/dL (03-28-24 @ 05:48)  Creatinine: 0.37 mg/dL (03-27-24 @ 05:05)  Creatinine: 0.36 mg/dL (03-26-24 @ 05:37)  Creatinine: 0.35 mg/dL (03-25-24 @ 05:12)  Creatinine: 0.31 mg/dL (03-24-24 @ 05:17)    Procalcitonin, Serum: 0.28 ng/mL (03-22-24 @ 04:55)  Procalcitonin, Serum: 2.03 ng/mL (03-19-24 @ 02:45)  Procalcitonin, Serum: 3.55 ng/mL (03-18-24 @ 05:00)     SARS-CoV-2: NotDetec (03-17-24 @ 08:22)

## 2024-03-28 NOTE — PROGRESS NOTE ADULT - ASSESSMENT
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 Patient presented for severe abdominal pain.  Initial CT abdomen showed acute cholecystitis with contained perforation.  In the ED, found to be in severe septic shock with leukocytosis (WBC 14), transaminitis, & lactic acidosis (8.1), fluid resuscitated and started on pressors (Levophed) and Zosyn.  Admitted to MICU for acute cholecystitis.  Surgery consulted but recommended against surgical intervention.  Blood culture grew ESBL E. coli, started on ertapenem.  Now off pressors and transferred out of MICU 3/19. Continued on Ertapenem.      ESBL Escherichia coli bacteremia   acute cholecystitis with contained perforation  s/p Septic shock   Transaminitis, worsening   S/p IR percutaneous cholecystostomy (3/17)  s/p ERCP 3/25/24    - Blood cultures  3/17 reporting Escherichia coli  - Repeat blood cultures 3/21 no growth   - Bile fluid culture 3/17 reporting Escherichia coli  - RVP/COVID 19 PCR 3/17 negative   - Urine Cx no growth   - CT A/P reporting Acute cholecystitis with contained perforation  - US ABD reporting Acute cholecystitis with walled off perforation.  - S/p IR percutaneous cholecystostomy (3/17)  - UA 3/17 negative for UTI   - Procalcitonin level 0.28  - LFTs improving  - Drop in H/H   - s/p ERCP 3/25/24  - Monitor LFTs  - Continue Ertapenem 1gm IV q 24hours   - patient educated about home intravenous antibiotics and antibiotic administrations as well as Mid/PICC line procedure with the patient and he will discuss with his wife   - Plan for antibiotics till April 3, 2024   - Follow up cultures  - Trend Fever  - Trend WBC      Will Follow    Discussed treatment plan with: Dr Ingram

## 2024-03-29 VITALS
SYSTOLIC BLOOD PRESSURE: 108 MMHG | TEMPERATURE: 98 F | OXYGEN SATURATION: 100 % | DIASTOLIC BLOOD PRESSURE: 73 MMHG | HEART RATE: 71 BPM | RESPIRATION RATE: 18 BRPM

## 2024-03-29 LAB
ALBUMIN SERPL ELPH-MCNC: 3.2 G/DL — LOW (ref 3.3–5.2)
ALP SERPL-CCNC: 609 U/L — HIGH (ref 40–120)
ALT FLD-CCNC: 59 U/L — HIGH
ANION GAP SERPL CALC-SCNC: 12 MMOL/L — SIGNIFICANT CHANGE UP (ref 5–17)
AST SERPL-CCNC: 19 U/L — SIGNIFICANT CHANGE UP
BASOPHILS # BLD AUTO: 0.07 K/UL — SIGNIFICANT CHANGE UP (ref 0–0.2)
BASOPHILS NFR BLD AUTO: 1.6 % — SIGNIFICANT CHANGE UP (ref 0–2)
BILIRUB SERPL-MCNC: 0.5 MG/DL — SIGNIFICANT CHANGE UP (ref 0.4–2)
BUN SERPL-MCNC: 15.4 MG/DL — SIGNIFICANT CHANGE UP (ref 8–20)
CALCIUM SERPL-MCNC: 9.3 MG/DL — SIGNIFICANT CHANGE UP (ref 8.4–10.5)
CHLORIDE SERPL-SCNC: 96 MMOL/L — SIGNIFICANT CHANGE UP (ref 96–108)
CO2 SERPL-SCNC: 28 MMOL/L — SIGNIFICANT CHANGE UP (ref 22–29)
CREAT SERPL-MCNC: 0.39 MG/DL — LOW (ref 0.5–1.3)
EGFR: 117 ML/MIN/1.73M2 — SIGNIFICANT CHANGE UP
EOSINOPHIL # BLD AUTO: 0.08 K/UL — SIGNIFICANT CHANGE UP (ref 0–0.5)
EOSINOPHIL NFR BLD AUTO: 1.8 % — SIGNIFICANT CHANGE UP (ref 0–6)
GLUCOSE BLDC GLUCOMTR-MCNC: 202 MG/DL — HIGH (ref 70–99)
GLUCOSE BLDC GLUCOMTR-MCNC: 205 MG/DL — HIGH (ref 70–99)
GLUCOSE SERPL-MCNC: 213 MG/DL — HIGH (ref 70–99)
HCT VFR BLD CALC: 31.8 % — LOW (ref 39–50)
HGB BLD-MCNC: 10.1 G/DL — LOW (ref 13–17)
IMM GRANULOCYTES NFR BLD AUTO: 0.7 % — SIGNIFICANT CHANGE UP (ref 0–0.9)
LYMPHOCYTES # BLD AUTO: 0.47 K/UL — LOW (ref 1–3.3)
LYMPHOCYTES # BLD AUTO: 10.7 % — LOW (ref 13–44)
MCHC RBC-ENTMCNC: 25.8 PG — LOW (ref 27–34)
MCHC RBC-ENTMCNC: 31.8 GM/DL — LOW (ref 32–36)
MCV RBC AUTO: 81.3 FL — SIGNIFICANT CHANGE UP (ref 80–100)
MONOCYTES # BLD AUTO: 0.4 K/UL — SIGNIFICANT CHANGE UP (ref 0–0.9)
MONOCYTES NFR BLD AUTO: 9.1 % — SIGNIFICANT CHANGE UP (ref 2–14)
NEUTROPHILS # BLD AUTO: 3.34 K/UL — SIGNIFICANT CHANGE UP (ref 1.8–7.4)
NEUTROPHILS NFR BLD AUTO: 76.1 % — SIGNIFICANT CHANGE UP (ref 43–77)
PLATELET # BLD AUTO: 229 K/UL — SIGNIFICANT CHANGE UP (ref 150–400)
POTASSIUM SERPL-MCNC: 4.2 MMOL/L — SIGNIFICANT CHANGE UP (ref 3.5–5.3)
POTASSIUM SERPL-SCNC: 4.2 MMOL/L — SIGNIFICANT CHANGE UP (ref 3.5–5.3)
PROT SERPL-MCNC: 6.6 G/DL — SIGNIFICANT CHANGE UP (ref 6.6–8.7)
RBC # BLD: 3.91 M/UL — LOW (ref 4.2–5.8)
RBC # FLD: 22.7 % — HIGH (ref 10.3–14.5)
SODIUM SERPL-SCNC: 136 MMOL/L — SIGNIFICANT CHANGE UP (ref 135–145)
SURGICAL PATHOLOGY STUDY: SIGNIFICANT CHANGE UP
WBC # BLD: 4.39 K/UL — SIGNIFICANT CHANGE UP (ref 3.8–10.5)
WBC # FLD AUTO: 4.39 K/UL — SIGNIFICANT CHANGE UP (ref 3.8–10.5)

## 2024-03-29 PROCEDURE — 83690 ASSAY OF LIPASE: CPT

## 2024-03-29 PROCEDURE — 92526 ORAL FUNCTION THERAPY: CPT

## 2024-03-29 PROCEDURE — 74230 X-RAY XM SWLNG FUNCJ C+: CPT | Mod: 26

## 2024-03-29 PROCEDURE — C1729: CPT

## 2024-03-29 PROCEDURE — 96365 THER/PROPH/DIAG IV INF INIT: CPT

## 2024-03-29 PROCEDURE — 0225U NFCT DS DNA&RNA 21 SARSCOV2: CPT

## 2024-03-29 PROCEDURE — 87205 SMEAR GRAM STAIN: CPT

## 2024-03-29 PROCEDURE — 82248 BILIRUBIN DIRECT: CPT

## 2024-03-29 PROCEDURE — 36430 TRANSFUSION BLD/BLD COMPNT: CPT

## 2024-03-29 PROCEDURE — 74177 CT ABD & PELVIS W/CONTRAST: CPT | Mod: MC

## 2024-03-29 PROCEDURE — P9040: CPT

## 2024-03-29 PROCEDURE — 87040 BLOOD CULTURE FOR BACTERIA: CPT

## 2024-03-29 PROCEDURE — 74329 X-RAY FOR PANCREAS ENDOSCOPY: CPT

## 2024-03-29 PROCEDURE — 85027 COMPLETE CBC AUTOMATED: CPT

## 2024-03-29 PROCEDURE — 93306 TTE W/DOPPLER COMPLETE: CPT

## 2024-03-29 PROCEDURE — 96361 HYDRATE IV INFUSION ADD-ON: CPT

## 2024-03-29 PROCEDURE — 97163 PT EVAL HIGH COMPLEX 45 MIN: CPT

## 2024-03-29 PROCEDURE — 99239 HOSP IP/OBS DSCHRG MGMT >30: CPT

## 2024-03-29 PROCEDURE — 82947 ASSAY GLUCOSE BLOOD QUANT: CPT

## 2024-03-29 PROCEDURE — 85014 HEMATOCRIT: CPT

## 2024-03-29 PROCEDURE — 92610 EVALUATE SWALLOWING FUNCTION: CPT

## 2024-03-29 PROCEDURE — 86850 RBC ANTIBODY SCREEN: CPT

## 2024-03-29 PROCEDURE — 82272 OCCULT BLD FECES 1-3 TESTS: CPT

## 2024-03-29 PROCEDURE — 74183 MRI ABD W/O CNTR FLWD CNTR: CPT | Mod: MC

## 2024-03-29 PROCEDURE — 82728 ASSAY OF FERRITIN: CPT

## 2024-03-29 PROCEDURE — 84100 ASSAY OF PHOSPHORUS: CPT

## 2024-03-29 PROCEDURE — 88342 IMHCHEM/IMCYTCHM 1ST ANTB: CPT

## 2024-03-29 PROCEDURE — 80076 HEPATIC FUNCTION PANEL: CPT

## 2024-03-29 PROCEDURE — 80053 COMPREHEN METABOLIC PANEL: CPT

## 2024-03-29 PROCEDURE — 86923 COMPATIBILITY TEST ELECTRIC: CPT

## 2024-03-29 PROCEDURE — 82435 ASSAY OF BLOOD CHLORIDE: CPT

## 2024-03-29 PROCEDURE — 84295 ASSAY OF SERUM SODIUM: CPT

## 2024-03-29 PROCEDURE — 93005 ELECTROCARDIOGRAM TRACING: CPT

## 2024-03-29 PROCEDURE — 84466 ASSAY OF TRANSFERRIN: CPT

## 2024-03-29 PROCEDURE — 87640 STAPH A DNA AMP PROBE: CPT

## 2024-03-29 PROCEDURE — 87641 MR-STAPH DNA AMP PROBE: CPT

## 2024-03-29 PROCEDURE — 80048 BASIC METABOLIC PNL TOTAL CA: CPT

## 2024-03-29 PROCEDURE — 76705 ECHO EXAM OF ABDOMEN: CPT

## 2024-03-29 PROCEDURE — 36415 COLL VENOUS BLD VENIPUNCTURE: CPT

## 2024-03-29 PROCEDURE — 82330 ASSAY OF CALCIUM: CPT

## 2024-03-29 PROCEDURE — 87086 URINE CULTURE/COLONY COUNT: CPT

## 2024-03-29 PROCEDURE — 83036 HEMOGLOBIN GLYCOSYLATED A1C: CPT

## 2024-03-29 PROCEDURE — 85018 HEMOGLOBIN: CPT

## 2024-03-29 PROCEDURE — 84443 ASSAY THYROID STIM HORMONE: CPT

## 2024-03-29 PROCEDURE — 87077 CULTURE AEROBIC IDENTIFY: CPT

## 2024-03-29 PROCEDURE — C1773: CPT

## 2024-03-29 PROCEDURE — 77012 CT SCAN FOR NEEDLE BIOPSY: CPT

## 2024-03-29 PROCEDURE — 96375 TX/PRO/DX INJ NEW DRUG ADDON: CPT

## 2024-03-29 PROCEDURE — 83540 ASSAY OF IRON: CPT

## 2024-03-29 PROCEDURE — 76942 ECHO GUIDE FOR BIOPSY: CPT

## 2024-03-29 PROCEDURE — 87186 SC STD MICRODIL/AGAR DIL: CPT

## 2024-03-29 PROCEDURE — 81001 URINALYSIS AUTO W/SCOPE: CPT

## 2024-03-29 PROCEDURE — 82803 BLOOD GASES ANY COMBINATION: CPT

## 2024-03-29 PROCEDURE — 86901 BLOOD TYPING SEROLOGIC RH(D): CPT

## 2024-03-29 PROCEDURE — 86900 BLOOD TYPING SEROLOGIC ABO: CPT

## 2024-03-29 PROCEDURE — 88305 TISSUE EXAM BY PATHOLOGIST: CPT

## 2024-03-29 PROCEDURE — 83735 ASSAY OF MAGNESIUM: CPT

## 2024-03-29 PROCEDURE — 99233 SBSQ HOSP IP/OBS HIGH 50: CPT

## 2024-03-29 PROCEDURE — 83550 IRON BINDING TEST: CPT

## 2024-03-29 PROCEDURE — 85730 THROMBOPLASTIN TIME PARTIAL: CPT

## 2024-03-29 PROCEDURE — 84145 PROCALCITONIN (PCT): CPT

## 2024-03-29 PROCEDURE — 99231 SBSQ HOSP IP/OBS SF/LOW 25: CPT | Mod: FS

## 2024-03-29 PROCEDURE — 76937 US GUIDE VASCULAR ACCESS: CPT | Mod: 26,1L

## 2024-03-29 PROCEDURE — 87150 DNA/RNA AMPLIFIED PROBE: CPT

## 2024-03-29 PROCEDURE — 71045 X-RAY EXAM CHEST 1 VIEW: CPT

## 2024-03-29 PROCEDURE — 87070 CULTURE OTHR SPECIMN AEROBIC: CPT

## 2024-03-29 PROCEDURE — C1769: CPT

## 2024-03-29 PROCEDURE — 87075 CULTR BACTERIA EXCEPT BLOOD: CPT

## 2024-03-29 PROCEDURE — 83605 ASSAY OF LACTIC ACID: CPT

## 2024-03-29 PROCEDURE — 74230 X-RAY XM SWLNG FUNCJ C+: CPT

## 2024-03-29 PROCEDURE — 85610 PROTHROMBIN TIME: CPT

## 2024-03-29 PROCEDURE — 85025 COMPLETE CBC W/AUTO DIFF WBC: CPT

## 2024-03-29 PROCEDURE — 99285 EMERGENCY DEPT VISIT HI MDM: CPT | Mod: 25

## 2024-03-29 PROCEDURE — 82962 GLUCOSE BLOOD TEST: CPT

## 2024-03-29 PROCEDURE — 80061 LIPID PANEL: CPT

## 2024-03-29 PROCEDURE — 86803 HEPATITIS C AB TEST: CPT

## 2024-03-29 PROCEDURE — 84132 ASSAY OF SERUM POTASSIUM: CPT

## 2024-03-29 RX ORDER — ACETAMINOPHEN 500 MG
20.3 TABLET ORAL
Qty: 812 | Refills: 0
Start: 2024-03-29 | End: 2024-04-07

## 2024-03-29 RX ORDER — SUCRALFATE 1 G
1 TABLET ORAL
Qty: 120 | Refills: 0
Start: 2024-03-29 | End: 2024-04-27

## 2024-03-29 RX ORDER — ERTAPENEM SODIUM 1 G/1
1 INJECTION, POWDER, LYOPHILIZED, FOR SOLUTION INTRAMUSCULAR; INTRAVENOUS
Qty: 0 | Refills: 0 | DISCHARGE
Start: 2024-03-29

## 2024-03-29 RX ORDER — PANTOPRAZOLE SODIUM 20 MG/1
1 TABLET, DELAYED RELEASE ORAL
Qty: 60 | Refills: 0
Start: 2024-03-29 | End: 2024-04-27

## 2024-03-29 RX ORDER — TAMSULOSIN HYDROCHLORIDE 0.4 MG/1
1 CAPSULE ORAL
Qty: 0 | Refills: 0 | DISCHARGE

## 2024-03-29 RX ADMIN — Medication 4: at 08:07

## 2024-03-29 RX ADMIN — Medication 81 MILLIGRAM(S): at 12:12

## 2024-03-29 RX ADMIN — ERTAPENEM SODIUM 120 MILLIGRAM(S): 1 INJECTION, POWDER, LYOPHILIZED, FOR SOLUTION INTRAMUSCULAR; INTRAVENOUS at 05:19

## 2024-03-29 RX ADMIN — Medication 1 GRAM(S): at 05:20

## 2024-03-29 RX ADMIN — Medication 1 GRAM(S): at 01:07

## 2024-03-29 RX ADMIN — Medication 325 MILLIGRAM(S): at 12:13

## 2024-03-29 RX ADMIN — PANTOPRAZOLE SODIUM 40 MILLIGRAM(S): 20 TABLET, DELAYED RELEASE ORAL at 05:20

## 2024-03-29 RX ADMIN — Medication 4: at 12:11

## 2024-03-29 RX ADMIN — CHLORHEXIDINE GLUCONATE 1 APPLICATION(S): 213 SOLUTION TOPICAL at 05:21

## 2024-03-29 RX ADMIN — Medication 1 GRAM(S): at 12:13

## 2024-03-29 RX ADMIN — MIDODRINE HYDROCHLORIDE 5 MILLIGRAM(S): 2.5 TABLET ORAL at 12:13

## 2024-03-29 RX ADMIN — MIDODRINE HYDROCHLORIDE 5 MILLIGRAM(S): 2.5 TABLET ORAL at 05:20

## 2024-03-29 NOTE — SWALLOW VFSS/MBS ASSESSMENT ADULT - COMMENTS
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  Initial CT abdomen showed acute cholecystitis with contained perforation.  In the ED, found to be in severe septic shock with leukocytosis (WBC 14), transaminitis, & lactic acidosis (8.1), fluid resuscitated and started on pressors (Levophed) and Zosyn.  Admitted to MICU for acute cholecystitis.  Surgery consulted but recommended against surgical intervention.  BCx grew ESBL E. coli, started on ertapenem.  Now off pressors, stable for downgrade to medicine 3/19. Worsening LFTS noted, MRCP with no obstruction. Seen by GI, status post ERCP with sphincterotomy. Post procedure noted to have worsening anemia. CT abdomen and pelvis with IV contrast negative. ERCP/EUS with reported grade D Esophagitis. Started on  PPI BID. Transfused 1 unit PRBC. No Further episodes of melena. Midline ordered for IV Invanz until 3/4/24.

## 2024-03-29 NOTE — SWALLOW VFSS/MBS ASSESSMENT ADULT - DIAGNOSTIC IMPRESSIONS
Pt presenting w/moderate oropharyngeal dysphagia, impacted upon by current SCC dx with completion of mandibular resection & chemo/XRT. Oral stage of swallow marked by reduced lingual/labial/mandibular/buccal strength & ROM, resulting in suboptimal oral grading via tsp/cup often necessitating forceful intake of air to oral cavity for slurping to facilitate grading & acceptance. Delayed & effortful A-P propulsion noted across all consistencies, though considered generally functional for all trials, w/the exception of regular solids. Piecemeal deglutition demonstrated consistently throughout study. Further reduced oral containment w/subsequent premature pharyngeal entry of solids variably between the kathryn & hypopharynx, w/further depth in entry to the pyriforms w/thin & mildly thick liquids. Single instance of pre-spill of thins to the hypopharynx, coupled w/unsuccessful airway protection, with silent aspiration of thin liquids PRIOR to initiation of swallow trigger, via cup sip in head neutral position. No further airway entry reduplicated PRIOR to swallow trigger w/remaining trials. Occasional reduced labial seal w/trace anterior loss of thins x 1. No oral stasis visualized.  Pharyngeal phase of swallow characterized by reduced contractility & pharyngeal pressure generation, w/trace-min valleculae & trace posterior pharyngeal wall/pyriform stasis remaining across trials. Increasing amounts of stasis in the valleculae noted w/increasingly viscous textures. Reduction in stasis appreciated w/cued & independent subsequent dry swallow x 1-2. Pt with reduced hyo-laryngeal elevation/excursion, incomplete epiglottic deflection & inadequate upper/lower airway protection. +Silent aspiration demonstrated during swallow trigger, w/thin liquids via cup sip, in head neutral & all compensatory postures (chin tuck, L & R head turn). Additional instance of aspiration during the swallow however with sensation w/thin liquids via tsp in head neutral. CONTINUE BELOW...

## 2024-03-29 NOTE — PROGRESS NOTE ADULT - SUBJECTIVE AND OBJECTIVE BOX
Chief Complaint:  Patient is a 73y old  Male who presents with a chief complaint of Severe sepsis (28 Mar 2024 11:45)      HPI/ 24 hr events: Patient seen and examined at bedside. Pt feeling well this morning. He notes a brown BM. Denies nausea, vomiting, diarrhea, abdominal pain. Vitals are overall stable, H/H stable.       REVIEW OF SYSTEMS:   General: Negative  HEENT: Negative  CV: Negative  Respiratory: Negative  GI: See HPI  : Negative  MSK: Negative  Hematologic: Negative  Skin: Negative    MEDICATIONS:   MEDICATIONS  (STANDING):  aspirin  chewable 81 milliGRAM(s) Oral daily  chlorhexidine 2% Cloths 1 Application(s) Topical <User Schedule>  dextrose 5%. 1000 milliLiter(s) (50 mL/Hr) IV Continuous <Continuous>  dextrose 5%. 1000 milliLiter(s) (100 mL/Hr) IV Continuous <Continuous>  dextrose 50% Injectable 25 Gram(s) IV Push once  dextrose 50% Injectable 25 Gram(s) IV Push once  dextrose 50% Injectable 12.5 Gram(s) IV Push once  ertapenem  IVPB 1000 milliGRAM(s) IV Intermittent every 24 hours  ferrous    sulfate 325 milliGRAM(s) Oral daily  glucagon  Injectable 1 milliGRAM(s) IntraMuscular once  insulin glargine Injectable (LANTUS) 5 Unit(s) SubCutaneous at bedtime  insulin lispro (ADMELOG) corrective regimen sliding scale   SubCutaneous Before meals and at bedtime  midodrine. 5 milliGRAM(s) Oral three times a day  ondansetron Injectable 4 milliGRAM(s) IV Push once  pantoprazole  Injectable 40 milliGRAM(s) IV Push every 12 hours  sucralfate 1 Gram(s) Oral four times a day  tamsulosin 0.4 milliGRAM(s) Oral at bedtime    MEDICATIONS  (PRN):  dextrose Oral Gel 15 Gram(s) Oral once PRN Blood Glucose LESS THAN 70 milliGRAM(s)/deciliter      Packed Red Cells Order:  1 Unit  Indication: Symptomatic Anemia - e.g. Chest Pain, Orthostasis, Tach  Infuse Unit : 3 Hours (03-27-24 @ 08:16)        DIET:  Diet, Minced and Moist:   Mildly Thick Liquids (MILDTHICKLIQS)  Halal  Tube Feeding Modality: Gastrostomy  Glucerna 1.5 Nando (GLUCERNA1.5)  Total Volume for 24 Hours (mL): 1080  Bolus  Total Volume of Bolus (mL):  270  Total # of Feeds: 4  Tube Feed Frequency: Every 6 hours   Tube Feed Start Time: 10:00  Bolus Feed Rate (mL per Hour): 270   Bolus Feed Duration (in Hours): 1  Free Water Flush  Bolus   Total Volume per Flush (mL): 200   Frequency: Every 6 Hours (03-27-24 @ 10:22) [Active]          ALLERGIES:   Allergies    No Known Allergies    Intolerances        VITAL SIGNS:   Vital Signs Last 24 Hrs  T(C): 36.7 (29 Mar 2024 05:15), Max: 36.7 (29 Mar 2024 05:15)  T(F): 98 (29 Mar 2024 05:15), Max: 98 (29 Mar 2024 05:15)  HR: 77 (29 Mar 2024 05:15) (71 - 87)  BP: 105/68 (29 Mar 2024 05:15) (100/61 - 116/76)  BP(mean): --  RR: 18 (28 Mar 2024 22:00) (18 - 18)  SpO2: 98% (29 Mar 2024 05:15) (98% - 100%)    Parameters below as of 29 Mar 2024 05:15  Patient On (Oxygen Delivery Method): room air      I&O's Summary    28 Mar 2024 07:01  -  29 Mar 2024 07:00  --------------------------------------------------------  IN: 3180 mL / OUT: 0 mL / NET: 3180 mL      PHYSICAL EXAM:   GENERAL:  No acute distress  HEENT:  NC/AT, conjunctiva clear, sclera anicteric  CHEST:  No increased effort, breath sounds clear  HEART:  Regular rate  ABDOMEN:  Soft, non-tender, non-distended, cholecystostomy tube and PEG tube noted, no rebound or guarding  SKIN:  Warm, dry  NEURO:  Calm, cooperative  LABS:                        10.1   4.39  )-----------( 229      ( 29 Mar 2024 05:45 )             31.8     Hemoglobin: 10.1 g/dL (03-29-24 @ 05:45)  Hemoglobin: 10.2 g/dL (03-28-24 @ 05:48)  Hemoglobin: 7.7 g/dL (03-27-24 @ 05:05)  Hemoglobin: 8.3 g/dL (03-26-24 @ 13:00)    03-29    136  |  96  |  15.4  ----------------------------<  213<H>  4.2   |  28.0  |  0.39<L>    Ca    9.3      29 Mar 2024 05:45    TPro  6.6  /  Alb  3.2<L>  /  TBili  0.5  /  DBili  x   /  AST  19  /  ALT  59<H>  /  AlkPhos  609<H>  03-29    LIVER FUNCTIONS - ( 29 Mar 2024 05:45 )  Alb: 3.2 g/dL / Pro: 6.6 g/dL / ALK PHOS: 609 U/L / ALT: 59 U/L / AST: 19 U/L / GGT: x                                                     RADIOLOGY & ADDITIONAL STUDIES:

## 2024-03-29 NOTE — PROGRESS NOTE ADULT - SUBJECTIVE AND OBJECTIVE BOX
[facility]  INFECTIOUS DISEASES at [place] / [place] / [place]  =======================================================                               Ronen Thomas MD#   Popeye Boyce MD*                             Cassy Madera MD*   Inez Caceres MD*                              Professor Emeritus:  Dr Rohit Elmore MD^            Diplomates American Board of Internal Medicine & Infectious Diseases                # [place] Office - Appt - Tel  [phone] Fax [phone]                * [place] Office - Appt - Tel [phone] Fax [phone]                      ^[place] Office - Tel  [phone] Fax [phone]                                  Hospital Consult line:  [phone]  =======================================================    GEORGE, PRATIMA [id number]    Follow up:  ESBL Escherichia coli bacteremia     no fevers       Allergies:  No Known Allergies       REVIEW OF SYSTEMS:  CONSTITUTIONAL:  No Fever or chills  HEENT:  No diplopia or blurred vision.  No earache, sore throat or runny nose.  CARDIOVASCULAR:  No chest pain  RESPIRATORY:  No cough, shortness of breath  GASTROINTESTINAL:  No nausea, vomiting or diarrhea.  GENITOURINARY:  No dysuria, frequency or urgency. No Blood in urine  MUSCULOSKELETAL:  no joint aches, no muscle pain  SKIN:  No change in skin, hair or nails.  NEUROLOGIC:  No Headaches      Physical Exam:  GEN: NAD  HEENT: normocephalic and atraumatic. EOMI. PERRL.    NECK: Supple.   LUNGS: CTA B/L.  HEART: RRR  ABDOMEN: Soft, NT, ND.  +BS.    : No CVA tenderness  EXTREMITIES: Without  edema.  MSK: No joint swelling  NEUROLOGIC: No Focal Deficits   SKIN: No rash      Vitals:  T(F): 98 (29 Mar 2024 05:15), Max: 98 (29 Mar 2024 05:15)  HR: 77 (29 Mar 2024 05:15)  BP: 105/68 (29 Mar 2024 05:15)  RR: 18 (28 Mar 2024 22:00)  SpO2: 98% (29 Mar 2024 05:15) (98% - 100%)  temp max in last 48H T(F): , Max: 98.4 (03-27-24 @ 20:00)    Current Antibiotics:  ertapenem  IVPB 1000 milliGRAM(s) IV Intermittent every 24 hours    Other medications:  aspirin  chewable 81 milliGRAM(s) Oral daily  chlorhexidine 2% Cloths 1 Application(s) Topical <User Schedule>  dextrose 5%. 1000 milliLiter(s) IV Continuous <Continuous>  dextrose 5%. 1000 milliLiter(s) IV Continuous <Continuous>  dextrose 50% Injectable 25 Gram(s) IV Push once  dextrose 50% Injectable 25 Gram(s) IV Push once  dextrose 50% Injectable 12.5 Gram(s) IV Push once  ferrous    sulfate 325 milliGRAM(s) Oral daily  glucagon  Injectable 1 milliGRAM(s) IntraMuscular once  insulin glargine Injectable (LANTUS) 5 Unit(s) SubCutaneous at bedtime  insulin lispro (ADMELOG) corrective regimen sliding scale   SubCutaneous Before meals and at bedtime  midodrine. 5 milliGRAM(s) Oral three times a day  ondansetron Injectable 4 milliGRAM(s) IV Push once  pantoprazole  Injectable 40 milliGRAM(s) IV Push every 12 hours  sucralfate 1 Gram(s) Oral four times a day  tamsulosin 0.4 milliGRAM(s) Oral at bedtime                            10.1   4.39  )-----------( 229      ( 29 Mar 2024 05:45 )             31.8     03-29    136  |  96  |  15.4  ----------------------------<  213<H>  4.2   |  28.0  |  0.39<L>    Ca    9.3      29 Mar 2024 05:45    TPro  6.6  /  Alb  3.2<L>  /  TBili  0.5  /  DBili  x   /  AST  19  /  ALT  59<H>  /  AlkPhos  609<H>  03-29    RECENT CULTURES:  03-21 @ 08:26 .Blood Blood-Peripheral     No growth at 5 days    03-21 @ 08:19 .Blood Blood-Peripheral     No growth at 5 days    03-17 @ 18:22 Bile Bile Fluid Escherichia coli    Rare Escherichia coli  No polymorphonuclear cells seen  No organisms seen  by cytocentrifuge    03-17 @ 13:13 Clean Catch Clean Catch (Midstream)     No growth    03-17 @ 08:30 .Blood Blood-Peripheral     Growth in aerobic and anaerobic bottles: Escherichia coli  Growth in aerobic and anaerobic bottles: Escherichia coli ESBL  See previous culture [id number]  Growth in aerobic and anaerobic bottles: Gram Negative Rods    03-17 @ 08:22    RVP  NotDetec    03-17 @ 08:15 .Blood Blood-Peripheral Blood Culture PCR  Escherichia coli  Escherichia coli ESBL    Growth in aerobic and anaerobic bottles: Escherichia coli  Growth in aerobic bottle: Escherichia coli ESBL  Direct identification is available within approximately 3-5  hours either by Blood Panel Multiplexed PCR or Direct  MALDI-TOF. Details: [email or web address]  Growth in aerobic bottle: Gram Negative Rods  Growth in anaerobic bottle: Gram Negative Rods      WBC Count: 4.39 K/uL (03-29-24 @ 05:45)  WBC Count: 4.37 K/uL (03-28-24 @ 05:48)  WBC Count: 4.44 K/uL (03-27-24 @ 05:05)  WBC Count: 6.32 K/uL (03-26-24 @ 13:00)  WBC Count: 5.62 K/uL (03-26-24 @ 05:37)  WBC Count: 5.02 K/uL (03-25-24 @ 05:12)    Creatinine: 0.39 mg/dL (03-29-24 @ 05:45)  Creatinine: 0.39 mg/dL (03-28-24 @ 05:48)  Creatinine: 0.37 mg/dL (03-27-24 @ 05:05)  Creatinine: 0.36 mg/dL (03-26-24 @ 05:37)  Creatinine: 0.35 mg/dL (03-25-24 @ 05:12)    Procalcitonin, Serum: 0.28 ng/mL (03-22-24 @ 04:55)  Procalcitonin, Serum: 2.03 ng/mL (03-19-24 @ 02:45)  Procalcitonin, Serum: 3.55 ng/mL (03-18-24 @ 05:00)     SARS-CoV-2: NotDetec (03-17-24 @ 08:22)

## 2024-03-29 NOTE — SWALLOW VFSS/MBS ASSESSMENT ADULT - SLP GENERAL OBSERVATIONS
Pt recd awake/upright in stretcher in radiology, A&A Ox4, pleasant/cooperative, 0/10 pain pre/post, tolerating RA NAD

## 2024-03-29 NOTE — PROGRESS NOTE ADULT - SUBJECTIVE AND OBJECTIVE BOX
CC: Follow up     INTERVAL HPI/OVERNIGHT EVENTS: Patient  seen and examined, denies abdominal pain nausea or vomiting. Afebrile. per RN no further episodes of melena       Vital Signs Last 24 Hrs  T(C): 36.7 (29 Mar 2024 05:15), Max: 36.7 (29 Mar 2024 05:15)  T(F): 98 (29 Mar 2024 05:15), Max: 98 (29 Mar 2024 05:15)  HR: 77 (29 Mar 2024 05:15) (71 - 87)  BP: 105/68 (29 Mar 2024 05:15) (100/61 - 116/76)  BP(mean): --  RR: 18 (28 Mar 2024 22:00) (18 - 18)  SpO2: 98% (29 Mar 2024 05:15) (98% - 100%)    Parameters below as of 29 Mar 2024 05:15  Patient On (Oxygen Delivery Method): room air        PHYSICAL EXAM:    GENERAL: NAD, AOX3  HEAD:  Atraumatic, Normocephalic  EYES:conjunctiva and sclera clear  ENMT: Moist mucous membranes  CHEST/LUNG: Clear to auscultation bilaterally; No rales, rhonchi, wheezing, or rubs  HEART: Regular rate and rhythm; No murmurs, rubs, or gallops  ABDOMEN: Soft, Nontender, Nondistended; Bowel sounds present  + PEG  + Shu   EXTREMITIES:  2+ Peripheral Pulses, No clubbing, cyanosis, or edema        MEDICATIONS  (STANDING):  aspirin  chewable 81 milliGRAM(s) Oral daily  chlorhexidine 2% Cloths 1 Application(s) Topical <User Schedule>  dextrose 5%. 1000 milliLiter(s) (50 mL/Hr) IV Continuous <Continuous>  dextrose 5%. 1000 milliLiter(s) (100 mL/Hr) IV Continuous <Continuous>  dextrose 50% Injectable 25 Gram(s) IV Push once  dextrose 50% Injectable 12.5 Gram(s) IV Push once  dextrose 50% Injectable 25 Gram(s) IV Push once  ertapenem  IVPB 1000 milliGRAM(s) IV Intermittent every 24 hours  ferrous    sulfate 325 milliGRAM(s) Oral daily  glucagon  Injectable 1 milliGRAM(s) IntraMuscular once  insulin glargine Injectable (LANTUS) 5 Unit(s) SubCutaneous at bedtime  insulin lispro (ADMELOG) corrective regimen sliding scale   SubCutaneous Before meals and at bedtime  midodrine. 5 milliGRAM(s) Oral three times a day  ondansetron Injectable 4 milliGRAM(s) IV Push once  pantoprazole  Injectable 40 milliGRAM(s) IV Push every 12 hours  sucralfate 1 Gram(s) Oral four times a day  tamsulosin 0.4 milliGRAM(s) Oral at bedtime    MEDICATIONS  (PRN):  dextrose Oral Gel 15 Gram(s) Oral once PRN Blood Glucose LESS THAN 70 milliGRAM(s)/deciliter      Allergies    No Known Allergies    Intolerances          LABS:                          10.1   4.39  )-----------( 229      ( 29 Mar 2024 05:45 )             31.8     03-29    136  |  96  |  15.4  ----------------------------<  213<H>  4.2   |  28.0  |  0.39<L>    Ca    9.3      29 Mar 2024 05:45    TPro  6.6  /  Alb  3.2<L>  /  TBili  0.5  /  DBili  x   /  AST  19  /  ALT  59<H>  /  AlkPhos  609<H>  03-29      Urinalysis Basic - ( 29 Mar 2024 05:45 )    Color: x / Appearance: x / SG: x / pH: x  Gluc: 213 mg/dL / Ketone: x  / Bili: x / Urobili: x   Blood: x / Protein: x / Nitrite: x   Leuk Esterase: x / RBC: x / WBC x   Sq Epi: x / Non Sq Epi: x / Bacteria: x        RADIOLOGY & ADDITIONAL TESTS:

## 2024-03-29 NOTE — PROGRESS NOTE ADULT - TIME BILLING
This includes chart review, patient assessment, discussion with Dr Ingram. Antibiotic dosing and management with clinical pharmacy.
This includes chart review, patient assessment, discussion with Dr Campbell. Antibiotic dosing and management with clinical pharmacy.
This includes chart review, patient assessment, discussion with Dr Ingram. Antibiotic dosing and management with clinical pharmacy.
This includes chart review, patient assessment, discussion with Dr Ingram. Antibiotic dosing and management with clinical pharmacy.
Time spent on patient encounter including emergency department chart review, history taking and physical exam, developing patient plan and tailoring H&P, as well as discussion with patient, family, RN, and other providers involved in patient's care.

## 2024-03-29 NOTE — PROGRESS NOTE ADULT - REASON FOR ADMISSION
Severe sepsis

## 2024-03-29 NOTE — PROGRESS NOTE ADULT - PROVIDER SPECIALTY LIST ADULT
Critical Care
Hospitalist
Infectious Disease
Intervent Radiology
Gastroenterology
Hospitalist
Hospitalist
Infectious Disease
Infectious Disease
Critical Care
Gastroenterology
Gastroenterology
Hospitalist
Internal Medicine
Hospitalist
Infectious Disease
Hospitalist
Infectious Disease
Infectious Disease

## 2024-03-29 NOTE — PROGRESS NOTE ADULT - ASSESSMENT
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 Patient presented for severe abdominal pain.  Initial CT abdomen showed acute cholecystitis with contained perforation.  In the ED, found to be in severe septic shock with leukocytosis (WBC 14), transaminitis, & lactic acidosis (8.1), fluid resuscitated and started on pressors (Levophed) and Zosyn.  Admitted to MICU for acute cholecystitis.  Surgery consulted but recommended against surgical intervention.  Blood culture grew ESBL E. coli, started on ertapenem.  Now off pressors and transferred out of MICU 3/19. Continued on Ertapenem.      ESBL Escherichia coli bacteremia   acute cholecystitis with contained perforation  s/p Septic shock   Transaminitis  S/p IR percutaneous cholecystostomy (3/17)  s/p ERCP 3/25/24      - Blood cultures  3/17 reporting Escherichia coli  - Repeat blood cultures 3/21 no growth   - Bile fluid culture 3/17 reporting Escherichia coli  - RVP/COVID 19 PCR 3/17 negative   - Urine Cx no growth   - CT A/P reporting Acute cholecystitis with contained perforation  - US ABD reporting Acute cholecystitis with walled off perforation.  - S/p IR percutaneous cholecystostomy (3/17)  - UA 3/17 negative for UTI   - Procalcitonin level 0.28  - LFTs improving  - Drop in H/H   - s/p ERCP 3/25/24  - Monitor LFTs  - Continue Ertapenem 1gm IV q 24hours   - patient educated about home intravenous antibiotics and antibiotic administrations as well as Mid/PICC line procedure with the patient  - Plan for antibiotics till April 3, 2024   - Will Plan for Midline  - Trend Fever  - Trend WBC      Will sign off. Please call PRN.      Discussed treatment plan with: Dr Ingram

## 2024-03-29 NOTE — SWALLOW VFSS/MBS ASSESSMENT ADULT - ROSENBEK'S PENETRATION ASPIRATION SCALE
prior & during swallow trigger via cup sip head neutral & all postures; 7- aspiration w/sensation during swallow via tsp in head neutral; 3- penetration above the vocal cords during swallow without clearance via tsp R head turn/(8) contrast passes glottis, visible subglottic residue remains, absent patient response (aspiration) during swallow head neutral/(3) contrast remains above the vocal cords, visible residue remains (penetration) (1) no aspiration, contrast does not enter airway

## 2024-03-29 NOTE — PROGRESS NOTE ADULT - NUTRITIONAL ASSESSMENT
This patient has been assessed with a concern for Malnutrition and has been determined to have a diagnosis/diagnoses of Severe protein-calorie malnutrition and Underweight (BMI < 19).    This patient is being managed with:   Diet DASH/TLC-  Sodium & Cholesterol Restricted  Pureed (PUREED)  Mildly Thick Liquids (MILDTHICKLIQS)  Tube Feeding Modality: Gastrostomy  Glucerna 1.5 Nando (GLUCERNA1.5)  Total Volume for 24 Hours (mL): 1080  Continuous  Starting Tube Feed Rate {mL per Hour}: 30  Increase Tube Feed Rate by (mL): 10     Every 6 hours  Until Goal Tube Feed Rate (mL per Hour): 60  Tube Feed Duration (in Hours): 18  Tube Feed Start Time: 16:00  Entered: Mar 19 2024  2:46PM  
This patient has been assessed with a concern for Malnutrition and has been determined to have a diagnosis/diagnoses of Underweight (BMI < 19) and Severe protein-calorie malnutrition.    This patient is being managed with:   Diet Minced and Moist-  Mildly Thick Liquids (MILDTHICKLIQS)  Halal  Tube Feeding Modality: Gastrostomy  Glucerna 1.5 Nando (GLUCERNA1.5)  Total Volume for 24 Hours (mL): 1080  Bolus  Total Volume of Bolus (mL):  270  Total # of Feeds: 4  Tube Feed Frequency: Every 6 hours   Tube Feed Start Time: 10:00  Bolus Feed Rate (mL per Hour): 270   Bolus Feed Duration (in Hours): 1  Free Water Flush  Bolus   Total Volume per Flush (mL): 200   Frequency: Every 6 Hours  Entered: Mar 27 2024 10:22AM  
This patient has been assessed with a concern for Malnutrition and has been determined to have a diagnosis/diagnoses of Severe protein-calorie malnutrition and Underweight (BMI < 19).    This patient is being managed with:   Diet DASH/TLC-  Sodium & Cholesterol Restricted  Pureed (PUREED)  Mildly Thick Liquids (MILDTHICKLIQS)  Halal  Tube Feeding Modality: Gastrostomy  Glucerna 1.5 Nando (GLUCERNA1.5)  Total Volume for 24 Hours (mL): 1080  Continuous  Starting Tube Feed Rate {mL per Hour}: 30  Increase Tube Feed Rate by (mL): 10     Every 6 hours  Until Goal Tube Feed Rate (mL per Hour): 60  Tube Feed Duration (in Hours): 18  Tube Feed Start Time: 16:00  Entered: Mar 19 2024  6:00PM  
This patient has been assessed with a concern for Malnutrition and has been determined to have a diagnosis/diagnoses of Severe protein-calorie malnutrition and Underweight (BMI < 19).    This patient is being managed with:   Diet DASH/TLC-  Sodium & Cholesterol Restricted  Pureed (PUREED)  Mildly Thick Liquids (MILDTHICKLIQS)  Halal  Tube Feeding Modality: Gastrostomy  Glucerna 1.5 Nando (GLUCERNA1.5)  Total Volume for 24 Hours (mL): 1080  Continuous  Starting Tube Feed Rate {mL per Hour}: 30  Increase Tube Feed Rate by (mL): 10     Every 6 hours  Until Goal Tube Feed Rate (mL per Hour): 60  Tube Feed Duration (in Hours): 18  Tube Feed Start Time: 16:00  Entered: Mar 19 2024  6:00PM  
This patient has been assessed with a concern for Malnutrition and has been determined to have a diagnosis/diagnoses of Severe protein-calorie malnutrition and Underweight (BMI < 19).    This patient is being managed with:   Diet NPO after Midnight-     NPO Start Date: 24-Mar-2024   NPO Start Time: 23:59  Entered: Mar 24 2024 10:13AM    Diet DASH/TLC-  Sodium & Cholesterol Restricted  Pureed (PUREED)  Mildly Thick Liquids (MILDTHICKLIQS)  Halal  Tube Feeding Modality: Gastrostomy  Glucerna 1.5 Nando (GLUCERNA1.5)  Total Volume for 24 Hours (mL): 1080  Continuous  Starting Tube Feed Rate {mL per Hour}: 30  Increase Tube Feed Rate by (mL): 10     Every 6 hours  Until Goal Tube Feed Rate (mL per Hour): 60  Tube Feed Duration (in Hours): 18  Tube Feed Start Time: 16:00  Entered: Mar 19 2024  6:00PM  
This patient has been assessed with a concern for Malnutrition and has been determined to have a diagnosis/diagnoses of Severe protein-calorie malnutrition and Underweight (BMI < 19).    This patient is being managed with:   Diet DASH/TLC-  Sodium & Cholesterol Restricted  Pureed (PUREED)  Mildly Thick Liquids (MILDTHICKLIQS)  Halal  Tube Feeding Modality: Gastrostomy  Glucerna 1.5 Nando (GLUCERNA1.5)  Total Volume for 24 Hours (mL): 1080  Continuous  Starting Tube Feed Rate {mL per Hour}: 30  Increase Tube Feed Rate by (mL): 10     Every 6 hours  Until Goal Tube Feed Rate (mL per Hour): 60  Tube Feed Duration (in Hours): 18  Tube Feed Start Time: 16:00  Entered: Mar 19 2024  6:00PM  
This patient has been assessed with a concern for Malnutrition and has been determined to have a diagnosis/diagnoses of Severe protein-calorie malnutrition and Underweight (BMI < 19).    This patient is being managed with:   Diet DASH/TLC-  Sodium & Cholesterol Restricted  Pureed (PUREED)  Mildly Thick Liquids (MILDTHICKLIQS)  Halal  Tube Feeding Modality: Gastrostomy  Glucerna 1.5 Nando (GLUCERNA1.5)  Total Volume for 24 Hours (mL): 1080  Continuous  Starting Tube Feed Rate {mL per Hour}: 30  Increase Tube Feed Rate by (mL): 10     Every 6 hours  Until Goal Tube Feed Rate (mL per Hour): 60  Tube Feed Duration (in Hours): 18  Tube Feed Start Time: 16:00  Entered: Mar 19 2024  6:00PM  
This patient has been assessed with a concern for Malnutrition and has been determined to have a diagnosis/diagnoses of Severe protein-calorie malnutrition and Underweight (BMI < 19).    This patient is being managed with:   Diet Minced and Moist-  Mildly Thick Liquids (MILDTHICKLIQS)  Halal  Tube Feeding Modality: Gastrostomy  Glucerna 1.5 Nando (GLUCERNA1.5)  Total Volume for 24 Hours (mL): 1080  Bolus  Total Volume of Bolus (mL):  270  Total # of Feeds: 4  Tube Feed Frequency: Every 6 hours   Tube Feed Start Time: 10:00  Bolus Feed Rate (mL per Hour): 270   Bolus Feed Duration (in Hours): 1  Free Water Flush  Bolus   Total Volume per Flush (mL): 200   Frequency: Every 6 Hours  Entered: Mar 27 2024 10:22AM  
This patient has been assessed with a concern for Malnutrition and has been determined to have a diagnosis/diagnoses of Severe protein-calorie malnutrition and Underweight (BMI < 19).    This patient is being managed with:   Diet Minced and Moist-  Mildly Thick Liquids (MILDTHICKLIQS)  Halal  Tube Feeding Modality: Gastrostomy  Glucerna 1.5 Nando (GLUCERNA1.5)  Total Volume for 24 Hours (mL): 1080  Bolus  Total Volume of Bolus (mL):  270  Total # of Feeds: 4  Tube Feed Frequency: Every 6 hours   Tube Feed Start Time: 10:00  Bolus Feed Rate (mL per Hour): 270   Bolus Feed Duration (in Hours): 1  Free Water Flush  Bolus   Total Volume per Flush (mL): 200   Frequency: Every 6 Hours  Entered: Mar 27 2024 10:22AM  
This patient has been assessed with a concern for Malnutrition and has been determined to have a diagnosis/diagnoses of Severe protein-calorie malnutrition and Underweight (BMI < 19).    This patient is being managed with:   Diet Pureed-  DASH/TLC {Sodium & Cholesterol Restricted} (DASH)  Mildly Thick Liquids (MILDTHICKLIQS)  Halal  Tube Feeding Modality: Gastrostomy  Glucerna 1.5 Nando (GLUCERNA1.5)  Total Volume for 24 Hours (mL): 1080  Bolus  Total Volume of Bolus (mL):  270  Total # of Feeds: 4  Tube Feed Frequency: Every 6 hours   Tube Feed Start Time: 12:00  Bolus Feed Rate (mL per Hour): 270   Bolus Feed Duration (in Hours): 0.5  Bolus Feed Instructions:  Please give 100 ml free water flush before feeding bolus and 100 ml free water flusch after feeding bolus  Free Water Flush  Bolus   Total Volume per Flush (mL): 200   Frequency: Every 6 Hours  Entered: Mar 25 2024  9:49AM  
This patient has been assessed with a concern for Malnutrition and has been determined to have a diagnosis/diagnoses of Severe protein-calorie malnutrition and Underweight (BMI < 19).    This patient is being managed with:   Diet Pureed-  DASH/TLC {Sodium & Cholesterol Restricted} (DASH)  Mildly Thick Liquids (MILDTHICKLIQS)  Halal  Tube Feeding Modality: Gastrostomy  Glucerna 1.5 Nando (GLUCERNA1.5)  Total Volume for 24 Hours (mL): 1080  Bolus  Total Volume of Bolus (mL):  270  Total # of Feeds: 4  Tube Feed Frequency: Every 6 hours   Tube Feed Start Time: 12:00  Bolus Feed Rate (mL per Hour): 270   Bolus Feed Duration (in Hours): 0.5  Bolus Feed Instructions:  Please give 100 ml free water flush before feeding bolus and 100 ml free water flusch after feeding bolus  Free Water Flush  Bolus   Total Volume per Flush (mL): 200   Frequency: Every 6 Hours  Entered: Mar 25 2024  9:49AM    Diet NPO after Midnight-     NPO Start Date: 24-Mar-2024   NPO Start Time: 23:59  Entered: Mar 24 2024 10:13AM

## 2024-03-29 NOTE — PROGRESS NOTE ADULT - NS ATTEND AMEND GEN_ALL_CORE FT
The patient had a brown stool.  His hemoglobin is stable overnight.  There is still no evidence of GI bleeding.  He is status post sphincterotomy.  No further GI evaluation is indicated at this time.  GI will sign off.  Please call us for any further inpatient questions.
Patient's status post a successful sphincterotomy for biliary drainage.  He has no postprocedure complications.  GI will sign off.  Please call us for any further inpatient questions.
We are called to resee the Mr. Madera because of a drop in hemoglobin.  He has dropped about 2 g over 2 days.  He had a brown stool yesterday and he had a brown stool today.  He had a sphincterotomy with no bleeding at the time.    I do not think he is having a post sphincterotomy bleed or for that matter any GI bleed given the brown stools and a significant drop in his hemoglobin.  Additionally his hemoglobin was down from his baseline at the time of admission.  He denies any melena or hematochezia prior to admission either.  At this point we recommend transfusing him back to a hemoglobin between 8 and 9 and then following him.  If it drops again and there is no evidence of GI bleeding then perhaps we should look for a retroperitoneal bleed or look at a peripheral smear for hemolysis for some reason.
Patient states he had dark stool after ERCP, drop in hg to 7.7. Hgb 10 after transfusion  hemoglobin ordered for tomorrow   grade D esphagitis   perforated gallbadder             A/P  Pt with cholecystostomy tube.    I reviewed ID note- antibiotics until 4/3    drop in hemoglobin - grade D esophagitis  hemoglobin stable after transfusion  PPI BID

## 2024-03-29 NOTE — SWALLOW VFSS/MBS ASSESSMENT ADULT - ASPIRATION DURING THE SWALLOW - SILENT
Detail Level: Zone Continue Regimen: Continue Eucerin eczema relief cream daily after bathing. observed via cup sip head neutral & all postures

## 2024-03-29 NOTE — PROGRESS NOTE ADULT - ASSESSMENT
72 y/o M with PMHX squamous cell cancer s/p resection, chemoradiation and G-tube placement, CAD, DM and HTN who presented with septic shock with perforated cholecystitis, s/p percutaneous cholecystostomy tube placement (3/17/24) now with persistent elevation in liver associated enzymes with MRI findings suggestive of cholangitis. Now with drop in H/H     #Elevated liver chemistry  #Perforated Cholecystitis with cholecystostomy tube  #Cholangitis   #Drop in H/H   EGD w/ ERCP (03.25.24)- Sphincterotomy was performed, bile duct was swept, esophagitis (LA grade D) approximately 10 cm in length, area of nodular irregular appearing mucosa in the gastric antrum/incisura (biopsies obtained), may represent intestinal metaplasia, notably an extended intra-duodenal segment which may represent the underlying cause of biliary hypokinesia.    - Trend CBC, transfuse as needed. Monitor for signs of bleeding. Avoid NSAIDs  - IV PPI BID for GI mucosal cytoprotection, Carafate QID   - Continue current medical regimen  - Interval cholecystectomy for perforated gallbladder  - Continue to trend liver enzymes   _________________________________________________________________  Assessment and recommendations are final when note is signed by the attending physician.

## 2024-03-29 NOTE — SWALLOW VFSS/MBS ASSESSMENT ADULT - ORAL PHASE
piecemeal deglutition/within functional limits/Uncontrolled bolus / spillover in hypopharynx/Aspiration before swallow - silent piecemeal deglutition/within functional limits/Uncontrolled bolus / spillover in hypopharynx piecemeal deglutition/Delayed oral transit time/Reduced anterior - posterior transport/Uncontrolled bolus / spillover in kathryn-pharynx/Uncontrolled bolus / spillover in hypopharynx piecemeal deglutition/within functional limits/Uncontrolled bolus / spillover in kathryn-pharynx/Uncontrolled bolus / spillover in hypopharynx piecemeal deglutition/Within functional limits/Uncontrolled bolus / spillover in kathryn-pharynx/Uncontrolled bolus / spillover in hypopharynx

## 2024-03-29 NOTE — SWALLOW VFSS/MBS ASSESSMENT ADULT - UNSUCCESSFUL STRATEGIES TRIALED DURING PROCEDURE
chin tuck/hard swallow/head turn to the right/head turn to the left/nonproductive volitional cough following clinician cue/productive volitional cough following clinician cue

## 2024-03-29 NOTE — SWALLOW VFSS/MBS ASSESSMENT ADULT - RECOMMENDED FEEDING/EATING TECHNIQUES
THIN LIQUIDS VIA TSP ONLY; DO NO ALLOW CUP SIPS/allow for swallow between intakes/alternate food with liquid/crush medication (when feasible)/hard swallow w/ each bite or sip/maintain upright posture during/after eating for 30 mins/oral hygiene/position upright (90 degrees)/provide rest periods between swallows/turn head right

## 2024-03-29 NOTE — PROGRESS NOTE ADULT - ASSESSMENT
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  Initial CT abdomen showed acute cholecystitis with contained perforation.  In the ED, found to be in severe septic shock with leukocytosis (WBC 14), transaminitis, & lactic acidosis (8.1), fluid resuscitated and started on pressors (Levophed) and Zosyn.  Admitted to MICU for acute cholecystitis.  Surgery consulted but recommended against surgical intervention.  BCx grew ESBL E. coli, started on ertapenem.  Now off pressors, stable for downgrade to medicine 3/19. Worsening LFTS noted, MRCP with no obstruction. Seen by GI, status post ERCP with sphincterotomy. Post procedure noted to have worsening anemia. CT abdomen and pelvis with IV contrast negative. ERCP/EUS with reported grade D Esophagitis. Started on  PPI BID. Transfused 1 unit PRBC. No Further episodes of melena. Midline ordered for IV Invanz until 3/4/24.     Assessment/Plan:    1. Acute blood loss anemia suspected secondary to UGIB  - Grade D Esophagitis noted on ERCP   - FOBT positive no further episodes of melena reported   - PPI BID, Start Carafate  - Follow up CBC stable   - CT abdomen and pelvis with IV Contrast negative   _ Transfused 1 unit PRBC on 3/27     2 ESBL E.Coli Bacteremia due to Acute Cholecystitis with contained perforation s/p choly tube  -septic shock resolved; on midodrine  -CT a/p: Acute cholecystitis with contained perforation  -US ABD: Acute cholecystitis with walled off perforation.  -S/p IR percutaneous cholecystostomy (3/17)- Initially draining purulent fluid, now draining bilious fluid  -Blood cultures - ESBL E. coli   -repeat blood cultures negative  - Status post ERCP after worsening LFTS status post sphincterotomy. LFTs downtrending   -continue Invanz until 04/03 via midline     3. T2DM  -A1c 7.8  - Lantus 5 units QHS  -Hold home Farxiga & Janumet  -ISS  -ADA diet    4. CAD  -denies anginal symptoms  -continue aspirin 81 mg QD  -Hold Lipitor in the setting of elevated liver enzymes    5. BPH  -continue Flomax    6. Dysphagia  - MBS today     DVT ppx: Hold for anemia    Home with home care pending Midline and MBS today

## 2024-03-29 NOTE — SWALLOW VFSS/MBS ASSESSMENT ADULT - RECOMMENDED CONSISTENCY
Pt unable to clear aspirated material from laryngeal space despite cued expectoration. Utilization of R head turn w/tsp presentation DOES eliminate aspiration of thin liquids, w/only trace penetration however ABOVE the vocal cords noted. Further trace penetration again above the cords without clearance during the swallow also noted w/mildly thick liquids & purees. No airway entry noted w/soft & bite-sized, easy to chew, regular. Note Pt with intermittent throat clear & cough throughout study, not in moments of airway entry of PO, therefore likely at times unrelated.       Rxs:  Easy to chew solids w/thin liquids (VIA TSP ONLY NO CUP SIP)  RIGHT HEAD TURN WITH ALL PO NO EXCEPTIONS

## 2024-04-03 ENCOUNTER — APPOINTMENT (OUTPATIENT)
Dept: RADIATION ONCOLOGY | Facility: CLINIC | Age: 74
End: 2024-04-03
Payer: MEDICARE

## 2024-04-03 VITALS
SYSTOLIC BLOOD PRESSURE: 98 MMHG | BODY MASS INDEX: 14.98 KG/M2 | RESPIRATION RATE: 16 BRPM | DIASTOLIC BLOOD PRESSURE: 63 MMHG | OXYGEN SATURATION: 98 % | WEIGHT: 129.6 LBS | HEART RATE: 85 BPM

## 2024-04-03 PROCEDURE — 99214 OFFICE O/P EST MOD 30 MIN: CPT

## 2024-04-03 NOTE — REASON FOR VISIT
[Post-Treatment Evaluation] : post-treatment evaluation for [Head and Neck Cancer] : head and neck cancer [Spouse] : spouse [Other: _____] : [unfilled]

## 2024-04-03 NOTE — ASSESSMENT
[FreeTextEntry1] : Resolving treatment related sequelae.  Being treated for newly diagnosed gall bladder infection.

## 2024-04-03 NOTE — DISEASE MANAGEMENT
[Pathological] : TNM Stage: p [PATRICIA] : PATRICIA [FreeTextEntry4] : squamous cell carcinoma [TTNM] : 4a [NTNM] : 2b [MTNM] : 0 [de-identified] : 6000 cGy [de-identified] : pos-op neck

## 2024-04-03 NOTE — HISTORY OF PRESENT ILLNESS
[FreeTextEntry1] : This 73-year-old male presents for post-treatment evaluation accompanied by his wife and home health aid.  Mr. 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  He is Yossi low infrastructure Maxillectomy, Neck Dissection, Modified Radical Impression Custom Preparation Surgical Obturator Prosthesis on 11/10/2023.  Mr. Madera completed adjuvant radiation therapy on 2/27/2024.   He was brought here by his wife from [facility] where he was cared for during his treatments here.  Presents today after hospital admission for sepsis due to a gall bladder infection.  Receiving IV antibiotics at home.  Shu-tube attached with small amount serous liquid.   Visualized portion of oral cavity and lips without erythema or mucositis.  Mucous membranes moist.  Reports he continues to rinse with baking soda/salt water.  Teeth with debris in between and around edges.  Advised to begin using a baby soft toothbrush instead of oral sponges on teeth. Trismus at baseline.  Refused referral to speech and swallow.  Six- cm submental neck incision well healed with some edema.  Skin intact without erythema or desquamation.   Notes he eats some pureed food PO.  He was advised during recent hospital stay to drink only nectar thick liquids. PEG-tube remains. Taking 4X feedings daily. Denies pain.

## 2024-04-08 ENCOUNTER — APPOINTMENT (OUTPATIENT)
Age: 74
End: 2024-04-08
Payer: MEDICARE

## 2024-04-08 PROCEDURE — 99213 OFFICE O/P EST LOW 20 MIN: CPT

## 2024-04-08 NOTE — REASON FOR VISIT
[TextEntry] : 74 y/o male presents for f/u s/p right sided hemimaxillectomy, Right supraomohyoid neck dissection, Left sided radial free fibular flap, Left sided split thickness skin graft for SCC of the right maxillary alveolar ridge on 11/10/23 with Dr. May and Dr. Lang. Pt discharged home on 11/21/23. Pt had Peg tube placed, tolerating some PO intake.  Pt completed XRT on 2/27/24 (total dose 6,000 cGY in 30 fraction from 1/16/24-2/17/24) Also received weekly Carboplatin , finished 2/20/24.  Presents today after hospital admission for sepsis due to acute cholecystitis. Receiving IV antibiotics at home. Shu-tube attached with small amount serous liquid  Exam: Gen: thin male, NAD Neck: incision site CDI, mild erythema/edema in surgical area Intraoral: flap site well perfused, soft, no drainage, discharge or bleeding noted, multiple sores noted in mouth/lips Forearm: (left): site healing  A/P: 74 y/o male presents for f/u s/p right sided hemimaxillectomy, Right supraomohyoid neck dissection, Left sided radial free fibular flap, Left sided split thickness skin graft for T4aN2b SCC of the right maxillary alveolar ridge on 11/10/23  -PET CT ordered for post op staging, will call pt w/ results  -f/u in 3 months

## 2024-04-10 ENCOUNTER — OUTPATIENT (OUTPATIENT)
Dept: OUTPATIENT SERVICES | Facility: HOSPITAL | Age: 74
LOS: 1 days | Discharge: HOME | End: 2024-04-10

## 2024-04-10 DIAGNOSIS — Z98.49 CATARACT EXTRACTION STATUS, UNSPECIFIED EYE: Chronic | ICD-10-CM

## 2024-04-10 DIAGNOSIS — Z98.61 CORONARY ANGIOPLASTY STATUS: Chronic | ICD-10-CM

## 2024-04-10 DIAGNOSIS — Z98.890 OTHER SPECIFIED POSTPROCEDURAL STATES: Chronic | ICD-10-CM

## 2024-04-10 DIAGNOSIS — C31.0 MALIGNANT NEOPLASM OF MAXILLARY SINUS: ICD-10-CM

## 2024-04-18 ENCOUNTER — RESULT REVIEW (OUTPATIENT)
Age: 74
End: 2024-04-18

## 2024-04-18 ENCOUNTER — APPOINTMENT (OUTPATIENT)
Dept: HEMATOLOGY ONCOLOGY | Facility: CLINIC | Age: 74
End: 2024-04-18
Payer: MEDICARE

## 2024-04-18 VITALS
HEART RATE: 86 BPM | OXYGEN SATURATION: 99 % | WEIGHT: 130 LBS | SYSTOLIC BLOOD PRESSURE: 82 MMHG | TEMPERATURE: 97.8 F | DIASTOLIC BLOOD PRESSURE: 50 MMHG

## 2024-04-18 LAB
BASOPHILS # BLD AUTO: 0 K/UL — SIGNIFICANT CHANGE UP (ref 0–0.2)
BASOPHILS NFR BLD AUTO: 0.6 % — SIGNIFICANT CHANGE UP (ref 0–2)
EOSINOPHIL # BLD AUTO: 0 K/UL — SIGNIFICANT CHANGE UP (ref 0–0.5)
EOSINOPHIL NFR BLD AUTO: 0.7 % — SIGNIFICANT CHANGE UP (ref 0–6)
HCT VFR BLD CALC: 34.5 % — LOW (ref 39–50)
HGB BLD-MCNC: 11.2 G/DL — LOW (ref 13–17)
LYMPHOCYTES # BLD AUTO: 0.7 K/UL — LOW (ref 1–3.3)
LYMPHOCYTES # BLD AUTO: 11.6 % — LOW (ref 13–44)
MCHC RBC-ENTMCNC: 26.4 PG — LOW (ref 27–34)
MCHC RBC-ENTMCNC: 32.4 G/DL — SIGNIFICANT CHANGE UP (ref 32–36)
MCV RBC AUTO: 81.6 FL — SIGNIFICANT CHANGE UP (ref 80–100)
MONOCYTES # BLD AUTO: 0.5 K/UL — SIGNIFICANT CHANGE UP (ref 0–0.9)
MONOCYTES NFR BLD AUTO: 9 % — SIGNIFICANT CHANGE UP (ref 2–14)
NEUTROPHILS # BLD AUTO: 4.7 K/UL — SIGNIFICANT CHANGE UP (ref 1.8–7.4)
NEUTROPHILS NFR BLD AUTO: 78.1 % — HIGH (ref 43–77)
PLATELET # BLD AUTO: 153 K/UL — SIGNIFICANT CHANGE UP (ref 150–400)
RBC # BLD: 4.23 M/UL — SIGNIFICANT CHANGE UP (ref 4.2–5.8)
RBC # FLD: 19.3 % — HIGH (ref 10.3–14.5)
WBC # BLD: 6 K/UL — SIGNIFICANT CHANGE UP (ref 3.8–10.5)
WBC # FLD AUTO: 6 K/UL — SIGNIFICANT CHANGE UP (ref 3.8–10.5)

## 2024-04-18 PROCEDURE — 99215 OFFICE O/P EST HI 40 MIN: CPT

## 2024-04-18 NOTE — HISTORY OF PRESENT ILLNESS
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  Patient states he had a bilateral cataract surgery ~1 year ago in [place] that led to a severe infection of his left eye that was treated with strong medications for an extended length of time and ultimately caused vision loss of his left eye. He states he developed an oral blister which he feels may have been a side effect of the strong medications he was taking for the infection. He had inflammation, pain, and continued blistering of his mouth and followed up with his PCP who referred him to an oral specialist who referred him to Oolaryngology, Dr. May. Pt had a biopsy of the right maxillary alveolar ridge on 10/02/23 that returned positive for SCCa.  Staging PET on 10/12/23 showed intense uptake at previously reported RIGHT maxillary lesion consistent with malignant involvement.  Bilateral nodes in the neck where additional malignant involvement is not excluded. Differential diagnosis also includes reaction to recent biopsy. Additionally, there is some fatty change in the region adjacent to the RIGHT maxilla within the oral cavity. Activity seen at the contralateral tongue border may be due to altered mastication patterns. Consider MRI of the neck if not recently obtained or planned as of yet.  Mild BILATERAL hilar uptake, nonspecific and most likely reactive.  Otherwise, no evidence of FDG-avid malignancy outside the head and neck.  On 11/10/23 he underwent a right hemimaxillectomy and supraomohyoidal neck dissection levels 1, 2, and 3, left sided radial free fibular flap and eft sided split thickness skin graft. Path report was c/w squamous cell carcinoma, well to moderately differentiated, invading through maxillary cortical bone. Perineural invasion is identified. Carcinoma is present 4 mm from the lateral resection margin. All tumor bed margins negative for invasive tumor.  Tumor present in 2 regional ipsilateral lymph nodes.  He comes today for medical oncology consultation,. His eating is variable at the rehab due to the quality of the food per the patient and he has lost weight.  He is able to stand only with signficant assistance. 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  Scheduled to complete RT on 2/27/24.  Final Carbo scheduled for 2/20/24.  + Intermittent dizziness. + Dysgeusia and decreased appetite. + Stomatitis. + PEG, calories 70/30 PEG vs PO. No dysphagia or odynophagia. + Intermittent diarrhea. + Excessive thick oral secretions. + Significant erythema, right neck.  No N/V. No neuropathy. No fever or chills.   04/18/14: Mr Hussein returns for follow up.  He was hospitalized recently when he presented for severe abdominal pain.  Initial CT abdomen showed acute cholecystitis with contained perforation.  In the ED, found to be in severe septic shock with leukocytosis (WBC 14), transaminitis, & lactic acidosis (8.1), fluid resuscitated and started on pressors (Levophed) and Zosyn. Admitted to MICU for acute cholecystitis.  CT a/p and sono showed acute cholecystitis with contained perforation. Surgery consulted but recommended against surgical intervention. IR performed percutaneous cholecystostomy on 3/17, initially draining purulent fluid and now draining bilious fluid. BCx grew ESBL E. coli, started on ertapenem.  Worsening LFTS noted, MRCP with no obstruction. Seen by GI, status post ERCP with sphincterotomy. Repeat blood cultures negative. LFTS downtrending. Drop in Hg noted after ERCP, transfused 1 unit PRBC> CT abdomen and pelvis negative. Noted to have esophagitis on ERCP. Started on PPI bID with carafate. Seen by GI, hb/hct stable. Discharge home to complete IV Invanz on 4/3/24 -after discharge he was seen by Dr. May who ordered a PET scan which is scheduled for tomorrow.  He will return in 3 weeks to review the results  [see HPI] : see HPI [Negative] : Heme/Lymph

## 2024-04-18 NOTE — PHYSICAL EXAM
[Capable of only limited self care, confined to bed or chair more than 50% of waking hours] : Status 3- Capable of only limited self care, confined to bed or chair more than 50% of waking hours [Normal] : affect appropriate [de-identified] : thin, frail, wheelchair bound

## 2024-04-18 NOTE — ASSESSMENT
[FreeTextEntry1] : 72 yo M with h/o DM and BPH who was diagnosed with SCCa of the right maxillary alveolar ridge in October 2023.  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  Perineural invasion also noted on path.   -Dr. Frazier discussed with him the role of chemotherapy or antibody therapy to enhance the effectiveness of radiation therapy and explained that is also helps eradicate micrometastatic diseaes to reduce the risk of both distant and local recurrence.   -he has an ECOG 3 performance status and is quite frail and may have a difficult time tolerating treatment. To help mitigate that and hopefully improve his strength prior to treatment was recommended a feeding tube be placed to supplement his nutritional needs. PEG placed on 1/11/24.   -discussed proper mouth care to help with dysgeusia and advised mucinex for excessive thick oral secretions  -continue silvaden for right neck -SUJIT Koehler will follow up today -scheduled to complete RT on 2/27/24.  Final Carbo scheduled for 2/20/24.   -04/18/24: Mr Madera returns for follow up after hospitalization for acute cholecystitis and septic shock as noted above, now  s/p percutaneous cholecystostomy. Awaiting PET scan which was scheduled for tomorrow, he will return to discuss the results

## 2024-04-19 ENCOUNTER — APPOINTMENT (OUTPATIENT)
Dept: NUCLEAR MEDICINE | Facility: CLINIC | Age: 74
End: 2024-04-19

## 2024-04-19 ENCOUNTER — OUTPATIENT (OUTPATIENT)
Dept: OUTPATIENT SERVICES | Facility: HOSPITAL | Age: 74
LOS: 1 days | End: 2024-04-19
Payer: MEDICARE

## 2024-04-19 DIAGNOSIS — Z98.890 OTHER SPECIFIED POSTPROCEDURAL STATES: Chronic | ICD-10-CM

## 2024-04-19 DIAGNOSIS — Z98.61 CORONARY ANGIOPLASTY STATUS: Chronic | ICD-10-CM

## 2024-04-19 DIAGNOSIS — Z98.49 CATARACT EXTRACTION STATUS, UNSPECIFIED EYE: Chronic | ICD-10-CM

## 2024-04-19 DIAGNOSIS — C03.0 MALIGNANT NEOPLASM OF UPPER GUM: ICD-10-CM

## 2024-04-19 PROCEDURE — 78815 PET IMAGE W/CT SKULL-THIGH: CPT | Mod: 26,PS

## 2024-04-23 ENCOUNTER — APPOINTMENT (OUTPATIENT)
Dept: TRAUMA SURGERY | Facility: CLINIC | Age: 74
End: 2024-04-23
Payer: MEDICARE

## 2024-04-23 VITALS
TEMPERATURE: 97.5 F | BODY MASS INDEX: 15.25 KG/M2 | HEART RATE: 98 BPM | RESPIRATION RATE: 16 BRPM | SYSTOLIC BLOOD PRESSURE: 96 MMHG | OXYGEN SATURATION: 98 % | WEIGHT: 132 LBS | DIASTOLIC BLOOD PRESSURE: 61 MMHG

## 2024-04-23 PROCEDURE — 99212 OFFICE O/P EST SF 10 MIN: CPT

## 2024-04-23 NOTE — HISTORY OF PRESENT ILLNESS
[de-identified] : admitted to [facility] for cholecystitis, required percutaneous cholecystostomy tube. pain at insertion site, drain with clear fluid non bilious. no fevers no chills

## 2024-04-23 NOTE — PLAN
[FreeTextEntry1] : Adequate evolution after percutaneous cholecystostomy tube. has been 5 weeks. explained bets time for cholecystectomy is 6-8 weeks. explained, at this time there is no infection at insertion site, skin irritation to drain and suture. Continue drain in place. plan to return to clinic in 2 weeks to start planning cholecystectomy. all questions answered

## 2024-04-23 NOTE — PHYSICAL EXAM
[de-identified] : NAD [de-identified] : anicteric sclera,  [de-identified] : erythema at perc cholecystostomy tube, no drainage, induration on subcutaneous track, no drainage, drain output is CLEAR non bilious..

## 2024-04-28 ENCOUNTER — EMERGENCY (EMERGENCY)
Facility: HOSPITAL | Age: 74
LOS: 1 days | Discharge: HOME | End: 2024-04-28
Attending: EMERGENCY MEDICINE
Payer: MEDICARE

## 2024-04-28 VITALS
DIASTOLIC BLOOD PRESSURE: 60 MMHG | RESPIRATION RATE: 18 BRPM | TEMPERATURE: 98 F | SYSTOLIC BLOOD PRESSURE: 106 MMHG | HEART RATE: 82 BPM | OXYGEN SATURATION: 98 %

## 2024-04-28 VITALS
DIASTOLIC BLOOD PRESSURE: 56 MMHG | HEIGHT: 72 IN | SYSTOLIC BLOOD PRESSURE: 102 MMHG | TEMPERATURE: 98 F | HEART RATE: 84 BPM | OXYGEN SATURATION: 98 % | RESPIRATION RATE: 18 BRPM | WEIGHT: 132.06 LBS

## 2024-04-28 DIAGNOSIS — Z98.61 CORONARY ANGIOPLASTY STATUS: Chronic | ICD-10-CM

## 2024-04-28 DIAGNOSIS — Z98.890 OTHER SPECIFIED POSTPROCEDURAL STATES: Chronic | ICD-10-CM

## 2024-04-28 DIAGNOSIS — Z98.49 CATARACT EXTRACTION STATUS, UNSPECIFIED EYE: Chronic | ICD-10-CM

## 2024-04-28 LAB
ALBUMIN SERPL ELPH-MCNC: 3.9 G/DL — SIGNIFICANT CHANGE UP (ref 3.3–5.2)
ALP SERPL-CCNC: 100 U/L — SIGNIFICANT CHANGE UP (ref 40–120)
ALT FLD-CCNC: 12 U/L — SIGNIFICANT CHANGE UP
ANION GAP SERPL CALC-SCNC: 12 MMOL/L — SIGNIFICANT CHANGE UP (ref 5–17)
APTT BLD: 35.9 SEC — HIGH (ref 24.5–35.6)
AST SERPL-CCNC: 14 U/L — SIGNIFICANT CHANGE UP
BASOPHILS # BLD AUTO: 0.03 K/UL — SIGNIFICANT CHANGE UP (ref 0–0.2)
BASOPHILS NFR BLD AUTO: 0.6 % — SIGNIFICANT CHANGE UP (ref 0–2)
BILIRUB SERPL-MCNC: 0.3 MG/DL — LOW (ref 0.4–2)
BLD GP AB SCN SERPL QL: SIGNIFICANT CHANGE UP
BUN SERPL-MCNC: 15.7 MG/DL — SIGNIFICANT CHANGE UP (ref 8–20)
CALCIUM SERPL-MCNC: 9.6 MG/DL — SIGNIFICANT CHANGE UP (ref 8.4–10.5)
CHLORIDE SERPL-SCNC: 94 MMOL/L — LOW (ref 96–108)
CO2 SERPL-SCNC: 31 MMOL/L — HIGH (ref 22–29)
CREAT SERPL-MCNC: 0.4 MG/DL — LOW (ref 0.5–1.3)
EGFR: 116 ML/MIN/1.73M2 — SIGNIFICANT CHANGE UP
EOSINOPHIL # BLD AUTO: 0.01 K/UL — SIGNIFICANT CHANGE UP (ref 0–0.5)
EOSINOPHIL NFR BLD AUTO: 0.2 % — SIGNIFICANT CHANGE UP (ref 0–6)
GLUCOSE SERPL-MCNC: 133 MG/DL — HIGH (ref 70–99)
HCT VFR BLD CALC: 37.4 % — LOW (ref 39–50)
HGB BLD-MCNC: 11.6 G/DL — LOW (ref 13–17)
IMM GRANULOCYTES NFR BLD AUTO: 0.2 % — SIGNIFICANT CHANGE UP (ref 0–0.9)
INR BLD: 1.04 RATIO — SIGNIFICANT CHANGE UP (ref 0.85–1.18)
LIDOCAIN IGE QN: 31 U/L — SIGNIFICANT CHANGE UP (ref 22–51)
LYMPHOCYTES # BLD AUTO: 0.9 K/UL — LOW (ref 1–3.3)
LYMPHOCYTES # BLD AUTO: 17.4 % — SIGNIFICANT CHANGE UP (ref 13–44)
MCHC RBC-ENTMCNC: 26.1 PG — LOW (ref 27–34)
MCHC RBC-ENTMCNC: 31 GM/DL — LOW (ref 32–36)
MCV RBC AUTO: 84 FL — SIGNIFICANT CHANGE UP (ref 80–100)
MONOCYTES # BLD AUTO: 0.3 K/UL — SIGNIFICANT CHANGE UP (ref 0–0.9)
MONOCYTES NFR BLD AUTO: 5.8 % — SIGNIFICANT CHANGE UP (ref 2–14)
NEUTROPHILS # BLD AUTO: 3.92 K/UL — SIGNIFICANT CHANGE UP (ref 1.8–7.4)
NEUTROPHILS NFR BLD AUTO: 75.8 % — SIGNIFICANT CHANGE UP (ref 43–77)
PLATELET # BLD AUTO: 242 K/UL — SIGNIFICANT CHANGE UP (ref 150–400)
POTASSIUM SERPL-MCNC: 4.2 MMOL/L — SIGNIFICANT CHANGE UP (ref 3.5–5.3)
POTASSIUM SERPL-SCNC: 4.2 MMOL/L — SIGNIFICANT CHANGE UP (ref 3.5–5.3)
PROT SERPL-MCNC: 7.6 G/DL — SIGNIFICANT CHANGE UP (ref 6.6–8.7)
PROTHROM AB SERPL-ACNC: 11.5 SEC — SIGNIFICANT CHANGE UP (ref 9.5–13)
RBC # BLD: 4.45 M/UL — SIGNIFICANT CHANGE UP (ref 4.2–5.8)
RBC # FLD: 18.3 % — HIGH (ref 10.3–14.5)
SODIUM SERPL-SCNC: 137 MMOL/L — SIGNIFICANT CHANGE UP (ref 135–145)
WBC # BLD: 5.17 K/UL — SIGNIFICANT CHANGE UP (ref 3.8–10.5)
WBC # FLD AUTO: 5.17 K/UL — SIGNIFICANT CHANGE UP (ref 3.8–10.5)

## 2024-04-28 PROCEDURE — 86901 BLOOD TYPING SEROLOGIC RH(D): CPT

## 2024-04-28 PROCEDURE — 74177 CT ABD & PELVIS W/CONTRAST: CPT | Mod: 26,MC

## 2024-04-28 PROCEDURE — 83690 ASSAY OF LIPASE: CPT

## 2024-04-28 PROCEDURE — 96374 THER/PROPH/DIAG INJ IV PUSH: CPT | Mod: XU

## 2024-04-28 PROCEDURE — 85025 COMPLETE CBC W/AUTO DIFF WBC: CPT

## 2024-04-28 PROCEDURE — 74177 CT ABD & PELVIS W/CONTRAST: CPT | Mod: MC

## 2024-04-28 PROCEDURE — 93010 ELECTROCARDIOGRAM REPORT: CPT

## 2024-04-28 PROCEDURE — 96375 TX/PRO/DX INJ NEW DRUG ADDON: CPT

## 2024-04-28 PROCEDURE — 86900 BLOOD TYPING SEROLOGIC ABO: CPT

## 2024-04-28 PROCEDURE — 99285 EMERGENCY DEPT VISIT HI MDM: CPT

## 2024-04-28 PROCEDURE — 85610 PROTHROMBIN TIME: CPT

## 2024-04-28 PROCEDURE — 99285 EMERGENCY DEPT VISIT HI MDM: CPT | Mod: 25

## 2024-04-28 PROCEDURE — 80053 COMPREHEN METABOLIC PANEL: CPT

## 2024-04-28 PROCEDURE — 93005 ELECTROCARDIOGRAM TRACING: CPT

## 2024-04-28 PROCEDURE — 85730 THROMBOPLASTIN TIME PARTIAL: CPT

## 2024-04-28 PROCEDURE — 86850 RBC ANTIBODY SCREEN: CPT

## 2024-04-28 PROCEDURE — 87040 BLOOD CULTURE FOR BACTERIA: CPT

## 2024-04-28 RX ORDER — ACETAMINOPHEN 500 MG
1000 TABLET ORAL ONCE
Refills: 0 | Status: COMPLETED | OUTPATIENT
Start: 2024-04-28 | End: 2024-04-28

## 2024-04-28 RX ORDER — PIPERACILLIN AND TAZOBACTAM 4; .5 G/20ML; G/20ML
3.38 INJECTION, POWDER, LYOPHILIZED, FOR SOLUTION INTRAVENOUS ONCE
Refills: 0 | Status: COMPLETED | OUTPATIENT
Start: 2024-04-28 | End: 2024-04-28

## 2024-04-28 RX ORDER — SODIUM CHLORIDE 9 MG/ML
1000 INJECTION INTRAMUSCULAR; INTRAVENOUS; SUBCUTANEOUS ONCE
Refills: 0 | Status: COMPLETED | OUTPATIENT
Start: 2024-04-28 | End: 2024-04-28

## 2024-04-28 RX ADMIN — Medication 400 MILLIGRAM(S): at 10:07

## 2024-04-28 RX ADMIN — Medication 1000 MILLIGRAM(S): at 12:36

## 2024-04-28 RX ADMIN — SODIUM CHLORIDE 1000 MILLILITER(S): 9 INJECTION INTRAMUSCULAR; INTRAVENOUS; SUBCUTANEOUS at 10:07

## 2024-04-28 RX ADMIN — PIPERACILLIN AND TAZOBACTAM 200 GRAM(S): 4; .5 INJECTION, POWDER, LYOPHILIZED, FOR SOLUTION INTRAVENOUS at 12:45

## 2024-04-28 NOTE — ED ADULT TRIAGE NOTE - CHIEF COMPLAINT QUOTE
BIBEMS c/o of abd pain RLQ near gallbladder drain. Pt hx of mouth, throat CA have a GT tube and right LQ abd drain. Pt report haven't taken tylenol today

## 2024-04-28 NOTE — ED PROVIDER NOTE - OBJECTIVE STATEMENT
73-year-old male history HTN, HLD, CAD, DM, head neck cancer, s/p G-tube placement with recent hospitalization for cholecystitis s/p cholecystostomy presents after home visiting nurse had noticed redness and drainage from the cholecystostomy site.  Patient reports pain at the cholecystostomy site which is about the same as usual.  Denies fevers, chills, N/V, back pain, urinary symptoms

## 2024-04-28 NOTE — ED PROVIDER NOTE - PROGRESS NOTE DETAILS
spoke with surgical resident who states no surgical intervention for case and they defer to IR who performed cholecystostomy tube.  IR on call called and discussed case. awaiting call back. Spoke with Dr. Langford from IR who states we can cover with skin hiram since tube is not leaking and no abscess. will place on po abx and dc. patient has follow up with surgery this week. patient reports pain improved with tylenol. will dc with strict return precautions.

## 2024-04-28 NOTE — ED PROVIDER NOTE - ATTENDING CONTRIBUTION TO CARE
73-year-old male; PMH significant for HTN, HLD, CAD, DM, head and neck cancer (s/p partial mandibular resection), s/p G-tube, s/p cholecystostomy tube for cholecystitis on a recent visit; now presenting with increasing pain over cholecystostomy tube site.  Patient reports increasing erythema and bloody drainage over the past 2 to 3 days.  States his home visiting nurse instructed him to come to the ED for further evaluation.  Denies fever, chills, sweats.  Denies nausea or vomiting.  Denies changes in the output from the cholecystostomy tube.  General:     NAD  Head:     NC/AT, EOMI, oral mucosa moist  Neck:     trachea midline  Lungs:     CTA b/l, no w/r/r  CVS:     S1S2, RRR, no m/g/r  Abd:     +BS, s/nd, no organomegaly. ttp @ ruq over gemini tube site with surrounding erythema and trace purulent discharge.   A/P: 73yoM p/w erythema around gemini tube site  -labs, ct, re-eval.

## 2024-04-28 NOTE — ED PROVIDER NOTE - ED STEMI HIDDEN
Norco 5-325mg      Last Written Prescription Date:  1/22/18  Last Fill Quantity: 14,   # refills: 0  Last Office Visit: 1/25/18  Future Office visit:    Next 5 appointments (look out 90 days)     Feb 07, 2018  9:40 AM CST   Office Visit with Santiago Guevara DO   [facility] ([facility]    [address] [facility] [address]   [phone]            Mar 02, 2018  8:30 AM CST   Return Visit with Isidro Marcano MD   [facility]    [address] [place] [id number]   [phone]                   Routing refill request to provider for review/approval because:  Drug not on the [facility], [facility] or  Health refill protocol or controlled substance     hide

## 2024-04-28 NOTE — ED ADULT NURSE NOTE - OBJECTIVE STATEMENT
patient presents with RUQ abdominal pain near the gall bladder drain, patient denies nausea, diarrhea or fever. patient with feeding tube for CA mouth and tongue.

## 2024-04-28 NOTE — ED PROVIDER NOTE - PHYSICAL EXAMINATION
General: Awake, alert, lying in bed in NAD.  Mildly cachectic  HEENT: Normocephalic, atraumatic. No scleral icterus or conjunctival injection. EOMI.  dry mucous membranes. Oropharynx clear.   Neck:. Soft and supple.  Cardiac: RRR, Peripheral pulses 2+ and symmetric. No LE edema.  Resp: Lungs CTAB. No accessory muscle use  Abd: generalized discomfort on palpation,  Tenderness in right upper quadrant.  G-tube site clean dry and intact.  Percutaneous cholecystostomy site with mild surrounding erythema, scant purulent drainage from site.  No drainage noted in collection bag. No guarding, rebound, or rigidity.  Back: Spine midline and non-tender.   Skin: No rashes, abrasions, or lacerations.  Neuro: AO x 4. Moves all extremities symmetrically. Motor strength and sensation grossly intact.  Psych: Appropriate mood and affect

## 2024-04-28 NOTE — ED PROVIDER NOTE - PATIENT PORTAL LINK FT
You can access the FollowMyHealth Patient Portal offered by [facility] by registering at the following website: [email or web address]. By joining [employer]’s FollowMyHealth portal, you will also be able to view your health information using other applications (apps) compatible with our system.

## 2024-04-28 NOTE — ED PROVIDER NOTE - CLINICAL SUMMARY MEDICAL DECISION MAKING FREE TEXT BOX
73-year-old male with recent hospitalization for cholecystitis status post cholecystostomy presents for reported increased redness and drainage from cholecystostomy site.  Differential includes but not limited to procedure site infection, normal drainage from cholecystitis.  Blood work, CT imaging, cultures, symptom control pending results

## 2024-04-28 NOTE — ED PROVIDER NOTE - NSFOLLOWUPINSTRUCTIONS_ED_ALL_ED_FT
You are seen in the emergency room for increased redness and drainage from your cholecystostomy tube site.  Blood work and a CT scan of your abdomen were performed.  There appears to be a superficial skin infection around the site.  Take the prescribed antibiotics through your G-tube as prescribed.  Follow-up with your surgical appointment as scheduled.    -----    Cellulitis, Adult  A person's legs and feet. One leg is normal and the other leg is affected by cellulitis.  Cellulitis is a skin infection. The infected area is usually warm, red, swollen, and tender. It most commonly occurs on the lower body, such as the legs, feet, and toes, but this condition can occur on any part of the body. The infection can travel to the muscles, blood, and underlying tissue and become life-threatening without treatment. It is important to get medical treatment right away for this condition.    What are the causes?  Cellulitis is caused by bacteria. The bacteria enter through a break in the skin, such as a cut, burn, insect or animal bite, open sore, or crack.    What increases the risk?  This condition is more likely to occur in people who:  Have a weak body's defense system (immune system).  Are older than 60 years old.  Have diabetes.  Have a type of long-term (chronic) liver disease (cirrhosis) or kidney disease.  Are obese.  Have a skin condition such as:  An itchy rash, such as eczema or psoriasis.  A fungal rash on the feet or in skinfolds.  Blistering rashes, such as shingles or chickenpox.  Slow movement of blood in the veins (venous stasis).  Fluid buildup below the skin (edema).  Have open wounds on the skin, such as cuts, puncture wounds, burns, bites, scrapes, tattoos, piercings, or wounds from surgery.  Have had radiation therapy.  Use IV drugs.  What are the signs or symptoms?  Symptoms of this condition include:  Skin that looks red, purple, or slightly darker than your usual skin color.  Streaks or spots on the skin.  Swollen area of the skin.  Tenderness or pain when an area of the skin is touched.  Warm skin.  Fever or chills.  Blisters.  Tiredness (fatigue).  How is this diagnosed?  This condition is diagnosed based on a medical history and physical exam. You may also have tests, including:  Blood tests.  Imaging tests.  Tests on a sample of fluid taken from the wound (wound culture).  How is this treated?  Treatment for this condition may include:  Medicines. These may include antibiotics or medicines to treat allergies (antihistamines).  Rest.  Applying cold or warm wet cloths (compresses) to the skin.  If the condition is severe, you may need to stay in the hospital and get antibiotics through an IV.  The infection usually starts to get better within 1–2 days of treatment.    Follow these instructions at home:  Medicines    Take over-the-counter and prescription medicines only as told by your health care provider.  If you were prescribed antibiotics, take them as told by your provider. Do not stop using the antibiotic even if you start to feel better.  General instructions    Drink enough fluid to keep your pee (urine) pale yellow.  Do not touch or rub the infected area.  Raise (elevate) the infected area above the level of your heart while you are sitting or lying down.  Return to your normal activities as told by your provider. Ask your provider what activities are safe for you.  Apply warm or cold compresses to the affected area as told by your provider.  Keep all follow-up visits. Your provider will need to make sure that a more serious infection is not developing.  Contact a health care provider if:  You have a fever.  Your symptoms do not improve within 1–2 days of starting treatment or you develop new symptoms.  Your bone or joint underneath the infected area becomes painful after the skin has healed.  Your infection returns in the same area or another area. Signs of this may include:  You notice a swollen bump in the infected area.  Your red area gets larger, turns dark in color, or becomes more painful.  Drainage increases.  Pus or a bad smell develops in your infected area.  You have more pain.  You feel ill and have muscle aches and weakness.  You develop vomiting or diarrhea that will not go away.  Get help right away if:  You notice red streaks coming from the infected area.  You notice the skin turns purple or black and falls off.  This symptom may be an emergency. Get help right away. Call 911.  Do not wait to see if the symptom will go away.  Do not drive yourself to the hospital.  This information is not intended to replace advice given to you by your health care provider. Make sure you discuss any questions you have with your health care provider.

## 2024-05-01 ENCOUNTER — APPOINTMENT (OUTPATIENT)
Dept: HEMATOLOGY ONCOLOGY | Facility: CLINIC | Age: 74
End: 2024-05-01
Payer: MEDICARE

## 2024-05-01 VITALS
TEMPERATURE: 98.1 F | WEIGHT: 132.44 LBS | SYSTOLIC BLOOD PRESSURE: 73 MMHG | BODY MASS INDEX: 15.3 KG/M2 | RESPIRATION RATE: 16 BRPM | HEART RATE: 9 BPM | OXYGEN SATURATION: 8 % | DIASTOLIC BLOOD PRESSURE: 46 MMHG

## 2024-05-01 DIAGNOSIS — Z85.89 PERSONAL HISTORY OF MALIGNANT NEOPLASM OF OTHER ORGANS AND SYSTEMS: ICD-10-CM

## 2024-05-01 DIAGNOSIS — Z90.49 ACQUIRED ABSENCE OF OTHER SPECIFIED PARTS OF DIGESTIVE TRACT: ICD-10-CM

## 2024-05-01 DIAGNOSIS — Z87.19 PERSONAL HISTORY OF OTHER DISEASES OF THE DIGESTIVE SYSTEM: ICD-10-CM

## 2024-05-01 DIAGNOSIS — E78.5 HYPERLIPIDEMIA, UNSPECIFIED: ICD-10-CM

## 2024-05-01 DIAGNOSIS — I25.10 ATHEROSCLEROTIC HEART DISEASE OF NATIVE CORONARY ARTERY WITHOUT ANGINA PECTORIS: ICD-10-CM

## 2024-05-01 DIAGNOSIS — T81.43XA INFECTION FOLLOWING A PROCEDURE, ORGAN AND SPACE SURGICAL SITE, INITIAL ENCOUNTER: ICD-10-CM

## 2024-05-01 DIAGNOSIS — I10 ESSENTIAL (PRIMARY) HYPERTENSION: ICD-10-CM

## 2024-05-01 DIAGNOSIS — E11.9 TYPE 2 DIABETES MELLITUS WITHOUT COMPLICATIONS: ICD-10-CM

## 2024-05-01 DIAGNOSIS — Z98.890 OTHER SPECIFIED POSTPROCEDURAL STATES: ICD-10-CM

## 2024-05-01 DIAGNOSIS — Z93.1 GASTROSTOMY STATUS: ICD-10-CM

## 2024-05-01 DIAGNOSIS — R10.11 RIGHT UPPER QUADRANT PAIN: ICD-10-CM

## 2024-05-01 PROCEDURE — 99214 OFFICE O/P EST MOD 30 MIN: CPT

## 2024-05-01 NOTE — PHYSICAL EXAM
[Capable of only limited self care, confined to bed or chair more than 50% of waking hours] : Status 3- Capable of only limited self care, confined to bed or chair more than 50% of waking hours [Normal] : affect appropriate [de-identified] : thin, frail, wheelchair bound

## 2024-05-01 NOTE — HISTORY OF PRESENT ILLNESS
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  Patient states he had a bilateral cataract surgery ~1 year ago in [place] that led to a severe infection of his left eye that was treated with strong medications for an extended length of time and ultimately caused vision loss of his left eye. He states he developed an oral blister which he feels may have been a side effect of the strong medications he was taking for the infection. He had inflammation, pain, and continued blistering of his mouth and followed up with his PCP who referred him to an oral specialist who referred him to Oolaryngology, Dr. May. Pt had a biopsy of the right maxillary alveolar ridge on 10/02/23 that returned positive for SCCa.  Staging PET on 10/12/23 showed intense uptake at previously reported RIGHT maxillary lesion consistent with malignant involvement.  Bilateral nodes in the neck where additional malignant involvement is not excluded. Differential diagnosis also includes reaction to recent biopsy. Additionally, there is some fatty change in the region adjacent to the RIGHT maxilla within the oral cavity. Activity seen at the contralateral tongue border may be due to altered mastication patterns. Consider MRI of the neck if not recently obtained or planned as of yet.  Mild BILATERAL hilar uptake, nonspecific and most likely reactive.  Otherwise, no evidence of FDG-avid malignancy outside the head and neck.  On 11/10/23 he underwent a right hemimaxillectomy and supraomohyoidal neck dissection levels 1, 2, and 3, left sided radial free fibular flap and eft sided split thickness skin graft. Path report was c/w squamous cell carcinoma, well to moderately differentiated, invading through maxillary cortical bone. Perineural invasion is identified. Carcinoma is present 4 mm from the lateral resection margin. All tumor bed margins negative for invasive tumor.  Tumor present in 2 regional ipsilateral lymph nodes.  He comes today for medical oncology consultation,. His eating is variable at the rehab due to the quality of the food per the patient and he has lost weight.  He is able to stand only with signficant assistance. 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  Scheduled to complete RT on 2/27/24.  Final Carbo scheduled for 2/20/24.  + Intermittent dizziness. + Dysgeusia and decreased appetite. + Stomatitis. + PEG, calories 70/30 PEG vs PO. No dysphagia or odynophagia. + Intermittent diarrhea. + Excessive thick oral secretions. + Significant erythema, right neck.  No N/V. No neuropathy. No fever or chills.   04/18/14: Mr Hussein returns for follow up.  He was hospitalized recently when he presented for severe abdominal pain.  Initial CT abdomen showed acute cholecystitis with contained perforation.  In the ED, found to be in severe septic shock with leukocytosis (WBC 14), transaminitis, & lactic acidosis (8.1), fluid resuscitated and started on pressors (Levophed) and Zosyn. Admitted to MICU for acute cholecystitis.  CT a/p and sono showed acute cholecystitis with contained perforation. Surgery consulted but recommended against surgical intervention. IR performed percutaneous cholecystostomy on 3/17, initially draining purulent fluid and now draining bilious fluid. BCx grew ESBL E. coli, started on ertapenem.  Worsening LFTS noted, MRCP with no obstruction. Seen by GI, status post ERCP with sphincterotomy. Repeat blood cultures negative. LFTS downtrending. Drop in Hg noted after ERCP, transfused 1 unit PRBC> CT abdomen and pelvis negative. Noted to have esophagitis on ERCP. Started on PPI bID with carafate. Seen by GI, hb/hct stable. Discharge home to complete IV Invanz on 4/3/24 -after discharge he was seen by Dr. May who ordered a PET scan which is scheduled for tomorrow.  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  Post treatment PET on 4/19/24 showed VILMA + Poor calorie intake, 75% of calories via PEG, 25% PO. + Poor appetite but no dysphagia or odynophagia. Still on thick liquids. But despite this weight stable. + Soft stool, no diarrhea. + Erythema right neck much improved, now lymphedema in this area. + Xerostomia. + Dysgeusia. + Intermittent dizziness and chronic hypotension. No fevers since D/C from the hospital.

## 2024-05-01 NOTE — ASSESSMENT
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  Perineural invasion also noted on path.   -Dr. Frazier discussed with him the role of chemotherapy or antibody therapy to enhance the effectiveness of radiation therapy and explained that is also helps eradicate micrometastatic diseaes to reduce the risk of both distant and local recurrence.   -he has an ECOG 3 performance status and is quite frail and may have a difficult time tolerating treatment. To help mitigate that and hopefully improve his strength prior to treatment was recommended a feeding tube be placed to supplement his nutritional needs. PEG placed on 1/11/24.   -discussed proper mouth care to help with dysgeusia and advised mucinex for excessive thick oral secretions  -continue silvaden for right neck -SUJIT Koehler will follow up today -scheduled to complete RT on 2/27/24.  Final Carbo scheduled for 2/20/24.   -04/18/24: Mr Madera returns for follow up after hospitalization for acute cholecystitis and septic shock as noted above, now  s/p percutaneous cholecystostomy. 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  - post treatment PET on 4/19/24: post treatment changes at resected RIGHT maxillary lesion without lore evidence of residual uptake.  Previously seen nodes in the neck appear to have resolved.  Unchanged mild BILATERAL hilar uptake, nonspecific and likely reactive.  Stable punctate RIGHT lung micronodule below resolution of PET and without abnormal uptake.  Findings consistent with recent cholecystostomy tube. Attention to follow-up.  Placement of gastrostomy tube.  Otherwise, no evidence of FDG-avid malignancy. - weight stable, trying to eat PO but 75% of calories via PEG - still with xerostomia and dysgeusia, both slowly improving. - Dr. Frazier follow up in 3 months.

## 2024-05-03 ENCOUNTER — APPOINTMENT (OUTPATIENT)
Dept: HEMATOLOGY ONCOLOGY | Facility: CLINIC | Age: 74
End: 2024-05-03

## 2024-05-03 LAB
CULTURE RESULTS: SIGNIFICANT CHANGE UP
CULTURE RESULTS: SIGNIFICANT CHANGE UP
SPECIMEN SOURCE: SIGNIFICANT CHANGE UP
SPECIMEN SOURCE: SIGNIFICANT CHANGE UP

## 2024-05-07 ENCOUNTER — RESULT REVIEW (OUTPATIENT)
Age: 74
End: 2024-05-07

## 2024-05-07 ENCOUNTER — INPATIENT (INPATIENT)
Facility: HOSPITAL | Age: 74
LOS: 7 days | Discharge: HOME | DRG: 395 | End: 2024-05-15
Attending: STUDENT IN AN ORGANIZED HEALTH CARE EDUCATION/TRAINING PROGRAM | Admitting: STUDENT IN AN ORGANIZED HEALTH CARE EDUCATION/TRAINING PROGRAM
Payer: MEDICARE

## 2024-05-07 ENCOUNTER — APPOINTMENT (OUTPATIENT)
Dept: TRAUMA SURGERY | Facility: CLINIC | Age: 74
End: 2024-05-07
Payer: MEDICARE

## 2024-05-07 VITALS
DIASTOLIC BLOOD PRESSURE: 45 MMHG | RESPIRATION RATE: 18 BRPM | HEIGHT: 72 IN | OXYGEN SATURATION: 98 % | WEIGHT: 175.05 LBS | TEMPERATURE: 98 F | SYSTOLIC BLOOD PRESSURE: 80 MMHG | HEART RATE: 84 BPM

## 2024-05-07 VITALS
RESPIRATION RATE: 16 BRPM | OXYGEN SATURATION: 96 % | TEMPERATURE: 98.1 F | DIASTOLIC BLOOD PRESSURE: 56 MMHG | SYSTOLIC BLOOD PRESSURE: 93 MMHG | HEART RATE: 87 BPM

## 2024-05-07 DIAGNOSIS — Z98.49 CATARACT EXTRACTION STATUS, UNSPECIFIED EYE: Chronic | ICD-10-CM

## 2024-05-07 DIAGNOSIS — Z43.4 ENCOUNTER FOR ATTENTION TO OTHER ARTIFICIAL OPENINGS OF DIGESTIVE TRACT: ICD-10-CM

## 2024-05-07 DIAGNOSIS — Z98.890 OTHER SPECIFIED POSTPROCEDURAL STATES: Chronic | ICD-10-CM

## 2024-05-07 DIAGNOSIS — Z98.61 CORONARY ANGIOPLASTY STATUS: Chronic | ICD-10-CM

## 2024-05-07 DIAGNOSIS — K81.0 ACUTE CHOLECYSTITIS: ICD-10-CM

## 2024-05-07 LAB
ALBUMIN SERPL ELPH-MCNC: 4.2 G/DL — SIGNIFICANT CHANGE UP (ref 3.3–5.2)
ALP SERPL-CCNC: 95 U/L — SIGNIFICANT CHANGE UP (ref 40–120)
ALT FLD-CCNC: 15 U/L — SIGNIFICANT CHANGE UP
ANION GAP SERPL CALC-SCNC: 14 MMOL/L — SIGNIFICANT CHANGE UP (ref 5–17)
APTT BLD: 33.9 SEC — SIGNIFICANT CHANGE UP (ref 24.5–35.6)
AST SERPL-CCNC: 19 U/L — SIGNIFICANT CHANGE UP
BASOPHILS # BLD AUTO: 0.04 K/UL — SIGNIFICANT CHANGE UP (ref 0–0.2)
BASOPHILS NFR BLD AUTO: 0.7 % — SIGNIFICANT CHANGE UP (ref 0–2)
BILIRUB SERPL-MCNC: 0.2 MG/DL — LOW (ref 0.4–2)
BLD GP AB SCN SERPL QL: SIGNIFICANT CHANGE UP
BUN SERPL-MCNC: 17.5 MG/DL — SIGNIFICANT CHANGE UP (ref 8–20)
CALCIUM SERPL-MCNC: 10.2 MG/DL — SIGNIFICANT CHANGE UP (ref 8.4–10.5)
CHLORIDE SERPL-SCNC: 92 MMOL/L — LOW (ref 96–108)
CO2 SERPL-SCNC: 29 MMOL/L — SIGNIFICANT CHANGE UP (ref 22–29)
CREAT SERPL-MCNC: 0.57 MG/DL — SIGNIFICANT CHANGE UP (ref 0.5–1.3)
EGFR: 104 ML/MIN/1.73M2 — SIGNIFICANT CHANGE UP
EOSINOPHIL # BLD AUTO: 0.01 K/UL — SIGNIFICANT CHANGE UP (ref 0–0.5)
EOSINOPHIL NFR BLD AUTO: 0.2 % — SIGNIFICANT CHANGE UP (ref 0–6)
GLUCOSE SERPL-MCNC: 134 MG/DL — HIGH (ref 70–99)
HCT VFR BLD CALC: 34.7 % — LOW (ref 39–50)
HGB BLD-MCNC: 11 G/DL — LOW (ref 13–17)
IMM GRANULOCYTES NFR BLD AUTO: 0.4 % — SIGNIFICANT CHANGE UP (ref 0–0.9)
INR BLD: 1.05 RATIO — SIGNIFICANT CHANGE UP (ref 0.85–1.18)
LACTATE BLDV-MCNC: 1.9 MMOL/L — SIGNIFICANT CHANGE UP (ref 0.5–2)
LACTATE BLDV-MCNC: 3.5 MMOL/L — HIGH (ref 0.5–2)
LACTATE BLDV-MCNC: 4.2 MMOL/L — CRITICAL HIGH (ref 0.5–2)
LYMPHOCYTES # BLD AUTO: 0.75 K/UL — LOW (ref 1–3.3)
LYMPHOCYTES # BLD AUTO: 14 % — SIGNIFICANT CHANGE UP (ref 13–44)
MCHC RBC-ENTMCNC: 26.4 PG — LOW (ref 27–34)
MCHC RBC-ENTMCNC: 31.7 GM/DL — LOW (ref 32–36)
MCV RBC AUTO: 83.2 FL — SIGNIFICANT CHANGE UP (ref 80–100)
MONOCYTES # BLD AUTO: 0.36 K/UL — SIGNIFICANT CHANGE UP (ref 0–0.9)
MONOCYTES NFR BLD AUTO: 6.7 % — SIGNIFICANT CHANGE UP (ref 2–14)
NEUTROPHILS # BLD AUTO: 4.16 K/UL — SIGNIFICANT CHANGE UP (ref 1.8–7.4)
NEUTROPHILS NFR BLD AUTO: 78 % — HIGH (ref 43–77)
PLATELET # BLD AUTO: 255 K/UL — SIGNIFICANT CHANGE UP (ref 150–400)
POTASSIUM SERPL-MCNC: 4.4 MMOL/L — SIGNIFICANT CHANGE UP (ref 3.5–5.3)
POTASSIUM SERPL-SCNC: 4.4 MMOL/L — SIGNIFICANT CHANGE UP (ref 3.5–5.3)
PROT SERPL-MCNC: 7.6 G/DL — SIGNIFICANT CHANGE UP (ref 6.6–8.7)
PROTHROM AB SERPL-ACNC: 11.6 SEC — SIGNIFICANT CHANGE UP (ref 9.5–13)
RBC # BLD: 4.17 M/UL — LOW (ref 4.2–5.8)
RBC # FLD: 17.7 % — HIGH (ref 10.3–14.5)
SODIUM SERPL-SCNC: 135 MMOL/L — SIGNIFICANT CHANGE UP (ref 135–145)
WBC # BLD: 5.34 K/UL — SIGNIFICANT CHANGE UP (ref 3.8–10.5)
WBC # FLD AUTO: 5.34 K/UL — SIGNIFICANT CHANGE UP (ref 3.8–10.5)

## 2024-05-07 PROCEDURE — 99213 OFFICE O/P EST LOW 20 MIN: CPT

## 2024-05-07 PROCEDURE — 71045 X-RAY EXAM CHEST 1 VIEW: CPT | Mod: 26

## 2024-05-07 PROCEDURE — 93306 TTE W/DOPPLER COMPLETE: CPT | Mod: 26

## 2024-05-07 PROCEDURE — 74177 CT ABD & PELVIS W/CONTRAST: CPT | Mod: 26,MC

## 2024-05-07 PROCEDURE — 99222 1ST HOSP IP/OBS MODERATE 55: CPT | Mod: GC

## 2024-05-07 PROCEDURE — 99291 CRITICAL CARE FIRST HOUR: CPT | Mod: GC

## 2024-05-07 RX ORDER — ENOXAPARIN SODIUM 100 MG/ML
40 INJECTION SUBCUTANEOUS EVERY 24 HOURS
Refills: 0 | Status: DISCONTINUED | OUTPATIENT
Start: 2024-05-07 | End: 2024-05-13

## 2024-05-07 RX ORDER — PANTOPRAZOLE SODIUM 20 MG/1
40 TABLET, DELAYED RELEASE ORAL EVERY 12 HOURS
Refills: 0 | Status: DISCONTINUED | OUTPATIENT
Start: 2024-05-07 | End: 2024-05-13

## 2024-05-07 RX ORDER — ERTAPENEM SODIUM 1 G/1
1000 INJECTION, POWDER, LYOPHILIZED, FOR SOLUTION INTRAMUSCULAR; INTRAVENOUS EVERY 24 HOURS
Refills: 0 | Status: DISCONTINUED | OUTPATIENT
Start: 2024-05-08 | End: 2024-05-13

## 2024-05-07 RX ORDER — SODIUM CHLORIDE 9 MG/ML
1000 INJECTION, SOLUTION INTRAVENOUS ONCE
Refills: 0 | Status: COMPLETED | OUTPATIENT
Start: 2024-05-07 | End: 2024-05-07

## 2024-05-07 RX ORDER — TAMSULOSIN HYDROCHLORIDE 0.4 MG/1
0.4 CAPSULE ORAL AT BEDTIME
Refills: 0 | Status: DISCONTINUED | OUTPATIENT
Start: 2024-05-07 | End: 2024-05-13

## 2024-05-07 RX ORDER — MIRTAZAPINE 45 MG/1
15 TABLET, ORALLY DISINTEGRATING ORAL DAILY
Refills: 0 | Status: DISCONTINUED | OUTPATIENT
Start: 2024-05-07 | End: 2024-05-13

## 2024-05-07 RX ORDER — SUCRALFATE 1 G
1 TABLET ORAL
Refills: 0 | Status: DISCONTINUED | OUTPATIENT
Start: 2024-05-07 | End: 2024-05-13

## 2024-05-07 RX ORDER — FINASTERIDE 5 MG/1
5 TABLET, FILM COATED ORAL DAILY
Refills: 0 | Status: DISCONTINUED | OUTPATIENT
Start: 2024-05-07 | End: 2024-05-13

## 2024-05-07 RX ORDER — ERTAPENEM SODIUM 1 G/1
INJECTION, POWDER, LYOPHILIZED, FOR SOLUTION INTRAMUSCULAR; INTRAVENOUS
Refills: 0 | Status: DISCONTINUED | OUTPATIENT
Start: 2024-05-07 | End: 2024-05-13

## 2024-05-07 RX ORDER — MIDODRINE HYDROCHLORIDE 2.5 MG/1
10 TABLET ORAL THREE TIMES A DAY
Refills: 0 | Status: DISCONTINUED | OUTPATIENT
Start: 2024-05-07 | End: 2024-05-13

## 2024-05-07 RX ORDER — POLYETHYLENE GLYCOL 3350 17 G/17G
17 POWDER, FOR SOLUTION ORAL DAILY
Refills: 0 | Status: DISCONTINUED | OUTPATIENT
Start: 2024-05-07 | End: 2024-05-13

## 2024-05-07 RX ORDER — ONDANSETRON 8 MG/1
4 TABLET, FILM COATED ORAL EVERY 6 HOURS
Refills: 0 | Status: DISCONTINUED | OUTPATIENT
Start: 2024-05-07 | End: 2024-05-13

## 2024-05-07 RX ORDER — SENNA PLUS 8.6 MG/1
1 TABLET ORAL AT BEDTIME
Refills: 0 | Status: DISCONTINUED | OUTPATIENT
Start: 2024-05-07 | End: 2024-05-13

## 2024-05-07 RX ORDER — FERROUS SULFATE 325(65) MG
325 TABLET ORAL DAILY
Refills: 0 | Status: DISCONTINUED | OUTPATIENT
Start: 2024-05-07 | End: 2024-05-13

## 2024-05-07 RX ORDER — ERTAPENEM SODIUM 1 G/1
1000 INJECTION, POWDER, LYOPHILIZED, FOR SOLUTION INTRAMUSCULAR; INTRAVENOUS ONCE
Refills: 0 | Status: COMPLETED | OUTPATIENT
Start: 2024-05-07 | End: 2024-05-07

## 2024-05-07 RX ORDER — CHLORHEXIDINE GLUCONATE 213 G/1000ML
15 SOLUTION TOPICAL
Refills: 0 | Status: DISCONTINUED | OUTPATIENT
Start: 2024-05-07 | End: 2024-05-13

## 2024-05-07 RX ORDER — ATORVASTATIN CALCIUM 80 MG/1
80 TABLET, FILM COATED ORAL AT BEDTIME
Refills: 0 | Status: DISCONTINUED | OUTPATIENT
Start: 2024-05-07 | End: 2024-05-13

## 2024-05-07 RX ORDER — ESCITALOPRAM OXALATE 10 MG/1
10 TABLET, FILM COATED ORAL DAILY
Refills: 0 | Status: DISCONTINUED | OUTPATIENT
Start: 2024-05-07 | End: 2024-05-13

## 2024-05-07 RX ORDER — ASPIRIN/CALCIUM CARB/MAGNESIUM 324 MG
81 TABLET ORAL DAILY
Refills: 0 | Status: DISCONTINUED | OUTPATIENT
Start: 2024-05-07 | End: 2024-05-13

## 2024-05-07 RX ADMIN — ATORVASTATIN CALCIUM 80 MILLIGRAM(S): 80 TABLET, FILM COATED ORAL at 23:15

## 2024-05-07 RX ADMIN — Medication 1 APPLICATION(S): at 23:14

## 2024-05-07 RX ADMIN — SODIUM CHLORIDE 1000 MILLILITER(S): 9 INJECTION, SOLUTION INTRAVENOUS at 11:53

## 2024-05-07 RX ADMIN — Medication 1 GRAM(S): at 23:15

## 2024-05-07 RX ADMIN — SODIUM CHLORIDE 1000 MILLILITER(S): 9 INJECTION, SOLUTION INTRAVENOUS at 15:21

## 2024-05-07 RX ADMIN — SODIUM CHLORIDE 1000 MILLILITER(S): 9 INJECTION, SOLUTION INTRAVENOUS at 21:11

## 2024-05-07 RX ADMIN — TAMSULOSIN HYDROCHLORIDE 0.4 MILLIGRAM(S): 0.4 CAPSULE ORAL at 23:15

## 2024-05-07 RX ADMIN — ERTAPENEM SODIUM 120 MILLIGRAM(S): 1 INJECTION, POWDER, LYOPHILIZED, FOR SOLUTION INTRAMUSCULAR; INTRAVENOUS at 21:12

## 2024-05-07 RX ADMIN — MIDODRINE HYDROCHLORIDE 10 MILLIGRAM(S): 2.5 TABLET ORAL at 11:53

## 2024-05-07 RX ADMIN — SENNA PLUS 1 TABLET(S): 8.6 TABLET ORAL at 23:15

## 2024-05-07 RX ADMIN — MIDODRINE HYDROCHLORIDE 10 MILLIGRAM(S): 2.5 TABLET ORAL at 19:16

## 2024-05-07 NOTE — H&P ADULT - ATTENDING COMMENTS
73-year-old male with pmhx of DM, HTN, HLD, CAD s/p stents, and SCC of maxilla s/p RT/chemo with hx of cholecystitis s/p cholecystostomy tube now with decreased drainage from cholecystostomy tube and purulent drainage around catheter concerning for soft tissue infection     #cholecystitis s/p c-tube   - plan for RAL cholecystectomy with removal of cholecystostomy tube and incision and drainage of tube site   - IV antibiotics   - preop labs   - medicine / cardiology risk stratification   - DVT ppx    #sepsis present on admission, hypotension, CAD s/p stent, hx of HTN   - volume resuscitation   - monitor endpoints and trend lactate   - obtain ECHO   - consult cardiology  - c/w home midodrine   - c/w ASA  - low threshold for SICU/stepdown level care     #GERD: c/w PPI  #Diabetes Mellitus: FS. ISS. Maintain euglycemia. hold farxiga   #BPH: c/w flomax   #Hyperlipidemia: c/w statin.

## 2024-05-07 NOTE — ED ADULT NURSE REASSESSMENT NOTE - NS ED NURSE REASSESS COMMENT FT1
Report received by critical care RN. Pt A+Ox4, respirations are equal and unlabored at this time. Pt connected to  and cardiac monitor, HR 75, NSR. Pt left in position of comfort, wheels of stretcher locked and in the lowest position.

## 2024-05-07 NOTE — ED PROVIDER NOTE - OBJECTIVE STATEMENT
73-year-old male patient status post percutaneous cholecystostomy 2 months ago, follows with Dr. Farris,   Head and neck cancer with G-tube placement, currently in remission Presents to the ED complaining of abdominal pain. Patient was seen in the office today by Dr. Andre  For regular follow-up on his cholecystostomy. patient was noted to be hypotensive in the office, sent to the ED for workup, CT abdomen pelvis, possible cholecystectomy.  In the ED, patient brought to critical.  Blood pressure noted to be 100/77, afebrile.  Patient reports mild right upper quadrant abdominal pain over the past few days and some associated weakness.  Denies any fevers, nausea, vomiting, diarrhea at home.  No chest pain or shortness of breath.  Patient is on midodrine 10 mg 3 times daily, took his 1 dose this morning.  No further complaints at this time

## 2024-05-07 NOTE — ED PROVIDER NOTE - ADMIT DISPOSITION PRESENT ON ADMISSION SEPSIS
Physical Therapy Visit    Visit Type: Daily Treatment Note  Visit: 2  Referring Provider: ERYN Mejia*  Medical Diagnosis (from order): Diagnosis Information    Diagnosis  M25.511 (ICD-10-CM) - Pain in joint of right shoulder         SUBJECTIVE                                                                                                               Feeling better. Much less pain with sneezing. Bed mobility is much less painful    Pain / Symptoms  - Pain rating (out of 10): Current: 0 ; Worst: 3 (certain movements)      OBJECTIVE                                                                                                                                       Treatment     Therapeutic Exercise  Review thoracic extension stretch with chair back and add deep breathing  Review cat cow  Supine over 1/2 foam roller at T7 with curl up and deep breathing 2 x 5    Manual Therapy   T4 - T8 thoracic PA GRIII  Right and left rib 4-8 mobilization in exhale direction GRIII   STM left and right mid thoracic paraspinal   MET T5-6 FRS left in prone on elbows position        ASSESSMENT                                                                                                            Patient is doing much better this week with his thoracic pain. Most of the time he is having no pain. Still some slight pain with sneezing but much less intensity. He is able to move in bed without the pain. Improved passive mobility in the mid thoracic region. Progressing toward goals. Follow up in 1 week  Pain/symptoms after session (out of 10): 0       Therapy procedure time and total treatment time can be found documented on the Time Entry flowsheet     No

## 2024-05-07 NOTE — ED PROVIDER NOTE - ATTENDING CONTRIBUTION TO CARE
I, Comfort Elizabeth DO, have personally provided 45 minutes of critical care time exclusive of time spent on separately billable procedures. Time includes review of laboratory data, radiology results, discussion with consultants, and monitoring for potential decompensation. Interventions were performed as documented above.   I personally saw the patient with the resident, and completed the key components of the history and physical exam. I then discussed the management plan with the resident.    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

## 2024-05-07 NOTE — H&P ADULT - NSHPLABSRESULTS_GEN_ALL_CORE
11.0   5.34  )-----------( 255      ( 07 May 2024 11:38 )             34.7     05-07    135  |  92<L>  |  17.5  ----------------------------<  134<H>  4.4   |  29.0  |  0.57    Ca    10.2      07 May 2024 11:38    TPro  7.6  /  Alb  4.2  /  TBili  0.2<L>  /  DBili  x   /  AST  19  /  ALT  15  /  AlkPhos  95  05-07

## 2024-05-07 NOTE — H&P ADULT - NSHPPHYSICALEXAM_GEN_ALL_CORE
Constitutional: NAD  Respiratory: respirations even, unlabored on room air  Cardiovascular: RRR  Gastrointestinal: Soft, non-distended, mild tenderness around cholecystostomy tube with small purulence(?); G tube in place  Extremities: No peripheral edema, No cyanosis, clubbing   Neurological: A&O x 3; no gross deficit from baseline  Psychiatric: Normal mood, normal affect  Musculoskeletal: No joint pain, swelling or deformity; no limitation of movement

## 2024-05-07 NOTE — ED PROVIDER NOTE - CLINICAL SUMMARY MEDICAL DECISION MAKING FREE TEXT BOX
73-year-old male patient with a history of cholecystostomy placement presents the ED complaining of abdominal pain, hypotension.  Sent in by surgeon from outpatient office secondary to low blood pressure, abdominal pain, possible need for cholecystectomy.  In the ED, patient in the critical care area, in no acute distress, no temperature, not tachycardic,   blood pressure 100/77.  resting comfortably, abdomen soft, nontender, no drainage around the cholecystostomy site, G-tube in place without any drainage.  Sepsis workup initiated with blood cultures, fluids, midodrine as patient takes this daily for his blood pressure.  CT abdomen pelvis ordered, concern for cholecystitis versus abscess versus other acute intra-abdominal pathology.  Will consult surgery once workup complete 73-year-old male patient with a history of cholecystostomy placement presents the ED complaining of abdominal pain, hypotension.  Sent in by surgeon from outpatient office secondary to low blood pressure, abdominal pain, possible need for cholecystectomy.  In the ED, patient in the critical care area, in no acute distress, no temperature, not tachycardic,   blood pressure 100/77.  resting comfortably, abdomen soft, nontender, no drainage around the cholecystostomy site, G-tube in place without any drainage.  Sepsis workup initiated with blood cultures, fluids, midodrine as patient takes this daily for his blood pressure.  CT abdomen pelvis ordered, concern for cholecystitis versus abscess versus other acute intra-abdominal pathology.  Will consult surgery once workup complete    BP improved. CT shows no acute intra abdominal pathology. TBA for cholecystectomy under Dr. Farris

## 2024-05-07 NOTE — H&P ADULT - ASSESSMENT
73M w/ hx cholecystostomy tube, presenting from clinic with hypotension. Hypotensive in ED, volume responsive; afebrile without leukocytosis. CT without acute pathology.    Plan:  -admit to surgery - Dr. Farris  -plan for OR 5/9 for robotic cholecystectomy  -c/s to medicine for optimization  -repeat lactate pending  -broad spectrum abx  -dvt ppx

## 2024-05-07 NOTE — ED ADULT NURSE NOTE - OBJECTIVE STATEMENT
pt received in critical care. Patient A&Ox4 sent in by MD to have gallbladder removed.  Had tube placed recently, small amounts of drainage noted by pt.  Also had J tube in place. C/o low BP, weakness. Denies fevers, N/V.  NAD noted, respirations even and unlabored.  Safety precautions in place.  Plan of care explained, pt verbalized understanding. MD Degroot and MD Pandey at bedside.

## 2024-05-07 NOTE — H&P ADULT - HISTORY OF PRESENT ILLNESS
73M with PMHx HTN, HLD, CAD s/p stents, DM, SCC of face s/p resection  w/ creation of free flap and chemorads, now PEG dependent, and s/p percutaneous cholecystostomy. He presents to the ED from clinic with several days of abdominal pain.  He denies N/V/CP/SOB, no subjective fevers or chills. Concern for some purulence at site of cholecystostomy tube. Hypotensive with elevated lactate in ED.

## 2024-05-07 NOTE — ED PROVIDER NOTE - DATE/TIME 1
[address]                  [address] [facility],  [facility]                               PULMONARY FUNCTION    PATIENT NAME: Zack Mccartney                     :        1954  MED REC NO:   [id number]                            ROOM:  ACCOUNT NO:   [de-identified]                           ADMIT DATE: 06/10/2022  PROVIDER:     Sherita Jimenez DO    DATE OF PROCEDURE:  06/10/2022    INDICATIONS:  A 27-year-old female, 68 inches, 336 pounds, preoperative  CABG in a 7 pack-year smoker. FINDINGS:  Spirometry is normal with a forced vital capacity of 2.96  liters, 87% predicted with an FEV1 of 2.35 liters, 89% of predicted with  an FEV1/FVC ratio 79%. Midflow rates are normal, 104% of predicted with  a maximum voluntary ventilation 96 liters per minute, 100% of predicted. Static lung volumes with slow vital capacity of 2.72 liters, 80%  predicted. Inspiratory capacity 2.59 liters, 107% of predicted. Expiratory reserve volume 0.14 liters, 14% of predicted. The diffusion  capacity corrected for alveolar ventilation is 4.38 mL/min per mmHg per  liter which is 83% of predicted. IMPRESSION:  Normal spirometry and corrected gas transfer.         Jaqueline Akhtar DO    D: 06/10/2022 15:16:01       T: 06/10/2022 15:19:15     [email or web address]  Job#: [id number]     Doc#: [id number]    CC:
07-May-2024 14:44

## 2024-05-07 NOTE — ED ADULT NURSE REASSESSMENT NOTE - NS ED NURSE REASSESS COMMENT FT1
Pt connected to  and cardiac monitor, respirations are equal and unlabored at this time. No signs of acute distress noted at this time.

## 2024-05-07 NOTE — ED ADULT NURSE NOTE - NSFALLRISKINTERV_ED_ALL_ED

## 2024-05-07 NOTE — ED ADULT TRIAGE NOTE - CHIEF COMPLAINT QUOTE
sent in in by MD FOR SURGERY. C/O LLQ abd pain. denies fevers chills. seen at [facility] for gall stones. sent in to [facility] for further eval.

## 2024-05-07 NOTE — ED PROVIDER NOTE - CARE PLAN
Principal Discharge DX:	Cholecystostomy care  Secondary Diagnosis:	Encounter for cholecystectomy   1

## 2024-05-07 NOTE — ED PROVIDER NOTE - PHYSICAL EXAMINATION
Const:  Awake, alert and oriented to person, place, & time. In no acute distress.   HEENT:  left jaw is asymmetric secondary to head and neck surgery with mild slurred speech, chronic.  Airways patent, mouth with normal mucosa, moist mucous membranes.  Eyes:   Left scleral cataract, chronic  Cardiac: Regular rate and regular rhythm. +S1/S2. Peripheral pulses 2+ and symmetric. No LE edema.  Resp: Speaking in full sentences, breath sounds equal and clear bilaterally. No wheezes, rales or rhonchi.  Abd: Soft, non-tender, non-distended. Normal bowel sounds in all 4 quadrants. cholecystostomy drain in place without surrounding erythema or purulent discharge.  No tenderness overlying the site.  No fluid noted in the bag.  G-tube is also in place without surrounding erythema, pinkish tinged  MSK: Spine midline and non-tender. No CVAT.  Skin: No rashes, abrasions or lacerations. G-tube.  Neuro: Moves all extremities symmetrically. No motor or sensory deficits

## 2024-05-07 NOTE — ED ADULT NURSE NOTE - CHIEF COMPLAINT QUOTE
sent in in by MD FOR SURGERY. C/O LLQ abd pain. denies fevers chills. seen at [facility] for gall stones. sent in to [facility] for further eval.

## 2024-05-08 DIAGNOSIS — Z93.1 GASTROSTOMY STATUS: ICD-10-CM

## 2024-05-08 LAB
ALBUMIN SERPL ELPH-MCNC: 3.7 G/DL — SIGNIFICANT CHANGE UP (ref 3.3–5.2)
ALP SERPL-CCNC: 77 U/L — SIGNIFICANT CHANGE UP (ref 40–120)
ALT FLD-CCNC: 13 U/L — SIGNIFICANT CHANGE UP
ANION GAP SERPL CALC-SCNC: 11 MMOL/L — SIGNIFICANT CHANGE UP (ref 5–17)
ANISOCYTOSIS BLD QL: SLIGHT — SIGNIFICANT CHANGE UP
AST SERPL-CCNC: 14 U/L — SIGNIFICANT CHANGE UP
BASOPHILS # BLD AUTO: 0.02 K/UL — SIGNIFICANT CHANGE UP (ref 0–0.2)
BASOPHILS NFR BLD AUTO: 0.9 % — SIGNIFICANT CHANGE UP (ref 0–2)
BILIRUB DIRECT SERPL-MCNC: 0.1 MG/DL — SIGNIFICANT CHANGE UP (ref 0–0.3)
BILIRUB INDIRECT FLD-MCNC: 0.3 MG/DL — SIGNIFICANT CHANGE UP (ref 0.2–1)
BILIRUB SERPL-MCNC: 0.4 MG/DL — SIGNIFICANT CHANGE UP (ref 0.4–2)
BUN SERPL-MCNC: 10.1 MG/DL — SIGNIFICANT CHANGE UP (ref 8–20)
CALCIUM SERPL-MCNC: 9.5 MG/DL — SIGNIFICANT CHANGE UP (ref 8.4–10.5)
CHLORIDE SERPL-SCNC: 101 MMOL/L — SIGNIFICANT CHANGE UP (ref 96–108)
CO2 SERPL-SCNC: 29 MMOL/L — SIGNIFICANT CHANGE UP (ref 22–29)
CREAT SERPL-MCNC: 0.47 MG/DL — LOW (ref 0.5–1.3)
CULTURE RESULTS: NO GROWTH — SIGNIFICANT CHANGE UP
EGFR: 110 ML/MIN/1.73M2 — SIGNIFICANT CHANGE UP
EOSINOPHIL # BLD AUTO: 0.02 K/UL — SIGNIFICANT CHANGE UP (ref 0–0.5)
EOSINOPHIL NFR BLD AUTO: 0.9 % — SIGNIFICANT CHANGE UP (ref 0–6)
GLUCOSE SERPL-MCNC: 105 MG/DL — HIGH (ref 70–99)
HCT VFR BLD CALC: 34.7 % — LOW (ref 39–50)
HGB BLD-MCNC: 10.8 G/DL — LOW (ref 13–17)
HYPOCHROMIA BLD QL: SLIGHT — SIGNIFICANT CHANGE UP
LYMPHOCYTES # BLD AUTO: 0.37 K/UL — LOW (ref 1–3.3)
LYMPHOCYTES # BLD AUTO: 13.2 % — SIGNIFICANT CHANGE UP (ref 13–44)
MAGNESIUM SERPL-MCNC: 1.6 MG/DL — SIGNIFICANT CHANGE UP (ref 1.6–2.6)
MANUAL SMEAR VERIFICATION: SIGNIFICANT CHANGE UP
MCHC RBC-ENTMCNC: 25.8 PG — LOW (ref 27–34)
MCHC RBC-ENTMCNC: 31.1 GM/DL — LOW (ref 32–36)
MCV RBC AUTO: 83 FL — SIGNIFICANT CHANGE UP (ref 80–100)
MICROCYTES BLD QL: SLIGHT — SIGNIFICANT CHANGE UP
MONOCYTES # BLD AUTO: 0.32 K/UL — SIGNIFICANT CHANGE UP (ref 0–0.9)
MONOCYTES NFR BLD AUTO: 11.4 % — SIGNIFICANT CHANGE UP (ref 2–14)
NEUTROPHILS # BLD AUTO: 1.97 K/UL — SIGNIFICANT CHANGE UP (ref 1.8–7.4)
NEUTROPHILS NFR BLD AUTO: 71 % — SIGNIFICANT CHANGE UP (ref 43–77)
PHOSPHATE SERPL-MCNC: 3.4 MG/DL — SIGNIFICANT CHANGE UP (ref 2.4–4.7)
PLAT MORPH BLD: ABNORMAL
PLATELET # BLD AUTO: 222 K/UL — SIGNIFICANT CHANGE UP (ref 150–400)
POLYCHROMASIA BLD QL SMEAR: SIGNIFICANT CHANGE UP
POTASSIUM SERPL-MCNC: 3.9 MMOL/L — SIGNIFICANT CHANGE UP (ref 3.5–5.3)
POTASSIUM SERPL-SCNC: 3.9 MMOL/L — SIGNIFICANT CHANGE UP (ref 3.5–5.3)
PROT SERPL-MCNC: 6.7 G/DL — SIGNIFICANT CHANGE UP (ref 6.6–8.7)
RBC # BLD: 4.18 M/UL — LOW (ref 4.2–5.8)
RBC # FLD: 17.6 % — HIGH (ref 10.3–14.5)
RBC BLD AUTO: ABNORMAL
SODIUM SERPL-SCNC: 140 MMOL/L — SIGNIFICANT CHANGE UP (ref 135–145)
SPECIMEN SOURCE: SIGNIFICANT CHANGE UP
VARIANT LYMPHS # BLD: 2.6 % — SIGNIFICANT CHANGE UP (ref 0–6)
WBC # BLD: 2.77 K/UL — LOW (ref 3.8–10.5)
WBC # FLD AUTO: 2.77 K/UL — LOW (ref 3.8–10.5)

## 2024-05-08 PROCEDURE — 99231 SBSQ HOSP IP/OBS SF/LOW 25: CPT | Mod: GC

## 2024-05-08 PROCEDURE — 99223 1ST HOSP IP/OBS HIGH 75: CPT

## 2024-05-08 PROCEDURE — 99222 1ST HOSP IP/OBS MODERATE 55: CPT

## 2024-05-08 PROCEDURE — 99223 1ST HOSP IP/OBS HIGH 75: CPT | Mod: FS

## 2024-05-08 RX ADMIN — Medication 1 GRAM(S): at 17:49

## 2024-05-08 RX ADMIN — ENOXAPARIN SODIUM 40 MILLIGRAM(S): 100 INJECTION SUBCUTANEOUS at 05:48

## 2024-05-08 RX ADMIN — MIDODRINE HYDROCHLORIDE 10 MILLIGRAM(S): 2.5 TABLET ORAL at 12:39

## 2024-05-08 RX ADMIN — MIDODRINE HYDROCHLORIDE 10 MILLIGRAM(S): 2.5 TABLET ORAL at 05:48

## 2024-05-08 RX ADMIN — Medication 1 GRAM(S): at 05:48

## 2024-05-08 RX ADMIN — PANTOPRAZOLE SODIUM 40 MILLIGRAM(S): 20 TABLET, DELAYED RELEASE ORAL at 05:48

## 2024-05-08 RX ADMIN — CHLORHEXIDINE GLUCONATE 15 MILLILITER(S): 213 SOLUTION TOPICAL at 05:48

## 2024-05-08 RX ADMIN — TAMSULOSIN HYDROCHLORIDE 0.4 MILLIGRAM(S): 0.4 CAPSULE ORAL at 22:21

## 2024-05-08 RX ADMIN — SENNA PLUS 1 TABLET(S): 8.6 TABLET ORAL at 22:22

## 2024-05-08 RX ADMIN — Medication 325 MILLIGRAM(S): at 12:39

## 2024-05-08 RX ADMIN — MIDODRINE HYDROCHLORIDE 10 MILLIGRAM(S): 2.5 TABLET ORAL at 17:49

## 2024-05-08 RX ADMIN — Medication 1 APPLICATION(S): at 05:48

## 2024-05-08 RX ADMIN — CHLORHEXIDINE GLUCONATE 15 MILLILITER(S): 213 SOLUTION TOPICAL at 17:48

## 2024-05-08 RX ADMIN — PANTOPRAZOLE SODIUM 40 MILLIGRAM(S): 20 TABLET, DELAYED RELEASE ORAL at 17:50

## 2024-05-08 RX ADMIN — Medication 1 APPLICATION(S): at 12:40

## 2024-05-08 RX ADMIN — Medication 81 MILLIGRAM(S): at 12:39

## 2024-05-08 RX ADMIN — Medication 1 GRAM(S): at 12:39

## 2024-05-08 RX ADMIN — MIRTAZAPINE 15 MILLIGRAM(S): 45 TABLET, ORALLY DISINTEGRATING ORAL at 12:40

## 2024-05-08 RX ADMIN — Medication 1 APPLICATION(S): at 17:51

## 2024-05-08 RX ADMIN — ATORVASTATIN CALCIUM 80 MILLIGRAM(S): 80 TABLET, FILM COATED ORAL at 22:22

## 2024-05-08 RX ADMIN — ESCITALOPRAM OXALATE 10 MILLIGRAM(S): 10 TABLET, FILM COATED ORAL at 12:39

## 2024-05-08 RX ADMIN — ERTAPENEM SODIUM 120 MILLIGRAM(S): 1 INJECTION, POWDER, LYOPHILIZED, FOR SOLUTION INTRAMUSCULAR; INTRAVENOUS at 18:44

## 2024-05-08 RX ADMIN — FINASTERIDE 5 MILLIGRAM(S): 5 TABLET, FILM COATED ORAL at 12:40

## 2024-05-08 NOTE — CONSULT NOTE ADULT - ASSESSMENT
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 coli bacteremia was given ertapenem, he was noted to have worsening LFTS, MRCP done with no obstruction, followed by GI, status post ERCP with sphincterotomy. Post procedure noted to have worsening anemia, ERCP/EUS with reported grade D Esophagitis. Started on  PPI BID. Transfused 1 unit PRBC. No Further episodes of melena. Midline placed and was given IV Invanz until 04/03/24, he was seen by surgery, ID over the course and was discharged, he comes back to the ED with complain of several days of abdominal pain, there is concern for some purulence at site of cholecystostomy tube. Hypotensive with elevated lactate in ED, admitted under surgical service, medicine consulted for medical optimization for surgery.         Plan:     Sepsis from     1. Acute blood loss anemia suspected secondary to UGIB  - Grade D Esophagitis noted on ERCP   - FOBT positive no further episodes of melena reported   - PPI BID, Start Carafate  - Follow up CBC stable   - CT abdomen and pelvis with IV Contrast negative   _ Transfused 1 unit PRBC on 3/27     2 ESBL E.Coli Bacteremia due to Acute Cholecystitis with contained perforation s/p choly tube  -septic shock resolved; on midodrine  -CT a/p: Acute cholecystitis with contained perforation  -US ABD: Acute cholecystitis with walled off perforation.  -S/p IR percutaneous cholecystostomy (3/17)- Initially draining purulent fluid, now draining bilious fluid  -Blood cultures - ESBL E. coli   -repeat blood cultures negative  - Status post ERCP after worsening LFTS status post sphincterotomy. LFTs downtrending   -continue Invanz until 04/03 via midline     3. T2DM  -A1c 7.8  - Lantus 5 units QHS  -Hold home Farxiga & Janumet  -ISS  -ADA diet    4. CAD  -denies anginal symptoms  -continue aspirin 81 mg QD  -Hold Lipitor in the setting of elevated liver enzymes    5. BPH  -continue Flomax    6. 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 coli bacteremia was given ertapenem, he was noted to have worsening LFTS, MRCP done with no obstruction, followed by GI, status post ERCP with sphincterotomy. Post procedure noted to have worsening anemia, ERCP/EUS with reported grade D Esophagitis. Started on  PPI BID. Transfused 1 unit PRBC. No Further episodes of melena. Midline placed and was given IV Invanz until 04/03/24, he was seen by surgery, ID over the course and was discharged, he comes back to the ED with complain of several days of abdominal pain, there is concern for some purulence at site of cholecystostomy tube. Hypotensive with elevated lactate in ED, admitted under surgical service, medicine consulted for medical optimization for surgery.         Plan:     Sepsis Cholecystitis and choly tube  had ESBL bactremia recently will continue with ertapenem  IVPB 1000 milliGRAM(s) IV Intermittent every 24 hours   cultures to follow   ID consult  IV fluids   continue with midodrine for hypotension as he is on it.   management as per primary team   likely going for surgery     Blood loss anemia suspected secondary to UGIB  - Grade D Esophagitis noted on ERCP during last admission   - FOBT was positive   - will continue with PPI BID, Start Carafate  - monitor CBC   -previous admission he got a unit of PRBCs     Hx of T2DM  -A1c 7.8  - will give Lantus 5 units QHS, will do FS monitoring and provide coverage insulin   -Hold home Farxiga & Janumet  -ISS  -ADA diet    Hx of CAD  -denies anginal symptoms  -continue aspirin 81 mg QD  -continue with Lipitor home dose     Hx of BPH  -continue Flomax    Hx of Dysphagia  continue with same diet he was on during previous admission     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       Medicine team is signing off, please call as needed

## 2024-05-08 NOTE — CONSULT NOTE ADULT - ASSESSMENT
incomplete     73M with PMHx HTN, HLD, CAD s/p stents, DM, SCCa of face s/p R Hemimaxillectomy  and supraomohyoidal neck dissection levels 1, 2, and 3, left sided radial free fibular flap and left sided split thickness skin graft (11/23) now PEG dependent (1/24) s/p completion of RT (last tx 2/27/24) and weekly Carbo (last tx 2/20/24). Patient also s/p percutaneous cholecystostomy for cholecystitis Patient presenting to [facility] w/ hypotension planning for cholecystectomy Heme/ONC consulted for OR clearance.      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 Treated by Dr. Frazier at [facility] completed RT 2/27/24 and weekly Carbo 2/20/24  -PET scan from 4/19/24- showing post tx changes, and  no evidence of FDG-avid malignancy.  -patient hospitalized 3/24 for  acute cholecystitis sp gemini tube. Now presenting back to ER w/ worsening abd pain, Concern for some purulence at site of cholecystostomy tube w/ hypotension   -SX onboard plan for cholecystectomy 5/9    RECS         *Note not finalized until signed by Attending Physician      Please call with any questions [phone]  Above reviewed with Attending Dr. Wright     [facility]  [facility] [place] [address]  [phone]  incomplete     73M with PMHx HTN, HLD, CAD s/p stents, DM, SCCa of face s/p R Hemimaxillectomy  and supraomohyoidal neck dissection levels 1, 2, and 3, left sided radial free fibular flap and left sided split thickness skin graft (11/23) now PEG dependent (1/24) s/p completion of RT (last tx 2/27/24) and weekly Carbo (last tx 2/20/24). Patient also s/p percutaneous cholecystostomy for cholecystitis Patient presenting to [facility] w/ hypotension planning for cholecystectomy Heme/ONC consulted for OR clearance.      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 Treated by Dr. Frazier at [facility] completed RT 2/27/24 and weekly Carbo 2/20/24  -PET scan from 4/19/24- showing post tx changes, and  no evidence of FDG-avid malignancy.  -patient hospitalized 3/24 for  acute cholecystitis sp gemini tube. Now presenting back to ER w/ worsening abd pain, Concern for some purulence at site of cholecystostomy tube w/ hypotension   -SX onboard plan for cholecystectomy 5/9    RECS   -no absolute hematologic/ oncologic contraindications for SX  -patient can f/u w/ Dr. Frazier on Dc as scheduled       *Note not finalized until signed by Attending Physician      Please call with any questions [phone]  Above reviewed with Attending Dr. Wright     [facility]  [address] [place] [address]  [phone]

## 2024-05-08 NOTE — PROGRESS NOTE ADULT - ATTENDING COMMENTS
Plan for OR tomorrow for robotic cholecystectomy  NPO after midnight  IV fluids  IV abx  SCDs and LVNX for DVT ppx Plan for OR tomorrow for robotic cholecystectomy  NPO after midnight  IV fluids  IV abx  SCDs and LVNX for DVT ppx  Heme/Onc consult for chemo

## 2024-05-08 NOTE — CONSULT NOTE ADULT - SUBJECTIVE AND OBJECTIVE BOX
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 coli bacteremia was given ertapenem, he was noted to have worsening LFTS, MRCP done with no obstruction, followed by GI, status post ERCP with sphincterotomy. Post procedure noted to have worsening anemia, ERCP/EUS with reported grade D Esophagitis. Started on  PPI BID. Transfused 1 unit PRBC. No Further episodes of melena. Midline placed and was given IV Invanz until 04/03/24, he was seen by surgery, ID over the course and was discharged, he comes back to the ED with complain of several days of abdominal pain, there is concern for some purulence at site of cholecystostomy tube. Hypotensive with elevated lactate in ED, admitted under surgical service, medicine consulted for medical optimization for surgery, as per patient his pain is under control, denies fever, chills, chest pain or sob.          Vital Signs Last 24 Hrs  T(C): 36.7 (08 May 2024 07:56), Max: 36.7 (08 May 2024 01:04)  T(F): 98.1 (08 May 2024 07:56), Max: 98.1 (08 May 2024 01:04)  HR: 76 (08 May 2024 07:56) (74 - 81)  BP: 98/63 (08 May 2024 07:56) (98/63 - 122/67)  BP(mean): 69 (07 May 2024 11:54) (69 - 77)  RR: 18 (08 May 2024 07:56) (13 - 18)  SpO2: 98% (08 May 2024 07:56) (98% - 100%)    Parameters below as of 08 May 2024 07:56  Patient On (Oxygen Delivery Method): room air        PHYSICAL EXAM:    GENERAL: Elderly catechetic male looking comfortable   HEENT: mandibular scar from previous surgery   NECK: soft, Supple, No JVD  CHEST/LUNG: Clear to auscultate bilaterally; No wheezing  HEART: S1S2+, Regular rate and rhythm; No murmurs  ABDOMEN: Soft, Nontender, Nondistended; Bowel sounds present, her cholecystostomy tube in place   EXTREMITIES:  1+ Peripheral Pulses, No edema  SKIN: No rashes or lesions  NEURO: AAOX3  PSYCH: normal mood      LABS:                        10.8   2.77  )-----------( 222      ( 08 May 2024 03:52 )             34.7     05-08    140  |  101  |  10.1  ----------------------------<  105<H>  3.9   |  29.0  |  0.47<L>    Ca    9.5      08 May 2024 03:52  Phos  3.4     05-08  Mg     1.6     05-08    TPro  6.7  /  Alb  3.7  /  TBili  0.4  /  DBili  0.1  /  AST  14  /  ALT  13  /  AlkPhos  77  05-08    PT/INR - ( 07 May 2024 11:38 )   PT: 11.6 sec;   INR: 1.05 ratio         PTT - ( 07 May 2024 11:38 )  PTT:33.9 sec        I&O's Summary      MEDICATIONS  (STANDING):  aspirin  chewable 81 milliGRAM(s) Oral daily  atorvastatin 80 milliGRAM(s) Oral at bedtime  chlorhexidine 0.12% Liquid 15 milliLiter(s) Swish and Spit two times a day  enoxaparin Injectable 40 milliGRAM(s) SubCutaneous every 24 hours  ertapenem  IVPB 1000 milliGRAM(s) IV Intermittent every 24 hours  ertapenem  IVPB      escitalopram 10 milliGRAM(s) Oral daily  ferrous    sulfate 325 milliGRAM(s) Oral daily  finasteride 5 milliGRAM(s) Oral daily  midodrine. 10 milliGRAM(s) Oral three times a day  mirtazapine 15 milliGRAM(s) Oral daily  pantoprazole    Tablet 40 milliGRAM(s) Oral every 12 hours  petrolatum Ophthalmic Ointment 1 Application(s) Both EYES four times a day  polyethylene glycol 3350 17 Gram(s) Oral daily  senna 1 Tablet(s) Oral at bedtime  sucralfate 1 Gram(s) Oral four times a day  tamsulosin 0.4 milliGRAM(s) Oral at bedtime    MEDICATIONS  (PRN):  ondansetron Injectable 4 milliGRAM(s) IV Push every 6 hours PRN Nausea

## 2024-05-08 NOTE — CONSULT NOTE ADULT - NS ATTEND AMEND GEN_ALL_CORE FT
I attest my time as attending is greater than 50% of the total combined time spent on qualifying patient care activities by the PA/NP and attending.   I have made amendments to the documentation where necessary.
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, chemo-radiation and PEG tube had recent admission 3/2024 for septic shock due to cholecystitis with contained perforation and bacteremia s/p cholecystostomy drain presents with hypotension and pain/purulent around cholecystostomy site, now planning for cholecystectomy   -repeat TTE with preserved LV EF and no significant valvular abnormality  -stable CAD with remote stents only on ASA 81mg  -has been on midodrine since last discharge  -no cardiac contraindication, currently optimized to proceed with the planned surgery, if intraop hypotension can use IV phenylephrine         Gibran Villasenor DO, [facility]  Faculty Non-Invasive Cardiologist  [phone]

## 2024-05-08 NOTE — PATIENT PROFILE ADULT - FALL HARM RISK - HARM RISK INTERVENTIONS

## 2024-05-08 NOTE — PATIENT PROFILE ADULT - HISTORY OF COVID-19 VACCINATION
Tyrone Ferris has been seen and examined by me on bedside rounds. The interval history, lab findings, and physical examination of the patient have been reviewed with members of the  team. The notes have been reviewed. All aspects of care have been discussed and I have agreed on the assessment and plan for the day with the care team.    Tyrone Boothe is a now 12 day old infant born at 34w2d  with a NICU course complicated by RDS and sepsis evaluation.     Resp: initially on cpap, intubated for surfactant 3/23.  Last trial off CPAP 3/26; required nasal cannula; tolerated wean off NC. Follow [facility] clinically  ID: Blood culture 3/22 negative to date.  Monitor for signs/symptoms of sepsis.    FEN/GI: NGT out yesteday; follow ability to tolerate PO. Parents very engaged and working on feeding infant.   Heme: Rebound bili 3/28 downtrending, does not need further bilis.   MSK: mid thoracic prominence noted over spine, non-tender.  Ultrasound revealed no abnormalities/mass, no dysraphism appreciated on x-ray.  Likely prominent spinous process.  Will continue to monitor.      Mother updated during rounds. Likely dc in am if continues to feed well and parents, who have been very engaged in his care, feel comfortable caring for infant. Vaccine status unknown

## 2024-05-08 NOTE — CONSULT NOTE ADULT - SUBJECTIVE AND OBJECTIVE BOX
[facility]                                              CARDIOLOGY AT [place] [address]                                             Telephone: [phone]. 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 He presents to the ED from clinic with several days of abdominal pain.  He denies N/V/CP/SOB, no subjective fevers or chills. Concern for some purulence at site of cholecystostomy tube. Hypotensive with elevated lactate in ED. (07 May 2024 18:41)    CARDIAC TESTING   TTE W or WO Ultrasound Enhancing Agent (03.18.24 @ 15:13) >  CONCLUSIONS:   1. Interpretation based on limited views.   2. Left ventricular cavity is normal in size. Left ventricular wall thickness is normal. Left ventricular systolic function is normal with an ejection fraction visually estimated at 55 to 60 %. There are no regional wall motion abnormalities seen.   3. Normal right ventricular cavity size, with normal wall thickness, and normal systolic function.   4. No pericardial effusion seen.   5. Compared to the transthoracic echocardiogram performed on 11/26/2023,.   6. Technically difficult image quality.  < end of copied text >    Cardiac Cath Lab - Adult (03.25.21 @ 09:37) >  CORONARY VESSELS:  RCA:   --  Proximal RCA: There was a 100 % stenosis.  COMPLICATIONS: There were no complications.  DIAGNOSTIC RECOMMENDATIONS: this is a .  ASA and Plavix for 1 year.  INTERVENTIONAL RECOMMENDATIONS: this is a .  ASA and Plavix for 1 year.  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    Acoustic neuroma    Facial nerve disorder    Blindness of left eye    Unsteady gait    CAD (coronary artery disease)    Hearing loss, right    H/O neuropathy      PAST SURGICAL HISTORY  S/P excision of acoustic neuroma    S/P cataract surgery    S/P coronary angioplasty      SOCIAL HISTORY: Denies smoking/alcohol/drugs  CIGARETTES:     ALCOHOL:  DRUGS:  FAMILY HISTORY:  Family history of CABG (Sibling)    Family history of diabetes mellitus (DM) (Sibling, Mother, Sibling)      Family History of Cardiovascular Disease:  Yes  /   No   Coronary Artery Disease in first degree relative: Yes  /   No   Sudden Cardiac Death in First degree relative: Yes  /   No     HOME MEDICATIONS:   Artificial Tears ophthalmic ointment: 1 application in each eye 4 times a day (13 Feb 2024 13:13)  aspirin 81 mg oral capsule: 1 cap(s) orally once a day (13 Feb 2024 13:13)  docusate sodium 100 mg oral capsule: 1 cap(s) orally once a day (13 Feb 2024 13:13)  ergocalciferol 1.25 mg (50,000 intl units) oral capsule: 1 cap(s) orally once a week (13 Feb 2024 13:13)  Farxiga 10 mg oral tablet: 1 tab(s) orally once a day (13 Feb 2024 13:13)  ferrous sulfate 325 mg (65 mg elemental iron) oral delayed release tablet: 1 tab(s) orally once a day (13 Feb 2024 13:13)  finasteride 5 mg oral tablet: 1 tab(s) orally once a day (13 Feb 2024 13:13)  Flomax 0.4 mg oral capsule: 1 cap(s) orally once a day (13 Feb 2024 13:13)  Janumet 50 mg-1000 mg oral tablet: 1 tab(s) orally 2 times a day (resume on 3/27) (13 Feb 2024 13:13)  Lexapro 10 mg oral tablet: 1 tab(s) orally once a day (13 Feb 2024 13:13)  Lipitor 20 mg oral tablet: 1 tab(s) orally once a day REsume on 4/1/20204 (29 Mar 2024 12:23)  metFORMIN 500 mg oral tablet: 1 tab(s) orally once a day (13 Feb 2024 13:13)  midodrine 5 mg oral tablet: 2 tab(s) orally 3 times a day (13 Feb 2024 13:13)  MiraLax oral powder for reconstitution: 17 gram(s) orally once a day (13 Feb 2024 13:13)  mirtazapine 15 mg oral tablet: 1 tab(s) orally once a day (13 Feb 2024 13:13)  Multiple Vitamins oral tablet: 1 tab(s) orally once a day (13 Feb 2024 13:13)  Prevident Dental Rinse 0.02% topical solution: Apply topically to affected area 2 times a day (13 Feb 2024 13:13)  senna (sennosides) 8.6 mg oral tablet: 1 tab(s) orally once a day (13 Feb 2024 13:13)    CURRENT CARDIAC MEDICATIONS:   midodrine. 10 milliGRAM(s) Oral three times a day    CURRENT OTHER MEDICATIONS:   escitalopram 10 milliGRAM(s) Oral daily  mirtazapine 15 milliGRAM(s) Oral daily  ondansetron Injectable 4 milliGRAM(s) IV Push every 6 hours PRN Nausea  pantoprazole    Tablet 40 milliGRAM(s) Oral every 12 hours  polyethylene glycol 3350 17 Gram(s) Oral daily  senna 1 Tablet(s) Oral at bedtime  sucralfate 1 Gram(s) Oral four times a day  aspirin  chewable 81 milliGRAM(s) Oral daily  atorvastatin 80 milliGRAM(s) Oral at bedtime  chlorhexidine 0.12% Liquid 15 milliLiter(s) Swish and Spit two times a day  enoxaparin Injectable 40 milliGRAM(s) SubCutaneous every 24 hours  ertapenem  IVPB      ertapenem  IVPB 1000 milliGRAM(s) IV Intermittent every 24 hours, Stop order after: 7 Days  ferrous    sulfate 325 milliGRAM(s) Oral daily  finasteride 5 milliGRAM(s) Oral daily  petrolatum Ophthalmic Ointment 1 Application(s) Both EYES four times a day  tamsulosin 0.4 milliGRAM(s) Oral at bedtime    ALLERGIES:   No Known Allergies    REVIEW OF SYMPTOMS: See HPI   CONSTITUTIONAL: No fever, no chills, no weight loss, no weight gain, no fatigue   ENMT:  No vertigo; No sinus or throat pain  NECK: No pain or stiffness  CARDIOVASCULAR: No chest pain, no dyspnea, no syncope/presyncope, no fatigue, no palpitations, no dizziness, no Orthopnea, no Paroxsymal nocturnal dyspnea  RESPIRATORY: No shortness of breath, no cough  : No dysuria, no hematuria   GI: no nausea, no diarrhea, no constipation, no abdominal pain  NEURO: No headache, no slurred speech   MUSCULOSKELETAL: No joint pain or swelling; No muscle, back, or extremity pain  PSYCH: No agitation, no anxiety.    ALL OTHER REVIEW OF SYSTEMS ARE NEGATIVE.    VITAL SIGNS:   T(C): 36.6 (05-08-24 @ 03:50), Max: 36.9 (05-07-24 @ 11:23)  T(F): 97.8 (05-08-24 @ 03:50), Max: 98.4 (05-07-24 @ 11:23)  HR: 78 (05-08-24 @ 03:50) (74 - 84)  BP: 109/67 (05-08-24 @ 03:50) (80/45 - 122/67)  RR: 17 (05-08-24 @ 03:50) (13 - 18)  SpO2: 98% (05-08-24 @ 03:50) (98% - 100%)    INTAKE AND OUTPUT:        PHYSICAL EXAM:   Constitutional: Comfortable . No acute distress.   HEENT: Atraumatic and normocephalic , neck is supple . no JVD.   CNS: A&Ox3. No focal deficits.   Respiratory: CTAB, unlabored. No wheezing, No rhonchi. No crackles   Cardiovascular: RRR normal s1 s2. No murmur. No rubs or gallop.  Gastrointestinal: Soft, non-tender. +Bowel sounds.   Extremities: 2+ Peripheral Pulses, No edema  Psychiatric: Calm . no agitation.   Skin: Warm and dry    LABS:                             10.8   2.77  )-----------( 222      ( 08 May 2024 03:52 )             34.7     05-08    140  |  101  |  10.1  ----------------------------<  105<H>  3.9   |  29.0  |  0.47<L>    Ca    9.5      08 May 2024 03:52  Phos  3.4     05-08  Mg     1.6     05-08    TPro  6.7  /  Alb  3.7  /  TBili  0.4  /  DBili  0.1  /  AST  14  /  ALT  13  /  AlkPhos  77  05-08    PT/INR - ( 07 May 2024 11:38 )   PT: 11.6 sec;   INR: 1.05 ratio         PTT - ( 07 May 2024 11:38 )  PTT:33.9 sec  Urinalysis Basic - ( 08 May 2024 03:52 )    Color: x / Appearance: x / SG: x / pH: x  Gluc: 105 mg/dL / Ketone: x  / Bili: x / Urobili: x   Blood: x / Protein: x / Nitrite: x   Leuk Esterase: x / RBC: x / WBC x   Sq Epi: x / Non Sq Epi: x / Bacteria: x              ECG:   Prior ECG: Yes [  ] No [  ]    RADIOLOGY & ADDITIONAL STUDIES:    X-ray:    CT scan:   MRI:   US:    Preliminary evaluation, please await official recommendations     Assessment and recommendations are final when note is signed by the attending.                                      [facility]                                              CARDIOLOGY AT [place] [address]                                             Telephone: [phone]. Fax:[phone]      CARDIOLOGY CONSULTATION NOTE                                                                                             History obtained by: Patient and medical record  Community Cardiologist: Dr Shaikh Madera   obtained:  No   Reason for Consultation: Cardiac clearance     Chief complaint:   Patient is a 73y old  Male who presented with a chief complaint of abdominal pain.    HPI: 74 yo M with PMHx HTN, HLD, CAD s/p stents x 2 in 2022 at [facility], DM, SCC of face s/p resection  w/ creation of free flap and chemorads, now PEG dependent, and s/p percutaneous cholecystostomy. He presents to the ED from clinic with several days of abdominal pain.  He denies N/V/CP/SOB, no subjective fevers or chills. Concern for some purulence at site of cholecystostomy tube. Hypotensive with elevated lactate in ED. (07 May 2024 18:41)    CARDIAC TESTING   TTE W or WO Ultrasound Enhancing Agent (03.18.24 @ 15:13) >  CONCLUSIONS:   1. Interpretation based on limited views.   2. Left ventricular cavity is normal in size. Left ventricular wall thickness is normal. Left ventricular systolic function is normal with an ejection fraction visually estimated at 55 to 60 %. There are no regional wall motion abnormalities seen.   3. Normal right ventricular cavity size, with normal wall thickness, and normal systolic function.   4. No pericardial effusion seen.   5. Compared to the transthoracic echocardiogram performed on 11/26/2023,.   6. Technically difficult image quality.  < end of copied text >    Cardiac Cath Lab - Adult (03.25.21 @ 09:37) >  CORONARY VESSELS:  RCA:   --  Proximal RCA: There was a 100 % stenosis.  COMPLICATIONS: There were no complications.  DIAGNOSTIC RECOMMENDATIONS: this is a .  ASA and Plavix for 1 year.  INTERVENTIONAL RECOMMENDATIONS: this is a .  ASA and Plavix for 1 year.  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 Denies drugs    FAMILY HISTORY:  Family history of CABG (Sibling)  Family history of diabetes mellitus (DM) (Sibling, Mother, Sibling)    Family History of Cardiovascular Disease:  Yes   Coronary Artery Disease in first degree relative: Yes   Sudden Cardiac Death in First degree relative:  No     HOME MEDICATIONS:   Artificial Tears ophthalmic ointment: 1 application in each eye 4 times a day (13 Feb 2024 13:13)  aspirin 81 mg oral capsule: 1 cap(s) orally once a day (13 Feb 2024 13:13)  docusate sodium 100 mg oral capsule: 1 cap(s) orally once a day (13 Feb 2024 13:13)  ergocalciferol 1.25 mg (50,000 intl units) oral capsule: 1 cap(s) orally once a week (13 Feb 2024 13:13)  Farxiga 10 mg oral tablet: 1 tab(s) orally once a day (13 Feb 2024 13:13)  ferrous sulfate 325 mg (65 mg elemental iron) oral delayed release tablet: 1 tab(s) orally once a day (13 Feb 2024 13:13)  finasteride 5 mg oral tablet: 1 tab(s) orally once a day (13 Feb 2024 13:13)  Flomax 0.4 mg oral capsule: 1 cap(s) orally once a day (13 Feb 2024 13:13)  Janumet 50 mg-1000 mg oral tablet: 1 tab(s) orally 2 times a day (resume on 3/27) (13 Feb 2024 13:13)  Lexapro 10 mg oral tablet: 1 tab(s) orally once a day (13 Feb 2024 13:13)  Lipitor 20 mg oral tablet: 1 tab(s) orally once a day REsume on 4/1/20204 (29 Mar 2024 12:23)  metFORMIN 500 mg oral tablet: 1 tab(s) orally once a day (13 Feb 2024 13:13)  midodrine 5 mg oral tablet: 2 tab(s) orally 3 times a day (13 Feb 2024 13:13)  MiraLax oral powder for reconstitution: 17 gram(s) orally once a day (13 Feb 2024 13:13)  mirtazapine 15 mg oral tablet: 1 tab(s) orally once a day (13 Feb 2024 13:13)  Multiple Vitamins oral tablet: 1 tab(s) orally once a day (13 Feb 2024 13:13)  Prevident Dental Rinse 0.02% topical solution: Apply topically to affected area 2 times a day (13 Feb 2024 13:13)  senna (sennosides) 8.6 mg oral tablet: 1 tab(s) orally once a day (13 Feb 2024 13:13)    CURRENT CARDIAC MEDICATIONS:   midodrine. 10 milliGRAM(s) Oral three times a day    CURRENT OTHER MEDICATIONS:   escitalopram 10 milliGRAM(s) Oral daily  mirtazapine 15 milliGRAM(s) Oral daily  ondansetron Injectable 4 milliGRAM(s) IV Push every 6 hours PRN Nausea  pantoprazole    Tablet 40 milliGRAM(s) Oral every 12 hours  polyethylene glycol 3350 17 Gram(s) Oral daily  senna 1 Tablet(s) Oral at bedtime  sucralfate 1 Gram(s) Oral four times a day  aspirin  chewable 81 milliGRAM(s) Oral daily  atorvastatin 80 milliGRAM(s) Oral at bedtime  chlorhexidine 0.12% Liquid 15 milliLiter(s) Swish and Spit two times a day  enoxaparin Injectable 40 milliGRAM(s) SubCutaneous every 24 hours  ertapenem  IVPB      ertapenem  IVPB 1000 milliGRAM(s) IV Intermittent every 24 hours, Stop order after: 7 Days  ferrous    sulfate 325 milliGRAM(s) Oral daily  finasteride 5 milliGRAM(s) Oral daily  petrolatum Ophthalmic Ointment 1 Application(s) Both EYES four times a day  tamsulosin 0.4 milliGRAM(s) Oral at bedtime    ALLERGIES:   No Known Allergies    REVIEW OF SYMPTOMS: Asymptomatic   CONSTITUTIONAL: No fever, no chills, no weight loss, no weight gain, no fatigue   ENMT:  No vertigo; No sinus or throat pain  NECK: No pain or stiffness  CARDIOVASCULAR: No chest pain, no dyspnea, no syncope/presyncope, no fatigue, no palpitations, no dizziness, no Orthopnea, no Paroxsymal nocturnal dyspnea  RESPIRATORY: No shortness of breath, no cough  : No dysuria, no hematuria   GI: no nausea, no diarrhea, no constipation, no abdominal pain  NEURO: No headache, no slurred speech   MUSCULOSKELETAL: No joint pain or swelling; No muscle, back, or extremity pain  PSYCH: No agitation, no anxiety.    ALL OTHER REVIEW OF SYSTEMS ARE NEGATIVE.    VITAL SIGNS:   T(C): 36.6 (05-08-24 @ 03:50), Max: 36.9 (05-07-24 @ 11:23)  T(F): 97.8 (05-08-24 @ 03:50), Max: 98.4 (05-07-24 @ 11:23)  HR: 78 (05-08-24 @ 03:50) (74 - 84)  BP: 109/67 (05-08-24 @ 03:50) (80/45 - 122/67)  RR: 17 (05-08-24 @ 03:50) (13 - 18)  SpO2: 98% (05-08-24 @ 03:50) (98% - 100%)    PHYSICAL EXAM:   Constitutional: Comfortable . No acute distress.   HEENT: Atraumatic and normocephalic , neck is supple . no JVD.   CNS: A&Ox3. No focal deficits.   Respiratory: CTAB, unlabored. No wheezing, No rhonchi. No crackles   Cardiovascular: RRR normal s1 s2. No murmur. No rubs or gallop.  Gastrointestinal: Soft, non-tender. +Bowel sounds. + PEG noted   Extremities: 2+ Peripheral Pulses, No edema  Psychiatric: Calm . no agitation.   Skin: Warm and dry    LABS:                         10.8   2.77  )-----------( 222      ( 08 May 2024 03:52 )             34.7     05-08    140  |  101  |  10.1  ----------------------------<  105<H>  3.9   |  29.0  |  0.47<L>    Ca    9.5      08 May 2024 03:52  Phos  3.4     05-08  Mg     1.6     05-08    TPro  6.7  /  Alb  3.7  /  TBili  0.4  /  DBili  0.1  /  AST  14  /  ALT  13  /  AlkPhos  77  05-08    PT/INR - ( 07 May 2024 11:38 )   PT: 11.6 sec;   INR: 1.05 ratio       PTT - ( 07 May 2024 11:38 )  PTT:33.9 sec    ECG: NSR  Prior ECG: Yes     RADIOLOGY & ADDITIONAL STUDIES:     CT Abdomen and Pelvis w/ IV Cont (05.07.24 @ 15:54) >  IMPRESSION: Redemonstration of cholecystostomy tube with tip at the level   of the gallbladder neck.  Decreased inflammatory changes in the subcutaneous soft tissues and   musculature at the level of the tube entry.  < end of copied text >      Preliminary evaluation, please await official recommendations     Assessment and recommendations are final when note is signed by the attending.                                      [facility]                                              CARDIOLOGY AT [place] [address]                                             Telephone: [phone]. Fax:[phone]      CARDIOLOGY CONSULTATION NOTE                                                                                             History obtained by: Patient and medical record  Community Cardiologist: Dr Shaikh Madera   obtained:  No   Reason for Consultation: Cardiac clearance     Chief complaint:   Patient is a 73y old  Male who presented with a chief complaint of abdominal pain.    HPI: 72 yo M with PMHx HTN, HLD, CAD s/p stents x 2 in 2021 at [facility], DM, SCC of face s/p resection  w/ creation of free flap and chemorads, now PEG dependent, and s/p percutaneous cholecystostomy. He presents to the ED from clinic with several days of abdominal pain.  He denies N/V/CP/SOB, no subjective fevers or chills. Concern for some purulence at site of cholecystostomy tube. Hypotensive with elevated lactate in ED. (07 May 2024 18:41)    CARDIAC TESTING   TTE W or WO Ultrasound Enhancing Agent (03.18.24 @ 15:13) >  CONCLUSIONS:   1. Interpretation based on limited views.   2. Left ventricular cavity is normal in size. Left ventricular wall thickness is normal. Left ventricular systolic function is normal with an ejection fraction visually estimated at 55 to 60 %. There are no regional wall motion abnormalities seen.   3. Normal right ventricular cavity size, with normal wall thickness, and normal systolic function.   4. No pericardial effusion seen.   5. Compared to the transthoracic echocardiogram performed on 11/26/2023,.   6. Technically difficult image quality.  < end of copied text >    Cardiac Cath Lab - Adult (03.25.21 @ 09:37) >  CORONARY VESSELS:  RCA:   --  Proximal RCA: There was a 100 % stenosis.  COMPLICATIONS: There were no complications.  DIAGNOSTIC RECOMMENDATIONS: this is a .  ASA and Plavix for 1 year.  INTERVENTIONAL RECOMMENDATIONS: this is a .  ASA and Plavix for 1 year.  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 Denies drugs    FAMILY HISTORY:  Family history of CABG (Sibling)  Family history of diabetes mellitus (DM) (Sibling, Mother, Sibling)    Family History of Cardiovascular Disease:  Yes   Coronary Artery Disease in first degree relative: Yes   Sudden Cardiac Death in First degree relative:  No     HOME MEDICATIONS:   Artificial Tears ophthalmic ointment: 1 application in each eye 4 times a day (13 Feb 2024 13:13)  aspirin 81 mg oral capsule: 1 cap(s) orally once a day (13 Feb 2024 13:13)  docusate sodium 100 mg oral capsule: 1 cap(s) orally once a day (13 Feb 2024 13:13)  ergocalciferol 1.25 mg (50,000 intl units) oral capsule: 1 cap(s) orally once a week (13 Feb 2024 13:13)  Farxiga 10 mg oral tablet: 1 tab(s) orally once a day (13 Feb 2024 13:13)  ferrous sulfate 325 mg (65 mg elemental iron) oral delayed release tablet: 1 tab(s) orally once a day (13 Feb 2024 13:13)  finasteride 5 mg oral tablet: 1 tab(s) orally once a day (13 Feb 2024 13:13)  Flomax 0.4 mg oral capsule: 1 cap(s) orally once a day (13 Feb 2024 13:13)  Janumet 50 mg-1000 mg oral tablet: 1 tab(s) orally 2 times a day (resume on 3/27) (13 Feb 2024 13:13)  Lexapro 10 mg oral tablet: 1 tab(s) orally once a day (13 Feb 2024 13:13)  Lipitor 20 mg oral tablet: 1 tab(s) orally once a day REsume on 4/1/20204 (29 Mar 2024 12:23)  metFORMIN 500 mg oral tablet: 1 tab(s) orally once a day (13 Feb 2024 13:13)  midodrine 5 mg oral tablet: 2 tab(s) orally 3 times a day (13 Feb 2024 13:13)  MiraLax oral powder for reconstitution: 17 gram(s) orally once a day (13 Feb 2024 13:13)  mirtazapine 15 mg oral tablet: 1 tab(s) orally once a day (13 Feb 2024 13:13)  Multiple Vitamins oral tablet: 1 tab(s) orally once a day (13 Feb 2024 13:13)  Prevident Dental Rinse 0.02% topical solution: Apply topically to affected area 2 times a day (13 Feb 2024 13:13)  senna (sennosides) 8.6 mg oral tablet: 1 tab(s) orally once a day (13 Feb 2024 13:13)    CURRENT CARDIAC MEDICATIONS:   midodrine. 10 milliGRAM(s) Oral three times a day    CURRENT OTHER MEDICATIONS:   escitalopram 10 milliGRAM(s) Oral daily  mirtazapine 15 milliGRAM(s) Oral daily  ondansetron Injectable 4 milliGRAM(s) IV Push every 6 hours PRN Nausea  pantoprazole    Tablet 40 milliGRAM(s) Oral every 12 hours  polyethylene glycol 3350 17 Gram(s) Oral daily  senna 1 Tablet(s) Oral at bedtime  sucralfate 1 Gram(s) Oral four times a day  aspirin  chewable 81 milliGRAM(s) Oral daily  atorvastatin 80 milliGRAM(s) Oral at bedtime  chlorhexidine 0.12% Liquid 15 milliLiter(s) Swish and Spit two times a day  enoxaparin Injectable 40 milliGRAM(s) SubCutaneous every 24 hours  ertapenem  IVPB      ertapenem  IVPB 1000 milliGRAM(s) IV Intermittent every 24 hours, Stop order after: 7 Days  ferrous    sulfate 325 milliGRAM(s) Oral daily  finasteride 5 milliGRAM(s) Oral daily  petrolatum Ophthalmic Ointment 1 Application(s) Both EYES four times a day  tamsulosin 0.4 milliGRAM(s) Oral at bedtime    ALLERGIES:   No Known Allergies    REVIEW OF SYMPTOMS: Asymptomatic   CONSTITUTIONAL: No fever, no chills, no weight loss, no weight gain, no fatigue   ENMT:  No vertigo; No sinus or throat pain  NECK: No pain or stiffness  CARDIOVASCULAR: No chest pain, no dyspnea, no syncope/presyncope, no fatigue, no palpitations, no dizziness, no Orthopnea, no Paroxsymal nocturnal dyspnea  RESPIRATORY: No shortness of breath, no cough  : No dysuria, no hematuria   GI: no nausea, no diarrhea, no constipation, no abdominal pain  NEURO: No headache, no slurred speech   MUSCULOSKELETAL: No joint pain or swelling; No muscle, back, or extremity pain  PSYCH: No agitation, no anxiety.    ALL OTHER REVIEW OF SYSTEMS ARE NEGATIVE.    VITAL SIGNS:   T(C): 36.6 (05-08-24 @ 03:50), Max: 36.9 (05-07-24 @ 11:23)  T(F): 97.8 (05-08-24 @ 03:50), Max: 98.4 (05-07-24 @ 11:23)  HR: 78 (05-08-24 @ 03:50) (74 - 84)  BP: 109/67 (05-08-24 @ 03:50) (80/45 - 122/67)  RR: 17 (05-08-24 @ 03:50) (13 - 18)  SpO2: 98% (05-08-24 @ 03:50) (98% - 100%)    PHYSICAL EXAM:   Constitutional: Comfortable . No acute distress.   HEENT: Atraumatic and normocephalic , neck is supple . no JVD.   CNS: A&Ox3. No focal deficits.   Respiratory: CTAB, unlabored. No wheezing, No rhonchi. No crackles   Cardiovascular: RRR normal s1 s2. No murmur. No rubs or gallop.  Gastrointestinal: Soft, non-tender. +Bowel sounds. + PEG noted   Extremities: 2+ Peripheral Pulses, No edema  Psychiatric: Calm . no agitation.   Skin: Warm and dry    LABS:                         10.8   2.77  )-----------( 222      ( 08 May 2024 03:52 )             34.7     05-08    140  |  101  |  10.1  ----------------------------<  105<H>  3.9   |  29.0  |  0.47<L>    Ca    9.5      08 May 2024 03:52  Phos  3.4     05-08  Mg     1.6     05-08    TPro  6.7  /  Alb  3.7  /  TBili  0.4  /  DBili  0.1  /  AST  14  /  ALT  13  /  AlkPhos  77  05-08    PT/INR - ( 07 May 2024 11:38 )   PT: 11.6 sec;   INR: 1.05 ratio       PTT - ( 07 May 2024 11:38 )  PTT:33.9 sec    ECG: NSR  Prior ECG: Yes     RADIOLOGY & ADDITIONAL STUDIES:     CT Abdomen and Pelvis w/ IV Cont (05.07.24 @ 15:54) >  IMPRESSION: Redemonstration of cholecystostomy tube with tip at the level   of the gallbladder neck.  Decreased inflammatory changes in the subcutaneous soft tissues and   musculature at the level of the tube entry.  < end of copied text >      Preliminary evaluation, please await official recommendations     Assessment and recommendations are final when note is signed by the attending.

## 2024-05-08 NOTE — CONSULT NOTE ADULT - SUBJECTIVE AND OBJECTIVE BOX
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 He presents to the ED from clinic with several days of abdominal pain.  He denies N/V/CP/SOB, no subjective fevers or chills. Concern for some purulence at site of cholecystostomy tube. Hypotensive with elevated lactate in ED. (07 May 2024 18:41)      Allergies    No Known Allergies    Intolerances        MEDICATIONS  (STANDING):  aspirin  chewable 81 milliGRAM(s) Oral daily  atorvastatin 80 milliGRAM(s) Oral at bedtime  chlorhexidine 0.12% Liquid 15 milliLiter(s) Swish and Spit two times a day  enoxaparin Injectable 40 milliGRAM(s) SubCutaneous every 24 hours  ertapenem  IVPB      ertapenem  IVPB 1000 milliGRAM(s) IV Intermittent every 24 hours  escitalopram 10 milliGRAM(s) Oral daily  ferrous    sulfate 325 milliGRAM(s) Oral daily  finasteride 5 milliGRAM(s) Oral daily  midodrine. 10 milliGRAM(s) Oral three times a day  mirtazapine 15 milliGRAM(s) Oral daily  pantoprazole    Tablet 40 milliGRAM(s) Oral every 12 hours  petrolatum Ophthalmic Ointment 1 Application(s) Both EYES four times a day  polyethylene glycol 3350 17 Gram(s) Oral daily  senna 1 Tablet(s) Oral at bedtime  sucralfate 1 Gram(s) Oral four times a day  tamsulosin 0.4 milliGRAM(s) Oral at bedtime    MEDICATIONS  (PRN):  ondansetron Injectable 4 milliGRAM(s) IV Push every 6 hours PRN Nausea      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      Acoustic neuroma      Facial nerve disorder      Blindness of left eye      Unsteady gait      CAD (coronary artery disease)      Hearing loss, right      H/O neuropathy      S/P excision of acoustic neuroma      S/P cataract surgery      S/P coronary angioplasty          FAMILY HISTORY:  Family history of CABG (Sibling)    Family history of diabetes mellitus (DM) (Sibling, Mother, Sibling)        SOCIAL HISTORY: No EtOH, no tobacco    REVIEW OF SYSTEMS:    CONSTITUTIONAL: gen weakness   EYES/ENT: No visual changes;  No vertigo or throat pain   NECK: No pain or stiffness  RESPIRATORY: No cough, wheezing, hemoptysis; No shortness of breath  CARDIOVASCULAR: No chest pain or palpitations  GASTROINTESTINAL: PEG in place   GENITOURINARY: No dysuria, frequency or hematuria  NEUROLOGICAL: No numbness or weakness  SKIN: No itching, burning, rashes, or lesions   All other review of systems is negative unless indicated above.    Height (cm): 182.9 (05-07 @ 11:23)  Weight (kg): 79.4 (05-07 @ 11:23)  BMI (kg/m2): 23.7 (05-07 @ 11:23)  BSA (m2): 2.01 (05-07 @ 11:23)    T(F): 98.1 (05-08-24 @ 07:56), Max: 98.4 (05-07-24 @ 11:23)  HR: 76 (05-08-24 @ 07:56)  BP: 98/63 (05-08-24 @ 07:56)  RR: 18 (05-08-24 @ 07:56)  SpO2: 98% (05-08-24 @ 07:56)  Wt(kg): --    GENERAL: NAD, well-developed  HEAD:  Atraumatic, Normocephalic  EYES: EOMI, PERRLA, conjunctiva and sclera clear  NECK: Supple, No JVD  CHEST/LUNG: Clear to auscultation bilaterally; No wheeze  HEART: Regular rate and rhythm; No murmurs, rubs, or gallops  ABDOMEN: Soft, Nontender, Nondistended; Bowel sounds present  EXTREMITIES:  2+ Peripheral Pulses, No clubbing, cyanosis, or edema  NEUROLOGY: non-focal  SKIN: No rashes or lesions                          10.8   2.77  )-----------( 222      ( 08 May 2024 03:52 )             34.7       05-08    140  |  101  |  10.1  ----------------------------<  105<H>  3.9   |  29.0  |  0.47<L>    Ca    9.5      08 May 2024 03:52  Phos  3.4     05-08  Mg     1.6     05-08    TPro  6.7  /  Alb  3.7  /  TBili  0.4  /  DBili  0.1  /  AST  14  /  ALT  13  /  AlkPhos  77  05-08      Magnesium: 1.6 mg/dL (05-08 @ 03:52)  Phosphorus: 3.4 mg/dL (05-08 @ 03:52)      < from: NM PET/CT Onc FDG Skull to Thigh, Subsq (04.19.24 @ 17:20) >  EXAM: [id number] - PETCT [facility] ONC FDG SUBS  - ORDERED BY: ISABELL CAMPOS      PROCEDURE DATE:  04/19/2024           INTERPRETATION:  CLINICAL INFORMATION: 73 years-old Male with 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 Resection 10/2023.   Chemoradiation completed 2/2024.    TREATMENT STRATEGY EVALUATION: Subsequent  FASTING BLOOD SUGAR: 108 mg/dl  RADIOPHARMACEUTICAL: 12.02 mCi F-18 FDG, I.V.  I.V. SITE: forearm left  UPTAKE PERIOD: 48 min  SCANNER: 120 Sports  ORAL CONTRAST: NONE    TECHNIQUE: Following intravenous injection of radiopharmaceutical and   above uptake period, PET/CT was obtained from base of skull to mid-thigh   followed by dedicated images of the head and neck. CT protocol was   optimized forPET attenuation correction and anatomic localization and   was not designed to produce and cannot replace state-of-the-art   diagnostic CT images with specific imaging protocols for different body   parts and indications. Images were reconstructed and reviewed in axial,   coronal and sagittal views and three-dimensional MIP.    Standardized uptake values ("SUV") are normalized to patient body weight   and indicate the highest activity concentration (SUVmax) in a given   disease site. All image numbers refer to axial image numbers. Unless   otherwise noted, image numbers in the "HEAD/NECK" section refer to the   dedicated head and neck series.    COMPARISON: 10/12/2023    OTHER STUDIES USED FOR CORRELATION:    FINDINGS:    HEAD/NECK: Previously seen RIGHT maxillary erosive lesion has been   resected. There is diffuse mild uptake now seen in this region, which is   now occupied by surgical clips and shows some mild soft tissue   infiltration, consistent with posttreatment change. Uptake at the LEFT   tongue border has greatly diminished.. Adenoids, tonsils, nasopharynx,   oropharynx, hypopharynx, larynx, and trachea without suspicious uptake,   mass, or other anatomic abnormality. Vocal cords exhibit symmetric,   physiologic activity.    No deep or superficial pathologic lymphadenopathy. Mild uptake Previously   seen hypermetabolic nodes have resolved.    Symmetric activity and normal low-dose CT appearance at bilateral   salivary glands.    Thyroid gland metabolically and morphologically normal.    Extraocular muscles, optic nerves, and eye globes without suspicious   findings. No abnormal sinus uptake or opacification.    Remaining skin and soft tissues of scalp and neck without suspicious   uptake. Visible brain parenchyma exhibits symmetric, physiologic   activity. Remaining nonosseous head and neck without additional   suspicious findings.    There is carotid atherosclerosis.    CHEST: Mild BILATERAL hilar uptake is again seen, remains nonspecific and   likely reactive.    LUNGS: No abnormal FDG activity. Stable punctate lateral RIGHT LOWER lobe   pulmonary nodule (image 96), below resolution of PET without abnormal   uptake.    PLEURA/PERICARDIUM: No abnormal FDG activity. No effusions.    ESOPHAGUS/STOMACH: No abnormal FDG activity.    HEPATOBILIARY/PANCREAS: Increased uptake along percutaneous   cholecystostomy tube, likely postprocedural. Otherwise unremarkable   hepatobiliary uptake. For reference, liver SUV mean is 2.3, previously   2.5.    SPLEEN: Physiologic FDG activity. Normal size.    ADRENAL GLANDS: No abnormal FDG activity. No nodule(s).    KIDNEYS/URINARY BLADDER: Physiologic excreted FDG activity.    REPRODUCTIVE ORGANS: No abnormal FDG activity.    ABDOMINOPELVIC NODES: No enlarged or FDG-avid lymph node.    BOWEL/PERITONEUM/MESENTERY: No abnormal FDG activity.    ABDOMINAL WALL: Cholecystostomy tube. Gastrostomy tube with balloon in   gastric lumen.    BONES/SOFT TISSUES: No abnormal FDG activity.    VESSELS: Aortoiliac atherosclerosis without aneurysm.    IMPRESSION:  1.  Post treatment changes at resected RIGHT maxillary lesion without   lore evidence of residual uptake.  2.  Previously seen nodes in the neck appear to have resolved.  3.  Unchanged mild BILATERAL hilar uptake, nonspecific and likely   reactive.  4.  Stable punctate RIGHT lung micronodule below resolution of PET and   without abnormal uptake.  5.  Findings consistent with recent cholecystostomy tube. Attention to   follow-up.  6.  Placement of gastrostomy tube.  7.  Otherwise, no evidence of FDG-avid malignancy.    --- End of Report ---

## 2024-05-08 NOTE — CONSULT NOTE ADULT - PROBLEM SELECTOR RECOMMENDATION 9
74 yo M with PMHx HTN, HLD, CAD s/p stents x 2 in 2022 at [facility], DM, SCC of face s/p resection  w/ creation of free flap and chemorads, now PEG dependent, and s/p percutaneous cholecystostomy. He presents to the ED from clinic with several days of abdominal pain.  Concern for some purulence at site of cholecystostomy tube. Hypotensive with elevated lactate in ED.  Plan for RAL cholecystectomy with removal of cholecystostomy tube and incision and drainage   Recent Echo with normal EF (see above full report)   Pt is currently without angina, dyspnea, syncope, or palpitations however pt has hx of HTN, HLD and CAD  EKG NSR no acute changes   RCRI 2 points Class III Risk 10.1 % 30-day risk of death, MI, or cardiac arrest  Brando 1.6 % Risk of myocardial infarction or cardiac arrest, intraoperatively or up to 30 days post-op  Mets < 4 pt minimally walking 2/2 SOB and dizziness     Can proceed with planned procedure   Case discussed with Dr Villasenor

## 2024-05-08 NOTE — PROGRESS NOTE ADULT - SUBJECTIVE AND OBJECTIVE BOX
Patient seen and examined at bedside. No acute events overnight. Still having pain around perc gemini site    Vitals:  Vital Signs Last 24 Hrs  T(C): 36.6 (08 May 2024 03:50), Max: 36.9 (07 May 2024 11:23)  T(F): 97.8 (08 May 2024 03:50), Max: 98.4 (07 May 2024 11:23)  HR: 78 (08 May 2024 03:50) (74 - 84)  BP: 109/67 (08 May 2024 03:50) (80/45 - 122/67)  BP(mean): 69 (07 May 2024 11:54) (69 - 77)  RR: 17 (08 May 2024 03:50) (13 - 18)  SpO2: 98% (08 May 2024 03:50) (98% - 100%)    Parameters below as of 08 May 2024 03:50  Patient On (Oxygen Delivery Method): room air        Labs:  05-08    140  |  101  |  10.1  ----------------------------<  105<H>  3.9   |  29.0  |  0.47<L>    Ca    9.5      08 May 2024 03:52  Phos  3.4     05-08  Mg     1.6     05-08    TPro  6.7  /  Alb  3.7  /  TBili  0.4  /  DBili  0.1  /  AST  14  /  ALT  13  /  AlkPhos  77  05-08                            10.8   2.77  )-----------( 222      ( 08 May 2024 03:52 )             34.7       Exam:  Gen: pt lying in bed, alert, in NAD  Resp: unlabored  CVS: RRR  Abd: soft, NT, ND, minimal purulent discharge around perc gemini drain however associated tenderness, minimal contents in bag, PEG in place  Ext: moving all extremities spontaneously, sensation intact, pulses 2+

## 2024-05-08 NOTE — PROGRESS NOTE ADULT - ASSESSMENT
73M w/ hx cholecystostomy tube, presenting from clinic with hypotension. Hypotensive in ED with SBP 80s, volume responsive after 3L IVF bolus. Afebrile without leukocytosis. CT without acute pathology. Started on home midodrine, echo performed, pending.     Plan:  -booked for OR 5/9 for RA gemini and I&D perc gemini site  -f/u medicine reccs for pre-op optimization  -f/u cardiology reccs for pre-op optimization  -echo performed overnight, results pending  -restarted on home midodrine  -lactate downtrended, now normal after IVF boluses  -continue ertapenem  -monitor vitals  -pain control  -strict Is/Os  -continue home meds  -trend labs, replete electrolytes as needed  -encourage OOB  -incentive spirometry  -DVT ppx: SCDs, Lovenox 40 daily

## 2024-05-09 LAB
ALBUMIN SERPL ELPH-MCNC: 3.5 G/DL — SIGNIFICANT CHANGE UP (ref 3.3–5.2)
ALP SERPL-CCNC: 84 U/L — SIGNIFICANT CHANGE UP (ref 40–120)
ALT FLD-CCNC: 14 U/L — SIGNIFICANT CHANGE UP
ANION GAP SERPL CALC-SCNC: 12 MMOL/L — SIGNIFICANT CHANGE UP (ref 5–17)
APTT BLD: 32.8 SEC — SIGNIFICANT CHANGE UP (ref 24.5–35.6)
AST SERPL-CCNC: 14 U/L — SIGNIFICANT CHANGE UP
BASOPHILS # BLD AUTO: 0.02 K/UL — SIGNIFICANT CHANGE UP (ref 0–0.2)
BASOPHILS NFR BLD AUTO: 0.5 % — SIGNIFICANT CHANGE UP (ref 0–2)
BILIRUB DIRECT SERPL-MCNC: 0.1 MG/DL — SIGNIFICANT CHANGE UP (ref 0–0.3)
BILIRUB INDIRECT FLD-MCNC: 0.2 MG/DL — SIGNIFICANT CHANGE UP (ref 0.2–1)
BILIRUB SERPL-MCNC: 0.3 MG/DL — LOW (ref 0.4–2)
BLD GP AB SCN SERPL QL: SIGNIFICANT CHANGE UP
BUN SERPL-MCNC: 15.2 MG/DL — SIGNIFICANT CHANGE UP (ref 8–20)
CALCIUM SERPL-MCNC: 9.4 MG/DL — SIGNIFICANT CHANGE UP (ref 8.4–10.5)
CHLORIDE SERPL-SCNC: 96 MMOL/L — SIGNIFICANT CHANGE UP (ref 96–108)
CO2 SERPL-SCNC: 28 MMOL/L — SIGNIFICANT CHANGE UP (ref 22–29)
CREAT SERPL-MCNC: 0.42 MG/DL — LOW (ref 0.5–1.3)
EGFR: 114 ML/MIN/1.73M2 — SIGNIFICANT CHANGE UP
EOSINOPHIL # BLD AUTO: 0.03 K/UL — SIGNIFICANT CHANGE UP (ref 0–0.5)
EOSINOPHIL NFR BLD AUTO: 0.8 % — SIGNIFICANT CHANGE UP (ref 0–6)
GLUCOSE BLDC GLUCOMTR-MCNC: 115 MG/DL — HIGH (ref 70–99)
GLUCOSE BLDC GLUCOMTR-MCNC: 133 MG/DL — HIGH (ref 70–99)
GLUCOSE SERPL-MCNC: 110 MG/DL — HIGH (ref 70–99)
HCT VFR BLD CALC: 34.3 % — LOW (ref 39–50)
HGB BLD-MCNC: 10.7 G/DL — LOW (ref 13–17)
IMM GRANULOCYTES NFR BLD AUTO: 0.3 % — SIGNIFICANT CHANGE UP (ref 0–0.9)
INR BLD: 1.04 RATIO — SIGNIFICANT CHANGE UP (ref 0.85–1.18)
LYMPHOCYTES # BLD AUTO: 0.44 K/UL — LOW (ref 1–3.3)
LYMPHOCYTES # BLD AUTO: 11.1 % — LOW (ref 13–44)
MAGNESIUM SERPL-MCNC: 1.5 MG/DL — LOW (ref 1.6–2.6)
MCHC RBC-ENTMCNC: 25.9 PG — LOW (ref 27–34)
MCHC RBC-ENTMCNC: 31.2 GM/DL — LOW (ref 32–36)
MCV RBC AUTO: 83.1 FL — SIGNIFICANT CHANGE UP (ref 80–100)
MONOCYTES # BLD AUTO: 0.34 K/UL — SIGNIFICANT CHANGE UP (ref 0–0.9)
MONOCYTES NFR BLD AUTO: 8.6 % — SIGNIFICANT CHANGE UP (ref 2–14)
NEUTROPHILS # BLD AUTO: 3.11 K/UL — SIGNIFICANT CHANGE UP (ref 1.8–7.4)
NEUTROPHILS NFR BLD AUTO: 78.7 % — HIGH (ref 43–77)
PHOSPHATE SERPL-MCNC: 3.2 MG/DL — SIGNIFICANT CHANGE UP (ref 2.4–4.7)
PLATELET # BLD AUTO: 217 K/UL — SIGNIFICANT CHANGE UP (ref 150–400)
POTASSIUM SERPL-MCNC: 3.9 MMOL/L — SIGNIFICANT CHANGE UP (ref 3.5–5.3)
POTASSIUM SERPL-SCNC: 3.9 MMOL/L — SIGNIFICANT CHANGE UP (ref 3.5–5.3)
PROT SERPL-MCNC: 6.6 G/DL — SIGNIFICANT CHANGE UP (ref 6.6–8.7)
PROTHROM AB SERPL-ACNC: 11.5 SEC — SIGNIFICANT CHANGE UP (ref 9.5–13)
RBC # BLD: 4.13 M/UL — LOW (ref 4.2–5.8)
RBC # FLD: 17.3 % — HIGH (ref 10.3–14.5)
SODIUM SERPL-SCNC: 136 MMOL/L — SIGNIFICANT CHANGE UP (ref 135–145)
WBC # BLD: 3.95 K/UL — SIGNIFICANT CHANGE UP (ref 3.8–10.5)
WBC # FLD AUTO: 3.95 K/UL — SIGNIFICANT CHANGE UP (ref 3.8–10.5)

## 2024-05-09 PROCEDURE — 99233 SBSQ HOSP IP/OBS HIGH 50: CPT

## 2024-05-09 PROCEDURE — 0042T: CPT

## 2024-05-09 PROCEDURE — 70498 CT ANGIOGRAPHY NECK: CPT | Mod: 26

## 2024-05-09 PROCEDURE — 70496 CT ANGIOGRAPHY HEAD: CPT | Mod: 26

## 2024-05-09 RX ORDER — SODIUM CHLORIDE 9 MG/ML
500 INJECTION, SOLUTION INTRAVENOUS ONCE
Refills: 0 | Status: DISCONTINUED | OUTPATIENT
Start: 2024-05-09 | End: 2024-05-09

## 2024-05-09 RX ORDER — MAGNESIUM SULFATE 500 MG/ML
2 VIAL (ML) INJECTION
Refills: 0 | Status: COMPLETED | OUTPATIENT
Start: 2024-05-09 | End: 2024-05-09

## 2024-05-09 RX ORDER — INDOCYANINE GREEN 25 MG
2.5 KIT INTRAVASCULAR; INTRAVENOUS ONCE
Refills: 0 | Status: COMPLETED | OUTPATIENT
Start: 2024-05-09 | End: 2024-05-13

## 2024-05-09 RX ORDER — SODIUM CHLORIDE 9 MG/ML
1000 INJECTION, SOLUTION INTRAVENOUS ONCE
Refills: 0 | Status: COMPLETED | OUTPATIENT
Start: 2024-05-09 | End: 2024-05-09

## 2024-05-09 RX ADMIN — SENNA PLUS 1 TABLET(S): 8.6 TABLET ORAL at 21:26

## 2024-05-09 RX ADMIN — FINASTERIDE 5 MILLIGRAM(S): 5 TABLET, FILM COATED ORAL at 12:19

## 2024-05-09 RX ADMIN — ENOXAPARIN SODIUM 40 MILLIGRAM(S): 100 INJECTION SUBCUTANEOUS at 05:23

## 2024-05-09 RX ADMIN — SODIUM CHLORIDE 1000 MILLILITER(S): 9 INJECTION, SOLUTION INTRAVENOUS at 07:39

## 2024-05-09 RX ADMIN — Medication 1 GRAM(S): at 12:19

## 2024-05-09 RX ADMIN — Medication 1 GRAM(S): at 00:50

## 2024-05-09 RX ADMIN — MIDODRINE HYDROCHLORIDE 10 MILLIGRAM(S): 2.5 TABLET ORAL at 05:24

## 2024-05-09 RX ADMIN — Medication 1 GRAM(S): at 17:06

## 2024-05-09 RX ADMIN — MIRTAZAPINE 15 MILLIGRAM(S): 45 TABLET, ORALLY DISINTEGRATING ORAL at 12:19

## 2024-05-09 RX ADMIN — CHLORHEXIDINE GLUCONATE 15 MILLILITER(S): 213 SOLUTION TOPICAL at 17:06

## 2024-05-09 RX ADMIN — Medication 25 GRAM(S): at 12:50

## 2024-05-09 RX ADMIN — Medication 1 APPLICATION(S): at 17:07

## 2024-05-09 RX ADMIN — Medication 1 APPLICATION(S): at 12:20

## 2024-05-09 RX ADMIN — PANTOPRAZOLE SODIUM 40 MILLIGRAM(S): 20 TABLET, DELAYED RELEASE ORAL at 05:24

## 2024-05-09 RX ADMIN — Medication 25 GRAM(S): at 14:28

## 2024-05-09 RX ADMIN — CHLORHEXIDINE GLUCONATE 15 MILLILITER(S): 213 SOLUTION TOPICAL at 05:23

## 2024-05-09 RX ADMIN — ESCITALOPRAM OXALATE 10 MILLIGRAM(S): 10 TABLET, FILM COATED ORAL at 12:19

## 2024-05-09 RX ADMIN — POLYETHYLENE GLYCOL 3350 17 GRAM(S): 17 POWDER, FOR SOLUTION ORAL at 12:19

## 2024-05-09 RX ADMIN — Medication 1 APPLICATION(S): at 05:24

## 2024-05-09 RX ADMIN — TAMSULOSIN HYDROCHLORIDE 0.4 MILLIGRAM(S): 0.4 CAPSULE ORAL at 21:27

## 2024-05-09 RX ADMIN — ERTAPENEM SODIUM 120 MILLIGRAM(S): 1 INJECTION, POWDER, LYOPHILIZED, FOR SOLUTION INTRAMUSCULAR; INTRAVENOUS at 18:47

## 2024-05-09 RX ADMIN — Medication 1 GRAM(S): at 05:24

## 2024-05-09 RX ADMIN — Medication 325 MILLIGRAM(S): at 12:19

## 2024-05-09 RX ADMIN — Medication 1 APPLICATION(S): at 00:50

## 2024-05-09 RX ADMIN — Medication 81 MILLIGRAM(S): at 12:19

## 2024-05-09 RX ADMIN — PANTOPRAZOLE SODIUM 40 MILLIGRAM(S): 20 TABLET, DELAYED RELEASE ORAL at 17:06

## 2024-05-09 RX ADMIN — MIDODRINE HYDROCHLORIDE 10 MILLIGRAM(S): 2.5 TABLET ORAL at 12:19

## 2024-05-09 RX ADMIN — ATORVASTATIN CALCIUM 80 MILLIGRAM(S): 80 TABLET, FILM COATED ORAL at 21:27

## 2024-05-09 RX ADMIN — MIDODRINE HYDROCHLORIDE 10 MILLIGRAM(S): 2.5 TABLET ORAL at 17:06

## 2024-05-09 NOTE — CHART NOTE - NSCHARTNOTEFT_GEN_A_CORE
rapid respond called by nursing as patient has sudden acute generalized weakness , dizziness while he was walking to bathroom found to have increase facial drop with transient aphasia for few sec.          Vital Signs Last 24 Hrs  T(C): 36.9 (09 May 2024 04:30), Max: 36.9 (09 May 2024 04:30)  T(F): 98.5 (09 May 2024 04:30), Max: 98.5 (09 May 2024 04:30)  HR: 74 (09 May 2024 04:30) (72 - 81)  BP: 98/59 (09 May 2024 04:30) (93/56 - 122/73)  BP(mean): --  RR: 16 (09 May 2024 04:30) (16 - 19)  SpO2: 95% (09 May 2024 04:30) (95% - 100%)    Parameters below as of 09 May 2024 04:30  Patient On (Oxygen Delivery Method): room air      ACS team responded promptly patient is AAOx3, with L side facial drop states that's his baseline however hypotensive to 98/59   Bolus of 1L LR ordered   CT scan (head stroke protocol) ordered     Transport bedside patient on his way to CT scan   Patient seen and examined by Attending surgeon (Dr Thomas (ICU attending) and Dr Farris rapid respond called by nursing as patient has sudden acute generalized weakness , dizziness while he was walking to bathroom found to have increase facial drop with transient aphasia for few sec.          Vital Signs Last 24 Hrs  T(C): 36.9 (09 May 2024 04:30), Max: 36.9 (09 May 2024 04:30)  T(F): 98.5 (09 May 2024 04:30), Max: 98.5 (09 May 2024 04:30)  HR: 74 (09 May 2024 04:30) (72 - 81)  BP: 98/59 (09 May 2024 04:30) (93/56 - 122/73)  BP(mean): --  RR: 16 (09 May 2024 04:30) (16 - 19)  SpO2: 95% (09 May 2024 04:30) (95% - 100%)    Parameters below as of 09 May 2024 04:30  Patient On (Oxygen Delivery Method): room air      ACS team responded promptly patient is AAOx3, with L side facial drop states that's his baseline however hypotensive to 98/59   GCS 15, motor and sensory intact B/L UE and LE.   talkative with no issues   denies any sensory or motor deficit     Bolus of 1L LR ordered   CT scan (head stroke protocol) ordered     Transport bedside patient on his way to CT scan   Patient seen and examined by Attending surgeon (Dr Thomas (ICU attending) and Dr Farris

## 2024-05-09 NOTE — PROGRESS NOTE ADULT - SUBJECTIVE AND OBJECTIVE BOX
PRATIMA VAZQUEZ    [id number]    73y      Male    Patient is a 73y old  Male who presents with a chief complaint of     INTERVAL HPI/OVERNIGHT EVENTS:    Patient is doing ok, he was little dizzy this am while ambulating, rapid response was called and stroke was ruled out, he feels improvement       Vital Signs Last 24 Hrs  T(C): 36.4 (09 May 2024 08:37), Max: 36.9 (09 May 2024 04:30)  T(F): 97.6 (09 May 2024 08:37), Max: 98.5 (09 May 2024 04:30)  HR: 69 (09 May 2024 08:37) (69 - 81)  BP: 110/62 (09 May 2024 12:15) (93/56 - 122/73)  RR: 18 (09 May 2024 08:37) (16 - 19)  SpO2: 98% (09 May 2024 08:37) (95% - 100%)    Parameters below as of 09 May 2024 08:37  Patient On (Oxygen Delivery Method): room air        PHYSICAL EXAM:    GENERAL: Elderly catechetic male looking comfortable   HEENT: mandibular scar from previous surgery   NECK: soft, Supple, No JVD  CHEST/LUNG: Clear to auscultate bilaterally; No wheezing  HEART: S1S2+, Regular rate and rhythm; No murmurs  ABDOMEN: Soft, Nontender, Nondistended; Bowel sounds present, her cholecystostomy tube in place   EXTREMITIES:  1+ Peripheral Pulses, No edema  SKIN: No rashes or lesions  NEURO: AAOX3  PSYCH: normal mood      LABS:                        10.7   3.95  )-----------( 217      ( 09 May 2024 05:07 )             34.3     05-09    136  |  96  |  15.2  ----------------------------<  110<H>  3.9   |  28.0  |  0.42<L>    Ca    9.4      09 May 2024 05:07  Phos  3.2     05-09  Mg     1.5     05-09    TPro  6.6  /  Alb  3.5  /  TBili  0.3<L>  /  DBili  0.1  /  AST  14  /  ALT  14  /  AlkPhos  84  05-09    PT/INR - ( 09 May 2024 05:07 )   PT: 11.5 sec;   INR: 1.04 ratio        I&O's Summary      MEDICATIONS  (STANDING):  aspirin  chewable 81 milliGRAM(s) Oral daily  atorvastatin 80 milliGRAM(s) Oral at bedtime  chlorhexidine 0.12% Liquid 15 milliLiter(s) Swish and Spit two times a day  enoxaparin Injectable 40 milliGRAM(s) SubCutaneous every 24 hours  ertapenem  IVPB 1000 milliGRAM(s) IV Intermittent every 24 hours  ertapenem  IVPB      escitalopram 10 milliGRAM(s) Oral daily  ferrous    sulfate 325 milliGRAM(s) Oral daily  finasteride 5 milliGRAM(s) Oral daily  indocyanine green Injectable 2.5 milliGRAM(s) IV Push once  magnesium sulfate  IVPB 2 Gram(s) IV Intermittent every 2 hours  midodrine. 10 milliGRAM(s) Oral three times a day  mirtazapine 15 milliGRAM(s) Oral daily  pantoprazole    Tablet 40 milliGRAM(s) Oral every 12 hours  petrolatum Ophthalmic Ointment 1 Application(s) Both EYES four times a day  polyethylene glycol 3350 17 Gram(s) Oral daily  senna 1 Tablet(s) Oral at bedtime  sucralfate 1 Gram(s) Oral four times a day  tamsulosin 0.4 milliGRAM(s) Oral at bedtime    MEDICATIONS  (PRN):  ondansetron Injectable 4 milliGRAM(s) IV Push every 6 hours PRN Nausea

## 2024-05-09 NOTE — PROGRESS NOTE ADULT - ASSESSMENT
73M w/ hx cholecystostomy tube, sent from clinic with hypotension. Hypotensive in ED with SBP 80s, volume responsive after 3L IVF bolus. Afebrile without leukocytosis. CT without acute pathology. Started on home midodrine, echo performed, pending.     Plan:  -booked for OR today for RA gemini and I&D perc gemini site  -BP stable continue midodrine  -continue ertapenem  -pain control  -continue home meds  -encourage OOB  -incentive spirometry  -DVT ppx: SCDs, Lovenox 40 daily

## 2024-05-09 NOTE — PROGRESS NOTE ADULT - ASSESSMENT
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 coli bacteremia was given ertapenem, he was noted to have worsening LFTS, MRCP done with no obstruction, followed by GI, status post ERCP with sphincterotomy. Post procedure noted to have worsening anemia, ERCP/EUS with reported grade D Esophagitis. Started on  PPI BID. Transfused 1 unit PRBC. No Further episodes of melena. Midline placed and was given IV Invanz until 04/03/24, he was seen by surgery, ID over the course and was discharged, he comes back to the ED with complain of several days of abdominal pain, there is concern for some purulence at site of cholecystostomy tube. Hypotensive with elevated lactate in ED, admitted under surgical service, medicine consulted for medical optimization for surgery.         Plan:     Sepsis Cholecystitis and choly tube  had ESBL bactremia recently will continue with ertapenem  IVPB 1000 milliGRAM(s) IV Intermittent every 24 hours   cultures to follow   ID to consult   IV fluids   continue with midodrine for hypotension as he is on it.   management as per primary team   likely going for surgery     his dizziness was likely from orthostatic hypotension, stroke was ruled out, will continue to monitor  will monitor closely    Blood loss anemia suspected secondary to UGIB  - Grade D Esophagitis noted on ERCP during last admission   - FOBT was positive   - will continue with PPI BID, Start Carafate  - monitor CBC   -previous admission he got a unit of PRBCs     Hx of T2DM  -A1c 7.8  - will give Lantus 5 units QHS, will do FS monitoring and provide coverage insulin   -Hold home Farxiga & Janumet  -ISS  -ADA diet    Hx of CAD  -continue aspirin 81 mg QD  -continue with Lipitor home dose     Hx of BPH  -continue Flomax    Hx of Dysphagia  continue with same diet he was on during previous admission.     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       Medicine team is signing off, please call as needed.   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 coli bacteremia was given ertapenem, he was noted to have worsening LFTS, MRCP done with no obstruction, followed by GI, status post ERCP with sphincterotomy. Post procedure noted to have worsening anemia, ERCP/EUS with reported grade D Esophagitis. Started on  PPI BID. Transfused 1 unit PRBC. No Further episodes of melena. Midline placed and was given IV Invanz until 04/03/24, he was seen by surgery, ID over the course and was discharged, he comes back to the ED with complain of several days of abdominal pain, there is concern for some purulence at site of cholecystostomy tube. Hypotensive with elevated lactate in ED, admitted under surgical service, medicine consulted for medical optimization for surgery.         Plan:     Sepsis Cholecystitis and choly tube  had ESBL bactremia recently will continue with ertapenem  IVPB 1000 milliGRAM(s) IV Intermittent every 24 hours   cultures to follow   ID to consult   IV fluids   continue with midodrine for hypotension as he is on it.   management as per primary team   likely going for surgery     his dizziness was likely from orthostatic hypotension, stroke was ruled out, will continue to monitor  will monitor closely    Blood loss anemia suspected secondary to UGIB  - Grade D Esophagitis noted on ERCP during last admission   - FOBT was positive   - will continue with PPI BID, Start Carafate  - monitor CBC   -previous admission he got a unit of PRBCs     Hx of T2DM  -A1c 7.8  - will give Lantus 5 units QHS, will do FS monitoring and provide coverage insulin   -Hold home Farxiga & Janumet  -ISS  -ADA diet    Hx of CAD  -continue aspirin 81 mg QD  -continue with Lipitor home dose     Hx of BPH  -continue Flomax    Hx of Dysphagia  continue with same diet he was on during previous admission.     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     Hypomagnesemia: replaced, will monitor level, he had 3 beats of nonsustained V ben, likely in setting of hypomagnesemia, will continue to monitor

## 2024-05-09 NOTE — PROVIDER CONTACT NOTE (OTHER) - SITUATION
patient pending OR at 5:30am; requesting confirmation of Lovenox admin at 5am and glucose testings orders.  Per Provider, lovenox can be administered prior to OR.  Day team to followup on FS orders.

## 2024-05-09 NOTE — PROGRESS NOTE ADULT - SUBJECTIVE AND OBJECTIVE BOX
INTERVAL HPI/OVERNIGHT EVENTS:    STATUS POST:      POST OPERATIVE DAY #:     SUBJECTIVE:  Flatus: [ ] YES [ ] NO             Bowel Movement: [ ] YES [ ] NO  Pain (0-10):            Pain Control Adequate: [ ] YES [ ] NO  Nausea: [ ] YES [ ] NO            Vomiting: [ ] YES [ ] NO  Diarrhea: [ ] YES [ ] NO         Constipation: [ ] YES [ ] NO     Chest Pain: [ ] YES [ ] NO    SOB:  [ ] YES [ ] NO    MEDICATIONS  (STANDING):  aspirin  chewable 81 milliGRAM(s) Oral daily  atorvastatin 80 milliGRAM(s) Oral at bedtime  chlorhexidine 0.12% Liquid 15 milliLiter(s) Swish and Spit two times a day  enoxaparin Injectable 40 milliGRAM(s) SubCutaneous every 24 hours  ertapenem  IVPB      ertapenem  IVPB 1000 milliGRAM(s) IV Intermittent every 24 hours  escitalopram 10 milliGRAM(s) Oral daily  ferrous    sulfate 325 milliGRAM(s) Oral daily  finasteride 5 milliGRAM(s) Oral daily  indocyanine green Injectable 2.5 milliGRAM(s) IV Push once  midodrine. 10 milliGRAM(s) Oral three times a day  mirtazapine 15 milliGRAM(s) Oral daily  pantoprazole    Tablet 40 milliGRAM(s) Oral every 12 hours  petrolatum Ophthalmic Ointment 1 Application(s) Both EYES four times a day  polyethylene glycol 3350 17 Gram(s) Oral daily  senna 1 Tablet(s) Oral at bedtime  sucralfate 1 Gram(s) Oral four times a day  tamsulosin 0.4 milliGRAM(s) Oral at bedtime    MEDICATIONS  (PRN):  ondansetron Injectable 4 milliGRAM(s) IV Push every 6 hours PRN Nausea      Vital Signs Last 24 Hrs  T(C): 36.4 (09 May 2024 08:37), Max: 36.9 (09 May 2024 04:30)  T(F): 97.6 (09 May 2024 08:37), Max: 98.5 (09 May 2024 04:30)  HR: 69 (09 May 2024 08:37) (69 - 81)  BP: 110/62 (09 May 2024 12:15) (98/59 - 122/73)  BP(mean): --  RR: 18 (09 May 2024 08:37) (16 - 19)  SpO2: 98% (09 May 2024 08:37) (95% - 98%)    Parameters below as of 09 May 2024 08:37  Patient On (Oxygen Delivery Method): room air        PHYSICAL EXAM:      Constitutional: NAD    Respiratory: no accessory muscle use or conversational dyspnea    Cardiovascular: RRR    Gastrointestinal: soft, NT/ND    Extremities: ZHENG          I&O's Detail      LABS:                        10.7   3.95  )-----------( 217      ( 09 May 2024 05:07 )             34.3     05-09    136  |  96  |  15.2  ----------------------------<  110<H>  3.9   |  28.0  |  0.42<L>    Ca    9.4      09 May 2024 05:07  Phos  3.2     05-09  Mg     1.5     05-09    TPro  6.6  /  Alb  3.5  /  TBili  0.3<L>  /  DBili  0.1  /  AST  14  /  ALT  14  /  AlkPhos  84  05-09    PT/INR - ( 09 May 2024 05:07 )   PT: 11.5 sec;   INR: 1.04 ratio         PTT - ( 09 May 2024 05:07 )  PTT:32.8 sec  Urinalysis Basic - ( 09 May 2024 05:07 )    Color: x / Appearance: x / SG: x / pH: x  Gluc: 110 mg/dL / Ketone: x  / Bili: x / Urobili: x   Blood: x / Protein: x / Nitrite: x   Leuk Esterase: x / RBC: x / WBC x   Sq Epi: x / Non Sq Epi: x / Bacteria: x        RADIOLOGY & ADDITIONAL STUDIES:

## 2024-05-10 LAB
GLUCOSE BLDC GLUCOMTR-MCNC: 154 MG/DL — HIGH (ref 70–99)
GLUCOSE BLDC GLUCOMTR-MCNC: 89 MG/DL — SIGNIFICANT CHANGE UP (ref 70–99)

## 2024-05-10 PROCEDURE — 99232 SBSQ HOSP IP/OBS MODERATE 35: CPT

## 2024-05-10 RX ADMIN — Medication 1 APPLICATION(S): at 17:18

## 2024-05-10 RX ADMIN — FINASTERIDE 5 MILLIGRAM(S): 5 TABLET, FILM COATED ORAL at 12:22

## 2024-05-10 RX ADMIN — MIDODRINE HYDROCHLORIDE 10 MILLIGRAM(S): 2.5 TABLET ORAL at 17:17

## 2024-05-10 RX ADMIN — ERTAPENEM SODIUM 120 MILLIGRAM(S): 1 INJECTION, POWDER, LYOPHILIZED, FOR SOLUTION INTRAMUSCULAR; INTRAVENOUS at 18:12

## 2024-05-10 RX ADMIN — PANTOPRAZOLE SODIUM 40 MILLIGRAM(S): 20 TABLET, DELAYED RELEASE ORAL at 17:18

## 2024-05-10 RX ADMIN — POLYETHYLENE GLYCOL 3350 17 GRAM(S): 17 POWDER, FOR SOLUTION ORAL at 12:23

## 2024-05-10 RX ADMIN — SENNA PLUS 1 TABLET(S): 8.6 TABLET ORAL at 22:08

## 2024-05-10 RX ADMIN — Medication 1 APPLICATION(S): at 12:26

## 2024-05-10 RX ADMIN — Medication 325 MILLIGRAM(S): at 12:22

## 2024-05-10 RX ADMIN — MIRTAZAPINE 15 MILLIGRAM(S): 45 TABLET, ORALLY DISINTEGRATING ORAL at 12:22

## 2024-05-10 RX ADMIN — ENOXAPARIN SODIUM 40 MILLIGRAM(S): 100 INJECTION SUBCUTANEOUS at 05:21

## 2024-05-10 RX ADMIN — Medication 81 MILLIGRAM(S): at 12:23

## 2024-05-10 RX ADMIN — ESCITALOPRAM OXALATE 10 MILLIGRAM(S): 10 TABLET, FILM COATED ORAL at 12:22

## 2024-05-10 RX ADMIN — ATORVASTATIN CALCIUM 80 MILLIGRAM(S): 80 TABLET, FILM COATED ORAL at 22:09

## 2024-05-10 RX ADMIN — MIDODRINE HYDROCHLORIDE 10 MILLIGRAM(S): 2.5 TABLET ORAL at 05:17

## 2024-05-10 RX ADMIN — Medication 1 GRAM(S): at 00:02

## 2024-05-10 RX ADMIN — PANTOPRAZOLE SODIUM 40 MILLIGRAM(S): 20 TABLET, DELAYED RELEASE ORAL at 05:17

## 2024-05-10 RX ADMIN — CHLORHEXIDINE GLUCONATE 15 MILLILITER(S): 213 SOLUTION TOPICAL at 17:18

## 2024-05-10 RX ADMIN — Medication 1 GRAM(S): at 12:27

## 2024-05-10 RX ADMIN — Medication 1 GRAM(S): at 05:17

## 2024-05-10 RX ADMIN — TAMSULOSIN HYDROCHLORIDE 0.4 MILLIGRAM(S): 0.4 CAPSULE ORAL at 22:08

## 2024-05-10 RX ADMIN — Medication 1 APPLICATION(S): at 05:19

## 2024-05-10 RX ADMIN — CHLORHEXIDINE GLUCONATE 15 MILLILITER(S): 213 SOLUTION TOPICAL at 05:18

## 2024-05-10 RX ADMIN — MIDODRINE HYDROCHLORIDE 10 MILLIGRAM(S): 2.5 TABLET ORAL at 12:22

## 2024-05-10 RX ADMIN — Medication 1 APPLICATION(S): at 00:02

## 2024-05-10 RX ADMIN — Medication 1 GRAM(S): at 17:17

## 2024-05-10 NOTE — PROGRESS NOTE ADULT - ASSESSMENT
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 coli bacteremia was given ertapenem, he was noted to have worsening LFTS, MRCP done with no obstruction, followed by GI, status post ERCP with sphincterotomy. Post procedure noted to have worsening anemia, ERCP/EUS with reported grade D Esophagitis. Started on  PPI BID. Transfused 1 unit PRBC. No Further episodes of melena. Midline placed and was given IV Invanz until 04/03/24, he was seen by surgery, ID over the course and was discharged, he comes back to the ED with complain of several days of abdominal pain, there is concern for some purulence at site of cholecystostomy tube. Hypotensive with elevated lactate in ED, admitted under surgical service, medicine consulted for medical optimization for surgery.         Plan:     Sepsis Cholecystitis and choly tube  had ESBL bactremia recently will continue with ertapenem  IVPB 1000 milliGRAM(s) IV Intermittent every 24 hours   cultures to follow   ID to consult   continue with midodrine for hypotension as he is on it.   management as per primary team   likely going for surgery     his dizziness was likely from orthostatic hypotension, stroke was ruled out, will continue to monitor  will monitor closely, no more dizziness     Blood loss anemia suspected secondary to UGIB  - Grade D Esophagitis noted on ERCP during last admission   - FOBT was positive   - will continue with PPI BID, Start Carafate  - monitor CBC   -previous admission he got a unit of PRBCs   -Hb is stable     Hx of T2DM  -A1c 7.8  - will give Lantus 5 units QHS, will do FS monitoring and provide coverage insulin   -Hold home Farxiga & Janumet  -ISS  -ADA diet    Hx of CAD  -continue aspirin 81 mg QD  -continue with Lipitor home dose     Hx of BPH  -continue Flomax    Hx of Dysphagia  continue with same diet he was on during previous admission.     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     Hypomagnesemia: replaced, will monitor level, he had 3 beats of nonsustained V ben, likely in setting of hypomagnesemia, no further episodes, will continue to monitor     Medicine team is signing off, please call as needed

## 2024-05-10 NOTE — PROGRESS NOTE ADULT - SUBJECTIVE AND OBJECTIVE BOX
PRATIMA VAZQUEZ    [id number]    73y      Male    Patient is a 73y old  Male who presents with a chief complaint of     INTERVAL HPI/OVERNIGHT EVENTS:    Patient is doing ok, denies fever, chills, chest pain, sob, dizziness, his abdominal pain is well controlled       Vital Signs Last 24 Hrs  T(C): 36.8 (10 May 2024 08:15), Max: 36.8 (10 May 2024 08:15)  T(F): 98.2 (10 May 2024 08:15), Max: 98.2 (10 May 2024 08:15)  HR: 70 (10 May 2024 08:15) (65 - 80)  BP: 121/75 (10 May 2024 08:15) (102/63 - 129/73)  RR: 18 (10 May 2024 08:15) (14 - 18)  SpO2: 100% (10 May 2024 08:15) (97% - 100%)    Parameters below as of 10 May 2024 08:15  Patient On (Oxygen Delivery Method): room air        PHYSICAL EXAM:      GENERAL: Elderly catechetic male looking comfortable   HEENT: mandibular scar from previous surgery   NECK: soft, Supple, No JVD  CHEST/LUNG: Clear to auscultate bilaterally; No wheezing  HEART: S1S2+, Regular rate and rhythm; No murmurs  ABDOMEN: Soft, Nontender, Nondistended; Bowel sounds present, has cholecystostomy tube in place   EXTREMITIES:  1+ Peripheral Pulses, No edema  SKIN: No rashes or lesions  NEURO: AAOX3  PSYCH: normal mood      LABS:                        10.7   3.95  )-----------( 217      ( 09 May 2024 05:07 )             34.3     05-09    136  |  96  |  15.2  ----------------------------<  110<H>  3.9   |  28.0  |  0.42<L>    Ca    9.4      09 May 2024 05:07  Phos  3.2     05-09  Mg     1.5     05-09    TPro  6.6  /  Alb  3.5  /  TBili  0.3<L>  /  DBili  0.1  /  AST  14  /  ALT  14  /  AlkPhos  84  05-09    PT/INR - ( 09 May 2024 05:07 )   PT: 11.5 sec;   INR: 1.04 ratio         PTT - ( 09 May 2024 05:07 )  PTT:32.8 sec      I&O's Summary    09 May 2024 07:01  -  10 May 2024 07:00  --------------------------------------------------------  IN: 0 mL / OUT: 1100 mL / NET: -1100 mL        MEDICATIONS  (STANDING):  aspirin  chewable 81 milliGRAM(s) Oral daily  atorvastatin 80 milliGRAM(s) Oral at bedtime  chlorhexidine 0.12% Liquid 15 milliLiter(s) Swish and Spit two times a day  enoxaparin Injectable 40 milliGRAM(s) SubCutaneous every 24 hours  ertapenem  IVPB      ertapenem  IVPB 1000 milliGRAM(s) IV Intermittent every 24 hours  escitalopram 10 milliGRAM(s) Oral daily  ferrous    sulfate 325 milliGRAM(s) Oral daily  finasteride 5 milliGRAM(s) Oral daily  indocyanine green Injectable 2.5 milliGRAM(s) IV Push once  midodrine. 10 milliGRAM(s) Oral three times a day  mirtazapine 15 milliGRAM(s) Oral daily  pantoprazole    Tablet 40 milliGRAM(s) Oral every 12 hours  petrolatum Ophthalmic Ointment 1 Application(s) Both EYES four times a day  polyethylene glycol 3350 17 Gram(s) Oral daily  senna 1 Tablet(s) Oral at bedtime  sucralfate 1 Gram(s) Oral four times a day  tamsulosin 0.4 milliGRAM(s) Oral at bedtime    MEDICATIONS  (PRN):  ondansetron Injectable 4 milliGRAM(s) IV Push every 6 hours PRN Nausea

## 2024-05-10 NOTE — PROGRESS NOTE ADULT - ASSESSMENT
72 y/o male with perc gemini tube on 3/17 was afebrile and hemodynamically stable this morning. Abdominal exam is benign. Patient is NPO for OR today for laparoscopic cholecystectomy.     PLAN  Strict I/O's, monitor perc gemini tube output.   NPO for OR today for laparoscopic cholecystectomy.   Pain control  Antiemetics PRN  Continue home medications  Continue Invanz   Encourage ambulation  Encourage IS 10x/hour  DVT prophylaixs: SCDs, ASA, and lov

## 2024-05-10 NOTE — PROGRESS NOTE ADULT - SUBJECTIVE AND OBJECTIVE BOX
Patient seen and examined at bedside.     No acute events overnight. Patient has no complaints this morning. Denies fever and chills.     Vitals:  Vital Signs Last 24 Hrs  T(C): 36.8 (10 May 2024 08:15), Max: 36.8 (10 May 2024 08:15)  T(F): 98.2 (10 May 2024 08:15), Max: 98.2 (10 May 2024 08:15)  HR: 75 (10 May 2024 12:26) (65 - 80)  BP: 121/73 (10 May 2024 12:26) (102/63 - 129/73)  BP(mean): --  RR: 18 (10 May 2024 08:15) (14 - 18)  SpO2: 100% (10 May 2024 08:15) (97% - 100%)    Parameters below as of 10 May 2024 08:15  Patient On (Oxygen Delivery Method): room air    Labs:  05-09    136  |  96  |  15.2  ----------------------------<  110<H>  3.9   |  28.0  |  0.42<L>    Ca    9.4      09 May 2024 05:07  Phos  3.2     05-09  Mg     1.5     05-09    TPro  6.6  /  Alb  3.5  /  TBili  0.3<L>  /  DBili  0.1  /  AST  14  /  ALT  14  /  AlkPhos  84  05-09                            10.7   3.95  )-----------( 217      ( 09 May 2024 05:07 )             34.3       Exam:  Gen: pt lying in bed, alert, in NAD  Resp: unlabored  CVS: RRR  Abd: soft, NT, ND with no guarding or rebound tenderness. RUQ perc gemini tube in place with purulent drainage.   Ext: moving all extremities spontaneously, sensation intact, pulses 2+

## 2024-05-11 LAB
GLUCOSE BLDC GLUCOMTR-MCNC: 121 MG/DL — HIGH (ref 70–99)
GLUCOSE BLDC GLUCOMTR-MCNC: 145 MG/DL — HIGH (ref 70–99)
GLUCOSE BLDC GLUCOMTR-MCNC: 149 MG/DL — HIGH (ref 70–99)
GLUCOSE BLDC GLUCOMTR-MCNC: 165 MG/DL — HIGH (ref 70–99)

## 2024-05-11 PROCEDURE — 99231 SBSQ HOSP IP/OBS SF/LOW 25: CPT | Mod: GC

## 2024-05-11 PROCEDURE — 99223 1ST HOSP IP/OBS HIGH 75: CPT | Mod: 25

## 2024-05-11 PROCEDURE — 31575 DIAGNOSTIC LARYNGOSCOPY: CPT

## 2024-05-11 RX ORDER — SODIUM CHLORIDE 9 MG/ML
1000 INJECTION, SOLUTION INTRAVENOUS
Refills: 0 | Status: DISCONTINUED | OUTPATIENT
Start: 2024-05-11 | End: 2024-05-12

## 2024-05-11 RX ADMIN — ESCITALOPRAM OXALATE 10 MILLIGRAM(S): 10 TABLET, FILM COATED ORAL at 12:39

## 2024-05-11 RX ADMIN — CHLORHEXIDINE GLUCONATE 15 MILLILITER(S): 213 SOLUTION TOPICAL at 18:06

## 2024-05-11 RX ADMIN — MIDODRINE HYDROCHLORIDE 10 MILLIGRAM(S): 2.5 TABLET ORAL at 12:39

## 2024-05-11 RX ADMIN — ENOXAPARIN SODIUM 40 MILLIGRAM(S): 100 INJECTION SUBCUTANEOUS at 05:24

## 2024-05-11 RX ADMIN — CHLORHEXIDINE GLUCONATE 15 MILLILITER(S): 213 SOLUTION TOPICAL at 05:24

## 2024-05-11 RX ADMIN — Medication 1 APPLICATION(S): at 18:07

## 2024-05-11 RX ADMIN — SODIUM CHLORIDE 75 MILLILITER(S): 9 INJECTION, SOLUTION INTRAVENOUS at 03:28

## 2024-05-11 RX ADMIN — MIDODRINE HYDROCHLORIDE 10 MILLIGRAM(S): 2.5 TABLET ORAL at 18:06

## 2024-05-11 RX ADMIN — Medication 325 MILLIGRAM(S): at 12:39

## 2024-05-11 RX ADMIN — Medication 1 APPLICATION(S): at 12:46

## 2024-05-11 RX ADMIN — Medication 1 GRAM(S): at 00:10

## 2024-05-11 RX ADMIN — Medication 1 GRAM(S): at 05:24

## 2024-05-11 RX ADMIN — Medication 1 GRAM(S): at 23:52

## 2024-05-11 RX ADMIN — PANTOPRAZOLE SODIUM 40 MILLIGRAM(S): 20 TABLET, DELAYED RELEASE ORAL at 05:29

## 2024-05-11 RX ADMIN — PANTOPRAZOLE SODIUM 40 MILLIGRAM(S): 20 TABLET, DELAYED RELEASE ORAL at 18:06

## 2024-05-11 RX ADMIN — FINASTERIDE 5 MILLIGRAM(S): 5 TABLET, FILM COATED ORAL at 12:40

## 2024-05-11 RX ADMIN — Medication 1 APPLICATION(S): at 23:51

## 2024-05-11 RX ADMIN — ERTAPENEM SODIUM 120 MILLIGRAM(S): 1 INJECTION, POWDER, LYOPHILIZED, FOR SOLUTION INTRAMUSCULAR; INTRAVENOUS at 20:59

## 2024-05-11 RX ADMIN — ATORVASTATIN CALCIUM 80 MILLIGRAM(S): 80 TABLET, FILM COATED ORAL at 21:00

## 2024-05-11 RX ADMIN — TAMSULOSIN HYDROCHLORIDE 0.4 MILLIGRAM(S): 0.4 CAPSULE ORAL at 21:00

## 2024-05-11 RX ADMIN — MIDODRINE HYDROCHLORIDE 10 MILLIGRAM(S): 2.5 TABLET ORAL at 05:24

## 2024-05-11 RX ADMIN — Medication 1 GRAM(S): at 18:06

## 2024-05-11 RX ADMIN — SENNA PLUS 1 TABLET(S): 8.6 TABLET ORAL at 21:00

## 2024-05-11 RX ADMIN — MIRTAZAPINE 15 MILLIGRAM(S): 45 TABLET, ORALLY DISINTEGRATING ORAL at 12:39

## 2024-05-11 RX ADMIN — Medication 1 GRAM(S): at 12:46

## 2024-05-11 RX ADMIN — Medication 1 APPLICATION(S): at 05:29

## 2024-05-11 RX ADMIN — Medication 81 MILLIGRAM(S): at 12:39

## 2024-05-11 RX ADMIN — POLYETHYLENE GLYCOL 3350 17 GRAM(S): 17 POWDER, FOR SOLUTION ORAL at 12:40

## 2024-05-11 NOTE — CONSULT NOTE ADULT - ASSESSMENT
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     - Airway seemingly clear for direct laryngoscopy with standard intubation with flap noted replacing right oral tongue  - Can consider fiberoptic vs fiberoptic assisted  - ENT can be available at time of intubation with advance notice of case

## 2024-05-11 NOTE — CONSULT NOTE ADULT - SUBJECTIVE AND OBJECTIVE BOX
ENT ISSUE/POD: airway eval    HPI: 73M with hx of OC SCC s/p partial gloss adjuvant chemoXRT completed 3 months ago. With drainage around perc gemini requiring cholecystectomy. ENT eval requested for airway given history of head and neck cancer and need for general anesthesia. He dnies dyspnea, dysphagia, dysphonia.         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      Acoustic neuroma      Facial nerve disorder      Blindness of left eye      Unsteady gait      CAD (coronary artery disease)      Hearing loss, right      H/O neuropathy      S/P excision of acoustic neuroma      S/P cataract surgery      S/P coronary angioplasty        Allergies    No Known Allergies    Intolerances      MEDICATIONS  (STANDING):  aspirin  chewable 81 milliGRAM(s) Oral daily  atorvastatin 80 milliGRAM(s) Oral at bedtime  chlorhexidine 0.12% Liquid 15 milliLiter(s) Swish and Spit two times a day  enoxaparin Injectable 40 milliGRAM(s) SubCutaneous every 24 hours  ertapenem  IVPB      ertapenem  IVPB 1000 milliGRAM(s) IV Intermittent every 24 hours  escitalopram 10 milliGRAM(s) Oral daily  ferrous    sulfate 325 milliGRAM(s) Oral daily  finasteride 5 milliGRAM(s) Oral daily  indocyanine green Injectable 2.5 milliGRAM(s) IV Push once  lactated ringers. 1000 milliLiter(s) (75 mL/Hr) IV Continuous <Continuous>  midodrine. 10 milliGRAM(s) Oral three times a day  mirtazapine 15 milliGRAM(s) Oral daily  pantoprazole    Tablet 40 milliGRAM(s) Oral every 12 hours  petrolatum Ophthalmic Ointment 1 Application(s) Both EYES four times a day  polyethylene glycol 3350 17 Gram(s) Oral daily  senna 1 Tablet(s) Oral at bedtime  sucralfate 1 Gram(s) Oral four times a day  tamsulosin 0.4 milliGRAM(s) Oral at bedtime    MEDICATIONS  (PRN):  ondansetron Injectable 4 milliGRAM(s) IV Push every 6 hours PRN Nausea      Social History: see consult note    Family history: see consult note    ROS:   ENT: all negative except as noted in HPI   Pulm: denies SOB, cough, hemoptysis  Neuro: denies numbness/tingling, loss of sensation  Endo: denies heat/cold intolerance, excessive sweating      Vital Signs Last 24 Hrs  T(C): 36.8 (11 May 2024 09:30), Max: 36.8 (11 May 2024 09:30)  T(F): 98.3 (11 May 2024 09:30), Max: 98.3 (11 May 2024 09:30)  HR: 70 (11 May 2024 09:30) (63 - 74)  BP: 105/64 (11 May 2024 09:30) (105/64 - 134/75)  BP(mean): --  RR: 18 (11 May 2024 09:30) (17 - 18)  SpO2: 98% (11 May 2024 09:30) (97% - 99%)    Parameters below as of 11 May 2024 09:30  Patient On (Oxygen Delivery Method): room air                  PHYSICAL EXAM:  Gen: NAD  Neck incision CDI  Supraincisional lymphedema  Trachea midline  RFFF warm, soft, and pink  Native tongue mobility good     NPL: vallecula clear, bulk flap noted but not obstructing. epiglottis omega shaped, in neutral position, TVFs mobile bilaterally without edema. Glottic airway patent. There is thick pooling of secretion in the R pyriform and over the supraglottic larynx.

## 2024-05-11 NOTE — PROGRESS NOTE ADULT - ATTENDING COMMENTS
Above assessment noted.  The patient was seen and examined by myself with the surgical PA and resident. The patient is without abdominal pain, nausea, or vomit. Abdomen is soft without localizing tenderness. cholecystostomy tube remains in place with inflammation from the drain suture site.   The patient has been advised that he will be evaluated by ENT given his prior mandibular surgery and radiation therapy. He will tentatively be scheduled for cholecystectomy Monday.

## 2024-05-11 NOTE — PROGRESS NOTE ADULT - SUBJECTIVE AND OBJECTIVE BOX
Subjective: Patient seen and examined at bedside.     MEDICATIONS  (STANDING):  aspirin  chewable 81 milliGRAM(s) Oral daily  atorvastatin 80 milliGRAM(s) Oral at bedtime  chlorhexidine 0.12% Liquid 15 milliLiter(s) Swish and Spit two times a day  enoxaparin Injectable 40 milliGRAM(s) SubCutaneous every 24 hours  ertapenem  IVPB 1000 milliGRAM(s) IV Intermittent every 24 hours  ertapenem  IVPB      escitalopram 10 milliGRAM(s) Oral daily  ferrous    sulfate 325 milliGRAM(s) Oral daily  finasteride 5 milliGRAM(s) Oral daily  indocyanine green Injectable 2.5 milliGRAM(s) IV Push once  midodrine. 10 milliGRAM(s) Oral three times a day  mirtazapine 15 milliGRAM(s) Oral daily  pantoprazole    Tablet 40 milliGRAM(s) Oral every 12 hours  petrolatum Ophthalmic Ointment 1 Application(s) Both EYES four times a day  polyethylene glycol 3350 17 Gram(s) Oral daily  senna 1 Tablet(s) Oral at bedtime  sucralfate 1 Gram(s) Oral four times a day  tamsulosin 0.4 milliGRAM(s) Oral at bedtime    MEDICATIONS  (PRN):  ondansetron Injectable 4 milliGRAM(s) IV Push every 6 hours PRN Nausea    Vital Signs Last 24 Hrs  T(C): 36.7 (11 May 2024 00:54), Max: 36.8 (10 May 2024 08:15)  T(F): 98 (11 May 2024 00:54), Max: 98.2 (10 May 2024 08:15)  HR: 63 (11 May 2024 00:54) (63 - 80)  BP: 134/75 (11 May 2024 00:54) (115/72 - 134/75)  RR: 18 (11 May 2024 00:54) (15 - 18)  SpO2: 98% (11 May 2024 00:54) (97% - 100%)  Parameters below as of 11 May 2024 00:54  Patient On (Oxygen Delivery Method): room air    Physical Exam:  Constitutional: NAD  HEENT: PERRL, EOMI  Respiratory: Respirations non-labored, no accessory muscle use  Gastrointestinal: Soft, non-tender, non-distended, RUQ perc gemini tube in place   Extremities: moves extremities spontaneously     A: Patient is a 74 yo M with drainage around his perc gemini tube that was placed in March.     Plan:   NPO, IVFs   monitor perc gemini tube output   pain control prn   home meds   cont abx   DVT ppx w Lov and SCDs   Encourage ambulation and IS 10x/hour

## 2024-05-11 NOTE — CHART NOTE - NSCHARTNOTEFT_GEN_A_CORE
The patient is completely stable and has no pain, per Dr. Rolon, the case is not an emergency or urgent and the patient is not septic. The patient has restricted mouth opening and neck range of motion because of the surgical resection of mandibular cancer and radiation of the head and neck. The case got postponed to be done on Monday because of the altered airway anatomy and possible difficult intubation that may need surgical airway intervention by a thoracic surgeon or ENT. The weekend is not a safe time to do a case with this difficulty of airway management that could compromise the patient life and safety especially the lap cholecystectomy per the surgeon is completely  elective.    Recommendation  1- ENT consult to examine the airway.  2- Booking the lap Cholecystectomy to be done on the weekdays when everyone is around to make sure the patient is safe. The patient is completely stable and has no pain, per Dr. Rolon, the case is not an emergency or urgent and the patient is not septic. The patient has restricted mouth opening and neck range of motion because of the surgical resection of jaw cancer and radiation of the head and neck. The case got postponed to be done on Monday because of the altered airway anatomy and possible difficult intubation that may need surgical airway intervention by a thoracic surgeon or ENT. The weekend is not a safe time to do a case with this difficulty of airway management that could compromise the patient life and safety especially the lap cholecystectomy per the surgeon is completely  elective.    Recommendation  1- ENT consult to examine the airway.  2- Booking the lap Cholecystectomy to be done on the weekdays when everyone is around to make sure the patient is safe.

## 2024-05-12 ENCOUNTER — TRANSCRIPTION ENCOUNTER (OUTPATIENT)
Age: 74
End: 2024-05-12

## 2024-05-12 LAB
CULTURE RESULTS: SIGNIFICANT CHANGE UP
CULTURE RESULTS: SIGNIFICANT CHANGE UP
GLUCOSE BLDC GLUCOMTR-MCNC: 124 MG/DL — HIGH (ref 70–99)
GLUCOSE BLDC GLUCOMTR-MCNC: 146 MG/DL — HIGH (ref 70–99)
GLUCOSE BLDC GLUCOMTR-MCNC: 157 MG/DL — HIGH (ref 70–99)
SPECIMEN SOURCE: SIGNIFICANT CHANGE UP
SPECIMEN SOURCE: SIGNIFICANT CHANGE UP

## 2024-05-12 PROCEDURE — 99231 SBSQ HOSP IP/OBS SF/LOW 25: CPT | Mod: 57

## 2024-05-12 RX ORDER — SODIUM CHLORIDE 9 MG/ML
1000 INJECTION, SOLUTION INTRAVENOUS
Refills: 0 | Status: DISCONTINUED | OUTPATIENT
Start: 2024-05-12 | End: 2024-05-13

## 2024-05-12 RX ADMIN — Medication 1 APPLICATION(S): at 23:55

## 2024-05-12 RX ADMIN — Medication 81 MILLIGRAM(S): at 11:43

## 2024-05-12 RX ADMIN — Medication 1 GRAM(S): at 12:27

## 2024-05-12 RX ADMIN — Medication 1 GRAM(S): at 05:06

## 2024-05-12 RX ADMIN — ERTAPENEM SODIUM 120 MILLIGRAM(S): 1 INJECTION, POWDER, LYOPHILIZED, FOR SOLUTION INTRAMUSCULAR; INTRAVENOUS at 18:54

## 2024-05-12 RX ADMIN — Medication 1 APPLICATION(S): at 11:44

## 2024-05-12 RX ADMIN — ENOXAPARIN SODIUM 40 MILLIGRAM(S): 100 INJECTION SUBCUTANEOUS at 05:06

## 2024-05-12 RX ADMIN — MIDODRINE HYDROCHLORIDE 10 MILLIGRAM(S): 2.5 TABLET ORAL at 05:06

## 2024-05-12 RX ADMIN — Medication 325 MILLIGRAM(S): at 11:43

## 2024-05-12 RX ADMIN — MIDODRINE HYDROCHLORIDE 10 MILLIGRAM(S): 2.5 TABLET ORAL at 11:43

## 2024-05-12 RX ADMIN — Medication 1 GRAM(S): at 17:23

## 2024-05-12 RX ADMIN — MIDODRINE HYDROCHLORIDE 10 MILLIGRAM(S): 2.5 TABLET ORAL at 17:23

## 2024-05-12 RX ADMIN — TAMSULOSIN HYDROCHLORIDE 0.4 MILLIGRAM(S): 0.4 CAPSULE ORAL at 21:02

## 2024-05-12 RX ADMIN — FINASTERIDE 5 MILLIGRAM(S): 5 TABLET, FILM COATED ORAL at 11:43

## 2024-05-12 RX ADMIN — Medication 1 APPLICATION(S): at 17:23

## 2024-05-12 RX ADMIN — SENNA PLUS 1 TABLET(S): 8.6 TABLET ORAL at 21:01

## 2024-05-12 RX ADMIN — PANTOPRAZOLE SODIUM 40 MILLIGRAM(S): 20 TABLET, DELAYED RELEASE ORAL at 17:22

## 2024-05-12 RX ADMIN — PANTOPRAZOLE SODIUM 40 MILLIGRAM(S): 20 TABLET, DELAYED RELEASE ORAL at 05:06

## 2024-05-12 RX ADMIN — CHLORHEXIDINE GLUCONATE 15 MILLILITER(S): 213 SOLUTION TOPICAL at 17:22

## 2024-05-12 RX ADMIN — ESCITALOPRAM OXALATE 10 MILLIGRAM(S): 10 TABLET, FILM COATED ORAL at 11:43

## 2024-05-12 RX ADMIN — MIRTAZAPINE 15 MILLIGRAM(S): 45 TABLET, ORALLY DISINTEGRATING ORAL at 11:43

## 2024-05-12 RX ADMIN — POLYETHYLENE GLYCOL 3350 17 GRAM(S): 17 POWDER, FOR SOLUTION ORAL at 11:44

## 2024-05-12 RX ADMIN — Medication 1 GRAM(S): at 23:56

## 2024-05-12 RX ADMIN — CHLORHEXIDINE GLUCONATE 15 MILLILITER(S): 213 SOLUTION TOPICAL at 05:07

## 2024-05-12 RX ADMIN — ATORVASTATIN CALCIUM 80 MILLIGRAM(S): 80 TABLET, FILM COATED ORAL at 21:01

## 2024-05-12 RX ADMIN — Medication 1 APPLICATION(S): at 05:06

## 2024-05-12 NOTE — PROGRESS NOTE ADULT - SUBJECTIVE AND OBJECTIVE BOX
Subjective:  Patient seen and examined at bedside.       MEDICATIONS  (STANDING):  aspirin  chewable 81 milliGRAM(s) Oral daily  atorvastatin 80 milliGRAM(s) Oral at bedtime  chlorhexidine 0.12% Liquid 15 milliLiter(s) Swish and Spit two times a day  enoxaparin Injectable 40 milliGRAM(s) SubCutaneous every 24 hours  ertapenem  IVPB      ertapenem  IVPB 1000 milliGRAM(s) IV Intermittent every 24 hours  escitalopram 10 milliGRAM(s) Oral daily  ferrous    sulfate 325 milliGRAM(s) Oral daily  finasteride 5 milliGRAM(s) Oral daily  indocyanine green Injectable 2.5 milliGRAM(s) IV Push once  lactated ringers. 1000 milliLiter(s) (75 mL/Hr) IV Continuous <Continuous>  midodrine. 10 milliGRAM(s) Oral three times a day  mirtazapine 15 milliGRAM(s) Oral daily  pantoprazole    Tablet 40 milliGRAM(s) Oral every 12 hours  petrolatum Ophthalmic Ointment 1 Application(s) Both EYES four times a day  polyethylene glycol 3350 17 Gram(s) Oral daily  senna 1 Tablet(s) Oral at bedtime  sucralfate 1 Gram(s) Oral four times a day  tamsulosin 0.4 milliGRAM(s) Oral at bedtime    MEDICATIONS  (PRN):  ondansetron Injectable 4 milliGRAM(s) IV Push every 6 hours PRN Nausea    Vital Signs Last 24 Hrs  T(C): 36.8 (12 May 2024 00:02), Max: 36.8 (11 May 2024 09:30)  T(F): 98.2 (12 May 2024 00:02), Max: 98.3 (11 May 2024 09:30)  HR: 69 (12 May 2024 00:02) (69 - 70)  BP: 120/72 (12 May 2024 00:02) (105/64 - 127/70)  RR: 18 (12 May 2024 00:02) (17 - 18)  SpO2: 99% (12 May 2024 00:02) (98% - 100%)  Parameters below as of 12 May 2024 00:02  Patient On (Oxygen Delivery Method): room air    Physical Exam:  Constitutional: NAD  HEENT: PERRL, EOMI  Respiratory: Respirations non-labored, no accessory muscle use  Gastrointestinal: Soft, non-tender, non-distended, RUQ perc gemini tube in place   Extremities: moves extremities spontaneously     LABS: pending     A: Patient is a 74 yo M with drainage around his perc gemini tube that was placed in March. Patient's surgery was cancelled yesterday d/t anesthesia concern for difficult intubation, pt was evaluated and cleared for the OR by ENT.     Plan:   CLD, npo @ mdnight for OR tomorrow   monitor perc gemini tube output   pain control prn   home meds   cont abx   DVT ppx w Lov and SCDs   Encourage ambulation and IS 10x/hour

## 2024-05-13 ENCOUNTER — RESULT REVIEW (OUTPATIENT)
Age: 74
End: 2024-05-13

## 2024-05-13 LAB
ALBUMIN SERPL ELPH-MCNC: 3.5 G/DL — SIGNIFICANT CHANGE UP (ref 3.3–5.2)
ALP SERPL-CCNC: 85 U/L — SIGNIFICANT CHANGE UP (ref 40–120)
ALT FLD-CCNC: 13 U/L — SIGNIFICANT CHANGE UP
ANION GAP SERPL CALC-SCNC: 12 MMOL/L — SIGNIFICANT CHANGE UP (ref 5–17)
APTT BLD: 36.2 SEC — HIGH (ref 24.5–35.6)
AST SERPL-CCNC: 11 U/L — SIGNIFICANT CHANGE UP
BASOPHILS # BLD AUTO: 0.03 K/UL — SIGNIFICANT CHANGE UP (ref 0–0.2)
BASOPHILS NFR BLD AUTO: 0.6 % — SIGNIFICANT CHANGE UP (ref 0–2)
BILIRUB SERPL-MCNC: 0.4 MG/DL — SIGNIFICANT CHANGE UP (ref 0.4–2)
BLD GP AB SCN SERPL QL: SIGNIFICANT CHANGE UP
BUN SERPL-MCNC: 13.1 MG/DL — SIGNIFICANT CHANGE UP (ref 8–20)
CALCIUM SERPL-MCNC: 9.2 MG/DL — SIGNIFICANT CHANGE UP (ref 8.4–10.5)
CHLORIDE SERPL-SCNC: 97 MMOL/L — SIGNIFICANT CHANGE UP (ref 96–108)
CO2 SERPL-SCNC: 29 MMOL/L — SIGNIFICANT CHANGE UP (ref 22–29)
CREAT SERPL-MCNC: 0.42 MG/DL — LOW (ref 0.5–1.3)
EGFR: 114 ML/MIN/1.73M2 — SIGNIFICANT CHANGE UP
EOSINOPHIL # BLD AUTO: 0.04 K/UL — SIGNIFICANT CHANGE UP (ref 0–0.5)
EOSINOPHIL NFR BLD AUTO: 0.8 % — SIGNIFICANT CHANGE UP (ref 0–6)
GLUCOSE BLDC GLUCOMTR-MCNC: 117 MG/DL — HIGH (ref 70–99)
GLUCOSE BLDC GLUCOMTR-MCNC: 131 MG/DL — HIGH (ref 70–99)
GLUCOSE BLDC GLUCOMTR-MCNC: 139 MG/DL — HIGH (ref 70–99)
GLUCOSE BLDC GLUCOMTR-MCNC: 232 MG/DL — HIGH (ref 70–99)
GLUCOSE SERPL-MCNC: 124 MG/DL — HIGH (ref 70–99)
HCT VFR BLD CALC: 36.2 % — LOW (ref 39–50)
HGB BLD-MCNC: 11 G/DL — LOW (ref 13–17)
IMM GRANULOCYTES NFR BLD AUTO: 0.4 % — SIGNIFICANT CHANGE UP (ref 0–0.9)
INR BLD: 1.07 RATIO — SIGNIFICANT CHANGE UP (ref 0.85–1.18)
LYMPHOCYTES # BLD AUTO: 0.69 K/UL — LOW (ref 1–3.3)
LYMPHOCYTES # BLD AUTO: 14.1 % — SIGNIFICANT CHANGE UP (ref 13–44)
MAGNESIUM SERPL-MCNC: 1.5 MG/DL — LOW (ref 1.6–2.6)
MCHC RBC-ENTMCNC: 25.7 PG — LOW (ref 27–34)
MCHC RBC-ENTMCNC: 30.4 GM/DL — LOW (ref 32–36)
MCV RBC AUTO: 84.6 FL — SIGNIFICANT CHANGE UP (ref 80–100)
MONOCYTES # BLD AUTO: 0.45 K/UL — SIGNIFICANT CHANGE UP (ref 0–0.9)
MONOCYTES NFR BLD AUTO: 9.2 % — SIGNIFICANT CHANGE UP (ref 2–14)
NEUTROPHILS # BLD AUTO: 3.67 K/UL — SIGNIFICANT CHANGE UP (ref 1.8–7.4)
NEUTROPHILS NFR BLD AUTO: 74.9 % — SIGNIFICANT CHANGE UP (ref 43–77)
PHOSPHATE SERPL-MCNC: 3.5 MG/DL — SIGNIFICANT CHANGE UP (ref 2.4–4.7)
PLATELET # BLD AUTO: 165 K/UL — SIGNIFICANT CHANGE UP (ref 150–400)
POTASSIUM SERPL-MCNC: 3.8 MMOL/L — SIGNIFICANT CHANGE UP (ref 3.5–5.3)
POTASSIUM SERPL-SCNC: 3.8 MMOL/L — SIGNIFICANT CHANGE UP (ref 3.5–5.3)
PROT SERPL-MCNC: 6.4 G/DL — LOW (ref 6.6–8.7)
PROTHROM AB SERPL-ACNC: 11.8 SEC — SIGNIFICANT CHANGE UP (ref 9.5–13)
RBC # BLD: 4.28 M/UL — SIGNIFICANT CHANGE UP (ref 4.2–5.8)
RBC # FLD: 17.2 % — HIGH (ref 10.3–14.5)
SODIUM SERPL-SCNC: 138 MMOL/L — SIGNIFICANT CHANGE UP (ref 135–145)
WBC # BLD: 4.9 K/UL — SIGNIFICANT CHANGE UP (ref 3.8–10.5)
WBC # FLD AUTO: 4.9 K/UL — SIGNIFICANT CHANGE UP (ref 3.8–10.5)

## 2024-05-13 PROCEDURE — 47563 LAPARO CHOLECYSTECTOMY/GRAPH: CPT

## 2024-05-13 PROCEDURE — 99231 SBSQ HOSP IP/OBS SF/LOW 25: CPT | Mod: 57

## 2024-05-13 PROCEDURE — 88300 SURGICAL PATH GROSS: CPT | Mod: 26

## 2024-05-13 PROCEDURE — S2900 ROBOTIC SURGICAL SYSTEM: CPT | Mod: NC

## 2024-05-13 PROCEDURE — 88304 TISSUE EXAM BY PATHOLOGIST: CPT | Mod: 26

## 2024-05-13 DEVICE — XI STAPLER SUREFORM RELOAD 45 BLUE: Type: IMPLANTABLE DEVICE | Status: FUNCTIONAL

## 2024-05-13 DEVICE — LIGATING CLIPS WECK HEMOLOK POLYMER LARGE (PURPLE) 6: Type: IMPLANTABLE DEVICE | Status: FUNCTIONAL

## 2024-05-13 RX ORDER — MAGNESIUM SULFATE 500 MG/ML
2 VIAL (ML) INJECTION ONCE
Refills: 0 | Status: COMPLETED | OUTPATIENT
Start: 2024-05-13 | End: 2024-05-13

## 2024-05-13 RX ORDER — POLYETHYLENE GLYCOL 3350 17 G/17G
17 POWDER, FOR SOLUTION ORAL DAILY
Refills: 0 | Status: DISCONTINUED | OUTPATIENT
Start: 2024-05-13 | End: 2024-05-15

## 2024-05-13 RX ORDER — ENOXAPARIN SODIUM 100 MG/ML
40 INJECTION SUBCUTANEOUS EVERY 24 HOURS
Refills: 0 | Status: DISCONTINUED | OUTPATIENT
Start: 2024-05-13 | End: 2024-05-15

## 2024-05-13 RX ORDER — ERTAPENEM SODIUM 1 G/1
1000 INJECTION, POWDER, LYOPHILIZED, FOR SOLUTION INTRAMUSCULAR; INTRAVENOUS ONCE
Refills: 0 | Status: COMPLETED | OUTPATIENT
Start: 2024-05-13 | End: 2024-05-13

## 2024-05-13 RX ORDER — ASPIRIN/CALCIUM CARB/MAGNESIUM 324 MG
81 TABLET ORAL DAILY
Refills: 0 | Status: DISCONTINUED | OUTPATIENT
Start: 2024-05-14 | End: 2024-05-15

## 2024-05-13 RX ORDER — ESCITALOPRAM OXALATE 10 MG/1
10 TABLET, FILM COATED ORAL DAILY
Refills: 0 | Status: DISCONTINUED | OUTPATIENT
Start: 2024-05-13 | End: 2024-05-15

## 2024-05-13 RX ORDER — MAGNESIUM SULFATE 500 MG/ML
2 VIAL (ML) INJECTION ONCE
Refills: 0 | Status: DISCONTINUED | OUTPATIENT
Start: 2024-05-13 | End: 2024-05-13

## 2024-05-13 RX ORDER — ATORVASTATIN CALCIUM 80 MG/1
80 TABLET, FILM COATED ORAL AT BEDTIME
Refills: 0 | Status: DISCONTINUED | OUTPATIENT
Start: 2024-05-13 | End: 2024-05-15

## 2024-05-13 RX ORDER — TAMSULOSIN HYDROCHLORIDE 0.4 MG/1
0.4 CAPSULE ORAL AT BEDTIME
Refills: 0 | Status: DISCONTINUED | OUTPATIENT
Start: 2024-05-13 | End: 2024-05-15

## 2024-05-13 RX ORDER — FENTANYL CITRATE 50 UG/ML
25 INJECTION INTRAVENOUS
Refills: 0 | Status: DISCONTINUED | OUTPATIENT
Start: 2024-05-13 | End: 2024-05-13

## 2024-05-13 RX ORDER — ONDANSETRON 8 MG/1
4 TABLET, FILM COATED ORAL EVERY 6 HOURS
Refills: 0 | Status: DISCONTINUED | OUTPATIENT
Start: 2024-05-13 | End: 2024-05-15

## 2024-05-13 RX ORDER — FINASTERIDE 5 MG/1
5 TABLET, FILM COATED ORAL DAILY
Refills: 0 | Status: DISCONTINUED | OUTPATIENT
Start: 2024-05-13 | End: 2024-05-15

## 2024-05-13 RX ORDER — ERTAPENEM SODIUM 1 G/1
1000 INJECTION, POWDER, LYOPHILIZED, FOR SOLUTION INTRAMUSCULAR; INTRAVENOUS EVERY 24 HOURS
Refills: 0 | Status: DISCONTINUED | OUTPATIENT
Start: 2024-05-14 | End: 2024-05-15

## 2024-05-13 RX ORDER — SENNA PLUS 8.6 MG/1
1 TABLET ORAL AT BEDTIME
Refills: 0 | Status: DISCONTINUED | OUTPATIENT
Start: 2024-05-13 | End: 2024-05-15

## 2024-05-13 RX ORDER — ONDANSETRON 8 MG/1
4 TABLET, FILM COATED ORAL ONCE
Refills: 0 | Status: DISCONTINUED | OUTPATIENT
Start: 2024-05-13 | End: 2024-05-13

## 2024-05-13 RX ORDER — CHLORHEXIDINE GLUCONATE 213 G/1000ML
15 SOLUTION TOPICAL
Refills: 0 | Status: DISCONTINUED | OUTPATIENT
Start: 2024-05-13 | End: 2024-05-15

## 2024-05-13 RX ORDER — INSULIN LISPRO 100/ML
4 VIAL (ML) SUBCUTANEOUS ONCE
Refills: 0 | Status: COMPLETED | OUTPATIENT
Start: 2024-05-13 | End: 2024-05-13

## 2024-05-13 RX ORDER — IBUPROFEN 200 MG
400 TABLET ORAL EVERY 8 HOURS
Refills: 0 | Status: DISCONTINUED | OUTPATIENT
Start: 2024-05-13 | End: 2024-05-15

## 2024-05-13 RX ORDER — INDOCYANINE GREEN 25 MG
2.5 KIT INTRAVASCULAR; INTRAVENOUS ONCE
Refills: 0 | Status: DISCONTINUED | OUTPATIENT
Start: 2024-05-13 | End: 2024-05-13

## 2024-05-13 RX ORDER — SODIUM CHLORIDE 9 MG/ML
1000 INJECTION, SOLUTION INTRAVENOUS
Refills: 0 | Status: DISCONTINUED | OUTPATIENT
Start: 2024-05-13 | End: 2024-05-15

## 2024-05-13 RX ORDER — FERROUS SULFATE 325(65) MG
325 TABLET ORAL DAILY
Refills: 0 | Status: DISCONTINUED | OUTPATIENT
Start: 2024-05-13 | End: 2024-05-15

## 2024-05-13 RX ORDER — ACETAMINOPHEN 500 MG
650 TABLET ORAL EVERY 6 HOURS
Refills: 0 | Status: DISCONTINUED | OUTPATIENT
Start: 2024-05-13 | End: 2024-05-15

## 2024-05-13 RX ORDER — MIRTAZAPINE 45 MG/1
15 TABLET, ORALLY DISINTEGRATING ORAL DAILY
Refills: 0 | Status: DISCONTINUED | OUTPATIENT
Start: 2024-05-13 | End: 2024-05-15

## 2024-05-13 RX ORDER — ERTAPENEM SODIUM 1 G/1
INJECTION, POWDER, LYOPHILIZED, FOR SOLUTION INTRAMUSCULAR; INTRAVENOUS
Refills: 0 | Status: DISCONTINUED | OUTPATIENT
Start: 2024-05-13 | End: 2024-05-15

## 2024-05-13 RX ORDER — MIDODRINE HYDROCHLORIDE 2.5 MG/1
10 TABLET ORAL THREE TIMES A DAY
Refills: 0 | Status: DISCONTINUED | OUTPATIENT
Start: 2024-05-13 | End: 2024-05-15

## 2024-05-13 RX ORDER — SUCRALFATE 1 G
1 TABLET ORAL
Refills: 0 | Status: DISCONTINUED | OUTPATIENT
Start: 2024-05-13 | End: 2024-05-15

## 2024-05-13 RX ADMIN — INDOCYANINE GREEN 2.5 MILLIGRAM(S): KIT INTRAVASCULAR; INTRAVENOUS at 10:43

## 2024-05-13 RX ADMIN — Medication 400 MILLIGRAM(S): at 22:39

## 2024-05-13 RX ADMIN — Medication 1 APPLICATION(S): at 04:41

## 2024-05-13 RX ADMIN — SODIUM CHLORIDE 75 MILLILITER(S): 9 INJECTION, SOLUTION INTRAVENOUS at 05:26

## 2024-05-13 RX ADMIN — FENTANYL CITRATE 25 MICROGRAM(S): 50 INJECTION INTRAVENOUS at 17:06

## 2024-05-13 RX ADMIN — POLYETHYLENE GLYCOL 3350 17 GRAM(S): 17 POWDER, FOR SOLUTION ORAL at 22:32

## 2024-05-13 RX ADMIN — Medication 650 MILLIGRAM(S): at 23:51

## 2024-05-13 RX ADMIN — ESCITALOPRAM OXALATE 10 MILLIGRAM(S): 10 TABLET, FILM COATED ORAL at 22:32

## 2024-05-13 RX ADMIN — ENOXAPARIN SODIUM 40 MILLIGRAM(S): 100 INJECTION SUBCUTANEOUS at 22:28

## 2024-05-13 RX ADMIN — Medication 4 UNIT(S): at 16:24

## 2024-05-13 RX ADMIN — Medication 25 GRAM(S): at 10:51

## 2024-05-13 RX ADMIN — FENTANYL CITRATE 25 MICROGRAM(S): 50 INJECTION INTRAVENOUS at 16:55

## 2024-05-13 RX ADMIN — ATORVASTATIN CALCIUM 80 MILLIGRAM(S): 80 TABLET, FILM COATED ORAL at 22:28

## 2024-05-13 RX ADMIN — FENTANYL CITRATE 25 MICROGRAM(S): 50 INJECTION INTRAVENOUS at 17:05

## 2024-05-13 RX ADMIN — TAMSULOSIN HYDROCHLORIDE 0.4 MILLIGRAM(S): 0.4 CAPSULE ORAL at 22:28

## 2024-05-13 RX ADMIN — Medication 325 MILLIGRAM(S): at 22:32

## 2024-05-13 RX ADMIN — FINASTERIDE 5 MILLIGRAM(S): 5 TABLET, FILM COATED ORAL at 22:32

## 2024-05-13 RX ADMIN — CHLORHEXIDINE GLUCONATE 15 MILLILITER(S): 213 SOLUTION TOPICAL at 04:41

## 2024-05-13 RX ADMIN — MIRTAZAPINE 15 MILLIGRAM(S): 45 TABLET, ORALLY DISINTEGRATING ORAL at 22:32

## 2024-05-13 RX ADMIN — SENNA PLUS 1 TABLET(S): 8.6 TABLET ORAL at 22:28

## 2024-05-13 RX ADMIN — ERTAPENEM SODIUM 120 MILLIGRAM(S): 1 INJECTION, POWDER, LYOPHILIZED, FOR SOLUTION INTRAMUSCULAR; INTRAVENOUS at 20:01

## 2024-05-13 RX ADMIN — Medication 650 MILLIGRAM(S): at 20:01

## 2024-05-13 NOTE — ASU PREOP CHECKLIST - SITE MARKED BY SURGEON
Chronic, elevated SBP this   Continue home Clonidine, Hydralazine, Coreg, Amlodipine and  Lasix  with hold parameters  Hemodynamically stable   Dash Diet n/a

## 2024-05-13 NOTE — BRIEF OPERATIVE NOTE - OPERATION/FINDINGS
perc tube in liver, duodenum stuck to GB, high CBD, seen with ICG. Critical view of safety obtained, cystic duct thickened, stapled with blue 45 load Sureform stapler

## 2024-05-13 NOTE — ASU PREOP CHECKLIST - BP NONINVASIVE SYSTOLIC (MM HG)
Chief Complaint   Patient presents with    Abdominal Pain     RLQ pain X 1 week. Has urinary urgency but can't go.   Cramping lower abd-started BC 3 months ago   Visit Vitals  BP 92/62   Pulse 72   Temp 97.6 °F (36.4 °C) (Temporal)   Resp 16   Ht 5' 2\" (1.575 m)   Wt 120 lb 9.6 oz (54.7 kg)   LMP  (LMP Unknown) Comment: Takes BC   SpO2 97%   BMI 22.06 kg/m² 103

## 2024-05-13 NOTE — PROGRESS NOTE ADULT - ASSESSMENT
Patient is a 74 yo M with drainage around his perc gemini tube that was placed in March. Patient's surgery was cancelled on 5/11 due to anesthesia concern for difficult intubation, pt was evaluated and cleared for the OR by ENT. Patient added on for OR today.     Plan:     NPO for OR today   Continue to monitor perc gemini tube output   pain control prn   home meds   cont abx   DVT ppx w Lov and SCDs   Encourage ambulation and IS 10x/hour      Pt evaluated with senior resident and attending

## 2024-05-13 NOTE — CHART NOTE - NSCHARTNOTEFT_GEN_A_CORE
Post-op check:    Pt is a 72 yo M s/p Robotic-Assisted Laparoscopic Cholecystectomy    Vitals:    Vital Signs Last 24 Hrs  T(C): 37.1 (13 May 2024 23:35), Max: 37.1 (13 May 2024 23:35)  T(F): 98.8 (13 May 2024 23:35), Max: 98.8 (13 May 2024 23:35)  HR: 84 (13 May 2024 23:35) (70 - 84)  BP: 117/66 (13 May 2024 23:35) (97/61 - 147/76)  BP(mean): 75 (13 May 2024 17:15) (74 - 80)  RR: 16 (13 May 2024 23:35) (10 - 19)  SpO2: 95% (13 May 2024 23:35) (95% - 100%)    Parameters below as of 13 May 2024 23:35  Patient On (Oxygen Delivery Method): room air    Patient resting in bed. Pt denies pain at this time. Denies nausea, vomiting, SOB, chest pain. Patient has not yet voided, is not yet passing flatus and has not had a BM.    Physical exam:  General:  patient resting in bed comfortably, in NAD  Resp: equal chest rise bilaterally, nonlabored breathing  Cardio: RRR  Abdomen: soft, NT, ND, wounds c/d/i with dermabond in place  Extremities: moving all extremities spontaneously, pulses 2+    Plan:   -continue diet as tolerated  -pain control PRN  -encourage OOB, ambulation  -DVT ppx: SCDs

## 2024-05-13 NOTE — BRIEF OPERATIVE NOTE - NSICDXBRIEFPOSTOP_GEN_ALL_CORE_FT
POST-OP DIAGNOSIS:  Cholelithiasis with acute on chronic cholecystitis 13-May-2024 15:51:02  Lyssa Contreras

## 2024-05-13 NOTE — PROGRESS NOTE ADULT - SUBJECTIVE AND OBJECTIVE BOX
INTERVAL HPI/OVERNIGHT EVENTS: No events overnight. Patient added on for Lap Gemini today 5/13.      MEDICATIONS  (STANDING):  aspirin  chewable 81 milliGRAM(s) Oral daily  atorvastatin 80 milliGRAM(s) Oral at bedtime  chlorhexidine 0.12% Liquid 15 milliLiter(s) Swish and Spit two times a day  enoxaparin Injectable 40 milliGRAM(s) SubCutaneous every 24 hours  ertapenem  IVPB      ertapenem  IVPB 1000 milliGRAM(s) IV Intermittent every 24 hours  escitalopram 10 milliGRAM(s) Oral daily  ferrous    sulfate 325 milliGRAM(s) Oral daily  finasteride 5 milliGRAM(s) Oral daily  indocyanine green Injectable 2.5 milliGRAM(s) IV Push once  lactated ringers. 1000 milliLiter(s) (75 mL/Hr) IV Continuous <Continuous>  midodrine. 10 milliGRAM(s) Oral three times a day  mirtazapine 15 milliGRAM(s) Oral daily  pantoprazole    Tablet 40 milliGRAM(s) Oral every 12 hours  petrolatum Ophthalmic Ointment 1 Application(s) Both EYES four times a day  polyethylene glycol 3350 17 Gram(s) Oral daily  senna 1 Tablet(s) Oral at bedtime  sucralfate 1 Gram(s) Oral four times a day  tamsulosin 0.4 milliGRAM(s) Oral at bedtime    MEDICATIONS  (PRN):  ondansetron Injectable 4 milliGRAM(s) IV Push every 6 hours PRN Nausea      Vital Signs Last 24 Hrs  T(C): 36.7 (13 May 2024 04:34), Max: 36.9 (12 May 2024 20:07)  T(F): 98.1 (13 May 2024 04:34), Max: 98.4 (12 May 2024 20:07)  HR: 77 (13 May 2024 04:34) (71 - 77)  BP: 102/66 (13 May 2024 04:34) (102/66 - 131/79)  BP(mean): --  RR: 15 (13 May 2024 04:34) (13 - 18)  SpO2: 97% (13 May 2024 04:34) (95% - 99%)    Parameters below as of 13 May 2024 04:34  Patient On (Oxygen Delivery Method): room air        Physical Exam:    Respiratory: no accessory muscle use    Cardiovascular: Regular rate     Gastrointestinal: Soft, non-tender, perc gemini tube in place with purulent drainage        I&O's Detail    12 May 2024 07:01  -  13 May 2024 07:00  --------------------------------------------------------  IN:    Lactated Ringers: 525 mL  Total IN: 525 mL    OUT:    Drain (mL): 40 mL    Voided (mL): 2050 mL  Total OUT: 2090 mL    Total NET: -1565 mL          LABS:                        11.0   4.90  )-----------( 165      ( 13 May 2024 06:38 )             36.2                 RADIOLOGY & ADDITIONAL STUDIES: INTERVAL HPI/OVERNIGHT EVENTS: No events overnight. Patient added on for RA Gemini today 5/13.      MEDICATIONS  (STANDING):  aspirin  chewable 81 milliGRAM(s) Oral daily  atorvastatin 80 milliGRAM(s) Oral at bedtime  chlorhexidine 0.12% Liquid 15 milliLiter(s) Swish and Spit two times a day  enoxaparin Injectable 40 milliGRAM(s) SubCutaneous every 24 hours  ertapenem  IVPB      ertapenem  IVPB 1000 milliGRAM(s) IV Intermittent every 24 hours  escitalopram 10 milliGRAM(s) Oral daily  ferrous    sulfate 325 milliGRAM(s) Oral daily  finasteride 5 milliGRAM(s) Oral daily  indocyanine green Injectable 2.5 milliGRAM(s) IV Push once  lactated ringers. 1000 milliLiter(s) (75 mL/Hr) IV Continuous <Continuous>  midodrine. 10 milliGRAM(s) Oral three times a day  mirtazapine 15 milliGRAM(s) Oral daily  pantoprazole    Tablet 40 milliGRAM(s) Oral every 12 hours  petrolatum Ophthalmic Ointment 1 Application(s) Both EYES four times a day  polyethylene glycol 3350 17 Gram(s) Oral daily  senna 1 Tablet(s) Oral at bedtime  sucralfate 1 Gram(s) Oral four times a day  tamsulosin 0.4 milliGRAM(s) Oral at bedtime    MEDICATIONS  (PRN):  ondansetron Injectable 4 milliGRAM(s) IV Push every 6 hours PRN Nausea      Vital Signs Last 24 Hrs  T(C): 36.7 (13 May 2024 04:34), Max: 36.9 (12 May 2024 20:07)  T(F): 98.1 (13 May 2024 04:34), Max: 98.4 (12 May 2024 20:07)  HR: 77 (13 May 2024 04:34) (71 - 77)  BP: 102/66 (13 May 2024 04:34) (102/66 - 131/79)  BP(mean): --  RR: 15 (13 May 2024 04:34) (13 - 18)  SpO2: 97% (13 May 2024 04:34) (95% - 99%)    Parameters below as of 13 May 2024 04:34  Patient On (Oxygen Delivery Method): room air        Physical Exam:    Respiratory: no accessory muscle use    Cardiovascular: Regular rate     Gastrointestinal: Soft, non-tender, perc gemini tube in place with purulent drainage        I&O's Detail    12 May 2024 07:01  -  13 May 2024 07:00  --------------------------------------------------------  IN:    Lactated Ringers: 525 mL  Total IN: 525 mL    OUT:    Drain (mL): 40 mL    Voided (mL): 2050 mL  Total OUT: 2090 mL    Total NET: -1565 mL          LABS:                        11.0   4.90  )-----------( 165      ( 13 May 2024 06:38 )             36.2                 RADIOLOGY & ADDITIONAL STUDIES:

## 2024-05-13 NOTE — PROGRESS NOTE ADULT - ATTENDING COMMENTS
Patient seen and examined at bedside with wife present. abd soft, NTTP, ND, perc gemini tube in place with minimal drainage, PEG in place with feeds held. Imaging and labs reviewed. Risks, benefits and alternatives discussed with pt and his wife, they wish to proceed with OR. To OR today for [place] singletary. Consent signed

## 2024-05-13 NOTE — BRIEF OPERATIVE NOTE - NSICDXBRIEFPREOP_GEN_ALL_CORE_FT
PRE-OP DIAGNOSIS:  Cholelithiasis with acute on chronic cholecystitis 13-May-2024 15:50:42  Lyssa Contreras

## 2024-05-14 ENCOUNTER — TRANSCRIPTION ENCOUNTER (OUTPATIENT)
Age: 74
End: 2024-05-14

## 2024-05-14 LAB
ALBUMIN SERPL ELPH-MCNC: 3.2 G/DL — LOW (ref 3.3–5.2)
ALP SERPL-CCNC: 71 U/L — SIGNIFICANT CHANGE UP (ref 40–120)
ALT FLD-CCNC: 213 U/L — HIGH
ANION GAP SERPL CALC-SCNC: 9 MMOL/L — SIGNIFICANT CHANGE UP (ref 5–17)
ANISOCYTOSIS BLD QL: SIGNIFICANT CHANGE UP
AST SERPL-CCNC: 224 U/L — HIGH
BASOPHILS # BLD AUTO: 0 K/UL — SIGNIFICANT CHANGE UP (ref 0–0.2)
BASOPHILS NFR BLD AUTO: 0 % — SIGNIFICANT CHANGE UP (ref 0–2)
BILIRUB DIRECT SERPL-MCNC: 0.2 MG/DL — SIGNIFICANT CHANGE UP (ref 0–0.3)
BILIRUB INDIRECT FLD-MCNC: 0.4 MG/DL — SIGNIFICANT CHANGE UP (ref 0.2–1)
BILIRUB SERPL-MCNC: 0.6 MG/DL — SIGNIFICANT CHANGE UP (ref 0.4–2)
BUN SERPL-MCNC: 13.6 MG/DL — SIGNIFICANT CHANGE UP (ref 8–20)
CALCIUM SERPL-MCNC: 8.8 MG/DL — SIGNIFICANT CHANGE UP (ref 8.4–10.5)
CHLORIDE SERPL-SCNC: 100 MMOL/L — SIGNIFICANT CHANGE UP (ref 96–108)
CO2 SERPL-SCNC: 29 MMOL/L — SIGNIFICANT CHANGE UP (ref 22–29)
CREAT SERPL-MCNC: 0.49 MG/DL — LOW (ref 0.5–1.3)
EGFR: 109 ML/MIN/1.73M2 — SIGNIFICANT CHANGE UP
ELLIPTOCYTES BLD QL SMEAR: SLIGHT — SIGNIFICANT CHANGE UP
EOSINOPHIL # BLD AUTO: 0 K/UL — SIGNIFICANT CHANGE UP (ref 0–0.5)
EOSINOPHIL NFR BLD AUTO: 0 % — SIGNIFICANT CHANGE UP (ref 0–6)
GLUCOSE BLDC GLUCOMTR-MCNC: 112 MG/DL — HIGH (ref 70–99)
GLUCOSE BLDC GLUCOMTR-MCNC: 135 MG/DL — HIGH (ref 70–99)
GLUCOSE BLDC GLUCOMTR-MCNC: 189 MG/DL — HIGH (ref 70–99)
GLUCOSE BLDC GLUCOMTR-MCNC: 97 MG/DL — SIGNIFICANT CHANGE UP (ref 70–99)
GLUCOSE SERPL-MCNC: 117 MG/DL — HIGH (ref 70–99)
HCT VFR BLD CALC: 33.2 % — LOW (ref 39–50)
HGB BLD-MCNC: 10.2 G/DL — LOW (ref 13–17)
HYPOCHROMIA BLD QL: SLIGHT — SIGNIFICANT CHANGE UP
LYMPHOCYTES # BLD AUTO: 0.57 K/UL — LOW (ref 1–3.3)
LYMPHOCYTES # BLD AUTO: 12 % — LOW (ref 13–44)
MAGNESIUM SERPL-MCNC: 1.8 MG/DL — SIGNIFICANT CHANGE UP (ref 1.6–2.6)
MANUAL SMEAR VERIFICATION: SIGNIFICANT CHANGE UP
MCHC RBC-ENTMCNC: 26.2 PG — LOW (ref 27–34)
MCHC RBC-ENTMCNC: 30.7 GM/DL — LOW (ref 32–36)
MCV RBC AUTO: 85.3 FL — SIGNIFICANT CHANGE UP (ref 80–100)
MICROCYTES BLD QL: SIGNIFICANT CHANGE UP
MONOCYTES # BLD AUTO: 0.24 K/UL — SIGNIFICANT CHANGE UP (ref 0–0.9)
MONOCYTES NFR BLD AUTO: 5.1 % — SIGNIFICANT CHANGE UP (ref 2–14)
NEUTROPHILS # BLD AUTO: 3.9 K/UL — SIGNIFICANT CHANGE UP (ref 1.8–7.4)
NEUTROPHILS NFR BLD AUTO: 82.9 % — HIGH (ref 43–77)
OVALOCYTES BLD QL SMEAR: SLIGHT — SIGNIFICANT CHANGE UP
PHOSPHATE SERPL-MCNC: 3.8 MG/DL — SIGNIFICANT CHANGE UP (ref 2.4–4.7)
PLAT MORPH BLD: NORMAL — SIGNIFICANT CHANGE UP
PLATELET # BLD AUTO: 159 K/UL — SIGNIFICANT CHANGE UP (ref 150–400)
POIKILOCYTOSIS BLD QL AUTO: SLIGHT — SIGNIFICANT CHANGE UP
POLYCHROMASIA BLD QL SMEAR: SIGNIFICANT CHANGE UP
POTASSIUM SERPL-MCNC: 4 MMOL/L — SIGNIFICANT CHANGE UP (ref 3.5–5.3)
POTASSIUM SERPL-SCNC: 4 MMOL/L — SIGNIFICANT CHANGE UP (ref 3.5–5.3)
PROT SERPL-MCNC: 5.8 G/DL — LOW (ref 6.6–8.7)
RBC # BLD: 3.89 M/UL — LOW (ref 4.2–5.8)
RBC # FLD: 17.2 % — HIGH (ref 10.3–14.5)
RBC BLD AUTO: ABNORMAL
SODIUM SERPL-SCNC: 138 MMOL/L — SIGNIFICANT CHANGE UP (ref 135–145)
TARGETS BLD QL SMEAR: SLIGHT — SIGNIFICANT CHANGE UP
WBC # BLD: 4.71 K/UL — SIGNIFICANT CHANGE UP (ref 3.8–10.5)
WBC # FLD AUTO: 4.71 K/UL — SIGNIFICANT CHANGE UP (ref 3.8–10.5)

## 2024-05-14 PROCEDURE — 99024 POSTOP FOLLOW-UP VISIT: CPT

## 2024-05-14 RX ORDER — MAGNESIUM SULFATE 500 MG/ML
2 VIAL (ML) INJECTION ONCE
Refills: 0 | Status: COMPLETED | OUTPATIENT
Start: 2024-05-14 | End: 2024-05-14

## 2024-05-14 RX ADMIN — Medication 25 GRAM(S): at 13:53

## 2024-05-14 RX ADMIN — Medication 400 MILLIGRAM(S): at 21:55

## 2024-05-14 RX ADMIN — Medication 1 GRAM(S): at 17:15

## 2024-05-14 RX ADMIN — ESCITALOPRAM OXALATE 10 MILLIGRAM(S): 10 TABLET, FILM COATED ORAL at 11:32

## 2024-05-14 RX ADMIN — Medication 1 APPLICATION(S): at 00:58

## 2024-05-14 RX ADMIN — Medication 650 MILLIGRAM(S): at 11:32

## 2024-05-14 RX ADMIN — Medication 1 GRAM(S): at 23:35

## 2024-05-14 RX ADMIN — Medication 1 GRAM(S): at 00:15

## 2024-05-14 RX ADMIN — MIDODRINE HYDROCHLORIDE 10 MILLIGRAM(S): 2.5 TABLET ORAL at 05:30

## 2024-05-14 RX ADMIN — Medication 650 MILLIGRAM(S): at 06:17

## 2024-05-14 RX ADMIN — Medication 1 APPLICATION(S): at 11:46

## 2024-05-14 RX ADMIN — ATORVASTATIN CALCIUM 80 MILLIGRAM(S): 80 TABLET, FILM COATED ORAL at 21:56

## 2024-05-14 RX ADMIN — POLYETHYLENE GLYCOL 3350 17 GRAM(S): 17 POWDER, FOR SOLUTION ORAL at 09:15

## 2024-05-14 RX ADMIN — Medication 400 MILLIGRAM(S): at 22:04

## 2024-05-14 RX ADMIN — TAMSULOSIN HYDROCHLORIDE 0.4 MILLIGRAM(S): 0.4 CAPSULE ORAL at 21:55

## 2024-05-14 RX ADMIN — Medication 400 MILLIGRAM(S): at 14:26

## 2024-05-14 RX ADMIN — Medication 81 MILLIGRAM(S): at 09:15

## 2024-05-14 RX ADMIN — Medication 1 APPLICATION(S): at 17:35

## 2024-05-14 RX ADMIN — SODIUM CHLORIDE 75 MILLILITER(S): 9 INJECTION, SOLUTION INTRAVENOUS at 09:14

## 2024-05-14 RX ADMIN — Medication 1 GRAM(S): at 06:16

## 2024-05-14 RX ADMIN — Medication 650 MILLIGRAM(S): at 23:36

## 2024-05-14 RX ADMIN — ERTAPENEM SODIUM 120 MILLIGRAM(S): 1 INJECTION, POWDER, LYOPHILIZED, FOR SOLUTION INTRAMUSCULAR; INTRAVENOUS at 17:10

## 2024-05-14 RX ADMIN — MIRTAZAPINE 15 MILLIGRAM(S): 45 TABLET, ORALLY DISINTEGRATING ORAL at 21:55

## 2024-05-14 RX ADMIN — Medication 650 MILLIGRAM(S): at 18:10

## 2024-05-14 RX ADMIN — Medication 650 MILLIGRAM(S): at 17:10

## 2024-05-14 RX ADMIN — Medication 650 MILLIGRAM(S): at 12:32

## 2024-05-14 RX ADMIN — CHLORHEXIDINE GLUCONATE 15 MILLILITER(S): 213 SOLUTION TOPICAL at 17:15

## 2024-05-14 RX ADMIN — Medication 400 MILLIGRAM(S): at 13:26

## 2024-05-14 RX ADMIN — Medication 325 MILLIGRAM(S): at 09:15

## 2024-05-14 RX ADMIN — ENOXAPARIN SODIUM 40 MILLIGRAM(S): 100 INJECTION SUBCUTANEOUS at 21:55

## 2024-05-14 RX ADMIN — Medication 1 APPLICATION(S): at 23:39

## 2024-05-14 RX ADMIN — FINASTERIDE 5 MILLIGRAM(S): 5 TABLET, FILM COATED ORAL at 09:15

## 2024-05-14 RX ADMIN — Medication 400 MILLIGRAM(S): at 06:17

## 2024-05-14 RX ADMIN — Medication 1 APPLICATION(S): at 06:45

## 2024-05-14 RX ADMIN — CHLORHEXIDINE GLUCONATE 15 MILLILITER(S): 213 SOLUTION TOPICAL at 06:17

## 2024-05-14 RX ADMIN — Medication 1 GRAM(S): at 11:32

## 2024-05-14 NOTE — DISCHARGE NOTE NURSING/CASE MANAGEMENT/SOCIAL WORK - NSCORESITESY/N_GEN_A_CORE_RD
COVID Screen    Does Patient OR patient's household members have any of the following:    · Temperature: Fever greater than or equal to 100.4 F   No   · Respiratory symptoms: New or worsening cough, shortness of breath, or sore throat   No   · GI Symptoms: New onset of nausea, vomiting or diarrhea   No   · Miscellaneous: New onset of loss of taste or smell, chills, repeated shaking with chills, muscle pain or headache   No   · Contact with anyone who has tested positive, suspected of having or pending test for covid?   No       If patient is scheduled for a non virtual appointment (in clinic):  • Patient informed of policy for masking while present for appointments and asked to bring own mask if able to and/or told a mask will be given at arrival to clinic   Yes  • Patient informed of visitor restrictions (if a visitor/chaperone is necessary please list name of person)   Yes  • Patient advised not to arrive more than 20 minutes before appointment time   Yes   No

## 2024-05-14 NOTE — PROGRESS NOTE ADULT - ASSESSMENT
73M admitted with drainage around perc gemini tube placed by IR 3/17 now POD#1 RA Cholecystectomy    - pain well controlled  - Total 4 days antibiotics, continue Invanz while inpatient, will switch to oral abx when ready for discharge  - pain control  - repeat LFT's in am, most likely elevated from OR manipulation  - DVT ppx  - Dispo to home with home care, wife requesting additional hours for home care aides

## 2024-05-14 NOTE — DISCHARGE NOTE NURSING/CASE MANAGEMENT/SOCIAL WORK - PATIENT PORTAL LINK FT
You can access the FollowMyHealth Patient Portal offered by [facility] by registering at the following website: [email or web address]. By joining [facility]’s FollowMyHealth portal, you will also be able to view your health information using other applications (apps) compatible with our system.

## 2024-05-15 ENCOUNTER — TRANSCRIPTION ENCOUNTER (OUTPATIENT)
Age: 74
End: 2024-05-15

## 2024-05-15 VITALS
DIASTOLIC BLOOD PRESSURE: 73 MMHG | RESPIRATION RATE: 18 BRPM | TEMPERATURE: 98 F | HEART RATE: 83 BPM | OXYGEN SATURATION: 98 % | SYSTOLIC BLOOD PRESSURE: 155 MMHG

## 2024-05-15 LAB
ALBUMIN SERPL ELPH-MCNC: 3.2 G/DL — LOW (ref 3.3–5.2)
ALP SERPL-CCNC: 84 U/L — SIGNIFICANT CHANGE UP (ref 40–120)
ALT FLD-CCNC: 192 U/L — HIGH
ANION GAP SERPL CALC-SCNC: 6 MMOL/L — SIGNIFICANT CHANGE UP (ref 5–17)
AST SERPL-CCNC: 115 U/L — HIGH
BASOPHILS # BLD AUTO: 0.02 K/UL — SIGNIFICANT CHANGE UP (ref 0–0.2)
BASOPHILS NFR BLD AUTO: 0.4 % — SIGNIFICANT CHANGE UP (ref 0–2)
BILIRUB DIRECT SERPL-MCNC: 0.1 MG/DL — SIGNIFICANT CHANGE UP (ref 0–0.3)
BILIRUB INDIRECT FLD-MCNC: 0.3 MG/DL — SIGNIFICANT CHANGE UP (ref 0.2–1)
BILIRUB SERPL-MCNC: 0.5 MG/DL — SIGNIFICANT CHANGE UP (ref 0.4–2)
BUN SERPL-MCNC: 7 MG/DL — LOW (ref 8–20)
CALCIUM SERPL-MCNC: 9.1 MG/DL — SIGNIFICANT CHANGE UP (ref 8.4–10.5)
CHLORIDE SERPL-SCNC: 103 MMOL/L — SIGNIFICANT CHANGE UP (ref 96–108)
CO2 SERPL-SCNC: 32 MMOL/L — HIGH (ref 22–29)
CREAT SERPL-MCNC: 0.4 MG/DL — LOW (ref 0.5–1.3)
EGFR: 116 ML/MIN/1.73M2 — SIGNIFICANT CHANGE UP
EOSINOPHIL # BLD AUTO: 0.03 K/UL — SIGNIFICANT CHANGE UP (ref 0–0.5)
EOSINOPHIL NFR BLD AUTO: 0.6 % — SIGNIFICANT CHANGE UP (ref 0–6)
GLUCOSE BLDC GLUCOMTR-MCNC: 118 MG/DL — HIGH (ref 70–99)
GLUCOSE BLDC GLUCOMTR-MCNC: 198 MG/DL — HIGH (ref 70–99)
GLUCOSE SERPL-MCNC: 115 MG/DL — HIGH (ref 70–99)
HCT VFR BLD CALC: 35.4 % — LOW (ref 39–50)
HGB BLD-MCNC: 10.9 G/DL — LOW (ref 13–17)
IMM GRANULOCYTES NFR BLD AUTO: 0.2 % — SIGNIFICANT CHANGE UP (ref 0–0.9)
LYMPHOCYTES # BLD AUTO: 0.52 K/UL — LOW (ref 1–3.3)
LYMPHOCYTES # BLD AUTO: 10.4 % — LOW (ref 13–44)
MAGNESIUM SERPL-MCNC: 1.7 MG/DL — SIGNIFICANT CHANGE UP (ref 1.6–2.6)
MCHC RBC-ENTMCNC: 26.3 PG — LOW (ref 27–34)
MCHC RBC-ENTMCNC: 30.8 GM/DL — LOW (ref 32–36)
MCV RBC AUTO: 85.3 FL — SIGNIFICANT CHANGE UP (ref 80–100)
MONOCYTES # BLD AUTO: 0.47 K/UL — SIGNIFICANT CHANGE UP (ref 0–0.9)
MONOCYTES NFR BLD AUTO: 9.4 % — SIGNIFICANT CHANGE UP (ref 2–14)
NEUTROPHILS # BLD AUTO: 3.96 K/UL — SIGNIFICANT CHANGE UP (ref 1.8–7.4)
NEUTROPHILS NFR BLD AUTO: 79 % — HIGH (ref 43–77)
PHOSPHATE SERPL-MCNC: 1.8 MG/DL — LOW (ref 2.4–4.7)
PLATELET # BLD AUTO: 163 K/UL — SIGNIFICANT CHANGE UP (ref 150–400)
POTASSIUM SERPL-MCNC: 3.7 MMOL/L — SIGNIFICANT CHANGE UP (ref 3.5–5.3)
POTASSIUM SERPL-SCNC: 3.7 MMOL/L — SIGNIFICANT CHANGE UP (ref 3.5–5.3)
PROT SERPL-MCNC: 6.7 G/DL — SIGNIFICANT CHANGE UP (ref 6.6–8.7)
RBC # BLD: 4.15 M/UL — LOW (ref 4.2–5.8)
RBC # FLD: 17.2 % — HIGH (ref 10.3–14.5)
SODIUM SERPL-SCNC: 141 MMOL/L — SIGNIFICANT CHANGE UP (ref 135–145)
WBC # BLD: 5.01 K/UL — SIGNIFICANT CHANGE UP (ref 3.8–10.5)
WBC # FLD AUTO: 5.01 K/UL — SIGNIFICANT CHANGE UP (ref 3.8–10.5)

## 2024-05-15 PROCEDURE — 93306 TTE W/DOPPLER COMPLETE: CPT

## 2024-05-15 PROCEDURE — 82962 GLUCOSE BLOOD TEST: CPT

## 2024-05-15 PROCEDURE — 87040 BLOOD CULTURE FOR BACTERIA: CPT

## 2024-05-15 PROCEDURE — 86850 RBC ANTIBODY SCREEN: CPT

## 2024-05-15 PROCEDURE — 85025 COMPLETE CBC W/AUTO DIFF WBC: CPT

## 2024-05-15 PROCEDURE — C9399: CPT

## 2024-05-15 PROCEDURE — 71045 X-RAY EXAM CHEST 1 VIEW: CPT

## 2024-05-15 PROCEDURE — 99291 CRITICAL CARE FIRST HOUR: CPT | Mod: 25

## 2024-05-15 PROCEDURE — 0042T: CPT | Mod: MC

## 2024-05-15 PROCEDURE — 88304 TISSUE EXAM BY PATHOLOGIST: CPT

## 2024-05-15 PROCEDURE — S2900: CPT

## 2024-05-15 PROCEDURE — 80053 COMPREHEN METABOLIC PANEL: CPT

## 2024-05-15 PROCEDURE — 83605 ASSAY OF LACTIC ACID: CPT

## 2024-05-15 PROCEDURE — 70498 CT ANGIOGRAPHY NECK: CPT | Mod: MC

## 2024-05-15 PROCEDURE — 83735 ASSAY OF MAGNESIUM: CPT

## 2024-05-15 PROCEDURE — 84100 ASSAY OF PHOSPHORUS: CPT

## 2024-05-15 PROCEDURE — 74177 CT ABD & PELVIS W/CONTRAST: CPT | Mod: MC

## 2024-05-15 PROCEDURE — 70450 CT HEAD/BRAIN W/O DYE: CPT | Mod: MC

## 2024-05-15 PROCEDURE — 80048 BASIC METABOLIC PNL TOTAL CA: CPT

## 2024-05-15 PROCEDURE — 86901 BLOOD TYPING SEROLOGIC RH(D): CPT

## 2024-05-15 PROCEDURE — C1889: CPT

## 2024-05-15 PROCEDURE — 85610 PROTHROMBIN TIME: CPT

## 2024-05-15 PROCEDURE — 87086 URINE CULTURE/COLONY COUNT: CPT

## 2024-05-15 PROCEDURE — 88300 SURGICAL PATH GROSS: CPT

## 2024-05-15 PROCEDURE — 99024 POSTOP FOLLOW-UP VISIT: CPT

## 2024-05-15 PROCEDURE — 80076 HEPATIC FUNCTION PANEL: CPT

## 2024-05-15 PROCEDURE — 93005 ELECTROCARDIOGRAM TRACING: CPT

## 2024-05-15 PROCEDURE — 85730 THROMBOPLASTIN TIME PARTIAL: CPT

## 2024-05-15 PROCEDURE — 70496 CT ANGIOGRAPHY HEAD: CPT | Mod: MC

## 2024-05-15 PROCEDURE — 86900 BLOOD TYPING SEROLOGIC ABO: CPT

## 2024-05-15 PROCEDURE — 36415 COLL VENOUS BLD VENIPUNCTURE: CPT

## 2024-05-15 RX ORDER — IBUPROFEN 200 MG
1 TABLET ORAL
Qty: 0 | Refills: 0 | DISCHARGE
Start: 2024-05-15

## 2024-05-15 RX ORDER — POTASSIUM PHOSPHATE, MONOBASIC POTASSIUM PHOSPHATE, DIBASIC 236; 224 MG/ML; MG/ML
15 INJECTION, SOLUTION INTRAVENOUS ONCE
Refills: 0 | Status: COMPLETED | OUTPATIENT
Start: 2024-05-15 | End: 2024-05-15

## 2024-05-15 RX ORDER — MAGNESIUM SULFATE 500 MG/ML
2 VIAL (ML) INJECTION ONCE
Refills: 0 | Status: COMPLETED | OUTPATIENT
Start: 2024-05-15 | End: 2024-05-15

## 2024-05-15 RX ADMIN — Medication 1 APPLICATION(S): at 05:43

## 2024-05-15 RX ADMIN — SODIUM CHLORIDE 75 MILLILITER(S): 9 INJECTION, SOLUTION INTRAVENOUS at 05:43

## 2024-05-15 RX ADMIN — Medication 650 MILLIGRAM(S): at 00:04

## 2024-05-15 RX ADMIN — Medication 650 MILLIGRAM(S): at 11:47

## 2024-05-15 RX ADMIN — ESCITALOPRAM OXALATE 10 MILLIGRAM(S): 10 TABLET, FILM COATED ORAL at 11:46

## 2024-05-15 RX ADMIN — Medication 81 MILLIGRAM(S): at 11:47

## 2024-05-15 RX ADMIN — Medication 1 APPLICATION(S): at 11:46

## 2024-05-15 RX ADMIN — POTASSIUM PHOSPHATE, MONOBASIC POTASSIUM PHOSPHATE, DIBASIC 62.5 MILLIMOLE(S): 236; 224 INJECTION, SOLUTION INTRAVENOUS at 08:46

## 2024-05-15 RX ADMIN — Medication 25 GRAM(S): at 08:46

## 2024-05-15 RX ADMIN — POLYETHYLENE GLYCOL 3350 17 GRAM(S): 17 POWDER, FOR SOLUTION ORAL at 11:48

## 2024-05-15 RX ADMIN — Medication 650 MILLIGRAM(S): at 05:43

## 2024-05-15 RX ADMIN — Medication 650 MILLIGRAM(S): at 05:42

## 2024-05-15 RX ADMIN — Medication 1 GRAM(S): at 05:42

## 2024-05-15 RX ADMIN — CHLORHEXIDINE GLUCONATE 15 MILLILITER(S): 213 SOLUTION TOPICAL at 05:42

## 2024-05-15 RX ADMIN — FINASTERIDE 5 MILLIGRAM(S): 5 TABLET, FILM COATED ORAL at 11:47

## 2024-05-15 RX ADMIN — Medication 400 MILLIGRAM(S): at 05:42

## 2024-05-15 RX ADMIN — Medication 400 MILLIGRAM(S): at 05:43

## 2024-05-15 RX ADMIN — MIDODRINE HYDROCHLORIDE 10 MILLIGRAM(S): 2.5 TABLET ORAL at 11:47

## 2024-05-15 RX ADMIN — Medication 325 MILLIGRAM(S): at 11:46

## 2024-05-15 RX ADMIN — MIRTAZAPINE 15 MILLIGRAM(S): 45 TABLET, ORALLY DISINTEGRATING ORAL at 11:47

## 2024-05-15 RX ADMIN — Medication 1 GRAM(S): at 11:47

## 2024-05-15 NOTE — DIETITIAN INITIAL EVALUATION ADULT - OTHER INFO
73M admitted with drainage around perc gemini tube placed by IR 3/17 now POD#1 RA Cholecystectomy. PMH of HTN, HLD, CAD,  73M admitted with drainage around perc gemini tube placed by IR 3/17 now POD#1 RA Cholecystectomy. PMH of HTN, HLD, CAD, DM2, SCC mandible s/p resection/chemo, s/p PEG placement.

## 2024-05-15 NOTE — PROGRESS NOTE ADULT - NS ATTEND AMEND GEN_ALL_CORE FT
73-year-old male with pmhx of DM, HTN, HLD, CAD s/p stents, and SCC of maxilla s/p RT/chemo with hx of cholecystitis s/p cholecystostomy tube now with decreased drainage from cholecystostomy tube and purulent drainage around catheter concerning for soft tissue infection     #cholecystitis s/p RAL cholecystectomy with removal of cholecystostomy tube and incision and drainage of tube site   - IV antibiotics x4 days post op   - wet to dry packing of I&D site   - oral diet as tolerated   - nutritional supplementation via peg   - calorie count   - SW / PT for safe discharge planning   - DVT ppx     CAD s/p stent, hx of HTN   - appreciate cardiology input   - c/w home midodrine   - c/w ASA  - Will consider holding midodrine     #GERD: c/w PPI  #Diabetes Mellitus: FS. ISS. Maintain euglycemia. hold farxiga   #BPH: c/w flomax   #Hyperlipidemia: c/w statin.
Above assessment noted.  The patient was seen and examined by myself with the surgical PA. The patient is without abdominal pain, nausea, or vomit.  The patient is without new events overnight.  The patient remains with the cholecystostomy tube in place.  For OR tomorrow for cholecystectomy.
Plan for OR on this admission  NPO  IV fluids  Reassess post-op
73-year-old male with pmhx of DM, HTN, HLD, CAD s/p stents, and SCC of maxilla s/p RT/chemo with hx of cholecystitis s/p cholecystostomy tube now with decreased drainage from cholecystostomy tube and purulent drainage around catheter concerning for soft tissue infection     #cholecystitis s/p RAL cholecystectomy with removal of cholecystostomy tube and incision and drainage of tube site   - IV antibiotics x4 days post op   - wet to dry packing of I&D site   - oral diet as tolerated   - nutritional supplementation via peg as needed  - SW / PT for safe discharge planning home today   - DVT ppx     CAD s/p stent, hx of HTN   - appreciate cardiology input   - c/w home midodrine   - c/w ASA  - Will consider weaning midodrine outpatient      #GERD: c/w PPI  #Diabetes Mellitus: FS. ISS. Maintain euglycemia. hold farxiga   #BPH: c/w flomax   #Hyperlipidemia: c/w statin.

## 2024-05-15 NOTE — DISCHARGE NOTE PROVIDER - NSDCMRMEDTOKEN_GEN_ALL_CORE_FT
acetaminophen 650 mg/20.3 mL oral liquid: 20.3 milliliter(s) orally every 6 hours as needed for  fever  Artificial Tears ophthalmic ointment: 1 application in each eye 4 times a day  aspirin 81 mg oral capsule: 1 cap(s) orally once a day  docusate sodium 100 mg oral capsule: 1 cap(s) orally once a day  ergocalciferol 1.25 mg (50,000 intl units) oral capsule: 1 cap(s) orally once a week  Farxiga 10 mg oral tablet: 1 tab(s) orally once a day  ferrous sulfate 325 mg (65 mg elemental iron) oral delayed release tablet: 1 tab(s) orally once a day  finasteride 5 mg oral tablet: 1 tab(s) orally once a day  Flomax 0.4 mg oral capsule: 1 cap(s) orally once a day  ibuprofen 400 mg oral tablet: 1 tab(s) orally every 8 hours  Janumet 50 mg-1000 mg oral tablet: 1 tab(s) orally 2 times a day (resume on 3/27)  Lexapro 10 mg oral tablet: 1 tab(s) orally once a day  Lipitor 20 mg oral tablet: 1 tab(s) orally once a day REsume on 4/1/20204  metFORMIN 500 mg oral tablet: 1 tab(s) orally once a day  midodrine 5 mg oral tablet: 2 tab(s) orally 3 times a day  MiraLax oral powder for reconstitution: 17 gram(s) orally once a day  mirtazapine 15 mg oral tablet: 1 tab(s) orally once a day  Multiple Vitamins oral tablet: 1 tab(s) orally once a day  pantoprazole 40 mg oral delayed release tablet: 1 tab(s) orally 2 times a day  Peridex 0.12% mucous membrane liquid: 15 milliliter(s) orally 2 times a day Swish and spit, do not swallow  Prevident Dental Rinse 0.02% topical solution: Apply topically to affected area 2 times a day  senna (sennosides) 8.6 mg oral tablet: 1 tab(s) orally once a day  sucralfate 1 g oral tablet: 1 tab(s) orally 4 times a day

## 2024-05-15 NOTE — DIETITIAN INITIAL EVALUATION ADULT - REASON INDICATOR FOR ASSESSMENT
MST Score >/ 2  Enteral nutrition  1) Provided extensive DM2 and heart healthy diet education. Included the following: Consistent CHO diet with appropriate CHO portions sizes per meal/snack; basic BG monitoring and recommended frequency; physical activity promotion (per MD discretion/as tolerated) 150 min/week moderate intensity; target BG ranges/reduction in A1c; balanced diet promotion with inclusion of vegetables as able; limiting sodium, concentrated sweets, and saturated fat intake. 2) Reinforce diet education prn and as pt willing/receptive./Nutrition relationship to health/disease/Recommended modifications/Purpose of the nutrition education Purpose of the nutrition education/1) Provided extensive DM2 and heart healthy diet education. Included the following: Consistent CHO diet with appropriate CHO portions sizes per meal/snack; basic BG monitoring and recommended frequency; physical activity promotion (per MD discretion/as tolerated) 150 min/week moderate intensity; target BG ranges/reduction in A1c; balanced diet promotion with inclusion of vegetables as able; limiting sodium, concentrated sweets, and saturated fat intake. 2) Reinforce diet education prn and as pt willing/receptive. Handouts provided/Recommended modifications/Nutrition relationship to health/disease

## 2024-05-15 NOTE — DIETITIAN INITIAL EVALUATION ADULT - ENTERAL
Recommend bolus via PEG: Glucerna 1.5 237 ml per feed 3x/day to provide 711 ml, 1068 kcal, 59g protein, 540 ml free water.

## 2024-05-15 NOTE — DIETITIAN INITIAL EVALUATION ADULT - PERTINENT MEDS FT
MEDICATIONS  (STANDING):  acetaminophen     Tablet .. 650 milliGRAM(s) Oral every 6 hours  aspirin  chewable 81 milliGRAM(s) Oral daily  atorvastatin 80 milliGRAM(s) Oral at bedtime  chlorhexidine 0.12% Liquid 15 milliLiter(s) Swish and Spit two times a day  enoxaparin Injectable 40 milliGRAM(s) SubCutaneous every 24 hours  ertapenem  IVPB      ertapenem  IVPB 1000 milliGRAM(s) IV Intermittent every 24 hours  escitalopram 10 milliGRAM(s) Oral daily  ferrous    sulfate 325 milliGRAM(s) Oral daily  finasteride 5 milliGRAM(s) Oral daily  ibuprofen  Tablet. 400 milliGRAM(s) Oral every 8 hours  lactated ringers. 1000 milliLiter(s) (75 mL/Hr) IV Continuous <Continuous>  midodrine. 10 milliGRAM(s) Oral three times a day  mirtazapine 15 milliGRAM(s) Oral daily  petrolatum Ophthalmic Ointment 1 Application(s) Both EYES four times a day  polyethylene glycol 3350 17 Gram(s) Oral daily  senna 1 Tablet(s) Oral at bedtime  sucralfate 1 Gram(s) Oral four times a day  tamsulosin 0.4 milliGRAM(s) Oral at bedtime    MEDICATIONS  (PRN):  ondansetron Injectable 4 milliGRAM(s) IV Push every 6 hours PRN Nausea   MEDICATIONS  (STANDING):  atorvastatin 80 milliGRAM(s) Oral at bedtime  enoxaparin Injectable 40 milliGRAM(s) SubCutaneous every 24 hours   ertapenem  IVPB 1000 milliGRAM(s) IV Intermittent every 24 hours  ferrous    sulfate 325 milliGRAM(s) Oral daily  finasteride 5 milliGRAM(s) Oral daily  lactated ringers. 1000 milliLiter(s) (75 mL/Hr) IV Continuous <Continuous>  midodrine. 10 milliGRAM(s) Oral three times a day  mirtazapine 15 milliGRAM(s) Oral daily  polyethylene glycol 3350 17 Gram(s) Oral daily  senna 1 Tablet(s) Oral at bedtime  sucralfate 1 Gram(s) Oral four times a day    MEDICATIONS  (PRN):  ondansetron Injectable 4 milliGRAM(s) IV Push every 6 hours PRN Nausea

## 2024-05-15 NOTE — PROGRESS NOTE ADULT - ASSESSMENT
Assessment: 73M admitted with drainage around perc gemini tube placed by IR 3/17 now POD#1 RA Cholecystectomy.    Plan:  - pain well controlled  - Total 4 days antibiotics, continue Invanz while inpatient, will switch to oral abx when ready for discharge (End date 5/17)  - pain control  - LFT's downtrending today, most likely elevated from OR manipulation  - Continue Minced and moist diet w/ moderately thick liquids  - f/u with Dr. Frazier from oncology as outpatient  - DVT ppx  - PT pending  - Most likely dispo to home with home care, wife requesting additional hours for home care aides

## 2024-05-15 NOTE — DIETITIAN INITIAL EVALUATION ADULT - PERTINENT LABORATORY DATA
05-15    141  |  103  |  7.0<L>  ----------------------------<  115<H>  3.7   |  32.0<H>  |  0.40<L>    Ca    9.1      15 May 2024 04:45  Phos  1.8     05-15  Mg     1.7     05-15    TPro  6.7  /  Alb  3.2<L>  /  TBili  0.5  /  DBili  0.1  /  AST  115<H>  /  ALT  192<H>  /  AlkPhos  84  05-15  POCT Blood Glucose.: 118 mg/dL (05-15-24 @ 08:18)  A1C with Estimated Average Glucose Result: 7.8 % (03-20-24 @ 02:45)  A1C with Estimated Average Glucose Result: 6.5 % (11-17-23 @ 09:30)  A1C with Estimated Average Glucose Result: 7.4 % (11-10-23 @ 19:00)   05-15    141  |  103  |  7.0<L>  ----------------------------<  115<H>  3.7   |  32.0<H>  |  0.40<L>    Ca    9.1      15 May 2024 04:45  Phos  1.8     05-15  Mg     1.7     05-15    TPro  6.7  /  Alb  3.2<L>  /  TBili  0.5  /  DBili  0.1  /  AST  115<H>  /  ALT  192<H>  /  AlkPhos  84  05-15  POCT Blood Glucose.: 118 mg/dL (05-15-24 @ 08:18)  A1C with Estimated Average Glucose Result: 7.8 % (03-20-24 @ 02:45)

## 2024-05-15 NOTE — DIETITIAN INITIAL EVALUATION ADULT - ORAL INTAKE PTA/DIET HISTORY
Nutrition assessment completed. Pt reports not eating much via po. States at home he eats orally but also uses PEG for additional nutrition. States regimen is Glucerna 1.5 2-3 cans/day. Reports most recent weight 132 lbs; admission weight of 175 lbs appears inaccurate. Weight in 11/2023 was 155 lbs. Currently on a minced & moist with moderately thick liquids- has not been seen by SLP for swallow evaluation. RD to follow up as feasible.

## 2024-05-15 NOTE — PROGRESS NOTE ADULT - SUBJECTIVE AND OBJECTIVE BOX
Subjective: Patient was seen and examined this AM. Resting comfortably in bed with no acute complaints overnight. Dressing taken down with minimal drainage noted. Mild erythema around perc gemini site. Dressing changed and lightly packed with gauze. PEG remains in place with bumper at skin. Remains on Invanz with no leukocytosis. PT remains pending.       STATUS POST: Robotic assisted cholecystectomy    POST OPERATIVE DAY #: 2    MEDICATIONS  (STANDING):  acetaminophen     Tablet .. 650 milliGRAM(s) Oral every 6 hours  aspirin  chewable 81 milliGRAM(s) Oral daily  atorvastatin 80 milliGRAM(s) Oral at bedtime  chlorhexidine 0.12% Liquid 15 milliLiter(s) Swish and Spit two times a day  enoxaparin Injectable 40 milliGRAM(s) SubCutaneous every 24 hours  ertapenem  IVPB      ertapenem  IVPB 1000 milliGRAM(s) IV Intermittent every 24 hours  escitalopram 10 milliGRAM(s) Oral daily  ferrous    sulfate 325 milliGRAM(s) Oral daily  finasteride 5 milliGRAM(s) Oral daily  ibuprofen  Tablet. 400 milliGRAM(s) Oral every 8 hours  lactated ringers. 1000 milliLiter(s) (75 mL/Hr) IV Continuous <Continuous>  magnesium sulfate  IVPB 2 Gram(s) IV Intermittent once  midodrine. 10 milliGRAM(s) Oral three times a day  mirtazapine 15 milliGRAM(s) Oral daily  petrolatum Ophthalmic Ointment 1 Application(s) Both EYES four times a day  polyethylene glycol 3350 17 Gram(s) Oral daily  potassium phosphate IVPB 15 milliMole(s) IV Intermittent once  senna 1 Tablet(s) Oral at bedtime  sucralfate 1 Gram(s) Oral four times a day  tamsulosin 0.4 milliGRAM(s) Oral at bedtime    MEDICATIONS  (PRN):  ondansetron Injectable 4 milliGRAM(s) IV Push every 6 hours PRN Nausea      Vital Signs Last 24 Hrs  T(C): 36.7 (15 May 2024 04:23), Max: 36.8 (14 May 2024 10:26)  T(F): 98.1 (15 May 2024 04:23), Max: 98.3 (14 May 2024 10:26)  HR: 83 (15 May 2024 04:23) (71 - 83)  BP: 121/75 (15 May 2024 04:23) (104/63 - 158/78)  BP(mean): 99 (14 May 2024 11:30) (99 - 99)  RR: 16 (15 May 2024 04:23) (16 - 18)  SpO2: 97% (15 May 2024 04:23) (94% - 97%)    Parameters below as of 15 May 2024 04:23  Patient On (Oxygen Delivery Method): room air      05-14  -  05-15  --------------------------------------------------------  IN:  Total IN: 0 mL    OUT:    Voided (mL): 2150 mL  Total OUT: 2150 mL    Total NET: -2150 mL      Physical Exam:    Constitutional: NAD  Neck: No JVD, FROM without pain  Respiratory: Respirations non-labored, no accessory muscle use  Cardiovascular: Regular rate & rhythm  Gastrointestinal: Soft, non-tender, non-distended, PEG in place with bumper at skin, perc gemini site with mild erythema and packed lightly with gauze  Extremities: No peripheral edema, No cyanosis  Neurological: A&O x 3; without gross deficit      LABS:                        10.9   5.01  )-----------( 163      ( 15 May 2024 04:45 )             35.4     05-15    141  |  103  |  7.0<L>  ----------------------------<  115<H>  3.7   |  32.0<H>  |  0.40<L>    Ca    9.1      15 May 2024 04:45  Phos  1.8     05-15  Mg     1.7     05-15    TPro  6.7  /  Alb  3.2<L>  /  TBili  0.5  /  DBili  0.1  /  AST  115<H>  /  ALT  192<H>  /  AlkPhos  84  05-15    PT/INR - ( 13 May 2024 10:36 )   PT: 11.8 sec;   INR: 1.07 ratio         PTT - ( 13 May 2024 10:36 )  PTT:36.2 sec  Urinalysis Basic - ( 15 May 2024 04:45 )    Color: x / Appearance: x / SG: x / pH: x  Gluc: 115 mg/dL / Ketone: x  / Bili: x / Urobili: x   Blood: x / Protein: x / Nitrite: x   Leuk Esterase: x / RBC: x / WBC x   Sq Epi: x / Non Sq Epi: x / Bacteria: x

## 2024-05-15 NOTE — DISCHARGE NOTE PROVIDER - NSFOLLOWUPCLINICS_GEN_ALL_ED_FT
[facility] Acute Care Surgery  Acute Care Surgery  [address] [place] [id number]  Phone: [phone]  Fax:

## 2024-05-15 NOTE — DIETITIAN INITIAL EVALUATION ADULT - ADD RECOMMEND
Continue Remeron to potentially increase appetite. Continue ferrous sulfate supplementation. Encourage po intake, monitor diet tolerance, and provide assistance at meals as needed. Obtain daily weights to monitor trends.

## 2024-05-15 NOTE — DIETITIAN INITIAL EVALUATION ADULT - NS FNS DIET ORDER
Diet, Minced and Moist:   Moderately Thick Liquids (MODTHICKLIQS)  Lacto-Ovo Veg (Accepts Milk Prod., Eggs) (05-14-24 @ 12:28)

## 2024-05-15 NOTE — DISCHARGE NOTE PROVIDER - HOSPITAL COURSE
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  He was c/o several days of abdominal pain and concern for some purulence from the site of cholecystostomy tube. He was noted to be hypotensive with elevated lactate in ED.  Admitted to Surgery Service, placed on IV Abx and planned for Cholecystectomy.  Hospital course uneventful except for delays in getting to OR due to availability.  Underwent Robo Shu on 5/13, gallbladder removed as well as Perc Shu Tube.  Returned to floor post op.  Post op course with continuation of IV antibiotics and dressing changes.  Seen by PT and cleared to go home with home PT.  Currently stable for discharge at this time.

## 2024-05-15 NOTE — DISCHARGE NOTE PROVIDER - NSDCFUSCHEDAPPT_GEN_ALL_CORE_FT
[facility]  [facility] [address]  Scheduled Appointment: 05/23/2024    Parish Godfrey  [facility] Physician [facility]  GASTRO [address]  Scheduled Appointment: 07/08/2024    [facility]  DENTAL [address]  Scheduled Appointment: 07/15/2024

## 2024-05-15 NOTE — DIETITIAN NUTRITION RISK NOTIFICATION - TREATMENT: THE FOLLOWING DIET HAS BEEN RECOMMENDED
Diet, Minced and Moist:   Moderately Thick Liquids (MODTHICKLIQS)  Lacto-Ovo Veg (Accepts Milk Prod., Eggs) (05-14-24 @ 12:28) [Active]

## 2024-05-15 NOTE — DIETITIAN NUTRITION RISK NOTIFICATION - ADDITIONAL COMMENTS/DIETITIAN RECOMMENDATIONS
Consider formal SLP swallow evaluation.   Recommend bolus via PEG: Glucerna 1.5 237 ml per feed 3x/day to provide 711 ml, 1068 kcal, 59g protein, 540 ml free water.   Continue Remeron to potentially increase appetite. Continue ferrous sulfate supplementation. Encourage po intake, monitor diet tolerance, and provide assistance at meals as needed. Obtain daily weights to monitor trends.

## 2024-05-15 NOTE — DISCHARGE NOTE PROVIDER - DETAILS OF MALNUTRITION DIAGNOSIS/DIAGNOSES
This patient has been assessed with a concern for Malnutrition and was treated during this hospitalization for the following Nutrition diagnosis/diagnoses:     -  05/15/2024: Severe protein-calorie malnutrition   -  05/15/2024: Underweight (BMI < 19)

## 2024-05-15 NOTE — DIETITIAN INITIAL EVALUATION ADULT - SIGNS/SYMPTOMS
as evidenced by sev muscle/fat loss, 14.8% wt loss x ~6 mo, likely meeting <75% nutrient needs >1 mo

## 2024-05-15 NOTE — DIETITIAN INITIAL EVALUATION ADULT - ETIOLOGY
related to inability to meet sufficient protein-energy in setting of SCC mandible, lack of appetite, s/p cholecystectomy

## 2024-05-16 LAB — SURGICAL PATHOLOGY STUDY: SIGNIFICANT CHANGE UP

## 2024-05-19 LAB
ALBUMIN SERPL ELPH-MCNC: 4.1 G/DL
ALP BLD-CCNC: 120 U/L
ALT SERPL-CCNC: 15 U/L
ANION GAP SERPL CALC-SCNC: 13 MMOL/L
AST SERPL-CCNC: 13 U/L
BILIRUB SERPL-MCNC: 0.5 MG/DL
BUN SERPL-MCNC: 16 MG/DL
CALCIUM SERPL-MCNC: 9.6 MG/DL
CHLORIDE SERPL-SCNC: 99 MMOL/L
CO2 SERPL-SCNC: 27 MMOL/L
CREAT SERPL-MCNC: 0.53 MG/DL
EGFR: 106 ML/MIN/1.73M2
GLUCOSE SERPL-MCNC: 124 MG/DL
MAGNESIUM SERPL-MCNC: 1.8 MG/DL
POTASSIUM SERPL-SCNC: 4 MMOL/L
PROT SERPL-MCNC: 6.9 G/DL
SODIUM SERPL-SCNC: 139 MMOL/L
T4 SERPL-MCNC: 7 UG/DL
TSH SERPL-ACNC: 1.34 UIU/ML

## 2024-05-23 ENCOUNTER — APPOINTMENT (OUTPATIENT)
Dept: THORACIC SURGERY | Facility: CLINIC | Age: 74
End: 2024-05-23
Payer: MEDICARE

## 2024-05-23 VITALS
RESPIRATION RATE: 16 BRPM | DIASTOLIC BLOOD PRESSURE: 57 MMHG | HEART RATE: 89 BPM | HEIGHT: 71 IN | WEIGHT: 127 LBS | BODY MASS INDEX: 17.78 KG/M2 | OXYGEN SATURATION: 98 % | TEMPERATURE: 97.7 F | SYSTOLIC BLOOD PRESSURE: 90 MMHG

## 2024-05-23 DIAGNOSIS — R91.1 SOLITARY PULMONARY NODULE: ICD-10-CM

## 2024-05-23 PROCEDURE — 99213 OFFICE O/P EST LOW 20 MIN: CPT

## 2024-05-28 NOTE — ASSESSMENT
[FreeTextEntry1] : I had the pleasure of seeing Mr. VAZQUEZ in the office today to discuss .  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   I have fully reviewed and evaluated all available results. Today we reviewed the most recent imaging, which was a PET scan, post-treatment, done on 4/19/2024. There is unchanged mild bilateral hilar uptake, which is considered nonspecific and is likely reactive, as well as a stable punctate lateral right lower lobe pulmonary nodule without abnormal uptake. There were no additional findings of FDG avid malignancy.  We also discussed his tube feeding, which he has been tolerating but he remains weak and thin. He does eat some pureed food as well. It appears that given the extent of his recent treatment, he may need some nutritional supplementation and further gastroenterology evaluation.  All questions were answered and concerns were addressed.   Plan: - GI follow up with Dr. Kade Bliss - Return to care as needed - Patient is in full understanding and agreement with the plan going forward.   I, Dr. Andrew Bliss, personally performed the evaluation and management (E/M) services for this established patient who presents today with an existing condition.  That E/M includes conducting the examination, assessing all conditions, and establishing a new plan of care.  Today, Ying Rajan PA-C, was here to observe my evaluation and management services for this/these condition(s) to be followed going forward.

## 2024-05-28 NOTE — REVIEW OF SYSTEMS
[Feeling Tired] : feeling tired [Abdominal Pain] : abdominal pain [Negative] : Genitourinary [Fever] : no fever [Chills] : no chills

## 2024-05-28 NOTE — HISTORY OF PRESENT ILLNESS
[FreeTextEntry1] : Mr. PRATIMA VAZQUEZ is a 73 year male who presents today for follow-up. Previously, he has been followed for a lung nodule. 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 He was recently hospitalized for severe abdominal pain and found to have acute cholecystitis with contained perforation s/p percutaneous cholecystostomy on 3/17/2024 and ultimately ERCP with sphincterotomy. He recovered from septic shock and was discharged to home with long term IV antibiotics.  Post treatment PET on 4/19/24 was clear of FDG avid malignancy.  Additional past medical history includes HTN, HLD, CAD with 2 stents, Diabetes on Janumet, STEMI, cardiac stenting in 2021.   Today, the patient reports not eating enough but does have nutrition supplements and is eating pureed foods. He also is on tube feeding by bolus feeding. He otherwise does not have much of an appetite. He had radiation and chemotherapy which completed end of February. He arrives in a wheelchair today but is able to stand independently. He generally feels weak.  The patient is accompanied by his wife, Emmanuel, who assists with history taking.

## 2024-05-28 NOTE — DATA REVIEWED
[FreeTextEntry1] : PETCT TEVIN-CORRY ONC FDG SUBS performed through [facility] PROCEDURE DATE:  04/19/2024 INTERPRETATION:  CLINICAL INFORMATION: 73 years-old Male with squamous cell carcinoma of the RIGHT maxillary alveolar ridge. Resection 10/2023. Chemoradiation completed 2/2024.  TREATMENT STRATEGY EVALUATION: Subsequent FASTING BLOOD SUGAR: 108 mg/dl RADIOPHARMACEUTICAL: 12.02 mCi F-18 FDG, I.V. I.V. SITE: forearm left UPTAKE PERIOD: 48 min SCANNER: [employer] 710 ORAL CONTRAST: NONE  TECHNIQUE: Following intravenous injection of radiopharmaceutical and above uptake period, PET/CT was obtained from base of skull to mid-thigh followed by dedicated images of the head and neck. CT protocol was optimized for PET attenuation correction and anatomic localization and was not designed to produce and cannot replace state-of-the-art diagnostic CT images with specific imaging protocols for different body parts and indications. Images were reconstructed and reviewed in axial, coronal and sagittal views and three-dimensional MIP.  Standardized uptake values ("SUV") are normalized to patient body weight and indicate the highest activity concentration (SUVmax) in a given disease site. All image numbers refer to axial image numbers. Unless otherwise noted, image numbers in the "HEAD/NECK" section refer to the dedicated head and neck series.  COMPARISON: 10/12/2023  OTHER STUDIES USED FOR CORRELATION:  FINDINGS:  HEAD/NECK: Previously seen RIGHT maxillary erosive lesion has been resected. There is diffuse mild uptake now seen in this region, which is now occupied by surgical clips and shows some mild soft tissue infiltration, consistent with posttreatment change. Uptake at the LEFT tongue border has greatly diminished.. Adenoids, tonsils, nasopharynx, oropharynx, hypopharynx, larynx, and trachea without suspicious uptake, mass, or other anatomic abnormality. Vocal cords exhibit symmetric, physiologic activity.  No deep or superficial pathologic lymphadenopathy. Mild uptake Previously seen hypermetabolic nodes have resolved.  Symmetric activity and normal low-dose CT appearance at bilateral salivary glands.  Thyroid gland metabolically and morphologically normal.  Extraocular muscles, optic nerves, and eye globes without suspicious findings. No abnormal sinus uptake or opacification.  Remaining skin and soft tissues of scalp and neck without suspicious uptake. Visible brain parenchyma exhibits symmetric, physiologic activity. Remaining nonosseous head and neck without additional suspicious findings.  There is carotid atherosclerosis.  CHEST: Mild BILATERAL hilar uptake is again seen, remains nonspecific and likely reactive.  LUNGS: No abnormal FDG activity. Stable punctate lateral RIGHT LOWER lobe pulmonary nodule (image 96), below resolution of PET without abnormal uptake.  PLEURA/PERICARDIUM: No abnormal FDG activity. No effusions.  ESOPHAGUS/STOMACH: No abnormal FDG activity.  HEPATOBILIARY/PANCREAS: Increased uptake along percutaneous cholecystostomy tube, likely postprocedural. Otherwise unremarkable hepatobiliary uptake. For reference, liver SUV mean is 2.3, previously 2.5.  SPLEEN: Physiologic FDG activity. Normal size.  ADRENAL GLANDS: No abnormal FDG activity. No nodule(s).  KIDNEYS/URINARY BLADDER: Physiologic excreted FDG activity.  REPRODUCTIVE ORGANS: No abnormal FDG activity.  ABDOMINOPELVIC NODES: No enlarged or FDG-avid lymph node.  BOWEL/PERITONEUM/MESENTERY: No abnormal FDG activity.  ABDOMINAL WALL: Cholecystostomy tube. Gastrostomy tube with balloon in gastric lumen.  BONES/SOFT TISSUES: No abnormal FDG activity.  VESSELS: Aortoiliac atherosclerosis without aneurysm.  IMPRESSION: 1.  Post treatment changes at resected RIGHT maxillary lesion without lore evidence of residual uptake. 2.  Previously seen nodes in the neck appear to have resolved. 3.  Unchanged mild BILATERAL hilar uptake, nonspecific and likely reactive. 4.  Stable punctate RIGHT lung micronodule below resolution of PET and without abnormal uptake. 5.  Findings consistent with recent cholecystostomy tube. Attention to follow-up. 6.  Placement of gastrostomy tube. 7.  Otherwise, no evidence of FDG-avid malignancy.  --- End of Report ---

## 2024-05-28 NOTE — PHYSICAL EXAM
[Sclera] : the sclera and conjunctiva were normal [Respiration, Rhythm And Depth] : normal respiratory rhythm and effort [Auscultation Breath Sounds / Voice Sounds] : lungs were clear to auscultation bilaterally [Heart Rate And Rhythm] : heart rate was normal and rhythm regular [Heart Sounds] : normal S1 and S2 [2+] : left 2+ [Bowel Sounds] : normal bowel sounds [Skin Color & Pigmentation] : normal skin color and pigmentation [Oriented To Time, Place, And Person] : oriented to person, place, and time [Impaired Insight] : insight and judgment were intact [FreeTextEntry1] : facial asymmetry

## 2024-05-31 RX ORDER — DIPHENHYDRAMINE HYDROCHLORIDE AND LIDOCAINE HYDROCHLORIDE AND ALUMINUM HYDROXIDE AND MAGNESIUM HYDRO
KIT
Qty: 1 | Refills: 3 | Status: ACTIVE | COMMUNITY
Start: 2024-01-23 | End: 1900-01-01

## 2024-06-12 NOTE — PROGRESS NOTE ADULT - SUBJECTIVE AND OBJECTIVE BOX
Patient says he is taking this medication(BUMEX 1 MG) bid? On the script it says 1.5 MG by mouth daily. Please contact patient to advise with instructions for this medication   INTERVAL HPI/OVERNIGHT EVENTS: no complaints, pain well controlled, tolerating diet, no overnight events    STATUS POST:  Perc Shu Tube 3/17  Robo Shu 5/13      MEDICATIONS  (STANDING):  acetaminophen     Tablet .. 650 milliGRAM(s) Oral every 6 hours  aspirin  chewable 81 milliGRAM(s) Oral daily  atorvastatin 80 milliGRAM(s) Oral at bedtime  chlorhexidine 0.12% Liquid 15 milliLiter(s) Swish and Spit two times a day  enoxaparin Injectable 40 milliGRAM(s) SubCutaneous every 24 hours  ertapenem  IVPB      ertapenem  IVPB 1000 milliGRAM(s) IV Intermittent every 24 hours  escitalopram 10 milliGRAM(s) Oral daily  ferrous    sulfate 325 milliGRAM(s) Oral daily  finasteride 5 milliGRAM(s) Oral daily  ibuprofen  Tablet. 400 milliGRAM(s) Oral every 8 hours  lactated ringers. 1000 milliLiter(s) (75 mL/Hr) IV Continuous <Continuous>  midodrine. 10 milliGRAM(s) Oral three times a day  mirtazapine 15 milliGRAM(s) Oral daily  petrolatum Ophthalmic Ointment 1 Application(s) Both EYES four times a day  polyethylene glycol 3350 17 Gram(s) Oral daily  senna 1 Tablet(s) Oral at bedtime  sucralfate 1 Gram(s) Oral four times a day  tamsulosin 0.4 milliGRAM(s) Oral at bedtime    MEDICATIONS  (PRN):  ondansetron Injectable 4 milliGRAM(s) IV Push every 6 hours PRN Nausea      Vital Signs Last 24 Hrs  T(C): 36.8 (14 May 2024 10:26), Max: 37.1 (13 May 2024 23:35)  T(F): 98.3 (14 May 2024 10:26), Max: 98.8 (13 May 2024 23:35)  HR: 71 (14 May 2024 10:26) (71 - 84)  BP: 104/63 (14 May 2024 10:26) (104/63 - 147/76)  BP(mean): 75 (13 May 2024 17:15) (74 - 80)  RR: 16 (14 May 2024 10:26) (10 - 19)  SpO2: 94% (14 May 2024 10:26) (94% - 100%)    Parameters below as of 14 May 2024 10:26  Patient On (Oxygen Delivery Method): room air        PHYSICAL EXAM:      Constitutional: NAD    Respiratory: no accessory muscle use or conversational dyspnea    Cardiovascular: RRR    Gastrointestinal: mild diffuse tenderness > RUQ, soft, non-distended    Extremities: ZHENG          I&O's Detail    13 May 2024 07:01  -  14 May 2024 07:00  --------------------------------------------------------  IN:  Total IN: 0 mL    OUT:    Voided (mL): 800 mL  Total OUT: 800 mL    Total NET: -800 mL          LABS:                        10.2   4.71  )-----------( 159      ( 14 May 2024 06:02 )             33.2     05-14    138  |  100  |  13.6  ----------------------------<  117<H>  4.0   |  29.0  |  0.49<L>    Ca    8.8      14 May 2024 06:02  Phos  3.8     05-14  Mg     1.8     05-14    TPro  5.8<L>  /  Alb  3.2<L>  /  TBili  0.6  /  DBili  0.2  /  AST  224<H>  /  ALT  213<H>  /  AlkPhos  71  05-14    PT/INR - ( 13 May 2024 10:36 )   PT: 11.8 sec;   INR: 1.07 ratio         PTT - ( 13 May 2024 10:36 )  PTT:36.2 sec  Urinalysis Basic - ( 14 May 2024 06:02 )    Color: x / Appearance: x / SG: x / pH: x  Gluc: 117 mg/dL / Ketone: x  / Bili: x / Urobili: x   Blood: x / Protein: x / Nitrite: x   Leuk Esterase: x / RBC: x / WBC x   Sq Epi: x / Non Sq Epi: x / Bacteria: x        RADIOLOGY & ADDITIONAL STUDIES: no deformity, pain or tenderness. no restriction of movement

## 2024-06-25 ENCOUNTER — APPOINTMENT (OUTPATIENT)
Dept: GASTROENTEROLOGY | Facility: CLINIC | Age: 74
End: 2024-06-25
Payer: MEDICARE

## 2024-06-25 VITALS
SYSTOLIC BLOOD PRESSURE: 106 MMHG | HEIGHT: 71 IN | DIASTOLIC BLOOD PRESSURE: 58 MMHG | OXYGEN SATURATION: 98 % | RESPIRATION RATE: 14 BRPM | HEART RATE: 93 BPM | BODY MASS INDEX: 18.9 KG/M2 | WEIGHT: 135 LBS

## 2024-06-25 DIAGNOSIS — N40.0 BENIGN PROSTATIC HYPERPLASIA WITHOUT LOWER URINARY TRACT SYMPMS: ICD-10-CM

## 2024-06-25 DIAGNOSIS — R13.12 DYSPHAGIA, OROPHARYNGEAL PHASE: ICD-10-CM

## 2024-06-25 DIAGNOSIS — T85.598A OTHER MECHANICAL COMPLICATION OF OTHER GASTROINTESTINAL PROSTHETIC DEVICES, IMPLANTS AND GRAFTS, INITIAL ENCOUNTER: ICD-10-CM

## 2024-06-25 DIAGNOSIS — R59.0 LOCALIZED ENLARGED LYMPH NODES: ICD-10-CM

## 2024-06-25 DIAGNOSIS — Z97.8 PRESENCE OF OTHER SPECIFIED DEVICES: ICD-10-CM

## 2024-06-25 DIAGNOSIS — E11.9 TYPE 2 DIABETES MELLITUS W/OUT COMPLICATIONS: ICD-10-CM

## 2024-06-25 DIAGNOSIS — C03.0 MALIGNANT NEOPLASM OF UPPER GUM: ICD-10-CM

## 2024-06-25 DIAGNOSIS — T85.598S: ICD-10-CM

## 2024-06-25 DIAGNOSIS — E43 UNSPECIFIED SEVERE PROTEIN-CALORIE MALNUTRITION: ICD-10-CM

## 2024-06-25 DIAGNOSIS — G62.9 POLYNEUROPATHY, UNSPECIFIED: ICD-10-CM

## 2024-06-25 DIAGNOSIS — K12.30 ORAL MUCOSITIS (ULCERATIVE), UNSPECIFIED: ICD-10-CM

## 2024-06-25 PROCEDURE — 99214 OFFICE O/P EST MOD 30 MIN: CPT

## 2024-06-25 RX ORDER — DIPHENHYDRAMINE HYDROCHLORIDE AND LIDOCAINE HYDROCHLORIDE AND ALUMINUM HYDROXIDE AND MAGNESIUM HYDRO
KIT
Qty: 2 | Refills: 2 | Status: ACTIVE | COMMUNITY
Start: 2024-06-25 | End: 1900-01-01

## 2024-06-25 RX ORDER — NYSTATIN 100000 [USP'U]/ML
100000 SUSPENSION ORAL 3 TIMES DAILY
Qty: 500 | Refills: 3 | Status: ACTIVE | COMMUNITY
Start: 2024-06-25 | End: 1900-01-01

## 2024-06-25 RX ORDER — LIDOCAINE HYDROCHLORIDE 20 MG/ML
2 SOLUTION ORAL; TOPICAL
Qty: 500 | Refills: 3 | Status: ACTIVE | COMMUNITY
Start: 2024-06-25 | End: 1900-01-01

## 2024-06-26 PROBLEM — K12.30 ORAL MUCOSITIS: Status: ACTIVE | Noted: 2024-01-26

## 2024-06-26 PROBLEM — E43 SEVERE MALNUTRITION: Status: ACTIVE | Noted: 2024-06-26

## 2024-06-26 PROBLEM — G62.9 NEUROPATHY: Status: ACTIVE | Noted: 2022-07-21

## 2024-06-26 PROBLEM — C03.0 SQUAMOUS CELL CARCINOMA OF MAXILLARY ALVEOLAR RIDGE: Status: ACTIVE | Noted: 2023-10-31

## 2024-06-26 PROBLEM — R59.0 HILAR LYMPHADENOPATHY: Status: ACTIVE | Noted: 2024-05-28

## 2024-06-26 PROBLEM — E11.9 DIABETES MELLITUS: Status: ACTIVE | Noted: 2022-07-21

## 2024-06-26 PROBLEM — N40.0 BPH (BENIGN PROSTATIC HYPERPLASIA): Status: ACTIVE | Noted: 2023-10-31

## 2024-07-15 ENCOUNTER — APPOINTMENT (OUTPATIENT)
Age: 74
End: 2024-07-15

## 2024-07-15 PROCEDURE — 99213 OFFICE O/P EST LOW 20 MIN: CPT

## 2024-08-06 NOTE — ASU PREOP CHECKLIST - SKIN PREP
Results reviewed and discussed with the patient over the phone. Labs revealed low iron stores. Pt reports blood donation every 8 weeks and recheck labs in three months and discontinue blood donation.Recommend to increase dose of iron to ferrous sulfate 325 mg daily. 
n/a

## 2024-09-24 ENCOUNTER — NON-APPOINTMENT (OUTPATIENT)
Age: 74
End: 2024-09-24

## 2024-09-25 ENCOUNTER — RESULT REVIEW (OUTPATIENT)
Age: 74
End: 2024-09-25

## 2024-09-25 ENCOUNTER — APPOINTMENT (OUTPATIENT)
Dept: HEMATOLOGY ONCOLOGY | Facility: CLINIC | Age: 74
End: 2024-09-25
Payer: MEDICARE

## 2024-09-25 VITALS
HEART RATE: 81 BPM | HEIGHT: 71 IN | DIASTOLIC BLOOD PRESSURE: 65 MMHG | WEIGHT: 141.87 LBS | SYSTOLIC BLOOD PRESSURE: 100 MMHG | BODY MASS INDEX: 19.86 KG/M2 | OXYGEN SATURATION: 100 %

## 2024-09-25 PROCEDURE — 99214 OFFICE O/P EST MOD 30 MIN: CPT

## 2024-09-25 NOTE — ASSESSMENT
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  Perineural invasion also noted on path.   -Dr. Frazier discussed with him the role of chemotherapy or antibody therapy to enhance the effectiveness of radiation therapy and explained that is also helps eradicate micrometastatic diseaes to reduce the risk of both distant and local recurrence.   -he has an ECOG 3 performance status and is quite frail and may have a difficult time tolerating treatment. To help mitigate that and hopefully improve his strength prior to treatment was recommended a feeding tube be placed to supplement his nutritional needs. PEG placed on 1/11/24.   -discussed proper mouth care to help with dysgeusia and advised mucinex for excessive thick oral secretions  -continue silvaden for right neck -SUJIT Koehler will follow up today -scheduled to complete RT on 2/27/24.  Final Carbo scheduled for 2/20/24.   -04/18/24: Mr Madera returns for follow up after hospitalization for acute cholecystitis and septic shock as noted above, now  s/p percutaneous cholecystostomy. 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  - post treatment PET on 4/19/24: post treatment changes at resected RIGHT maxillary lesion without lore evidence of residual uptake.  Previously seen nodes in the neck appear to have resolved.  Unchanged mild BILATERAL hilar uptake, nonspecific and likely reactive.  Stable punctate RIGHT lung micronodule below resolution of PET and without abnormal uptake.  Findings consistent with recent cholecystostomy tube. Attention to follow-up.  Placement of gastrostomy tube.  Otherwise, no evidence of FDG-avid malignancy. - weight stable, trying to eat PO but 75% of calories via PEG - still with xerostomia and dysgeusia, both slowly improving. 09/25/24: Mr Madera returns for follow up. He has gained weight, taste slowly improving. Will see Dr May next month, currently VILMA by imaging and clinically. Imaging as per Dr May.  Follow up. in 3 months

## 2024-09-25 NOTE — REVIEW OF SYSTEMS
[Patient Intake Form Reviewed] : Patient intake form was reviewed [Fever] : no fever [Fatigue] : fatigue [Recent Change In Weight] : ~T recent weight change [Eye Pain] : no eye pain [Vision Problems] : no vision problems [Dysphagia] : no dysphagia [Loss of Hearing] : no loss of hearing [Hoarseness] : no hoarseness [Odynophagia] : no odynophagia [Chest Pain] : no chest pain [Lower Ext Edema] : no lower extremity edema [Shortness Of Breath] : no shortness of breath [Cough] : no cough [SOB on Exertion] : no shortness of breath during exertion [Abdominal Pain] : no abdominal pain [Vomiting] : no vomiting [Constipation] : constipation [Dysuria] : no dysuria [Joint Pain] : no joint pain [Proptosis] : no proptosis [Easy Bleeding] : no tendency for easy bleeding [Easy Bruising] : no tendency for easy bruising [Swollen Glands] : no swollen glands

## 2024-09-25 NOTE — PHYSICAL EXAM
[Restricted in physically strenuous activity but ambulatory and able to carry out work of a light or sedentary nature] : Status 1- Restricted in physically strenuous activity but ambulatory and able to carry out work of a light or sedentary nature, e.g., light house work, office work [Capable of only limited self care, confined to bed or chair more than 50% of waking hours] : Status 3- Capable of only limited self care, confined to bed or chair more than 50% of waking hours [Normal] : affect appropriate [de-identified] : thin, frail, wheelchair bound

## 2024-09-25 NOTE — PHYSICAL EXAM
[Restricted in physically strenuous activity but ambulatory and able to carry out work of a light or sedentary nature] : Status 1- Restricted in physically strenuous activity but ambulatory and able to carry out work of a light or sedentary nature, e.g., light house work, office work [Capable of only limited self care, confined to bed or chair more than 50% of waking hours] : Status 3- Capable of only limited self care, confined to bed or chair more than 50% of waking hours [Normal] : affect appropriate [de-identified] : thin, frail, wheelchair bound

## 2024-09-25 NOTE — HISTORY OF PRESENT ILLNESS
[T: ___] : T[unfilled] [N: ___] : N[unfilled] [de-identified] : 72 yo M with h/o DM and BPH who was diagnosed with SCCa of the right maxillary alveolar ridge in October 2023.  Patient states he had a bilateral cataract surgery ~1 year ago in [place] that led to a severe infection of his left eye that was treated with strong medications for an extended length of time and ultimately caused vision loss of his left eye. He states he developed an oral blister which he feels may have been a side effect of the strong medications he was taking for the infection. He had inflammation, pain, and continued blistering of his mouth and followed up with his PCP who referred him to an oral specialist who referred him to Oolaryngology, Dr. May. Pt had a biopsy of the right maxillary alveolar ridge on 10/02/23 that returned positive for SCCa.  Staging PET on 10/12/23 showed intense uptake at previously reported RIGHT maxillary lesion consistent with malignant involvement.  Bilateral nodes in the neck where additional malignant involvement is not excluded. Differential diagnosis also includes reaction to recent biopsy. Additionally, there is some fatty change in the region adjacent to the RIGHT maxilla within the oral cavity. Activity seen at the contralateral tongue border may be due to altered mastication patterns. Consider MRI of the neck if not recently obtained or planned as of yet.  Mild BILATERAL hilar uptake, nonspecific and most likely reactive.  Otherwise, no evidence of FDG-avid malignancy outside the head and neck.  On 11/10/23 he underwent a right hemimaxillectomy and supraomohyoidal neck dissection levels 1, 2, and 3, left sided radial free fibular flap and eft sided split thickness skin graft. Path report was c/w squamous cell carcinoma, well to moderately differentiated, invading through maxillary cortical bone. Perineural invasion is identified. Carcinoma is present 4 mm from the lateral resection margin. All tumor bed margins negative for invasive tumor.  Tumor present in 2 regional ipsilateral lymph nodes.  He comes today for medical oncology consultation,. His eating is variable at the rehab due to the quality of the food per the patient and he has lost weight.  He is able to stand only with signficant assistance. 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  Scheduled to complete RT on 2/27/24.  Final Carbo scheduled for 2/20/24.  + Intermittent dizziness. + Dysgeusia and decreased appetite. + Stomatitis. + PEG, calories 70/30 PEG vs PO. No dysphagia or odynophagia. + Intermittent diarrhea. + Excessive thick oral secretions. + Significant erythema, right neck.  No N/V. No neuropathy. No fever or chills.   04/18/14: Mr Hussein returns for follow up.  He was hospitalized recently when he presented for severe abdominal pain.  Initial CT abdomen showed acute cholecystitis with contained perforation.  In the ED, found to be in severe septic shock with leukocytosis (WBC 14), transaminitis, & lactic acidosis (8.1), fluid resuscitated and started on pressors (Levophed) and Zosyn. Admitted to MICU for acute cholecystitis.  CT a/p and sono showed acute cholecystitis with contained perforation. Surgery consulted but recommended against surgical intervention. IR performed percutaneous cholecystostomy on 3/17, initially draining purulent fluid and now draining bilious fluid. BCx grew ESBL E. coli, started on ertapenem.  Worsening LFTS noted, MRCP with no obstruction. Seen by GI, status post ERCP with sphincterotomy. Repeat blood cultures negative. LFTS downtrending. Drop in Hg noted after ERCP, transfused 1 unit PRBC> CT abdomen and pelvis negative. Noted to have esophagitis on ERCP. Started on PPI bID with carafate. Seen by GI, hb/hct stable. Discharge home to complete IV Invanz on 4/3/24 -after discharge he was seen by Dr. May who ordered a PET scan which is scheduled for tomorrow.  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  Post treatment PET on 4/19/24 showed VILMA + Poor calorie intake, 75% of calories via PEG, 25% PO. + Poor appetite but no dysphagia or odynophagia. Still on thick liquids. But despite this weight stable. + Soft stool, no diarrhea. + Erythema right neck much improved, now lymphedema in this area. + Xerostomia. + Dysgeusia. + Intermittent dizziness and chronic hypotension. No fevers since D/C from the hospital.  09/25/24: Mr Hussein returns for follow up. He has gained weight, taste slowly improving. Will see Dr May next month, currently VILMA by imaging and clinically. Imaging as per Dr May.  Follow up. in 3 months

## 2024-09-25 NOTE — HISTORY OF PRESENT ILLNESS
[T: ___] : T[unfilled] [N: ___] : N[unfilled] [de-identified] : 72 yo M with h/o DM and BPH who was diagnosed with SCCa of the right maxillary alveolar ridge in October 2023.  Patient states he had a bilateral cataract surgery ~1 year ago in [place] that led to a severe infection of his left eye that was treated with strong medications for an extended length of time and ultimately caused vision loss of his left eye. He states he developed an oral blister which he feels may have been a side effect of the strong medications he was taking for the infection. He had inflammation, pain, and continued blistering of his mouth and followed up with his PCP who referred him to an oral specialist who referred him to Oolaryngology, Dr. May. Pt had a biopsy of the right maxillary alveolar ridge on 10/02/23 that returned positive for SCCa.  Staging PET on 10/12/23 showed intense uptake at previously reported RIGHT maxillary lesion consistent with malignant involvement.  Bilateral nodes in the neck where additional malignant involvement is not excluded. Differential diagnosis also includes reaction to recent biopsy. Additionally, there is some fatty change in the region adjacent to the RIGHT maxilla within the oral cavity. Activity seen at the contralateral tongue border may be due to altered mastication patterns. Consider MRI of the neck if not recently obtained or planned as of yet.  Mild BILATERAL hilar uptake, nonspecific and most likely reactive.  Otherwise, no evidence of FDG-avid malignancy outside the head and neck.  On 11/10/23 he underwent a right hemimaxillectomy and supraomohyoidal neck dissection levels 1, 2, and 3, left sided radial free fibular flap and eft sided split thickness skin graft. Path report was c/w squamous cell carcinoma, well to moderately differentiated, invading through maxillary cortical bone. Perineural invasion is identified. Carcinoma is present 4 mm from the lateral resection margin. All tumor bed margins negative for invasive tumor.  Tumor present in 2 regional ipsilateral lymph nodes.  He comes today for medical oncology consultation,. His eating is variable at the rehab due to the quality of the food per the patient and he has lost weight.  He is able to stand only with signficant assistance. 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  Scheduled to complete RT on 2/27/24.  Final Carbo scheduled for 2/20/24.  + Intermittent dizziness. + Dysgeusia and decreased appetite. + Stomatitis. + PEG, calories 70/30 PEG vs PO. No dysphagia or odynophagia. + Intermittent diarrhea. + Excessive thick oral secretions. + Significant erythema, right neck.  No N/V. No neuropathy. No fever or chills.   04/18/14: Mr Hussein returns for follow up.  He was hospitalized recently when he presented for severe abdominal pain.  Initial CT abdomen showed acute cholecystitis with contained perforation.  In the ED, found to be in severe septic shock with leukocytosis (WBC 14), transaminitis, & lactic acidosis (8.1), fluid resuscitated and started on pressors (Levophed) and Zosyn. Admitted to MICU for acute cholecystitis.  CT a/p and sono showed acute cholecystitis with contained perforation. Surgery consulted but recommended against surgical intervention. IR performed percutaneous cholecystostomy on 3/17, initially draining purulent fluid and now draining bilious fluid. BCx grew ESBL E. coli, started on ertapenem.  Worsening LFTS noted, MRCP with no obstruction. Seen by GI, status post ERCP with sphincterotomy. Repeat blood cultures negative. LFTS downtrending. Drop in Hg noted after ERCP, transfused 1 unit PRBC> CT abdomen and pelvis negative. Noted to have esophagitis on ERCP. Started on PPI bID with carafate. Seen by GI, hb/hct stable. Discharge home to complete IV Invanz on 4/3/24 -after discharge he was seen by Dr. May who ordered a PET scan which is scheduled for tomorrow.  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  Post treatment PET on 4/19/24 showed VILMA + Poor calorie intake, 75% of calories via PEG, 25% PO. + Poor appetite but no dysphagia or odynophagia. Still on thick liquids. But despite this weight stable. + Soft stool, no diarrhea. + Erythema right neck much improved, now lymphedema in this area. + Xerostomia. + Dysgeusia. + Intermittent dizziness and chronic hypotension. No fevers since D/C from the hospital.  09/25/24: Mr Hussein returns for follow up. He has gained weight, taste slowly improving. Will see Dr May next month, currently VILMA by imaging and clinically. Imaging as per Dr May.  Follow up. in 3 months

## 2024-10-02 NOTE — ASU PREOP CHECKLIST - IDENTIFICATION BAND VERIFIED
----- Message from SJ Sierra sent at 10/2/2024  1:25 PM CDT -----    ----- Message -----  From: Zeo Castro MA  Sent: 10/2/2024   1:00 PM CDT  To: SJ Christiansen      ----- Message -----  From: Luz Tatum FNP  Sent: 10/2/2024  10:24 AM CDT  To: Zoe Castro MA    Yes  ----- Message -----  From: Zoe Castro MA  Sent: 10/2/2024  10:12 AM CDT  To: SJ Velarde    Defer to PCP?  ----- Message -----  From: Naty Canada  Sent: 10/1/2024  10:06 AM CDT  To: [facility] Clinical Support Staff    Pt called requesting a refill on these medications, pt can be reached @ [phone]    ergocalciferol (VITAMIN D2) 50,000 unit Cap   ibuprofen (ADVIL,MOTRIN) 800 MG tablet       [facility] in [place], [place]  
----- Message from SJ Sierra sent at 10/2/2024  1:25 PM CDT -----    ----- Message -----  From: Zoe Castro MA  Sent: 10/2/2024   1:00 PM CDT  To: SJ Christiansen      ----- Message -----  From: Luz Tatum FNP  Sent: 10/2/2024  10:24 AM CDT  To: Zoe Castro MA    Yes  ----- Message -----  From: Zoe Castro MA  Sent: 10/2/2024  10:12 AM CDT  To: SJ Velarde    Defer to PCP?  ----- Message -----  From: Naty Canada  Sent: 10/1/2024  10:06 AM CDT  To: [facility] Clinical Support Staff    Pt called requesting a refill on these medications, pt can be reached @ [phone]    ergocalciferol (VITAMIN D2) 50,000 unit Cap   ibuprofen (ADVIL,MOTRIN) 800 MG tablet       [facility] in [place], [place]  
LOV 11/2023   No show 12/2023  NOV 12/2024  
done

## 2024-10-04 LAB
ALBUMIN SERPL ELPH-MCNC: 4.8 G/DL
ALP BLD-CCNC: 80 U/L
ALT SERPL-CCNC: 15 U/L
ANION GAP SERPL CALC-SCNC: 13 MMOL/L
AST SERPL-CCNC: 20 U/L
BILIRUB SERPL-MCNC: 0.4 MG/DL
BUN SERPL-MCNC: 28 MG/DL
CALCIUM SERPL-MCNC: 10.4 MG/DL
CHLORIDE SERPL-SCNC: 95 MMOL/L
CO2 SERPL-SCNC: 30 MMOL/L
CREAT SERPL-MCNC: 0.55 MG/DL
EGFR: 105 ML/MIN/1.73M2
GLUCOSE SERPL-MCNC: 160 MG/DL
MAGNESIUM SERPL-MCNC: 1.8 MG/DL
POTASSIUM SERPL-SCNC: 4.3 MMOL/L
PROT SERPL-MCNC: 7.9 G/DL
SODIUM SERPL-SCNC: 138 MMOL/L

## 2024-10-16 ENCOUNTER — APPOINTMENT (OUTPATIENT)
Dept: RADIATION ONCOLOGY | Facility: CLINIC | Age: 74
End: 2024-10-16

## 2024-10-21 ENCOUNTER — APPOINTMENT (OUTPATIENT)
Age: 74
End: 2024-10-21

## 2025-01-02 ENCOUNTER — APPOINTMENT (OUTPATIENT)
Dept: GASTROENTEROLOGY | Facility: CLINIC | Age: 75
End: 2025-01-02

## 2025-01-02 ENCOUNTER — APPOINTMENT (OUTPATIENT)
Dept: HEMATOLOGY ONCOLOGY | Facility: CLINIC | Age: 75
End: 2025-01-02
